# Patient Record
Sex: FEMALE | Race: OTHER | NOT HISPANIC OR LATINO | Employment: OTHER | URBAN - METROPOLITAN AREA
[De-identification: names, ages, dates, MRNs, and addresses within clinical notes are randomized per-mention and may not be internally consistent; named-entity substitution may affect disease eponyms.]

---

## 2017-02-16 ENCOUNTER — TRANSCRIBE ORDERS (OUTPATIENT)
Dept: ADMINISTRATIVE | Facility: HOSPITAL | Age: 72
End: 2017-02-16

## 2017-02-16 ENCOUNTER — ALLSCRIPTS OFFICE VISIT (OUTPATIENT)
Dept: OTHER | Facility: OTHER | Age: 72
End: 2017-02-16

## 2017-02-16 DIAGNOSIS — R07.9 CHEST PAIN, UNSPECIFIED: Primary | ICD-10-CM

## 2017-03-02 ENCOUNTER — HOSPITAL ENCOUNTER (OUTPATIENT)
Dept: NUCLEAR MEDICINE | Facility: HOSPITAL | Age: 72
Discharge: HOME | End: 2017-03-02
Attending: INTERNAL MEDICINE
Payer: COMMERCIAL

## 2017-03-02 ENCOUNTER — HOSPITAL ENCOUNTER (OUTPATIENT)
Dept: NON INVASIVE DIAGNOSTICS | Facility: HOSPITAL | Age: 72
Discharge: HOME | End: 2017-03-02
Attending: INTERNAL MEDICINE
Payer: COMMERCIAL

## 2017-03-02 DIAGNOSIS — I10 ESSENTIAL (PRIMARY) HYPERTENSION: ICD-10-CM

## 2017-03-02 DIAGNOSIS — E78.5 HYPERLIPIDEMIA: ICD-10-CM

## 2017-03-02 DIAGNOSIS — I25.10 ATHEROSCLEROTIC HEART DISEASE OF NATIVE CORONARY ARTERY WITHOUT ANGINA PECTORIS: ICD-10-CM

## 2017-03-02 DIAGNOSIS — R07.9 CHEST PAIN: ICD-10-CM

## 2017-03-02 DIAGNOSIS — R07.9 CHEST PAIN, UNSPECIFIED: ICD-10-CM

## 2017-03-02 PROCEDURE — 93017 CV STRESS TEST TRACING ONLY: CPT

## 2017-03-02 PROCEDURE — A9502 TC99M TETROFOSMIN: HCPCS

## 2017-03-02 PROCEDURE — 78452 HT MUSCLE IMAGE SPECT MULT: CPT

## 2017-03-02 RX ADMIN — REGADENOSON 0.4 MG: 0.08 INJECTION, SOLUTION INTRAVENOUS at 10:08

## 2017-03-03 ENCOUNTER — CONVERSION ENCOUNTER (OUTPATIENT)
Dept: MAMMOGRAPHY | Facility: CLINIC | Age: 72
End: 2017-03-03

## 2018-01-12 VITALS
BODY MASS INDEX: 35.87 KG/M2 | HEART RATE: 74 BPM | SYSTOLIC BLOOD PRESSURE: 116 MMHG | WEIGHT: 190 LBS | RESPIRATION RATE: 18 BRPM | HEIGHT: 61 IN | DIASTOLIC BLOOD PRESSURE: 64 MMHG

## 2018-02-22 RX ORDER — PREGABALIN 75 MG/1
75 CAPSULE ORAL
COMMUNITY
Start: 2013-10-22

## 2018-02-22 RX ORDER — METRONIDAZOLE 500 MG/1
500 TABLET ORAL
COMMUNITY
Start: 2013-10-22 | End: 2019-04-27 | Stop reason: ALTCHOICE

## 2018-02-22 RX ORDER — LEVOFLOXACIN 500 MG/1
500 TABLET, FILM COATED ORAL
COMMUNITY
Start: 2013-10-22 | End: 2019-04-27 | Stop reason: ALTCHOICE

## 2018-02-22 RX ORDER — EZETIMIBE 10 MG/1
10 TABLET ORAL DAILY
Refills: 3 | COMMUNITY
Start: 2018-02-07 | End: 2020-06-09 | Stop reason: SDUPTHER

## 2018-02-22 RX ORDER — ERGOCALCIFEROL 1.25 MG/1
CAPSULE ORAL
Refills: 3 | COMMUNITY
Start: 2018-02-09 | End: 2020-07-20

## 2018-02-22 RX ORDER — DILTIAZEM HYDROCHLORIDE 240 MG/1
240 CAPSULE, COATED, EXTENDED RELEASE ORAL DAILY
Refills: 5 | COMMUNITY
Start: 2018-02-07 | End: 2020-01-10

## 2018-02-22 RX ORDER — ATORVASTATIN CALCIUM 40 MG/1
40 TABLET, FILM COATED ORAL
COMMUNITY
Start: 2013-10-18 | End: 2021-10-26 | Stop reason: ALTCHOICE

## 2018-02-22 RX ORDER — LOSARTAN POTASSIUM 100 MG/1
100 TABLET ORAL DAILY
Refills: 3 | COMMUNITY
Start: 2018-02-09 | End: 2020-02-04

## 2018-02-22 RX ORDER — FOLIC ACID 1 MG/1
1 TABLET ORAL DAILY
COMMUNITY
Start: 2011-10-07 | End: 2020-03-24 | Stop reason: SDUPTHER

## 2018-02-22 RX ORDER — METOPROLOL SUCCINATE 50 MG/1
50 TABLET, EXTENDED RELEASE ORAL DAILY
Refills: 5 | COMMUNITY
Start: 2018-02-07 | End: 2020-10-15 | Stop reason: SDUPTHER

## 2018-02-22 RX ORDER — OLMESARTAN MEDOXOMIL 40 MG/1
1 TABLET ORAL DAILY
COMMUNITY

## 2018-02-22 RX ORDER — LEVOTHYROXINE SODIUM 112 UG/1
112 TABLET ORAL DAILY
Refills: 5 | COMMUNITY
Start: 2018-02-07 | End: 2019-11-11 | Stop reason: SDUPTHER

## 2018-02-22 RX ORDER — NITROGLYCERIN 0.4 MG/1
1 TABLET SUBLINGUAL
COMMUNITY
Start: 2011-06-23

## 2018-02-22 RX ORDER — HYDROCHLOROTHIAZIDE 25 MG/1
25 TABLET ORAL DAILY
Refills: 1 | COMMUNITY
Start: 2018-02-09 | End: 2020-08-03 | Stop reason: SDUPTHER

## 2018-02-27 ENCOUNTER — OFFICE VISIT (OUTPATIENT)
Dept: CARDIOLOGY CLINIC | Facility: CLINIC | Age: 73
End: 2018-02-27
Payer: COMMERCIAL

## 2018-02-27 VITALS
HEART RATE: 59 BPM | SYSTOLIC BLOOD PRESSURE: 122 MMHG | BODY MASS INDEX: 36.7 KG/M2 | HEIGHT: 61 IN | WEIGHT: 194.4 LBS | DIASTOLIC BLOOD PRESSURE: 74 MMHG

## 2018-02-27 DIAGNOSIS — I10 ESSENTIAL HYPERTENSION: ICD-10-CM

## 2018-02-27 DIAGNOSIS — I25.118 CORONARY ARTERY DISEASE OF NATIVE ARTERY OF NATIVE HEART WITH STABLE ANGINA PECTORIS (HCC): Primary | ICD-10-CM

## 2018-02-27 DIAGNOSIS — E78.5 DYSLIPIDEMIA: ICD-10-CM

## 2018-02-27 PROCEDURE — 93000 ELECTROCARDIOGRAM COMPLETE: CPT | Performed by: INTERNAL MEDICINE

## 2018-02-27 PROCEDURE — 99214 OFFICE O/P EST MOD 30 MIN: CPT | Performed by: INTERNAL MEDICINE

## 2018-02-27 NOTE — LETTER
February 27, 2018     Haider Bermudez MD  [address] [address] [address]    Patient: Tatyana Guthrie   YOB: 1945   Date of Visit: 2/27/2018       Dear Dr Janet Luong: Thank you for referring Tatyana Guthrie to me for evaluation  Below are my notes for this consultation  If you have questions, please do not hesitate to call me  I look forward to following your patient along with you  Sincerely,        Bessy Murdock MD        CC: No Recipients  Bessy Murdock MD  2/27/2018  9:02 AM  Sign at close encounter                                             Cardiology Follow Up    Tatyana Guthrie  1945  [id number]  [address]  [address] [address]    1  Coronary artery disease of native artery of native heart with stable angina pectoris ([facility] [address] )     2  Essential hypertension  POCT ECG   3  Dyslipidemia         Discussion/Summary:  81-year-old female with a history of coronary artery disease without prior intervention  This has been stable  1   CAD:  Back pain, but reports this is stable  Been evaluated last year with a pharmacologic nuclear stress test which was unremarkable  She remains on aspirin, Toprol, Zetia with intolerance to statins in the past   Blood work followed by PCP  Goal LDL ideally 70  Not using sublingual nitroglycerin  2   Hypertension:  Controlled on her current regimen  3   Dyslipidemia:  As above  Continue Zetia  PCP through [address] [place] [address] system  Hopefully will be able to see these results in the near future  No testing or changes indicated at this time  Follow up one year unless changes in the interim  History of Present Illness:     81-year-old female  Cardiac catheterization in 2009 showed disease of diagonal   Otherwise no significant CAD  Treated medically  No intervention performed  Seen last 1 year ago  At that time, she was having some discomfort in her back between her shoulder blades  Concerning for angina, so we proceeded with a pharmacologic nuclear stress test   There were no significant abnormalities noted on the study  Since that time, she says back pain has remained constant, today tells me that this has been a much more longstanding issue  She has prior surgeries on her back as well  She complains of neurologic type pain on the left side of her body and also an abnormality when she hears a sudden loud noise that startles her  She has elevated liver chemistries with statins in the past, therefore is maintained on Zetia    Blood work is followed by her PCP, who does this twice a year  Problem List     Essential hypertension    Coronary artery disease of native artery of native heart with stable angina pectoris ([facility] [address] )    Dyslipidemia        No past medical history on file  Social History     Social History    Marital status: /Civil Union     Spouse name: N/A    Number of children: N/A    Years of education: N/A     Occupational History    Not on file  Social History Main Topics    Smoking status: Not on file    Smokeless tobacco: Not on file    Alcohol use Not on file    Drug use: Unknown    Sexual activity: Not on file     Other Topics Concern    Not on file     Social History Narrative    No narrative on file      No family history on file  No past surgical history on file      Current Outpatient Prescriptions:     aspirin 81 MG tablet, Take 1 tablet by mouth daily, Disp: , Rfl:     atorvastatin (LIPITOR) 40 mg tablet, Take 40 mg by mouth, Disp: , Rfl:     Calcium Carb-Cholecalciferol (OYSCO 500 + D) 500-200 MG-UNIT TABS, Reported on 1/12/2017 , Disp: , Rfl:     diltiazem (CARDIZEM CD) 240 mg 24 hr capsule, Take 240 mg by mouth daily, Disp: , Rfl: 5    ergocalciferol (VITAMIN D2) 50,000 units, TAKE ONE CAPSULE WEEKLY, Disp: , Rfl: 3    ezetimibe (ZETIA) 10 mg tablet, Take 10 mg by mouth daily, Disp: , Rfl: 3    folic acid (FOLVITE) 1 mg tablet, Take 1 tablet by mouth daily, Disp: , Rfl:     hydrochlorothiazide (HYDRODIURIL) 25 mg tablet, Take 25 mg by mouth daily, Disp: , Rfl: 1    levofloxacin (LEVAQUIN) 500 mg tablet, Take 500 mg by mouth, Disp: , Rfl:     levothyroxine 112 mcg tablet, Take 112 mcg by mouth daily, Disp: , Rfl: 5    losartan (COZAAR) 100 MG tablet, Take 100 mg by mouth daily, Disp: , Rfl: 3    metoprolol succinate (TOPROL-XL) 50 mg 24 hr tablet, Take 50 mg by mouth daily, Disp: , Rfl: 5    metroNIDAZOLE (FLAGYL) 500 mg tablet, Take 500 mg by mouth, Disp: , Rfl:     nitroglycerin (NITROSTAT) 0 4 mg SL tablet, Place 1 tablet under the tongue every 5 (five) minutes as needed, Disp: , Rfl:     olmesartan (BENICAR) 40 mg tablet, Take 1 tablet by mouth daily, Disp: , Rfl:     pregabalin (LYRICA) 75 mg capsule, Take 75 mg by mouth, Disp: , Rfl:   Allergies   Allergen Reactions    Codeine        Vitals:    02/27/18 0833   BP: 122/74   BP Location: Left arm   Patient Position: Sitting   Cuff Size: Large   Pulse: 59   Weight: 88 2 kg (194 lb 6 4 oz)   Height: 5' 1" (1 549 m)     Vitals:    02/27/18 0833   Weight: 88 2 kg (194 lb 6 4 oz)      Height: 5' 1" (154 9 cm)   Body mass index is 36 73 kg/m²  Physical Exam:  GENERAL: Alert, well appearing, and in no distress  HEENT:  PERRL, EOMI, no scleral icterus, no conjunctival pallor  NECK:  Supple, No elevated JVP, no thyromegaly, no carotid bruits  HEART:  Regular rate and rhythm, normal S1/S2, no S3/S4, no murmur or rub  LUNGS:  Clear to auscultation bilaterally  ABDOMEN:  Soft, non-tender, positive bowel sounds, no rebound or guarding  EXTREMITIES:  No edema  VASCULAR:  Normal pedal pulses   NEURO: No focal deficits,  SKIN: Normal without suspicious lesions on exposed skin    ROS:  Positive for parasthesias, back pain, abnormal hearing on left, headaches, weakness, fatigue, shaking    Except as noted in HPI, is otherwise reviewed in detail and a 12 point review of systems is negative  Imaging:  Pharmacologic nuclear stress test 2/2017:  Normal perfusion  Normal LV function  Normal study  EKG:  Sinus rhythm 60 beats per minute  First-degree AV block  Nonspecific T-wave abnormality

## 2018-02-27 NOTE — PROGRESS NOTES
Cardiology Follow Up    [facility] Delon  1945  [id number]  [address]    1  Coronary artery disease of native artery of native heart with stable angina pectoris ([address] )     2  Essential hypertension  POCT ECG   3  Dyslipidemia         Discussion/Summary:  67-year-old female with a history of coronary artery disease without prior intervention  This has been stable  1   CAD:  Back pain, but reports this is stable  Been evaluated last year with a pharmacologic nuclear stress test which was unremarkable  She remains on aspirin, Toprol, Zetia with intolerance to statins in the past   Blood work followed by PCP  Goal LDL ideally 70  Not using sublingual nitroglycerin  2   Hypertension:  Controlled on her current regimen  3   Dyslipidemia:  As above  Continue Zetia  PCP through [facility] system  Hopefully will be able to see these results in the near future  No testing or changes indicated at this time  Follow up one year unless changes in the interim  History of Present Illness:     67-year-old female  Cardiac catheterization in 2009 showed disease of diagonal   Otherwise no significant CAD  Treated medically  No intervention performed  Seen last 1 year ago  At that time, she was having some discomfort in her back between her shoulder blades  Concerning for angina, so we proceeded with a pharmacologic nuclear stress test   There were no significant abnormalities noted on the study  Since that time, she says back pain has remained constant, today tells me that this has been a much more longstanding issue  She has prior surgeries on her back as well  She complains of neurologic type pain on the left side of her body and also an abnormality when she hears a sudden loud noise that startles her      She has elevated liver chemistries with statins in the past, therefore is maintained on Zetia    Blood work is followed by her PCP, who does this twice a year  Problem List     Essential hypertension    Coronary artery disease of native artery of native heart with stable angina pectoris ([facility] [address] )    Dyslipidemia        No past medical history on file  Social History     Social History    Marital status: /Civil Union     Spouse name: N/A    Number of children: N/A    Years of education: N/A     Occupational History    Not on file  Social History Main Topics    Smoking status: Not on file    Smokeless tobacco: Not on file    Alcohol use Not on file    Drug use: Unknown    Sexual activity: Not on file     Other Topics Concern    Not on file     Social History Narrative    No narrative on file      No family history on file  No past surgical history on file      Current Outpatient Prescriptions:     aspirin 81 MG tablet, Take 1 tablet by mouth daily, Disp: , Rfl:     atorvastatin (LIPITOR) 40 mg tablet, Take 40 mg by mouth, Disp: , Rfl:     Calcium Carb-Cholecalciferol (OYSCO 500 + D) 500-200 MG-UNIT TABS, Reported on 1/12/2017 , Disp: , Rfl:     diltiazem (CARDIZEM CD) 240 mg 24 hr capsule, Take 240 mg by mouth daily, Disp: , Rfl: 5    ergocalciferol (VITAMIN D2) 50,000 units, TAKE ONE CAPSULE WEEKLY, Disp: , Rfl: 3    ezetimibe (ZETIA) 10 mg tablet, Take 10 mg by mouth daily, Disp: , Rfl: 3    folic acid (FOLVITE) 1 mg tablet, Take 1 tablet by mouth daily, Disp: , Rfl:     hydrochlorothiazide (HYDRODIURIL) 25 mg tablet, Take 25 mg by mouth daily, Disp: , Rfl: 1    levofloxacin (LEVAQUIN) 500 mg tablet, Take 500 mg by mouth, Disp: , Rfl:     levothyroxine 112 mcg tablet, Take 112 mcg by mouth daily, Disp: , Rfl: 5    losartan (COZAAR) 100 MG tablet, Take 100 mg by mouth daily, Disp: , Rfl: 3    metoprolol succinate (TOPROL-XL) 50 mg 24 hr tablet, Take 50 mg by mouth daily, Disp: , Rfl: 5    metroNIDAZOLE (FLAGYL) 500 mg tablet, Take 500 mg by mouth, Disp: , Rfl:     nitroglycerin (NITROSTAT) 0 4 mg SL tablet, Place 1 tablet under the tongue every 5 (five) minutes as needed, Disp: , Rfl:     olmesartan (BENICAR) 40 mg tablet, Take 1 tablet by mouth daily, Disp: , Rfl:     pregabalin (LYRICA) 75 mg capsule, Take 75 mg by mouth, Disp: , Rfl:   Allergies   Allergen Reactions    Codeine        Vitals:    02/27/18 0833   BP: 122/74   BP Location: Left arm   Patient Position: Sitting   Cuff Size: Large   Pulse: 59   Weight: 88 2 kg (194 lb 6 4 oz)   Height: 5' 1" (1 549 m)     Vitals:    02/27/18 0833   Weight: 88 2 kg (194 lb 6 4 oz)      Height: 5' 1" (154 9 cm)   Body mass index is 36 73 kg/m²  Physical Exam:  GENERAL: Alert, well appearing, and in no distress  HEENT:  PERRL, EOMI, no scleral icterus, no conjunctival pallor  NECK:  Supple, No elevated JVP, no thyromegaly, no carotid bruits  HEART:  Regular rate and rhythm, normal S1/S2, no S3/S4, no murmur or rub  LUNGS:  Clear to auscultation bilaterally  ABDOMEN:  Soft, non-tender, positive bowel sounds, no rebound or guarding  EXTREMITIES:  No edema  VASCULAR:  Normal pedal pulses   NEURO: No focal deficits,  SKIN: Normal without suspicious lesions on exposed skin    ROS:  Positive for parasthesias, back pain, abnormal hearing on left, headaches, weakness, fatigue, shaking  Except as noted in HPI, is otherwise reviewed in detail and a 12 point review of systems is negative  Imaging:  Pharmacologic nuclear stress test 2/2017:  Normal perfusion  Normal LV function  Normal study  EKG:  Sinus rhythm 60 beats per minute  First-degree AV block  Nonspecific T-wave abnormality

## 2018-04-04 ENCOUNTER — CONVERSION ENCOUNTER (OUTPATIENT)
Dept: MAMMOGRAPHY | Facility: CLINIC | Age: 73
End: 2018-04-04

## 2018-05-03 LAB
ABSOL LYMPHOCYTES (HISTORICAL): 2.9 K/UL (ref 0.5–4)
ALBUMIN SERPL BCP-MCNC: 4.3 G/DL (ref 3–5.2)
ALP SERPL-CCNC: 63 U/L (ref 43–122)
ALT SERPL W P-5'-P-CCNC: 51 U/L (ref 9–52)
ANION GAP SERPL CALCULATED.3IONS-SCNC: 8 MMOL/L (ref 5–14)
AST SERPL W P-5'-P-CCNC: 39 U/L (ref 14–36)
BASOPHILS # BLD AUTO: 0.1 K/UL (ref 0–0.1)
BASOPHILS # BLD AUTO: 1 % (ref 0–1)
BILIRUB SERPL-MCNC: 0.4 MG/DL
BUN SERPL-MCNC: 16 MG/DL (ref 5–25)
CALCIUM SERPL-MCNC: 9.9 MG/DL (ref 8.4–10.2)
CHLORIDE SERPL-SCNC: 100 MEQ/L (ref 97–108)
CHOLEST SERPL-MCNC: 179 MG/DL
CHOLEST/HDLC SERPL: 2.6 {RATIO}
CK SERPL-CCNC: 113 U/L (ref 30–135)
CO2 SERPL-SCNC: 30 MMOL/L (ref 22–30)
CREATINE, SERUM (HISTORICAL): 0.93 MG/DL (ref 0.6–1.2)
DEPRECATED RDW RBC AUTO: 14 %
EGFR (HISTORICAL): 59 ML/MIN/1.73 M2
EOSINOPHIL # BLD AUTO: 0.2 K/UL (ref 0–0.4)
EOSINOPHIL NFR BLD AUTO: 3 % (ref 0–6)
GLUCOSE SERPL-MCNC: 91 MG/DL (ref 70–99)
HCT VFR BLD AUTO: 41 % (ref 36–46)
HDLC SERPL-MCNC: 69 MG/DL
HGB BLD-MCNC: 12.8 G/DL (ref 12–16)
LDL/HDL RATIO (HISTORICAL): 1.3
LDLC SERPL CALC-MCNC: 87 MG/DL
LYMPHOCYTES NFR BLD AUTO: 38 % (ref 25–45)
MCH RBC QN AUTO: 26.8 PG (ref 26–34)
MCHC RBC AUTO-ENTMCNC: 31.3 % (ref 31–36)
MCV RBC AUTO: 86 FL (ref 80–100)
MONOCYTES # BLD AUTO: 0.7 K/UL (ref 0.2–0.9)
MONOCYTES NFR BLD AUTO: 9 % (ref 1–10)
NEUTROPHILS ABS COUNT (HISTORICAL): 3.8 K/UL (ref 1.8–7.8)
NEUTS SEG NFR BLD AUTO: 49 % (ref 45–65)
PLATELET # BLD AUTO: 215 K/MCL (ref 150–450)
POTASSIUM SERPL-SCNC: 4.5 MEQ/L (ref 3.6–5)
RBC # BLD AUTO: 4.78 M/MCL (ref 4–5.2)
SODIUM SERPL-SCNC: 139 MEQ/L (ref 137–147)
TOTAL PROTEIN (HISTORICAL): 7 G/DL (ref 5.9–8.4)
TRIGL SERPL-MCNC: 113 MG/DL
TSH SERPL DL<=0.05 MIU/L-ACNC: 6.65 UIU/ML (ref 0.47–4.68)
URIC ACID (HISTORICAL): 6.9 MG/DL (ref 2.5–7.5)
VLDLC SERPL CALC-MCNC: 23 MG/DL (ref 0–40)
WBC # BLD AUTO: 7.7 K/MCL (ref 4.5–11)

## 2018-06-15 ENCOUNTER — EVALUATION (OUTPATIENT)
Dept: PHYSICAL THERAPY | Facility: CLINIC | Age: 73
End: 2018-06-15
Payer: COMMERCIAL

## 2018-06-15 DIAGNOSIS — M54.16 LUMBAR RADICULOPATHY: ICD-10-CM

## 2018-06-15 DIAGNOSIS — M17.11 OSTEOARTHRITIS OF RIGHT KNEE, UNSPECIFIED OSTEOARTHRITIS TYPE: ICD-10-CM

## 2018-06-15 DIAGNOSIS — M17.12 OSTEOARTHRITIS OF LEFT KNEE, UNSPECIFIED OSTEOARTHRITIS TYPE: ICD-10-CM

## 2018-06-15 DIAGNOSIS — M25.562 LEFT KNEE PAIN, UNSPECIFIED CHRONICITY: Primary | ICD-10-CM

## 2018-06-15 PROCEDURE — 97162 PT EVAL MOD COMPLEX 30 MIN: CPT | Performed by: PHYSICAL MEDICINE & REHABILITATION

## 2018-06-15 PROCEDURE — G8991 OTHER PT/OT GOAL STATUS: HCPCS | Performed by: PHYSICAL MEDICINE & REHABILITATION

## 2018-06-15 PROCEDURE — G8990 OTHER PT/OT CURRENT STATUS: HCPCS | Performed by: PHYSICAL MEDICINE & REHABILITATION

## 2018-06-15 NOTE — LETTER
2018    Wale Greene PA-C  [address] [facility] [address]    Patient: Fallon Mcclain   YOB: 1945   Date of Visit: 6/15/2018     Encounter Diagnosis     ICD-10-CM    1  Left knee pain, unspecified chronicity M25 562 CANCELED: PT plan of care cert/re-cert     CANCELED: PT plan of care cert/re-cert   2  Lumbar radiculopathy M54 16 CANCELED: PT plan of care cert/re-cert     CANCELED: PT plan of care cert/re-cert   3  Osteoarthritis of left knee, unspecified osteoarthritis type M17 12 CANCELED: PT plan of care cert/re-cert     CANCELED: PT plan of care cert/re-cert   4  Osteoarthritis of right knee, unspecified osteoarthritis type M17 11 CANCELED: PT plan of care cert/re-cert     CANCELED: PT plan of care cert/re-cert       Dear Dr Yair Gonzales:    Please review the attached Plan of Care from [facility] recent visit  Please verify that you agree therapy should continue by signing the attached document and sending it back to our office  If you have any questions or concerns, please don't hesitate to call  Sincerely,    Edgar Lopez, PT      Referring Provider:      I certify that I have read the below Plan of Care and certify the need for these services furnished under this plan of treatment while under my care  Wale Greene PA-C  [address] [facility] [address]  VIA Facsimile: [phone]          PT Evaluation     Today's date: 6/15/2018  Patient name: Fallon Mcclain  : 1945  MRN: [id number]  Referring provider: Randal Perez  Dx:   Encounter Diagnosis     ICD-10-CM    1  Left knee pain, unspecified chronicity M25 562    2  Lumbar radiculopathy M54 16    3  Osteoarthritis of left knee, unspecified osteoarthritis type M17 12    4   Osteoarthritis of right knee, unspecified osteoarthritis type M17 11                   Assessment  Impairments: abnormal gait, abnormal or restricted ROM, lacks appropriate home exercise program, pain with function and poor posture     Assessment details: Patient is a 68year old female presenting with ROM and soft tissue restrictions, decreased strength, LBP, and bilateral knee pain  Current deficits limit functional activity tolerance and household mobility  Patient demonstrates good understanding and is motivated to participate in treatment plan  Patient would benefit from structured PT intervention to address current deficits and maximize functional mobility  Thank you for the referral    Understanding of Dx/Px/POC: good   Prognosis: good    Goals  1  Patient will report decreased pain with activity by at least 2 points within 4 weeks  2  Patient will improve ROM 5-10 degrees within 4 weeks  3  Patient will demonstrate improved strength by at least 1/2 grade within 4 weeks  4  Patient will ambulate greater than 2 blocks without requiring seated rest   5  Patient will climb stairs reciprocally with use of handrail by discharge      Plan  Patient would benefit from: skilled physical therapy  Planned modality interventions: TENS, cryotherapy and thermotherapy: hydrocollator packs  Planned therapy interventions: abdominal trunk stabilization, manual therapy, postural training, neuromuscular re-education, strengthening, stretching, therapeutic activities, therapeutic exercise, transfer training, gait training and home exercise program  Frequency: 2x week  Duration in weeks: 4  Treatment plan discussed with: patient and family        Subjective Evaluation    History of Present Illness  Mechanism of injury: Patient presents with complaints of LBP as well as bilateral knee pain, R>L  R knee pain present for 4 weeks, left knee pain 1 year of insidious onset  Long history of back pain secondary to stenosis, laminectomy performed in 2000  Patient notes constant numbness in the lateral LLE, cramping at night  Patient notes difficulty with ambulation greater than 2 blocks, stairclimbing   Increased pain in knees at night when getting up from bed to use the bathroom  Pain  Current pain ratin  At best pain ratin  At worst pain ratin  Aggravating factors: stair climbing, walking and standing          Objective     Active Range of Motion     Lumbar   Flexion: WFL  Extension: Active lumbar extension: Neutral  with pain  Left lateral flexion: 25 degrees WFL  Right lateral flexion: 22 degrees   Left Knee   Flexion: 118 degrees     Right Knee   Flexion: 100 degrees     Additional Active Range of Motion Details  Rotation 75% with pain  Increased lumbar lordosis in neutral stance  Patient rests in 10 deg trunk flexion    Strength/Myotome Testing     Left Knee   Flexion: 4+  Extension: 4+    Right Knee   Normal strength    General Comments     Lumbar Comments  Gait: slight right lateral trunk shift, left hip appears elevated vs  Right  Decreased gait speed, step length, heel strike, hip and knee excursion       Hip Comments   L hip flexion produces back pain in sitting and supine, 4/5  (+) passive SLR, 30 deg L 50 deg R  90/90 -45 bilaterally  Patient unable to complete bridge for leveling of pelvis, plan to perform longsit test per patient tolerance NV  Increased back pain with passive log roll for assessment of hip IR/ER          Precautions: previous back surgery    Daily Treatment Diary     Manual              Lumbar rotation mobs             B knee PROM                                                        Exercise Diary              UBE stand with TA             TA              TA with hip abd             Seated HS stretch             Lumbar roll outs             APT/PPT             LAQ             HR/TR                                                                                                                                                                             Modalities              CP/MHP/TENS prn

## 2018-06-15 NOTE — PROGRESS NOTES
PT Evaluation     Today's date: 6/15/2018  Patient name: Claudeen Norman  : 1945  MRN: [id number]  Referring provider: Lisa Hurley  Dx:   Encounter Diagnosis     ICD-10-CM    1  Left knee pain, unspecified chronicity M25 562    2  Lumbar radiculopathy M54 16    3  Osteoarthritis of left knee, unspecified osteoarthritis type M17 12    4  Osteoarthritis of right knee, unspecified osteoarthritis type M17 11                   Assessment  Impairments: abnormal gait, abnormal or restricted ROM, lacks appropriate home exercise program, pain with function and poor posture     Assessment details: Patient is a 68year old female presenting with ROM and soft tissue restrictions, decreased strength, LBP, and bilateral knee pain  Current deficits limit functional activity tolerance and household mobility  Patient demonstrates good understanding and is motivated to participate in treatment plan  Patient would benefit from structured PT intervention to address current deficits and maximize functional mobility  Thank you for the referral    Understanding of Dx/Px/POC: good   Prognosis: good    Goals  1  Patient will report decreased pain with activity by at least 2 points within 4 weeks  2  Patient will improve ROM 5-10 degrees within 4 weeks  3  Patient will demonstrate improved strength by at least 1/2 grade within 4 weeks  4   Patient will ambulate greater than 2 blocks without requiring seated rest   5  Patient will climb stairs reciprocally with use of handrail by discharge      Plan  Patient would benefit from: skilled physical therapy  Planned modality interventions: TENS, cryotherapy and thermotherapy: hydrocollator packs  Planned therapy interventions: abdominal trunk stabilization, manual therapy, postural training, neuromuscular re-education, strengthening, stretching, therapeutic activities, therapeutic exercise, transfer training, gait training and home exercise program  Frequency: 2x week  Duration in weeks: 4  Treatment plan discussed with: patient and family        Subjective Evaluation    History of Present Illness  Mechanism of injury: Patient presents with complaints of LBP as well as bilateral knee pain, R>L  R knee pain present for 4 weeks, left knee pain 1 year of insidious onset  Long history of back pain secondary to stenosis, laminectomy performed in   Patient notes constant numbness in the lateral LLE, cramping at night  Patient notes difficulty with ambulation greater than 2 blocks, stairclimbing  Increased pain in knees at night when getting up from bed to use the bathroom  Pain  Current pain ratin  At best pain ratin  At worst pain ratin  Aggravating factors: stair climbing, walking and standing          Objective     Active Range of Motion     Lumbar   Flexion: WFL  Extension: Active lumbar extension: Neutral  with pain  Left lateral flexion: 25 degrees WFL  Right lateral flexion: 22 degrees   Left Knee   Flexion: 118 degrees     Right Knee   Flexion: 100 degrees     Additional Active Range of Motion Details  Rotation 75% with pain  Increased lumbar lordosis in neutral stance  Patient rests in 10 deg trunk flexion    Strength/Myotome Testing     Left Knee   Flexion: 4+  Extension: 4+    Right Knee   Normal strength    General Comments     Lumbar Comments  Gait: slight right lateral trunk shift, left hip appears elevated vs  Right  Decreased gait speed, step length, heel strike, hip and knee excursion       Hip Comments   L hip flexion produces back pain in sitting and supine, 4/5  (+) passive SLR, 30 deg L 50 deg R  90/90 -45 bilaterally  Patient unable to complete bridge for leveling of pelvis, plan to perform longsit test per patient tolerance NV  Increased back pain with passive log roll for assessment of hip IR/ER          Precautions: previous back surgery    Daily Treatment Diary     Manual              Lumbar rotation mobs             B knee PROM Exercise Diary              UBE stand with TA             TA              TA with hip abd             Seated HS stretch             Lumbar roll outs             APT/PPT             LAQ             HR/TR                                                                                                                                                                             Modalities              CP/MHP/TENS prn

## 2018-06-18 ENCOUNTER — OFFICE VISIT (OUTPATIENT)
Dept: PHYSICAL THERAPY | Facility: CLINIC | Age: 73
End: 2018-06-18
Payer: COMMERCIAL

## 2018-06-18 DIAGNOSIS — M17.11 OSTEOARTHRITIS OF RIGHT KNEE, UNSPECIFIED OSTEOARTHRITIS TYPE: ICD-10-CM

## 2018-06-18 DIAGNOSIS — M25.562 LEFT KNEE PAIN, UNSPECIFIED CHRONICITY: Primary | ICD-10-CM

## 2018-06-18 DIAGNOSIS — M17.12 OSTEOARTHRITIS OF LEFT KNEE, UNSPECIFIED OSTEOARTHRITIS TYPE: ICD-10-CM

## 2018-06-18 DIAGNOSIS — M54.16 LUMBAR RADICULOPATHY: ICD-10-CM

## 2018-06-18 PROCEDURE — 97140 MANUAL THERAPY 1/> REGIONS: CPT

## 2018-06-18 PROCEDURE — 97110 THERAPEUTIC EXERCISES: CPT

## 2018-06-18 PROCEDURE — 97150 GROUP THERAPEUTIC PROCEDURES: CPT

## 2018-06-18 NOTE — PROGRESS NOTES
Daily Note     Today's date: 2018  Patient name: Hugo Peralta  : 1945  MRN: [id number]  Referring provider: Ranjan Carney  Dx:   Encounter Diagnosis     ICD-10-CM    1  Left knee pain, unspecified chronicity M25 562    2  Lumbar radiculopathy M54 16    3  Osteoarthritis of left knee, unspecified osteoarthritis type M17 12    4  Osteoarthritis of right knee, unspecified osteoarthritis type M17 11                   Subjective: Pt presents to PT reports pain continues in B knees and low back  She denies pain in low back at this time, She grades the pain in R knee a 5-6/10 and L a 4-5/10  She reports "numbness" in B LE's; L>R  Pt reports no change in paresthesia and denies increased pain in low back; she reports "pressure" in B LE's, plantar surface of feet numb  Objective: See treatment diary below    Precautions: previous back surgery    Daily Treatment Diary     Manual  18            Lumbar rotation mobs             B knee PROM                                                        Exercise Diary  18            UBE stand with TA: bkwrds 2 mins            TA  5" x 15            TA with hip abd 5" x 15            Seated HS stretch: w/ step stool 15" x 3 ea            Lumbar roll outs: fwrd 10" x 5            APT/PPT nv            LAQ:B 3" x15 ea            HR/TR 15x ea stand HR, sit TR                                                                                                                                                                            Modalities  18            CP/MHP/TENS prn                                                   Assessment: Tolerated treatment well orffering no complaints  She required occ cuning but otherwise performed TE well  Patient would benefit from continued PT      Plan: Continue per plan of care

## 2018-06-22 ENCOUNTER — OFFICE VISIT (OUTPATIENT)
Dept: PHYSICAL THERAPY | Facility: CLINIC | Age: 73
End: 2018-06-22
Payer: COMMERCIAL

## 2018-06-22 DIAGNOSIS — M54.16 LUMBAR RADICULOPATHY: ICD-10-CM

## 2018-06-22 DIAGNOSIS — M17.12 OSTEOARTHRITIS OF LEFT KNEE, UNSPECIFIED OSTEOARTHRITIS TYPE: ICD-10-CM

## 2018-06-22 DIAGNOSIS — M17.11 OSTEOARTHRITIS OF RIGHT KNEE, UNSPECIFIED OSTEOARTHRITIS TYPE: ICD-10-CM

## 2018-06-22 DIAGNOSIS — M25.562 LEFT KNEE PAIN, UNSPECIFIED CHRONICITY: Primary | ICD-10-CM

## 2018-06-22 PROCEDURE — 97110 THERAPEUTIC EXERCISES: CPT

## 2018-06-22 PROCEDURE — 97150 GROUP THERAPEUTIC PROCEDURES: CPT

## 2018-06-22 NOTE — PROGRESS NOTES
Daily Note     Today's date: 2018  Patient name: Chyna Hager  : 1945  MRN: [id number]  Referring provider: Morgan Munoz  Dx:   Encounter Diagnosis     ICD-10-CM    1  Left knee pain, unspecified chronicity M25 562    2  Lumbar radiculopathy M54 16    3  Osteoarthritis of left knee, unspecified osteoarthritis type M17 12    4  Osteoarthritis of right knee, unspecified osteoarthritis type M17 11                   Subjective:   Pt reports she did not feel well yesterday stating she did not sleep well the night before and had a HA  She reports he BP was high  She states MD appointment stating MD thought it was due to cortisone injection in B knee "about 2 weeks ago"  Pt states she feels better today and has a f/u with PCP 18 with possibility of increasing her BP medication  BP taken as noted below  She was asked to let clinican know if she feels light headed or dizzy, short of breath, sweating, palpitations or chest pain  Pt reports a verbal understanding  Pt presents to PT reports decreased pain in B knees grading the pain a 1-2/10  She reports numbness in  plantar surface of L foot  Pt reports no increased pain post PT session  She states she has had the numbness in L foot since her back surgery        Objective: See treatment diary below    Precautions: previous back surgery    Daily Treatment Diary     Manual  18           Lumbar rotation mobs  np           B knee PROM  np           BP  L UE, seated   170/94                                         Exercise Diary  18           UBE stand with TA: bkwrds 2 mins 3 mins           TA  5" x 15 5" x 15           TA with hip abd 5" x 15 Peach  5" x 15           Seated HS stretch: w/ step stool 15" x 3 ea 15" x 3 ea           Lumbar roll outs: fwrd 10" x 5 10" x 5           APT/PPT nv            LAQ:B 3" x15 ea 3" x20 ea           HR/TR 15x ea stand HR, sit TR 20 x ea  Stand HR, sit TR Modalities  6/18/18 6/22/18           CP/MHP/TENS prn  np                                                 Assessment: Pt demonstrates good tolerance to TE with no complaints  Pt requires some cuing for reps and holds  Pt also required cues for ab bracing and not to hold her breath  Patient would benefit from continued PT      Plan: Continue per plan of care

## 2018-06-25 ENCOUNTER — OFFICE VISIT (OUTPATIENT)
Dept: PHYSICAL THERAPY | Facility: CLINIC | Age: 73
End: 2018-06-25
Payer: COMMERCIAL

## 2018-06-25 DIAGNOSIS — M54.16 LUMBAR RADICULOPATHY: ICD-10-CM

## 2018-06-25 DIAGNOSIS — M17.11 OSTEOARTHRITIS OF RIGHT KNEE, UNSPECIFIED OSTEOARTHRITIS TYPE: ICD-10-CM

## 2018-06-25 DIAGNOSIS — M17.12 OSTEOARTHRITIS OF LEFT KNEE, UNSPECIFIED OSTEOARTHRITIS TYPE: ICD-10-CM

## 2018-06-25 DIAGNOSIS — M25.562 LEFT KNEE PAIN, UNSPECIFIED CHRONICITY: Primary | ICD-10-CM

## 2018-06-25 PROCEDURE — 97110 THERAPEUTIC EXERCISES: CPT

## 2018-06-25 PROCEDURE — 97150 GROUP THERAPEUTIC PROCEDURES: CPT

## 2018-06-25 NOTE — PROGRESS NOTES
Daily Note     Today's date: 2018  Patient name: Ebenezer Ford  : 1945  MRN: [id number]  Referring provider: Riley Boss  Dx:   Encounter Diagnosis     ICD-10-CM    1  Left knee pain, unspecified chronicity M25 562    2  Lumbar radiculopathy M54 16    3  Osteoarthritis of left knee, unspecified osteoarthritis type M17 12    4  Osteoarthritis of right knee, unspecified osteoarthritis type M17 11                   Subjective:   Pt reports increased pain now and over the weekend in R knee secondary to work over the weekend grading the pain a -7/10  She denies pain in L knee  She continues to report numbness in plantar surface of L foot  Pt reports pain increases in L knee with weight bearing activity  Pt reports decreased pain post PT session grading the pain a 2/10        Objective: See treatment diary below    Precautions: previous back surgery    Daily Treatment Diary     Manual  18          Lumbar rotation mobs  np np          B knee PROM  np np          BP  L UE, seated   170/94 L UE, seated   152/88                                        Exercise Diary  18          UBE stand with TA: bkwrds 2 mins 3 mins 3 mins          TA  5" x 15 5" x 15 5" x 15          TA with hip abd 5" x 15 [place]  5" x 15 Org  5" x 15          Seated HS stretch: w/ step stool 15" x 3 ea 15" x 3 ea 15" x 3 ea          Lumbar roll outs: fwrd 10" x 5 10" x 5 10" x 5          APT/PPT nv  10x ea  On pball          LAQ:B 3" x15 ea 3" x20 ea 3" x20 ea          HR/TR 15x ea stand HR, sit TR 20 x ea  Stand HR, sit TR 20 x ea  Stand HR, sit TR                                                                                                                                                                          Modalities  18          CP/MHP/TENS prn  np np                                                Assessment: Pt demonstrates good tolerance to TE with no complaints  Added APT/PPT seated on pball secondary to pt reporting pain in low back with lying supine  She demonstrates limited AROM with tilting  Patient would benefit from continued PT      Plan: Continue per plan of care

## 2018-06-29 ENCOUNTER — OFFICE VISIT (OUTPATIENT)
Dept: PHYSICAL THERAPY | Facility: CLINIC | Age: 73
End: 2018-06-29
Payer: COMMERCIAL

## 2018-06-29 DIAGNOSIS — M17.11 OSTEOARTHRITIS OF RIGHT KNEE, UNSPECIFIED OSTEOARTHRITIS TYPE: ICD-10-CM

## 2018-06-29 DIAGNOSIS — M17.12 OSTEOARTHRITIS OF LEFT KNEE, UNSPECIFIED OSTEOARTHRITIS TYPE: ICD-10-CM

## 2018-06-29 DIAGNOSIS — M25.562 LEFT KNEE PAIN, UNSPECIFIED CHRONICITY: Primary | ICD-10-CM

## 2018-06-29 DIAGNOSIS — M54.16 LUMBAR RADICULOPATHY: ICD-10-CM

## 2018-06-29 PROCEDURE — 97140 MANUAL THERAPY 1/> REGIONS: CPT

## 2018-06-29 NOTE — PROGRESS NOTES
Daily Note     Today's date: 2018  Patient name: Babs Merlos  : 1945  MRN: [id number]  Referring provider: Bisi Thomas  Dx:   Encounter Diagnosis     ICD-10-CM    1  Left knee pain, unspecified chronicity M25 562    2  Lumbar radiculopathy M54 16    3  Osteoarthritis of left knee, unspecified osteoarthritis type M17 12    4  Osteoarthritis of right knee, unspecified osteoarthritis type M17 11                   Subjective:   Pt reports no change in pain since last session  Pt reports she is not "feeling well" today stating she feels like there a "small moments" where she will fall or pass out  Pt states her BP has been higher on and off  She states she went to her PCP Dr Jennifer Del Castillo yesterday and was told if she had any sx or felt worse to go to the ER          Objective: See treatment diary below    Precautions: previous back surgery    Daily Treatment Diary     Manual  18         Lumbar rotation mobs  np np np         B knee PROM  np np np         BP  L UE, seated   170/94 L UE, seated   152/88 L UE, seated   160/92,  HR 51 bpm,  O2 98%             20 min rest   L UE, seated   168/92,  HR 52 bpm,  O2 98%                          Exercise Diary  18         UBE stand with TA: bkwrds 2 mins 3 mins 3 mins          TA  5" x 15 5" x 15 5" x 15          TA with hip abd 5" x 15 [place]  5" x 15 Org  5" x 15          Seated HS stretch: w/ step stool 15" x 3 ea 15" x 3 ea 15" x 3 ea          Lumbar roll outs: fwrd 10" x 5 10" x 5 10" x 5          APT/PPT nv  10x ea  On pball          LAQ:B 3" x15 ea 3" x20 ea 3" x20 ea          HR/TR 15x ea stand HR, sit TR 20 x ea  Stand HR, sit TR 20 x ea  Stand HR, sit TR                                                                                                                                                                          Modalities  18          CP/MHP/TENS prn  np np Assessment: Pt's BP, O2 and HR taken as noted above  While taking vitals noted bradycardia with regular rhythm with HR which pt states PCP is aware of    TE held secondary to pt's BP and her stating she doesn't feel well  Pt was advised to go to ER or contact MD if she begins to feel worse  Patient would benefit from continued PT  Plan: Continue per plan of care

## 2018-08-08 PROCEDURE — 84443 ASSAY THYROID STIM HORMONE: CPT | Performed by: FAMILY MEDICINE

## 2018-08-09 ENCOUNTER — LAB REQUISITION (OUTPATIENT)
Dept: LAB | Facility: HOSPITAL | Age: 73
End: 2018-08-09
Payer: COMMERCIAL

## 2018-08-09 DIAGNOSIS — E03.9 HYPOTHYROIDISM: ICD-10-CM

## 2018-08-09 LAB — TSH SERPL DL<=0.05 MIU/L-ACNC: 0.29 UIU/ML (ref 0.36–3.74)

## 2018-10-17 ENCOUNTER — TRANSCRIBE ORDERS (OUTPATIENT)
Dept: ADMINISTRATIVE | Facility: HOSPITAL | Age: 73
End: 2018-10-17

## 2018-10-17 DIAGNOSIS — Z12.39 SCREENING BREAST EXAMINATION: Primary | ICD-10-CM

## 2018-11-13 ENCOUNTER — LAB REQUISITION (OUTPATIENT)
Dept: LAB | Facility: HOSPITAL | Age: 73
End: 2018-11-13
Payer: COMMERCIAL

## 2018-11-13 DIAGNOSIS — E03.9 HYPOTHYROIDISM: ICD-10-CM

## 2018-11-13 DIAGNOSIS — E78.5 HYPERLIPIDEMIA: ICD-10-CM

## 2018-11-13 DIAGNOSIS — R78.5 FINDING OF OTHER PSYCHOTROPIC DRUG IN BLOOD: ICD-10-CM

## 2018-11-13 DIAGNOSIS — I10 ESSENTIAL (PRIMARY) HYPERTENSION: ICD-10-CM

## 2018-11-13 LAB
ALBUMIN SERPL BCP-MCNC: 3.9 G/DL (ref 3.5–5)
ALP SERPL-CCNC: 68 U/L (ref 46–116)
ALT SERPL W P-5'-P-CCNC: 26 U/L (ref 12–78)
ANION GAP SERPL CALCULATED.3IONS-SCNC: 7 MMOL/L (ref 4–13)
AST SERPL W P-5'-P-CCNC: 26 U/L (ref 5–45)
BASOPHILS # BLD AUTO: 0.1 THOUSANDS/ΜL (ref 0–0.1)
BASOPHILS NFR BLD AUTO: 1 % (ref 0–1)
BILIRUB SERPL-MCNC: 0.28 MG/DL (ref 0.2–1)
BUN SERPL-MCNC: 16 MG/DL (ref 5–25)
CALCIUM SERPL-MCNC: 9.4 MG/DL (ref 8.3–10.1)
CHLORIDE SERPL-SCNC: 104 MMOL/L (ref 100–108)
CHOLEST SERPL-MCNC: 159 MG/DL (ref 50–200)
CK SERPL-CCNC: 83 U/L (ref 26–192)
CO2 SERPL-SCNC: 28 MMOL/L (ref 21–32)
CREAT SERPL-MCNC: 0.98 MG/DL (ref 0.6–1.3)
EOSINOPHIL # BLD AUTO: 0.22 THOUSAND/ΜL (ref 0–0.61)
EOSINOPHIL NFR BLD AUTO: 3 % (ref 0–6)
ERYTHROCYTE [DISTWIDTH] IN BLOOD BY AUTOMATED COUNT: 13.4 % (ref 11.6–15.1)
GFR SERPL CREATININE-BSD FRML MDRD: 58 ML/MIN/1.73SQ M
GLUCOSE P FAST SERPL-MCNC: 89 MG/DL (ref 65–99)
HCT VFR BLD AUTO: 41.3 % (ref 34.8–46.1)
HDLC SERPL-MCNC: 59 MG/DL (ref 40–60)
HGB BLD-MCNC: 12.8 G/DL (ref 11.5–15.4)
IMM GRANULOCYTES # BLD AUTO: 0.03 THOUSAND/UL (ref 0–0.2)
IMM GRANULOCYTES NFR BLD AUTO: 0 % (ref 0–2)
LDLC SERPL CALC-MCNC: 75 MG/DL (ref 0–100)
LYMPHOCYTES # BLD AUTO: 2.46 THOUSANDS/ΜL (ref 0.6–4.47)
LYMPHOCYTES NFR BLD AUTO: 34 % (ref 14–44)
MCH RBC QN AUTO: 27.2 PG (ref 26.8–34.3)
MCHC RBC AUTO-ENTMCNC: 31 G/DL (ref 31.4–37.4)
MCV RBC AUTO: 88 FL (ref 82–98)
MONOCYTES # BLD AUTO: 0.51 THOUSAND/ΜL (ref 0.17–1.22)
MONOCYTES NFR BLD AUTO: 7 % (ref 4–12)
NEUTROPHILS # BLD AUTO: 3.84 THOUSANDS/ΜL (ref 1.85–7.62)
NEUTS SEG NFR BLD AUTO: 55 % (ref 43–75)
NONHDLC SERPL-MCNC: 100 MG/DL
NRBC BLD AUTO-RTO: 0 /100 WBCS
PLATELET # BLD AUTO: 237 THOUSANDS/UL (ref 149–390)
PMV BLD AUTO: 12.7 FL (ref 8.9–12.7)
POTASSIUM SERPL-SCNC: 4.2 MMOL/L (ref 3.5–5.3)
PROT SERPL-MCNC: 6.9 G/DL (ref 6.4–8.2)
RBC # BLD AUTO: 4.7 MILLION/UL (ref 3.81–5.12)
SODIUM SERPL-SCNC: 139 MMOL/L (ref 136–145)
TRIGL SERPL-MCNC: 123 MG/DL
TSH SERPL DL<=0.05 MIU/L-ACNC: 1.84 UIU/ML (ref 0.36–3.74)
WBC # BLD AUTO: 7.16 THOUSAND/UL (ref 4.31–10.16)

## 2018-11-13 PROCEDURE — 84443 ASSAY THYROID STIM HORMONE: CPT | Performed by: FAMILY MEDICINE

## 2018-11-13 PROCEDURE — 80061 LIPID PANEL: CPT | Performed by: FAMILY MEDICINE

## 2018-11-13 PROCEDURE — 80053 COMPREHEN METABOLIC PANEL: CPT | Performed by: FAMILY MEDICINE

## 2018-11-13 PROCEDURE — 85025 COMPLETE CBC W/AUTO DIFF WBC: CPT | Performed by: FAMILY MEDICINE

## 2018-11-13 PROCEDURE — 82550 ASSAY OF CK (CPK): CPT | Performed by: FAMILY MEDICINE

## 2018-11-15 ENCOUNTER — TRANSCRIBE ORDERS (OUTPATIENT)
Dept: ADMINISTRATIVE | Facility: HOSPITAL | Age: 73
End: 2018-11-15

## 2018-11-15 DIAGNOSIS — R92.2 BREAST DENSITY: Primary | ICD-10-CM

## 2018-11-27 ENCOUNTER — HOSPITAL ENCOUNTER (OUTPATIENT)
Dept: MAMMOGRAPHY | Facility: CLINIC | Age: 73
Discharge: HOME | End: 2018-11-27
Payer: COMMERCIAL

## 2018-11-27 ENCOUNTER — HOSPITAL ENCOUNTER (OUTPATIENT)
Dept: ULTRASOUND IMAGING | Facility: HOSPITAL | Age: 73
Discharge: HOME | End: 2018-11-27
Payer: COMMERCIAL

## 2018-11-27 VITALS — WEIGHT: 194 LBS | HEIGHT: 61 IN | BODY MASS INDEX: 36.63 KG/M2

## 2018-11-27 DIAGNOSIS — R92.2 BREAST DENSITY: ICD-10-CM

## 2018-11-27 PROCEDURE — 77065 DX MAMMO INCL CAD UNI: CPT

## 2018-11-27 PROCEDURE — 76642 ULTRASOUND BREAST LIMITED: CPT

## 2019-03-25 ENCOUNTER — OFFICE VISIT (OUTPATIENT)
Dept: CARDIOLOGY CLINIC | Facility: CLINIC | Age: 74
End: 2019-03-25
Payer: COMMERCIAL

## 2019-03-25 VITALS
HEIGHT: 61 IN | WEIGHT: 182 LBS | BODY MASS INDEX: 34.36 KG/M2 | SYSTOLIC BLOOD PRESSURE: 114 MMHG | HEART RATE: 56 BPM | DIASTOLIC BLOOD PRESSURE: 68 MMHG

## 2019-03-25 DIAGNOSIS — I20.8 EFFORT ANGINA (HCC): ICD-10-CM

## 2019-03-25 DIAGNOSIS — I25.118 CORONARY ARTERY DISEASE OF NATIVE ARTERY OF NATIVE HEART WITH STABLE ANGINA PECTORIS (HCC): ICD-10-CM

## 2019-03-25 DIAGNOSIS — E78.5 DYSLIPIDEMIA: ICD-10-CM

## 2019-03-25 DIAGNOSIS — I10 ESSENTIAL HYPERTENSION: Primary | ICD-10-CM

## 2019-03-25 PROBLEM — I65.23 BILATERAL CAROTID ARTERY STENOSIS: Status: ACTIVE | Noted: 2019-03-25

## 2019-03-25 PROCEDURE — 99214 OFFICE O/P EST MOD 30 MIN: CPT | Performed by: INTERNAL MEDICINE

## 2019-03-25 PROCEDURE — 93000 ELECTROCARDIOGRAM COMPLETE: CPT | Performed by: INTERNAL MEDICINE

## 2019-03-25 RX ORDER — ISOSORBIDE MONONITRATE 30 MG/1
30 TABLET, EXTENDED RELEASE ORAL DAILY
Qty: 90 TABLET | Refills: 3 | Status: SHIPPED | OUTPATIENT
Start: 2019-03-25

## 2019-03-25 NOTE — PROGRESS NOTES
Cardiology Follow Up    [facility]  1945  [id number]  [address]    1  Essential hypertension  POCT ECG    Echo complete with contrast if indicated   2  Coronary artery disease of native artery of native heart with stable angina pectoris (HCC)  isosorbide mononitrate (IMDUR) 30 mg 24 hr tablet    NM myocardial perfusion spect (rx stress and/or rest)    Echo complete with contrast if indicated   3  Dyslipidemia     4  Effort angina (HCC)  NM myocardial perfusion spect (rx stress and/or rest)    Echo complete with contrast if indicated       Discussion/Summary:  67-year-old female with a history of coronary artery disease identified on cardiac catheterization in 2009  Treated medically as this was diagonal disease of a small vessel  Otherwise no significant CAD  1   CAD:  Symptoms once again sound concerning for angina  Check stress test   I have started Imdur  She recalls having a medication that caused her headache many years ago  If she has problems with this, we can change her to Ranexa  Use sublingual nitroglycerin p r n     Check echocardiogram as no recent evaluation of LV function is performed  Continue aspirin  Lipid-lowering therapy as currently ordered  Advised that if stress test is abnormal, or she continues to have persistent symptoms despite unremarkable stress testing, may proceed with cardiac catheterization if she does not perceived benefit from anti anginal therapy escalation  2   Hypertension:  Blood pressure currently controlled  3   Dyslipidemia:  On both atorvastatin and Zetia  Monitor lipid panel  4    Carotid disease:  Tells me that recently, she had an ultrasound done with her physician who works to [employer]  Was told that her carotid disease increased from 50-60%  Continue medical management  No symptoms in this regard        Previous History:  Cardiac catheterization in 2009 showed disease of diagonal which was 75%, but since small-medium sized vessel, was treated medically  Interval History:  She returns today for yearly follow-up  She is telling me that she is having more tightness in her chest, discomfort in her neck and shoulder blades which comes both when she walks and at times when she is even just talking  We have previously evaluated symptoms similar to this with a nuclear stress test, last in 2017  She feels tired and fatigued  She recalls taking a medication about 10 years ago which caused her to have headaches, but does not recall which 1 this is  She has used sublingual nitroglycerin only a few times within the last 5 years  No other changes recently in her medications  Problem List     Essential hypertension    Coronary artery disease of native artery of native heart with stable angina pectoris ([facility] [address] )    Dyslipidemia        No past medical history on file    Social History     Socioeconomic History    Marital status: /Civil Union     Spouse name: Not on file    Number of children: Not on file    Years of education: Not on file    Highest education level: Not on file   Occupational History    Not on file   Social Needs    Financial resource strain: Not on file    Food insecurity:     Worry: Not on file     Inability: Not on file    Transportation needs:     Medical: Not on file     Non-medical: Not on file   Tobacco Use    Smoking status: Never Smoker    Smokeless tobacco: Never Used   Substance and Sexual Activity    Alcohol use: Not Currently    Drug use: Never    Sexual activity: Not on file   Lifestyle    Physical activity:     Days per week: Not on file     Minutes per session: Not on file    Stress: Not on file   Relationships    Social connections:     Talks on phone: Not on file     Gets together: Not on file     Attends Sabianism service: Not on file     Active member of club or organization: Not on file     Attends meetings of clubs or organizations: Not on file     Relationship status: Not on file    Intimate partner violence:     Fear of current or ex partner: Not on file     Emotionally abused: Not on file     Physically abused: Not on file     Forced sexual activity: Not on file   Other Topics Concern    Not on file   Social History Narrative    Not on file      No family history on file    Past Surgical History:   Procedure Laterality Date    HYSTERECTOMY      OOPHORECTOMY         Current Outpatient Medications:     aspirin 81 MG tablet, Take 1 tablet by mouth daily, Disp: , Rfl:     atorvastatin (LIPITOR) 40 mg tablet, Take 40 mg by mouth, Disp: , Rfl:     Calcium Carb-Cholecalciferol ([employer]SC[employer] 500 + D) 500-200 MG-UNIT TABS, Reported on 1/12/2017 , Disp: , Rfl:     diltiazem (CARDIZEM CD) 240 mg 24 hr capsule, Take 240 mg by mouth daily, Disp: , Rfl: 5    ergocalciferol (VITAMIN D2) 50,000 units, TAKE ONE CAPSULE WEEKLY, Disp: , Rfl: 3    ezetimibe (ZETIA) 10 mg tablet, Take 10 mg by mouth daily, Disp: , Rfl: 3    folic acid (FOLVITE) 1 mg tablet, Take 1 tablet by mouth daily, Disp: , Rfl:     hydrochlorothiazide (HYDRODIURIL) 25 mg tablet, Take 25 mg by mouth daily, Disp: , Rfl: 1    levothyroxine 112 mcg tablet, Take 112 mcg by mouth daily, Disp: , Rfl: 5    losartan (COZAAR) 100 MG tablet, Take 100 mg by mouth daily, Disp: , Rfl: 3    metoprolol succinate (TOPROL-XL) 50 mg 24 hr tablet, Take 50 mg by mouth daily, Disp: , Rfl: 5    isosorbide mononitrate (IMDUR) 30 mg 24 hr tablet, Take 1 tablet (30 mg total) by mouth daily, Disp: 90 tablet, Rfl: 3    levofloxacin (LEVAQUIN) 500 mg tablet, Take 500 mg by mouth, Disp: , Rfl:     metroNIDAZOLE (FLAGYL) 500 mg tablet, Take 500 mg by mouth, Disp: , Rfl:     nitroglycerin (NITROSTAT) 0 4 mg SL tablet, Place 1 tablet under the tongue every 5 (five) minutes as needed, Disp: , Rfl:     olmesartan (BENICAR) 40 mg tablet, Take 1 tablet by mouth daily, Disp: , Rfl:     pregabalin (LYRICA) 75 mg capsule, Take 75 mg by mouth, Disp: , Rfl:   Allergies   Allergen Reactions    Codeine        Vitals:    03/25/19 1019   BP: 114/68   BP Location: Right arm   Patient Position: Sitting   Cuff Size: Large   Pulse: 56   Weight: 82 6 kg (182 lb)   Height: 5' 1" (1 549 m)     Vitals:    03/25/19 1019   Weight: 82 6 kg (182 lb)      Height: 5' 1" (154 9 cm)   Body mass index is 34 39 kg/m²  Physical Exam:  GEN: Analisa Martin appears well, alert and oriented x 3, pleasant and cooperative   HEENT: pupils equal, round, and reactive to light; extraocular muscles intact  NECK: supple, no carotid bruits   HEART: regular rhythm, normal S1 and S2, no murmurs, clicks, gallops or rubs   LUNGS: clear to auscultation bilaterally; no wheezes, rales, or rhonchi   ABDOMEN: normal bowel sounds, soft, no tenderness, no distention  EXTREMITIES: trace LE edema  NEURO: no focal findings   SKIN: normal without suspicious lesions on exposed skin    ROS:  Positive for parasthesias, back pain, abnormal hearing on left, headaches, weakness, fatigue, shaking  Headaches  Except as noted in HPI, is otherwise reviewed in detail and a 12 point review of systems is negative  ROS reviewed and is unchanged    Imaging:  Pharmacologic nuclear stress test 2/2017:  Normal perfusion  Normal LV function  Normal study  EKG:  Sinus rhythm  57 beats per minute  Left axis deviation  Poor anterior R-wave progression  Nonspecific T-wave abnormality

## 2019-03-28 ENCOUNTER — TELEPHONE (OUTPATIENT)
Dept: CARDIOLOGY CLINIC | Facility: CLINIC | Age: 74
End: 2019-03-28

## 2019-03-28 DIAGNOSIS — I25.118 CORONARY ARTERY DISEASE OF NATIVE ARTERY OF NATIVE HEART WITH STABLE ANGINA PECTORIS (HCC): Primary | ICD-10-CM

## 2019-03-28 RX ORDER — RANOLAZINE 500 MG/1
500 TABLET, EXTENDED RELEASE ORAL 2 TIMES DAILY
Qty: 60 TABLET | Refills: 11 | Status: SHIPPED | OUTPATIENT
Start: 2019-03-28

## 2019-03-28 NOTE — TELEPHONE ENCOUNTER
Pt's  calling, pt was started on Isosorbide mono  30 mg QD  Since starting pt has had severe headaches  Pt would like this medication switched if possible

## 2019-03-29 ENCOUNTER — TELEPHONE (OUTPATIENT)
Dept: CARDIOLOGY CLINIC | Facility: CLINIC | Age: 74
End: 2019-03-29

## 2019-04-01 ENCOUNTER — TELEPHONE (OUTPATIENT)
Dept: CARDIOLOGY CLINIC | Facility: CLINIC | Age: 74
End: 2019-04-01

## 2019-04-12 ENCOUNTER — HOSPITAL ENCOUNTER (OUTPATIENT)
Dept: NUCLEAR MEDICINE | Facility: HOSPITAL | Age: 74
Discharge: HOME | End: 2019-04-12
Attending: INTERNAL MEDICINE
Payer: COMMERCIAL

## 2019-04-12 ENCOUNTER — HOSPITAL ENCOUNTER (OUTPATIENT)
Dept: NON INVASIVE DIAGNOSTICS | Facility: HOSPITAL | Age: 74
Discharge: HOME | End: 2019-04-12
Attending: INTERNAL MEDICINE
Payer: COMMERCIAL

## 2019-04-12 DIAGNOSIS — I20.8 EFFORT ANGINA (HCC): ICD-10-CM

## 2019-04-12 DIAGNOSIS — I25.118 CORONARY ARTERY DISEASE OF NATIVE ARTERY OF NATIVE HEART WITH STABLE ANGINA PECTORIS (HCC): ICD-10-CM

## 2019-04-12 DIAGNOSIS — I10 ESSENTIAL HYPERTENSION: ICD-10-CM

## 2019-04-12 LAB
ARRHY DURING EX: NORMAL
CHEST PAIN STATEMENT: NORMAL
MAX DIASTOLIC BP: 80 MMHG
MAX HEART RATE: 94 BPM
MAX PREDICTED HEART RATE: 148 BPM
MAX. SYSTOLIC BP: 140 MMHG
PROTOCOL NAME: NORMAL
REASON FOR TERMINATION: NORMAL
TARGET HR FORMULA: NORMAL
TEST INDICATION: NORMAL
TIME IN EXERCISE PHASE: NORMAL

## 2019-04-12 PROCEDURE — 93017 CV STRESS TEST TRACING ONLY: CPT

## 2019-04-12 PROCEDURE — A9502 TC99M TETROFOSMIN: HCPCS

## 2019-04-12 PROCEDURE — 93016 CV STRESS TEST SUPVJ ONLY: CPT | Performed by: INTERNAL MEDICINE

## 2019-04-12 PROCEDURE — 78452 HT MUSCLE IMAGE SPECT MULT: CPT | Performed by: INTERNAL MEDICINE

## 2019-04-12 PROCEDURE — 78452 HT MUSCLE IMAGE SPECT MULT: CPT

## 2019-04-12 PROCEDURE — 93306 TTE W/DOPPLER COMPLETE: CPT | Performed by: INTERNAL MEDICINE

## 2019-04-12 PROCEDURE — 93018 CV STRESS TEST I&R ONLY: CPT | Performed by: INTERNAL MEDICINE

## 2019-04-12 PROCEDURE — 93306 TTE W/DOPPLER COMPLETE: CPT

## 2019-04-12 RX ADMIN — REGADENOSON 0.4 MG: 0.08 INJECTION, SOLUTION INTRAVENOUS at 12:51

## 2019-04-27 ENCOUNTER — HOSPITAL ENCOUNTER (EMERGENCY)
Facility: HOSPITAL | Age: 74
Discharge: HOME | End: 2019-04-27
Attending: EMERGENCY MEDICINE | Admitting: EMERGENCY MEDICINE
Payer: COMMERCIAL

## 2019-04-27 ENCOUNTER — APPOINTMENT (EMERGENCY)
Dept: RADIOLOGY | Facility: HOSPITAL | Age: 74
End: 2019-04-27
Payer: COMMERCIAL

## 2019-04-27 VITALS
DIASTOLIC BLOOD PRESSURE: 73 MMHG | TEMPERATURE: 97.5 F | RESPIRATION RATE: 16 BRPM | WEIGHT: 182.76 LBS | SYSTOLIC BLOOD PRESSURE: 155 MMHG | HEART RATE: 69 BPM | OXYGEN SATURATION: 99 % | BODY MASS INDEX: 34.53 KG/M2

## 2019-04-27 DIAGNOSIS — L30.9 DERMATITIS OF BOTH FEET: Primary | ICD-10-CM

## 2019-04-27 LAB
ALBUMIN SERPL BCP-MCNC: 3.5 G/DL (ref 3.5–5)
ALP SERPL-CCNC: 74 U/L (ref 46–116)
ALT SERPL W P-5'-P-CCNC: 21 U/L (ref 12–78)
ANION GAP SERPL CALCULATED.3IONS-SCNC: 7 MMOL/L (ref 4–13)
APTT PPP: 35 SECONDS (ref 26–38)
AST SERPL W P-5'-P-CCNC: 18 U/L (ref 5–45)
BASOPHILS # BLD AUTO: 0.05 THOUSANDS/ΜL (ref 0–0.1)
BASOPHILS NFR BLD AUTO: 1 % (ref 0–1)
BILIRUB SERPL-MCNC: 0.18 MG/DL (ref 0.2–1)
BUN SERPL-MCNC: 19 MG/DL (ref 5–25)
CALCIUM SERPL-MCNC: 9.8 MG/DL (ref 8.3–10.1)
CHLORIDE SERPL-SCNC: 104 MMOL/L (ref 100–108)
CO2 SERPL-SCNC: 30 MMOL/L (ref 21–32)
CREAT SERPL-MCNC: 1.11 MG/DL (ref 0.6–1.3)
EOSINOPHIL # BLD AUTO: 0.18 THOUSAND/ΜL (ref 0–0.61)
EOSINOPHIL NFR BLD AUTO: 2 % (ref 0–6)
ERYTHROCYTE [DISTWIDTH] IN BLOOD BY AUTOMATED COUNT: 13.9 % (ref 11.6–15.1)
GFR SERPL CREATININE-BSD FRML MDRD: 50 ML/MIN/1.73SQ M
GLUCOSE SERPL-MCNC: 115 MG/DL (ref 65–140)
HCT VFR BLD AUTO: 38.8 % (ref 34.8–46.1)
HGB BLD-MCNC: 12.1 G/DL (ref 11.5–15.4)
IMM GRANULOCYTES # BLD AUTO: 0.02 THOUSAND/UL (ref 0–0.2)
IMM GRANULOCYTES NFR BLD AUTO: 0 % (ref 0–2)
INR PPP: 0.99 (ref 0.86–1.17)
LYMPHOCYTES # BLD AUTO: 2.37 THOUSANDS/ΜL (ref 0.6–4.47)
LYMPHOCYTES NFR BLD AUTO: 31 % (ref 14–44)
MCH RBC QN AUTO: 27.2 PG (ref 26.8–34.3)
MCHC RBC AUTO-ENTMCNC: 31.2 G/DL (ref 31.4–37.4)
MCV RBC AUTO: 87 FL (ref 82–98)
MONOCYTES # BLD AUTO: 0.53 THOUSAND/ΜL (ref 0.17–1.22)
MONOCYTES NFR BLD AUTO: 7 % (ref 4–12)
NEUTROPHILS # BLD AUTO: 4.56 THOUSANDS/ΜL (ref 1.85–7.62)
NEUTS SEG NFR BLD AUTO: 59 % (ref 43–75)
NRBC BLD AUTO-RTO: 0 /100 WBCS
PLATELET # BLD AUTO: 214 THOUSANDS/UL (ref 149–390)
PMV BLD AUTO: 11.1 FL (ref 8.9–12.7)
POTASSIUM SERPL-SCNC: 3.3 MMOL/L (ref 3.5–5.3)
PROT SERPL-MCNC: 7.1 G/DL (ref 6.4–8.2)
PROTHROMBIN TIME: 13.2 SECONDS (ref 11.8–14.2)
RBC # BLD AUTO: 4.45 MILLION/UL (ref 3.81–5.12)
SODIUM SERPL-SCNC: 141 MMOL/L (ref 136–145)
TROPONIN I SERPL-MCNC: <0.02 NG/ML
WBC # BLD AUTO: 7.71 THOUSAND/UL (ref 4.31–10.16)

## 2019-04-27 PROCEDURE — 99283 EMERGENCY DEPT VISIT LOW MDM: CPT | Performed by: EMERGENCY MEDICINE

## 2019-04-27 PROCEDURE — 36415 COLL VENOUS BLD VENIPUNCTURE: CPT | Performed by: EMERGENCY MEDICINE

## 2019-04-27 PROCEDURE — 84484 ASSAY OF TROPONIN QUANT: CPT | Performed by: EMERGENCY MEDICINE

## 2019-04-27 PROCEDURE — 85730 THROMBOPLASTIN TIME PARTIAL: CPT | Performed by: EMERGENCY MEDICINE

## 2019-04-27 PROCEDURE — 85025 COMPLETE CBC W/AUTO DIFF WBC: CPT | Performed by: EMERGENCY MEDICINE

## 2019-04-27 PROCEDURE — 99284 EMERGENCY DEPT VISIT MOD MDM: CPT

## 2019-04-27 PROCEDURE — 93005 ELECTROCARDIOGRAM TRACING: CPT

## 2019-04-27 PROCEDURE — 80053 COMPREHEN METABOLIC PANEL: CPT | Performed by: EMERGENCY MEDICINE

## 2019-04-27 PROCEDURE — 85610 PROTHROMBIN TIME: CPT | Performed by: EMERGENCY MEDICINE

## 2019-04-27 PROCEDURE — 71046 X-RAY EXAM CHEST 2 VIEWS: CPT

## 2019-04-27 RX ORDER — DOXYCYCLINE HYCLATE 100 MG/1
100 CAPSULE ORAL 2 TIMES DAILY
Qty: 14 CAPSULE | Refills: 0 | Status: SHIPPED | OUTPATIENT
Start: 2019-04-27 | End: 2019-05-04

## 2019-04-27 RX ORDER — PREDNISONE 20 MG/1
40 TABLET ORAL ONCE
Status: COMPLETED | OUTPATIENT
Start: 2019-04-27 | End: 2019-04-27

## 2019-04-27 RX ORDER — POTASSIUM CHLORIDE 20 MEQ/1
20 TABLET, EXTENDED RELEASE ORAL ONCE
Status: COMPLETED | OUTPATIENT
Start: 2019-04-27 | End: 2019-04-27

## 2019-04-27 RX ORDER — DOXYCYCLINE HYCLATE 100 MG/1
100 CAPSULE ORAL ONCE
Status: COMPLETED | OUTPATIENT
Start: 2019-04-27 | End: 2019-04-27

## 2019-04-27 RX ORDER — PREDNISONE 20 MG/1
40 TABLET ORAL DAILY
Qty: 8 TABLET | Refills: 0 | Status: SHIPPED | OUTPATIENT
Start: 2019-04-27

## 2019-04-27 RX ADMIN — PREDNISONE 40 MG: 20 TABLET ORAL at 16:37

## 2019-04-27 RX ADMIN — POTASSIUM CHLORIDE 20 MEQ: 1500 TABLET, EXTENDED RELEASE ORAL at 16:57

## 2019-04-27 RX ADMIN — DOXYCYCLINE HYCLATE 100 MG: 100 CAPSULE ORAL at 16:40

## 2019-04-29 LAB
ATRIAL RATE: 68 BPM
P AXIS: 63 DEGREES
PR INTERVAL: 186 MS
QRS AXIS: -31 DEGREES
QRSD INTERVAL: 86 MS
QT INTERVAL: 408 MS
QTC INTERVAL: 433 MS
T WAVE AXIS: 78 DEGREES
VENTRICULAR RATE: 68 BPM

## 2019-04-29 PROCEDURE — 93010 ELECTROCARDIOGRAM REPORT: CPT | Performed by: INTERNAL MEDICINE

## 2019-05-22 ENCOUNTER — LAB REQUISITION (OUTPATIENT)
Dept: LAB | Facility: HOSPITAL | Age: 74
End: 2019-05-22
Payer: COMMERCIAL

## 2019-05-22 DIAGNOSIS — I10 ESSENTIAL (PRIMARY) HYPERTENSION: ICD-10-CM

## 2019-05-22 DIAGNOSIS — E03.9 HYPOTHYROIDISM: ICD-10-CM

## 2019-05-22 PROCEDURE — 80061 LIPID PANEL: CPT | Performed by: FAMILY MEDICINE

## 2019-05-22 PROCEDURE — 85025 COMPLETE CBC W/AUTO DIFF WBC: CPT | Performed by: FAMILY MEDICINE

## 2019-05-22 PROCEDURE — 84443 ASSAY THYROID STIM HORMONE: CPT | Performed by: FAMILY MEDICINE

## 2019-05-22 PROCEDURE — 80053 COMPREHEN METABOLIC PANEL: CPT | Performed by: FAMILY MEDICINE

## 2019-05-23 LAB
ALBUMIN SERPL BCP-MCNC: 3.8 G/DL (ref 3.5–5)
ALP SERPL-CCNC: 61 U/L (ref 46–116)
ALT SERPL W P-5'-P-CCNC: 25 U/L (ref 12–78)
ANION GAP SERPL CALCULATED.3IONS-SCNC: 7 MMOL/L (ref 4–13)
AST SERPL W P-5'-P-CCNC: 22 U/L (ref 5–45)
BASOPHILS # BLD AUTO: 0.07 THOUSANDS/ΜL (ref 0–0.1)
BASOPHILS NFR BLD AUTO: 1 % (ref 0–1)
BILIRUB SERPL-MCNC: 0.6 MG/DL (ref 0.2–1)
BUN SERPL-MCNC: 14 MG/DL (ref 5–25)
CALCIUM SERPL-MCNC: 8.9 MG/DL (ref 8.3–10.1)
CHLORIDE SERPL-SCNC: 107 MMOL/L (ref 100–108)
CHOLEST SERPL-MCNC: 167 MG/DL (ref 50–200)
CO2 SERPL-SCNC: 28 MMOL/L (ref 21–32)
CREAT SERPL-MCNC: 0.87 MG/DL (ref 0.6–1.3)
EOSINOPHIL # BLD AUTO: 0.22 THOUSAND/ΜL (ref 0–0.61)
EOSINOPHIL NFR BLD AUTO: 3 % (ref 0–6)
ERYTHROCYTE [DISTWIDTH] IN BLOOD BY AUTOMATED COUNT: 14.4 % (ref 11.6–15.1)
GFR SERPL CREATININE-BSD FRML MDRD: 67 ML/MIN/1.73SQ M
GLUCOSE SERPL-MCNC: 95 MG/DL (ref 65–140)
HCT VFR BLD AUTO: 40.4 % (ref 34.8–46.1)
HDLC SERPL-MCNC: 70 MG/DL (ref 40–60)
HGB BLD-MCNC: 12.2 G/DL (ref 11.5–15.4)
IMM GRANULOCYTES # BLD AUTO: 0.01 THOUSAND/UL (ref 0–0.2)
IMM GRANULOCYTES NFR BLD AUTO: 0 % (ref 0–2)
LDLC SERPL CALC-MCNC: 78 MG/DL (ref 0–100)
LYMPHOCYTES # BLD AUTO: 2.48 THOUSANDS/ΜL (ref 0.6–4.47)
LYMPHOCYTES NFR BLD AUTO: 33 % (ref 14–44)
MCH RBC QN AUTO: 27.2 PG (ref 26.8–34.3)
MCHC RBC AUTO-ENTMCNC: 30.2 G/DL (ref 31.4–37.4)
MCV RBC AUTO: 90 FL (ref 82–98)
MONOCYTES # BLD AUTO: 0.55 THOUSAND/ΜL (ref 0.17–1.22)
MONOCYTES NFR BLD AUTO: 7 % (ref 4–12)
NEUTROPHILS # BLD AUTO: 4.11 THOUSANDS/ΜL (ref 1.85–7.62)
NEUTS SEG NFR BLD AUTO: 56 % (ref 43–75)
NONHDLC SERPL-MCNC: 97 MG/DL
NRBC BLD AUTO-RTO: 0 /100 WBCS
PLATELET # BLD AUTO: 231 THOUSANDS/UL (ref 149–390)
PMV BLD AUTO: 12 FL (ref 8.9–12.7)
POTASSIUM SERPL-SCNC: 3.8 MMOL/L (ref 3.5–5.3)
PROT SERPL-MCNC: 6.7 G/DL (ref 6.4–8.2)
RBC # BLD AUTO: 4.49 MILLION/UL (ref 3.81–5.12)
SODIUM SERPL-SCNC: 142 MMOL/L (ref 136–145)
TRIGL SERPL-MCNC: 94 MG/DL
TSH SERPL DL<=0.05 MIU/L-ACNC: 1.17 UIU/ML (ref 0.36–3.74)
WBC # BLD AUTO: 7.44 THOUSAND/UL (ref 4.31–10.16)

## 2019-06-27 ENCOUNTER — TRANSCRIBE ORDERS (OUTPATIENT)
Dept: ADMINISTRATIVE | Facility: HOSPITAL | Age: 74
End: 2019-06-27

## 2019-06-27 DIAGNOSIS — I87.1 COMPRESSION OF VEIN: ICD-10-CM

## 2019-06-27 DIAGNOSIS — R22.43 LOCALIZED SWELLING, MASS AND LUMP, LOWER LIMB, BILATERAL: Primary | ICD-10-CM

## 2019-06-28 ENCOUNTER — HOSPITAL ENCOUNTER (OUTPATIENT)
Dept: ULTRASOUND IMAGING | Facility: HOSPITAL | Age: 74
Discharge: HOME | End: 2019-06-28
Attending: SURGERY
Payer: COMMERCIAL

## 2019-06-28 DIAGNOSIS — R22.43 LOCALIZED SWELLING, MASS AND LUMP, LOWER LIMB, BILATERAL: ICD-10-CM

## 2019-06-28 DIAGNOSIS — I87.1 COMPRESSION OF VEIN: ICD-10-CM

## 2019-06-28 PROCEDURE — 76856 US EXAM PELVIC COMPLETE: CPT

## 2020-07-21 ENCOUNTER — TRANSCRIBE ORDERS (OUTPATIENT)
Dept: LAB | Facility: HOSPITAL | Age: 75
End: 2020-07-21

## 2020-07-21 ENCOUNTER — LAB (OUTPATIENT)
Dept: LAB | Facility: HOSPITAL | Age: 75
End: 2020-07-21
Payer: COMMERCIAL

## 2020-07-21 DIAGNOSIS — E03.9 ACQUIRED HYPOTHYROIDISM: ICD-10-CM

## 2020-07-21 DIAGNOSIS — E78.2 MIXED HYPERLIPIDEMIA: Primary | ICD-10-CM

## 2020-07-21 DIAGNOSIS — E78.5 HYPERLIPIDEMIA, UNSPECIFIED HYPERLIPIDEMIA TYPE: ICD-10-CM

## 2020-07-21 DIAGNOSIS — I10 ESSENTIAL HYPERTENSION: ICD-10-CM

## 2020-07-21 DIAGNOSIS — D72.829 LEUKOCYTOSIS, UNSPECIFIED TYPE: ICD-10-CM

## 2020-07-21 DIAGNOSIS — R30.0 DYSURIA: ICD-10-CM

## 2020-07-21 DIAGNOSIS — E78.2 MIXED HYPERLIPIDEMIA: ICD-10-CM

## 2020-07-21 DIAGNOSIS — D72.820 LYMPHOCYTOSIS: ICD-10-CM

## 2020-07-21 LAB
ALBUMIN SERPL BCP-MCNC: 3.9 G/DL (ref 3–5.2)
ALP SERPL-CCNC: 63 U/L (ref 43–122)
ALT SERPL W P-5'-P-CCNC: 18 U/L (ref 9–52)
ANION GAP SERPL CALCULATED.3IONS-SCNC: 9 MMOL/L (ref 5–14)
AST SERPL W P-5'-P-CCNC: 22 U/L (ref 14–36)
BASOPHILS # BLD AUTO: 0.1 THOUSANDS/ΜL (ref 0–0.1)
BASOPHILS NFR BLD AUTO: 1 % (ref 0–1)
BILIRUB SERPL-MCNC: 0.3 MG/DL
BUN SERPL-MCNC: 25 MG/DL (ref 5–25)
CALCIUM SERPL-MCNC: 10 MG/DL (ref 8.4–10.2)
CHLORIDE SERPL-SCNC: 102 MMOL/L (ref 97–108)
CHOLEST SERPL-MCNC: 138 MG/DL
CK SERPL-CCNC: 35 U/L (ref 30–135)
CO2 SERPL-SCNC: 29 MMOL/L (ref 22–30)
CREAT SERPL-MCNC: 0.81 MG/DL (ref 0.6–1.2)
EOSINOPHIL # BLD AUTO: 0.3 THOUSAND/ΜL (ref 0–0.4)
EOSINOPHIL NFR BLD AUTO: 5 % (ref 0–6)
ERYTHROCYTE [DISTWIDTH] IN BLOOD BY AUTOMATED COUNT: 13.6 %
GFR SERPL CREATININE-BSD FRML MDRD: 72 ML/MIN/1.73SQ M
GLUCOSE P FAST SERPL-MCNC: 101 MG/DL (ref 70–99)
HCT VFR BLD AUTO: 35 % (ref 36–46)
HDLC SERPL-MCNC: 34 MG/DL
HGB BLD-MCNC: 11.5 G/DL (ref 12–16)
LDLC SERPL CALC-MCNC: 75 MG/DL
LYMPHOCYTES # BLD AUTO: 2.1 THOUSANDS/ΜL (ref 0.5–4)
LYMPHOCYTES NFR BLD AUTO: 35 % (ref 25–45)
MCH RBC QN AUTO: 26.7 PG (ref 26–34)
MCHC RBC AUTO-ENTMCNC: 32.9 G/DL (ref 31–36)
MCV RBC AUTO: 81 FL (ref 80–100)
MONOCYTES # BLD AUTO: 0.6 THOUSAND/ΜL (ref 0.2–0.9)
MONOCYTES NFR BLD AUTO: 10 % (ref 1–10)
NEUTROPHILS # BLD AUTO: 3 THOUSANDS/ΜL (ref 1.8–7.8)
NEUTS SEG NFR BLD AUTO: 49 % (ref 45–65)
NONHDLC SERPL-MCNC: 104 MG/DL
PLATELET # BLD AUTO: 221 THOUSANDS/UL (ref 150–450)
PMV BLD AUTO: 10.1 FL (ref 8.9–12.7)
POTASSIUM SERPL-SCNC: 4 MMOL/L (ref 3.6–5)
PROT SERPL-MCNC: 7 G/DL (ref 5.9–8.4)
RBC # BLD AUTO: 4.31 MILLION/UL (ref 4–5.2)
SODIUM SERPL-SCNC: 140 MMOL/L (ref 137–147)
TRIGL SERPL-MCNC: 143 MG/DL
TSH SERPL DL<=0.05 MIU/L-ACNC: <0.015 UIU/ML (ref 0.47–4.68)
WBC # BLD AUTO: 6.1 THOUSAND/UL (ref 4.5–11)

## 2020-07-21 PROCEDURE — 36415 COLL VENOUS BLD VENIPUNCTURE: CPT

## 2020-07-21 PROCEDURE — 80061 LIPID PANEL: CPT

## 2020-07-21 PROCEDURE — 87186 SC STD MICRODIL/AGAR DIL: CPT

## 2020-07-21 PROCEDURE — 87181 SC STD AGAR DILUTION PER AGT: CPT

## 2020-07-21 PROCEDURE — 82550 ASSAY OF CK (CPK): CPT

## 2020-07-21 PROCEDURE — 84443 ASSAY THYROID STIM HORMONE: CPT

## 2020-07-21 PROCEDURE — 87077 CULTURE AEROBIC IDENTIFY: CPT

## 2020-07-21 PROCEDURE — 80053 COMPREHEN METABOLIC PANEL: CPT

## 2020-07-21 PROCEDURE — 87086 URINE CULTURE/COLONY COUNT: CPT

## 2020-07-21 PROCEDURE — 85025 COMPLETE CBC W/AUTO DIFF WBC: CPT

## 2020-07-23 NOTE — RESULT ENCOUNTER NOTE
Please call the patient regarding her abnormal result  uti macrodantin 100 #14 bid  thyroid level high, decrease synthroid to  125anemic, ferrous sulfate 325 # 90 once a day      #90 and return in 3 months

## 2020-07-24 LAB
BACTERIA UR CULT: ABNORMAL
BACTERIA UR CULT: ABNORMAL

## 2020-07-29 NOTE — RESULT ENCOUNTER NOTE
May continue to use Vistaril 25 mg TID PRN anxiety and insomnia    Please call the patient regarding her abnormal result  uti macrodantin 100 #14 bid

## 2020-08-06 LAB — SCAN RESULT: NORMAL

## 2020-10-09 ENCOUNTER — LAB (OUTPATIENT)
Dept: LAB | Facility: HOSPITAL | Age: 75
End: 2020-10-09
Attending: FAMILY MEDICINE
Payer: COMMERCIAL

## 2020-10-09 DIAGNOSIS — D64.9 ANEMIA, UNSPECIFIED TYPE: ICD-10-CM

## 2020-10-09 DIAGNOSIS — E03.9 ACQUIRED HYPOTHYROIDISM: ICD-10-CM

## 2020-10-09 DIAGNOSIS — M25.50 ARTHRALGIA, UNSPECIFIED JOINT: ICD-10-CM

## 2020-10-09 DIAGNOSIS — I10 ESSENTIAL HYPERTENSION: ICD-10-CM

## 2020-10-09 LAB
BASOPHILS # BLD AUTO: 0.1 THOUSANDS/ΜL (ref 0–0.1)
BASOPHILS NFR BLD AUTO: 1 % (ref 0–1)
CRP SERPL QL: 6.4 MG/L
EOSINOPHIL # BLD AUTO: 0.4 THOUSAND/ΜL (ref 0–0.4)
EOSINOPHIL NFR BLD AUTO: 6 % (ref 0–6)
ERYTHROCYTE [DISTWIDTH] IN BLOOD BY AUTOMATED COUNT: 15 %
ERYTHROCYTE [SEDIMENTATION RATE] IN BLOOD: 11 MM/HOUR (ref 0–29)
FERRITIN SERPL-MCNC: 35 NG/ML (ref 8–388)
FOLATE SERPL-MCNC: >20 NG/ML (ref 3.1–17.5)
HCT VFR BLD AUTO: 39.2 % (ref 36–46)
HGB BLD-MCNC: 12.7 G/DL (ref 12–16)
IRON SATN MFR SERPL: 22 %
IRON SERPL-MCNC: 73 UG/DL (ref 50–170)
LYMPHOCYTES # BLD AUTO: 2.5 THOUSANDS/ΜL (ref 0.5–4)
LYMPHOCYTES NFR BLD AUTO: 34 % (ref 25–45)
MCH RBC QN AUTO: 26.9 PG (ref 26–34)
MCHC RBC AUTO-ENTMCNC: 32.3 G/DL (ref 31–36)
MCV RBC AUTO: 83 FL (ref 80–100)
MONOCYTES # BLD AUTO: 0.5 THOUSAND/ΜL (ref 0.2–0.9)
MONOCYTES NFR BLD AUTO: 7 % (ref 1–10)
NEUTROPHILS # BLD AUTO: 3.9 THOUSANDS/ΜL (ref 1.8–7.8)
NEUTS SEG NFR BLD AUTO: 53 % (ref 45–65)
PLATELET # BLD AUTO: 198 THOUSANDS/UL (ref 150–450)
PMV BLD AUTO: 10.1 FL (ref 8.9–12.7)
RBC # BLD AUTO: 4.71 MILLION/UL (ref 4–5.2)
TIBC SERPL-MCNC: 325 UG/DL (ref 250–450)
TSH SERPL DL<=0.05 MIU/L-ACNC: 0.13 UIU/ML (ref 0.47–4.68)
URATE SERPL-MCNC: 6.1 MG/DL (ref 2.7–7.5)
VIT B12 SERPL-MCNC: 5833 PG/ML (ref 100–900)
WBC # BLD AUTO: 7.5 THOUSAND/UL (ref 4.5–11)

## 2020-10-09 PROCEDURE — 85652 RBC SED RATE AUTOMATED: CPT

## 2020-10-09 PROCEDURE — 36415 COLL VENOUS BLD VENIPUNCTURE: CPT

## 2020-10-09 PROCEDURE — 86140 C-REACTIVE PROTEIN: CPT

## 2020-10-09 PROCEDURE — 83550 IRON BINDING TEST: CPT

## 2020-10-09 PROCEDURE — 83540 ASSAY OF IRON: CPT

## 2020-10-09 PROCEDURE — 82746 ASSAY OF FOLIC ACID SERUM: CPT

## 2020-10-09 PROCEDURE — 85025 COMPLETE CBC W/AUTO DIFF WBC: CPT

## 2020-10-09 PROCEDURE — 84443 ASSAY THYROID STIM HORMONE: CPT

## 2020-10-09 PROCEDURE — 86430 RHEUMATOID FACTOR TEST QUAL: CPT

## 2020-10-09 PROCEDURE — 82728 ASSAY OF FERRITIN: CPT

## 2020-10-09 PROCEDURE — 86038 ANTINUCLEAR ANTIBODIES: CPT

## 2020-10-09 PROCEDURE — 82607 VITAMIN B-12: CPT

## 2020-10-09 PROCEDURE — 84550 ASSAY OF BLOOD/URIC ACID: CPT

## 2020-10-09 PROCEDURE — 86618 LYME DISEASE ANTIBODY: CPT

## 2020-10-10 LAB — B BURGDOR IGG+IGM SER-ACNC: <0.91 ISR (ref 0–0.9)

## 2020-10-12 LAB
RHEUMATOID FACT SER QL LA: NEGATIVE
RYE IGE QN: NEGATIVE

## 2021-01-08 PROBLEM — I73.9 PERIPHERAL VASCULAR DISEASE, UNSPECIFIED (HCC): Status: ACTIVE | Noted: 2021-01-08

## 2021-01-19 ENCOUNTER — LAB (OUTPATIENT)
Dept: LAB | Facility: HOSPITAL | Age: 76
End: 2021-01-19
Payer: COMMERCIAL

## 2021-01-19 DIAGNOSIS — E78.5 HYPERLIPIDEMIA, UNSPECIFIED HYPERLIPIDEMIA TYPE: ICD-10-CM

## 2021-01-19 DIAGNOSIS — E03.9 ACQUIRED HYPOTHYROIDISM: ICD-10-CM

## 2021-01-19 LAB
CHOLEST SERPL-MCNC: 191 MG/DL
HDLC SERPL-MCNC: 61 MG/DL
LDLC SERPL CALC-MCNC: 99 MG/DL
NONHDLC SERPL-MCNC: 130 MG/DL
TRIGL SERPL-MCNC: 156 MG/DL
TSH SERPL DL<=0.05 MIU/L-ACNC: 1.09 UIU/ML (ref 0.47–4.68)

## 2021-01-19 PROCEDURE — 84443 ASSAY THYROID STIM HORMONE: CPT

## 2021-01-19 PROCEDURE — 80061 LIPID PANEL: CPT

## 2021-01-19 PROCEDURE — 36415 COLL VENOUS BLD VENIPUNCTURE: CPT

## 2021-01-28 ENCOUNTER — IMMUNIZATIONS (OUTPATIENT)
Dept: FAMILY MEDICINE CLINIC | Facility: HOSPITAL | Age: 76
End: 2021-01-28

## 2021-01-28 DIAGNOSIS — Z23 ENCOUNTER FOR IMMUNIZATION: Primary | ICD-10-CM

## 2021-01-28 PROCEDURE — 91300 SARS-COV-2 / COVID-19 MRNA VACCINE (PFIZER-BIONTECH) 30 MCG: CPT

## 2021-01-28 PROCEDURE — 0001A SARS-COV-2 / COVID-19 MRNA VACCINE (PFIZER-BIONTECH) 30 MCG: CPT

## 2021-03-25 ENCOUNTER — IMMUNIZATIONS (OUTPATIENT)
Dept: FAMILY MEDICINE CLINIC | Facility: HOSPITAL | Age: 76
End: 2021-03-25

## 2021-03-25 DIAGNOSIS — Z23 ENCOUNTER FOR IMMUNIZATION: Primary | ICD-10-CM

## 2021-03-25 PROCEDURE — 0002A SARS-COV-2 / COVID-19 MRNA VACCINE (PFIZER-BIONTECH) 30 MCG: CPT

## 2021-03-25 PROCEDURE — 91300 SARS-COV-2 / COVID-19 MRNA VACCINE (PFIZER-BIONTECH) 30 MCG: CPT

## 2021-05-03 ENCOUNTER — EVALUATION (OUTPATIENT)
Dept: PHYSICAL THERAPY | Facility: CLINIC | Age: 76
End: 2021-05-03
Payer: COMMERCIAL

## 2021-05-03 DIAGNOSIS — M25.50 ARTHRALGIA, UNSPECIFIED JOINT: ICD-10-CM

## 2021-05-03 DIAGNOSIS — M54.2 NECK PAIN: Primary | ICD-10-CM

## 2021-05-03 DIAGNOSIS — R42 VERTIGO: ICD-10-CM

## 2021-05-03 PROCEDURE — 97163 PT EVAL HIGH COMPLEX 45 MIN: CPT | Performed by: PHYSICAL THERAPIST

## 2021-05-03 NOTE — PROGRESS NOTES
PT Evaluation     Today's date: 5/3/2021  Patient name: Ravi Arvizu  : 1945  MRN: [id number]  Referring provider: Jackson Portillo MD  Dx:   Encounter Diagnosis     ICD-10-CM    1  Neck pain  M54 2 Ambulatory referral to Physical Therapy   2  Vertigo  R42 Ambulatory referral to Physical Therapy   3  Arthralgia, unspecified joint  M25 50 Ambulatory referral to Physical Therapy                  Assessment  Assessment details: Ravi Arvizu is a pleasant 77 y o  female who presents with signs and symptoms correlating with referring diagnosis  No further referral appears necessary at this time based upon examination results  The patient's greatest concerns are decreasing pain in the neck and ensuring safety to decrease dizziness and risk of falls  She presents with a movement impairment diagnosis of hypomobile cervical extension ROM  She is also presenting with signs and symptoms that correlate to central pathology of dizziness, shown through head thrust testing, gaze hold in the top L quadrant, saccadic movements, and no dizziness during positional testing  This also presents with decreased tolerance to ROM of the cervical spine, positive compression and distraction, and increased symptoms into the LUE during cervical stretches  Negative prognostic indicators: wide range of symptoms  Positive prognostic indicators: good motivation and family support  Please contact me if you have any further questions or recommendations  Thank you very much for the kind referral       Impairments: abnormal or restricted ROM, abnormal movement, activity intolerance, impaired physical strength, pain with function and poor posture     Symptom irritability: highUnderstanding of Dx/Px/POC: good   Prognosis: good    Goals  STGs  1  Decrease pain by 20% in 2-4 weeks  2  Improve cervical ROM by 25% in 2-4 weeks  3  Decrease dizziness by 25% in in 2-4 weeks  LTGs  1  Decrease pain by 60% in 6-8 weeks    2  Decrease likelihood of dizziness to 1x/week in 6-8 weeks  3  Perform ADLs without pain in 6-8 weeks  Plan  Patient would benefit from: skilled physical therapy  Referral necessary: No  Planned modality interventions: cryotherapy, TENS and thermotherapy: hydrocollator packs  Planned therapy interventions: manual therapy, therapeutic training, stretching, strengthening, therapeutic activities, therapeutic exercise, patient education, activity modification, neuromuscular re-education and home exercise program  Frequency: 2x week  Duration in weeks: 8  Treatment plan discussed with: patient and family        Subjective Evaluation    History of Present Illness  Mechanism of injury: Pt is a 77 y o  female presenting w/ L sided arm and neck pain that occasionally increases to the point that it causes dizziness  She states this pain and dizziness is associated together  She has not noticed one specific reason for the pain or dizziness to start, and is worried that it's worsening  She is currently stating the neck pain is constant, but the dizziness is not  She has frequent headaches in the suboccipital region  Neurological signs: numbness through L side of face and down into L side of low back   Red Flags: numbness through entire L Side of body  PMH: none           Recurrent probem    Quality of life: good    Pain  Current pain ratin  At best pain ratin  At worst pain ratin  Quality: burning and tight  Relieving factors: rest and medications  Progression: worsening    Social Support  Lives with: adult children    Employment status: not working  Hand dominance: right    Patient Goals  Patient goals for therapy: increased strength, decreased pain, increased motion and independence with ADLs/IADLs          Objective     Concurrent Complaints  Positive for headaches (Cervical related), nausea/motion sickness, tinnitus, memory loss (associated with age) and peripheral neuropathy (numbness through LUE )   Negative for visual change, hearing loss, aural fullness and poor concentration    Active Range of Motion   Cervical/Thoracic Spine       Cervical  Subcranial protraction:  WFL   Subcranial retraction:  with pain   Restriction level: moderate  Flexion:  WFL  Extension:  with pain Restriction level: moderate  Left lateral flexion:  with pain Restriction level: maximal  Right lateral flexion:  with pain Restriction level moderate  Left rotation:  with pain Restriction level: moderate  Right rotation:  Restriction level: minimal    Additional Active Range of Motion Details  Palpation: increased tenderness to UT and suboccipital region L side   Neuro Exam:     Dizziness  Positive for disequilibrium, vertigo and oscillopsia (L side occasionally )  Negative for motion sickness, light-headedness, rocking or swaying, diplopia and floating or swimming  Exacerbating factors  Positive for bending over (if prolonged ), looking up, walking, turning head, supine to/from sitting and walking in busy environment  Negative for rolling in bed and optokinetic movement  Headaches   Patient reports headaches: Yes (Cervical related)       Oculomotor exam   Oculomotor ROM: diminished and WNL  Resting nystagmus: not present   Gaze holding nystagmus: not present left  and not present right  Smooth pursuits: saccadic smooth pursuit and within normal limits  Vertical saccades: hypermetric  Convergence: abnormal (symptomatic )  Head thrust: right normal  Head thrust: left abnormal    Positional testing   [place]   Left posterior canal: WNL  Right posterior canal: WNL  Roll test   Left horizontal canal: WNL  Right horizontal canal: WNL  Positional testing comment: VORX1: increased dizziness vert and horizontal       Flowsheet Rows      Most Recent Value   PT/OT G-Codes   Current Score  64   Projected Score  70             Precautions: high irritability of neck and dizziness    Daily Treatment Diary    Date 5/3       Visit Number 1       FOTO IE       Re-Eval IE          Manuals    EZEQUIEL CHAUDHRY                               Neuro Re-Ed     Cervical retractions         VOR x1 HEP       Finn string paper HEP        VOR x2         EC NBOS                         Ther Ex    UBE if sumaya         UT/LEvator stretch  HEP                                                       Ther Activity                                    Modalities    MHP PRN

## 2021-05-10 ENCOUNTER — OFFICE VISIT (OUTPATIENT)
Dept: PHYSICAL THERAPY | Facility: CLINIC | Age: 76
End: 2021-05-10
Payer: COMMERCIAL

## 2021-05-10 DIAGNOSIS — M54.2 NECK PAIN: Primary | ICD-10-CM

## 2021-05-10 DIAGNOSIS — R42 VERTIGO: ICD-10-CM

## 2021-05-10 DIAGNOSIS — M25.50 ARTHRALGIA, UNSPECIFIED JOINT: ICD-10-CM

## 2021-05-10 PROCEDURE — 97110 THERAPEUTIC EXERCISES: CPT | Performed by: PHYSICAL THERAPIST

## 2021-05-10 PROCEDURE — 97140 MANUAL THERAPY 1/> REGIONS: CPT | Performed by: PHYSICAL THERAPIST

## 2021-05-10 NOTE — PROGRESS NOTES
Daily Note     Today's date: 5/10/2021  Patient name: Alf Kunz  : 1945  MRN: [id number]  Referring provider: Timoteo Eden MD  Dx:   Encounter Diagnosis     ICD-10-CM    1  Neck pain  M54 2    2  Vertigo  R42    3  Arthralgia, unspecified joint  M25 50                   Subjective: Pt reports that she is feeling a little better today since last visit  She reports that she is currently doing well with all of her interventions and is still getting dizziness with them  Objective: See treatment diary below      Assessment: Tolerated treatment well, she is currently having dizziness with increased speed during VOR and wilbert string interventions  She required education on the positioning for the wilbert strings to the nose, and correcting the stretches  She will continue to progress at home and will do well with the correction of the interventions  Plan: Continue per plan of care        Precautions: high irritability of neck and dizziness    Daily Treatment Diary    Date 5/3 5/10      Visit Number 1 2      FOTO IE       Re-Eval IE          Manuals    EZEQUIEL CHAUDHRY                               Neuro Re-Ed     Cervical retractions   10x2      VOR x1 HEP 3x30"      Wilbert string paper HEP  4'      VOR x2   4'      EC NBOS                         Ther Ex    UBE if sumaya         UT/LEvator stretch  HEP 10"x10                                                      Ther Activity                                    Modalities    MHP PRN

## 2021-05-12 ENCOUNTER — OFFICE VISIT (OUTPATIENT)
Dept: PHYSICAL THERAPY | Facility: CLINIC | Age: 76
End: 2021-05-12
Payer: COMMERCIAL

## 2021-05-12 DIAGNOSIS — M54.2 NECK PAIN: Primary | ICD-10-CM

## 2021-05-12 DIAGNOSIS — M25.50 ARTHRALGIA, UNSPECIFIED JOINT: ICD-10-CM

## 2021-05-12 DIAGNOSIS — R42 VERTIGO: ICD-10-CM

## 2021-05-12 PROCEDURE — 97140 MANUAL THERAPY 1/> REGIONS: CPT | Performed by: PHYSICAL THERAPIST

## 2021-05-12 PROCEDURE — 97110 THERAPEUTIC EXERCISES: CPT | Performed by: PHYSICAL THERAPIST

## 2021-05-12 NOTE — PROGRESS NOTES
Daily Note     Today's date: 2021  Patient name: Mohamud Roach  : 1945  MRN: [id number]  Referring provider: Radha Hurst MD  Dx:   Encounter Diagnosis     ICD-10-CM    1  Neck pain  M54 2    2  Vertigo  R42    3  Arthralgia, unspecified joint  M25 50                   Subjective: Pt reports last night she had an increase in her dizziness  She rolled her head to the L side and had a room spinning occurrence  She is scared it is getting worse  She also frequently notices correlation to her neck pain  Objective: See treatment diary below, [employer] to L: increased dizziness after 5 seconds (minimal)       Assessment: during the session she expressed increased dizziness during cristian hallpike compared to first session and was taken through the epley maneuver  She did have minimal dizziness during each position and had most dizziness in the last position  She did not have dizziness with VOR interventions today and was completed the session with stretches for the cervical spine  She is also getting frequent numbness into the LUE following prolonged L lateral cervical flexion  Plan: Continue per plan of care        Precautions: high irritability of neck and dizziness    Daily Treatment Diary    Date 5/3 5/10 5/12     Visit Number 1 2 3     FOTO IE       Re-Eval IE          Manuals    EZEQUIEL CHAUDHRY [address]      Epley L    MKx2                     Neuro Re-Ed     Cervical retractions   10x2 10x2     VOR x1 HEP 3x30" 30"x1     Finn string paper HEP  4'      VOR x2   4'      EC NBOS                         Ther Ex    UBE if sumaya    5'     UT/LEvator stretch  HEP 10"x10 20"x5                                                     Ther Activity                                    Modalities    MHP PRN

## 2021-05-17 ENCOUNTER — OFFICE VISIT (OUTPATIENT)
Dept: PHYSICAL THERAPY | Facility: CLINIC | Age: 76
End: 2021-05-17
Payer: COMMERCIAL

## 2021-05-17 DIAGNOSIS — R42 VERTIGO: ICD-10-CM

## 2021-05-17 DIAGNOSIS — M25.50 ARTHRALGIA, UNSPECIFIED JOINT: ICD-10-CM

## 2021-05-17 DIAGNOSIS — M54.2 NECK PAIN: Primary | ICD-10-CM

## 2021-05-17 PROCEDURE — 97140 MANUAL THERAPY 1/> REGIONS: CPT | Performed by: PHYSICAL THERAPIST

## 2021-05-17 PROCEDURE — 97110 THERAPEUTIC EXERCISES: CPT | Performed by: PHYSICAL THERAPIST

## 2021-05-17 NOTE — PROGRESS NOTES
Daily Note     Today's date: 2021  Patient name: Sri Mccarthy  : 1945  MRN: [id number]  Referring provider: Fabricio Knowles MD  Dx:   Encounter Diagnosis     ICD-10-CM    1  Neck pain  M54 2    2  Vertigo  R42    3  Arthralgia, unspecified joint  M25 50                   Subjective: Pt reports improved dizziness since last visit today  She is however continues to state her L side of the neck does not feel normal  She is interested in continuing to work on this  Objective: See treatment diary below      Assessment: Tolerated treatment well  Patient had improved dizziness with 2 bouts of Epley maneuver  She had no symptoms during Semont's  However, did have increase in symptoms (drowsiness) following VOR x1 with rapid head turns  She was educated that she may feel tired/ run down the rest of the day and should drink plenty of water  She is currently doing well and will continue to progress next visit with her neck ROM  She is presenting with tenderness throughout the paraspinals on the L side and UT  Plan: Continue per plan of care        Precautions: high irritability of neck and dizziness    Daily Treatment Diary    Date 5/3 5/10 5/12 5/17    Visit Number 1 2 3 4    FOTO IE       Re-Eval IE          Manuals    EZEQUIEL  NV [address]      Epley L    MKx2 MKx2 Semont x1                     Neuro Re-Ed     Cervical retractions   10x2 10x2 10x2 w/ op    VOR x1 HEP 3x30" 30"x1 30"x1    Finn string paper HEP  4'      VOR x2   4'      EC NBOS                         Ther Ex    UBE if sumaya    5' NV    UT/LEvator stretch  HEP 10"x10 20"x5 NV                                                    Ther Activity                                    Modalities    MHP PRN

## 2021-05-19 ENCOUNTER — OFFICE VISIT (OUTPATIENT)
Dept: PHYSICAL THERAPY | Facility: CLINIC | Age: 76
End: 2021-05-19
Payer: COMMERCIAL

## 2021-05-19 DIAGNOSIS — R42 VERTIGO: ICD-10-CM

## 2021-05-19 DIAGNOSIS — M25.50 ARTHRALGIA, UNSPECIFIED JOINT: ICD-10-CM

## 2021-05-19 DIAGNOSIS — M54.2 NECK PAIN: Primary | ICD-10-CM

## 2021-05-19 PROCEDURE — 97140 MANUAL THERAPY 1/> REGIONS: CPT | Performed by: PHYSICAL THERAPIST

## 2021-05-19 PROCEDURE — 97110 THERAPEUTIC EXERCISES: CPT | Performed by: PHYSICAL THERAPIST

## 2021-05-19 NOTE — PROGRESS NOTES
Daily Note     Today's date: 2021  Patient name: John Barfield  : 1945  MRN: [id number]  Referring provider: Moe Perez MD  Dx:   Encounter Diagnosis     ICD-10-CM    1  Neck pain  M54 2    2  Vertigo  R42    3  Arthralgia, unspecified joint  M25 50                   Subjective: Pt continues to report improvement since last visit  She states that her neck is feeling better with less pain  However, is still stating that she feels it is not normal  She has not had dizziness since Monday  Objective: See treatment diary below      Assessment: Tolerated treatment well  Patient was taken through Epley maneuver 3x today with decreased dizziness with each bout  She did have increased dizziness in the last position lasting >20 seconds, but was completely resolved following this time  She was educated that she is on the right path with her dizziness, as it is continuing to improve  She is still having occasional dizziness with VOR x1 testing, and was instructed to continue this intervention at home standing to progress  Her neck was taken through [facility] today to improve her pain level, and would also benefit from continued stretching interventions  Plan: Continue per plan of care        Precautions: high irritability of neck and dizziness    Daily Treatment Diary    Date      Visit Number   3 4 5   FOTO     NV   Re-Eval           Manuals    ULTT Epley L    MKx2 MKx2 Semont x1  MK x3    STM UT and L side of neck     [address] MK            Neuro Re-Ed     Cervical retractions    10x2 10x2 w/ op    VOR x1   30"x1 30"x1 30"x1    Finn string paper        VOR x2         EC NBOS                         Ther Ex    UBE if sumaya    5' NV NV   UT/LEvator stretch    20"x5 NV 20"x5   yamileth row/ext     NV                                           Ther Activity                                    Modalities    MHP PRN

## 2021-05-24 ENCOUNTER — APPOINTMENT (OUTPATIENT)
Dept: PHYSICAL THERAPY | Facility: CLINIC | Age: 76
End: 2021-05-24
Payer: COMMERCIAL

## 2021-05-26 ENCOUNTER — OFFICE VISIT (OUTPATIENT)
Dept: PHYSICAL THERAPY | Facility: CLINIC | Age: 76
End: 2021-05-26
Payer: COMMERCIAL

## 2021-05-26 DIAGNOSIS — R42 VERTIGO: ICD-10-CM

## 2021-05-26 DIAGNOSIS — M25.50 ARTHRALGIA, UNSPECIFIED JOINT: ICD-10-CM

## 2021-05-26 DIAGNOSIS — M54.2 NECK PAIN: Primary | ICD-10-CM

## 2021-05-26 PROCEDURE — 97110 THERAPEUTIC EXERCISES: CPT | Performed by: PHYSICAL THERAPIST

## 2021-05-26 PROCEDURE — 97140 MANUAL THERAPY 1/> REGIONS: CPT | Performed by: PHYSICAL THERAPIST

## 2021-05-26 NOTE — PROGRESS NOTES
Daily Note     Today's date: 2021  Patient name: Rohit Warner  : 1945  MRN: [id number]  Referring provider: Adina Beard MD  Dx:   Encounter Diagnosis     ICD-10-CM    1  Neck pain  M54 2    2  Vertigo  R42    3  Arthralgia, unspecified joint  M25 50                   Subjective: Pt is stating that she is still having dizziness when she bends over or turns her head occasionally  She is worried that she still does not feel normal on the L side of her body and feels numbness  Objective: See treatment diary below, Reflexes all hyperreflexive, UQS and LQS WNL, sensation hyposensitivity on the L side throughout       Assessment: Pt is currently is having referred numbness throughout her L side, and has consistently been stating that she feels not normal  She will try one more visit as she seems to have been making progress with her neck and dizziness  However, she may require another referral to neuro based on numbness  Plan: Continue per plan of care        Precautions: high irritability of neck and dizziness    Daily Treatment Diary    Date    Visit Number 6  3 4 5   Re-Eval           Manuals    ULTT Epley L  MKx1   MKx2 MKx2 Semont x1  MK x3    STM UT and L side of neck [address] MK            Neuro Re-Ed     Cervical retractions    10x2 10x2 w/ op    VOR x1   30"x1 30"x1 30"x1    Finn string paper        VOR x2         EC NBOS                         Ther Ex    UBE if sumaya    5' NV NV   UT/LEvator stretch  20"x5  20"x5 NV 20"x5   yamileth row/ext 10R 2x10     NV   No monies GTB 15x5"                                        Ther Activity                                    Modalities    MHP PRN

## 2021-06-02 ENCOUNTER — OFFICE VISIT (OUTPATIENT)
Dept: PHYSICAL THERAPY | Facility: CLINIC | Age: 76
End: 2021-06-02
Payer: COMMERCIAL

## 2021-06-02 DIAGNOSIS — R42 VERTIGO: ICD-10-CM

## 2021-06-02 DIAGNOSIS — M25.50 ARTHRALGIA, UNSPECIFIED JOINT: ICD-10-CM

## 2021-06-02 DIAGNOSIS — M54.2 NECK PAIN: Primary | ICD-10-CM

## 2021-06-02 PROCEDURE — 97530 THERAPEUTIC ACTIVITIES: CPT | Performed by: PHYSICAL THERAPIST

## 2021-06-02 PROCEDURE — 97112 NEUROMUSCULAR REEDUCATION: CPT | Performed by: PHYSICAL THERAPIST

## 2021-06-02 NOTE — PROGRESS NOTES
Daily Note     Today's date: 2021  Patient name: Alf Kunz  : 1945  MRN: [id number]  Referring provider: Timoteo Eden MD  Dx:   Encounter Diagnosis     ICD-10-CM    1  Neck pain  M54 2    2  Vertigo  R42    3  Arthralgia, unspecified joint  M25 50                   Subjective: Pt reports she is currently feeling alright, better than the first day of therapy  Although, is still having issues with feeling off during the day and occasional lightheadedness during quick head turns  She will be calling her PCP to follow up in the next few days to see what the next steps for her are  Objective: See treatment diary below      Assessment: Tolerated treatment fair  Patient is currently still presenting with positive findings for VORx1 testing  She is also demonstrating balance issues with ambulation and head turns  She was educated that today was her last visit scheduled and if she would like to schedule more she could as she would benefit from it  However, would like to wait for PCP to suggest more  She was instructed if she does not return to continue working on her instructed interventions  Plan: Continue per plan of care        Precautions: high irritability of neck and dizziness    Daily Treatment Diary    Date    Visit Number 6 7 3 4 5   Re-Eval           Manuals    ULTT Epley L  MKx1   MKx2 MKx2 Semont x1  MK x3    STM UT and L side of neck [address] MK            Neuro Re-Ed     Cervical retractions    10x2 10x2 w/ op    VOR x1  30"x4 30"x1 30"x1 30"x1    Finn string paper        VOR x2         EC NBOS         Laser spot finding  30"x5              Ther Ex    UBE if sumaya    5' NV NV   UT/LEvator stretch  20"x5  20"x5 NV 20"x5   yamileth row/ext 10R 2x10     NV   No monies GTB 15x5"                                        Ther Activity    Ambulation with head turns  10'                               Modalities    MHP PRN

## 2021-09-21 ENCOUNTER — APPOINTMENT (EMERGENCY)
Dept: RADIOLOGY | Facility: HOSPITAL | Age: 76
End: 2021-09-21
Payer: COMMERCIAL

## 2021-09-21 ENCOUNTER — HOSPITAL ENCOUNTER (EMERGENCY)
Facility: HOSPITAL | Age: 76
Discharge: HOME | End: 2021-09-21
Attending: EMERGENCY MEDICINE | Admitting: EMERGENCY MEDICINE
Payer: COMMERCIAL

## 2021-09-21 VITALS
SYSTOLIC BLOOD PRESSURE: 144 MMHG | DIASTOLIC BLOOD PRESSURE: 67 MMHG | RESPIRATION RATE: 18 BRPM | HEART RATE: 57 BPM | BODY MASS INDEX: 30.91 KG/M2 | OXYGEN SATURATION: 98 % | WEIGHT: 169 LBS | TEMPERATURE: 97.5 F

## 2021-09-21 DIAGNOSIS — M25.519 SHOULDER PAIN: ICD-10-CM

## 2021-09-21 DIAGNOSIS — S80.00XA KNEE CONTUSION: ICD-10-CM
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
## 2021-09-21 PROCEDURE — 73564 X-RAY EXAM KNEE 4 OR MORE: CPT

## 2021-09-21 PROCEDURE — 99282 EMERGENCY DEPT VISIT SF MDM: CPT | Performed by: EMERGENCY MEDICINE

## 2021-09-21 PROCEDURE — 73030 X-RAY EXAM OF SHOULDER: CPT

## 2021-09-21 PROCEDURE — 99283 EMERGENCY DEPT VISIT LOW MDM: CPT

## 2021-09-21 NOTE — ED NOTES
Daughter states pt started on plavix 2 weeks ago     Yessi Arrington, [place]  09/21/21 7778

## 2021-09-21 NOTE — ED PROVIDER NOTES
History  Chief Complaint   Patient presents with    Fall     mechanical trip/fall at [email or web address] this morning, landed on right knee and right shoulder, c/o pain to same  no head injury  no LOC/dizzy before or after fall     Patient is a 21-year-old female complaining of right knee and shoulder pain s/p a fall at the grocery store this morning at 11AM   Mechanical fall  Denies any preceding dizziness  No HT or LOC  Patient is on plavix  Constant pain in shoulder and knee, worse with movement  Better with rest   Did take some tylenol earlier today with little relief  No weakness  States has decreased sensation distal to right knee  Prior to Admission Medications   Prescriptions Last Dose Informant Patient Reported? Taking?    Calcium Carb-Cholecalciferol (OYSCO 500 + D) 500-200 MG-UNIT TABS  Self Yes No   Sig: Reported on 1/12/2017    FeroSul 325 (65 Fe) MG tablet   No No   Sig: TAKE ONE TABLET BY MOUTH ONCE DAILY WITH BREAKFAST   aspirin (ECOTRIN LOW STRENGTH) 81 mg EC tablet   No No   Sig: Take 1 tablet (81 mg total) by mouth daily   atorvastatin (LIPITOR) 40 mg tablet  Self Yes No   Sig: Take 40 mg by mouth   clopidogrel (PLAVIX) 75 mg tablet   No No   Sig: Take 1 tablet (75 mg total) by mouth daily   diltiazem (CARDIZEM CD) 240 mg 24 hr capsule   No No   Sig: Take 1 capsule (240 mg total) by mouth daily   ergocalciferol (VITAMIN D2) 50,000 units   No No   Sig: Take 1 capsule (50,000 Units total) by mouth once a week   ezetimibe (ZETIA) 10 mg tablet   No No   Sig: TAKE ONE TABLET BY MOUTH ONCE DAILY   folic acid (FOLVITE) 1 mg tablet   No No   Sig: Take 1 tablet (1,000 mcg total) by mouth daily   hydrochlorothiazide (HYDRODIURIL) 25 mg tablet   No No   Sig: Take 1 tablet (25 mg total) by mouth daily   isosorbide mononitrate (IMDUR) 30 mg 24 hr tablet   No No   Sig: Take 1 tablet (30 mg total) by mouth daily   Patient not taking: Reported on 4/27/2019   levothyroxine 112 mcg tablet   No No Sig: Take 1 tablet (112 mcg total) by mouth daily in the early morning   losartan (COZAAR) 100 MG tablet   No No   Sig: TAKE ONE TABLET BY MOUTH ONCE DAILY   metoprolol succinate (TOPROL-XL) 50 mg 24 hr tablet   No No   Sig: Take 1 tablet (50 mg total) by mouth daily   nitrofurantoin (MACRODANTIN) 100 mg capsule   No No   Sig: Take 1 capsule (100 mg total) by mouth 2 (two) times a day   nitroglycerin (NITROSTAT) 0 4 mg SL tablet  Self Yes No   Sig: Place 1 tablet under the tongue every 5 (five) minutes as needed   olmesartan (BENICAR) 40 mg tablet  Self Yes No   Sig: Take 1 tablet by mouth daily   predniSONE 20 mg tablet   No No   Sig: Take 2 tablets (40 mg total) by mouth daily   pregabalin (LYRICA) 75 mg capsule  Self Yes No   Sig: Take 75 mg by mouth   ranolazine (RANEXA) 500 mg 12 hr tablet   No No   Sig: Take 1 tablet (500 mg total) by mouth 2 (two) times a day      Facility-Administered Medications: None       Past Medical History:   Diagnosis Date    Disease of thyroid gland     Hyperlipidemia     Hypertension        Past Surgical History:   Procedure Laterality Date    BACK SURGERY      BREAST SURGERY      CARPAL TUNNEL RELEASE      CHOLECYSTECTOMY      HYSTERECTOMY      OOPHORECTOMY         History reviewed  No pertinent family history  I have reviewed and agree with the history as documented  E-Cigarette/Vaping     E-Cigarette/Vaping Substances     Social History     Tobacco Use    Smoking status: Never Smoker    Smokeless tobacco: Never Used   Substance Use Topics    Alcohol use: Not Currently    Drug use: Never       Review of Systems   Constitutional: Negative  HENT: Negative  Eyes: Negative  Respiratory: Negative  Cardiovascular: Negative  Gastrointestinal: Negative  Endocrine: Negative  Genitourinary: Negative  Musculoskeletal: Positive for arthralgias  Skin: Negative  Allergic/Immunologic: Negative  Neurological: Negative  Hematological: Negative  Psychiatric/Behavioral: Negative  All other systems reviewed and are negative  Physical Exam  Physical Exam  Vitals and nursing note reviewed  Constitutional:       Appearance: Normal appearance  She is obese  HENT:      Head: Normocephalic and atraumatic  Right Ear: Tympanic membrane, ear canal and external ear normal       Left Ear: Tympanic membrane, ear canal and external ear normal       Nose: Nose normal    Cardiovascular:      Rate and Rhythm: Normal rate and regular rhythm  Pulses: Normal pulses  Heart sounds: Normal heart sounds  Pulmonary:      Effort: Pulmonary effort is normal       Breath sounds: Normal breath sounds  Abdominal:      General: Bowel sounds are normal       Palpations: Abdomen is soft  Musculoskeletal:         General: Swelling and tenderness present  Cervical back: Normal range of motion and neck supple  Comments: Global ant swelling to right knee with ecchymosis  No skin breakdown  No hip or ankle pain  Lateral ttp to right shoulder  Full rom  No elbow pain  No clavicular ttp  Pain with abduction  Skin:     General: Skin is warm  Capillary Refill: Capillary refill takes less than 2 seconds  Neurological:      General: No focal deficit present  Mental Status: She is alert and oriented to person, place, and time     Psychiatric:         Mood and Affect: Mood normal          Behavior: Behavior normal          Vital Signs  ED Triage Vitals [09/21/21 1829]   Temperature Pulse Respirations Blood Pressure SpO2   97 5 °F (36 4 °C) 57 18 144/67 98 %      Temp Source Heart Rate Source Patient Position - Orthostatic VS BP Location FiO2 (%)   Tympanic Monitor Sitting Left arm --      Pain Score       --           Vitals:    09/21/21 1829   BP: 144/67   Pulse: 57   Patient Position - Orthostatic VS: Sitting         Visual Acuity      ED Medications  Medications - No data to display    Diagnostic Studies  Results Reviewed     None XR knee 4+ vw right injury    (Results Pending)   XR shoulder 2+ views RIGHT    (Results Pending)              Procedures  Procedures         ED Course  ED Course as of Sep 21 1936   Tue Sep 21, 2021   1933 Went over xray results  Will call back if read differently by radiology  States cannot take codeine but has tylenol extra strength at home  Warned of worsening pain tomorrow  Follow up with PCP, return for any concerns  MDM  Number of Diagnoses or Management Options     Amount and/or Complexity of Data Reviewed  Tests in the radiology section of CPT®: reviewed and ordered  Obtain history from someone other than the patient: yes  Independent visualization of images, tracings, or specimens: yes        Disposition  Final diagnoses:   None     ED Disposition     None      Follow-up Information    None         Patient's Medications   Discharge Prescriptions    No medications on file     No discharge procedures on file      PDMP Review     None          ED Provider  Electronically Signed by           Valentin To MD  09/21/21 9962

## 2021-09-28 ENCOUNTER — LAB (OUTPATIENT)
Dept: LAB | Facility: HOSPITAL | Age: 76
End: 2021-09-28
Payer: COMMERCIAL

## 2021-09-28 DIAGNOSIS — R73.9 HYPERGLYCEMIA: ICD-10-CM

## 2021-09-28 DIAGNOSIS — E03.9 ACQUIRED HYPOTHYROIDISM: ICD-10-CM

## 2021-09-28 DIAGNOSIS — I10 ESSENTIAL HYPERTENSION: ICD-10-CM

## 2021-09-28 DIAGNOSIS — E78.5 HYPERLIPIDEMIA, UNSPECIFIED HYPERLIPIDEMIA TYPE: ICD-10-CM

## 2021-09-28 LAB
ALBUMIN SERPL BCP-MCNC: 4.4 G/DL (ref 3–5.2)
ALP SERPL-CCNC: 90 U/L (ref 43–122)
ALT SERPL W P-5'-P-CCNC: 59 U/L
ANION GAP SERPL CALCULATED.3IONS-SCNC: 10 MMOL/L (ref 5–14)
AST SERPL W P-5'-P-CCNC: 60 U/L (ref 14–36)
BASOPHILS # BLD AUTO: 0.1 THOUSANDS/ΜL (ref 0–0.1)
BASOPHILS NFR BLD AUTO: 1 % (ref 0–1)
BILIRUB SERPL-MCNC: 0.59 MG/DL
BUN SERPL-MCNC: 15 MG/DL (ref 5–25)
CALCIUM SERPL-MCNC: 10 MG/DL (ref 8.4–10.2)
CHLORIDE SERPL-SCNC: 100 MMOL/L (ref 97–108)
CHOLEST SERPL-MCNC: 173 MG/DL
CK SERPL-CCNC: 43 U/L (ref 30–135)
CO2 SERPL-SCNC: 30 MMOL/L (ref 22–30)
CREAT SERPL-MCNC: 0.87 MG/DL (ref 0.6–1.2)
EOSINOPHIL # BLD AUTO: 0.2 THOUSAND/ΜL (ref 0–0.4)
EOSINOPHIL NFR BLD AUTO: 3 % (ref 0–6)
ERYTHROCYTE [DISTWIDTH] IN BLOOD BY AUTOMATED COUNT: 13.6 %
EST. AVERAGE GLUCOSE BLD GHB EST-MCNC: 108 MG/DL
GFR SERPL CREATININE-BSD FRML MDRD: 65 ML/MIN/1.73SQ M
GLUCOSE P FAST SERPL-MCNC: 95 MG/DL (ref 70–99)
HBA1C MFR BLD: 5.4 %
HCT VFR BLD AUTO: 39.8 % (ref 36–46)
HDLC SERPL-MCNC: 56 MG/DL
HGB BLD-MCNC: 12.9 G/DL (ref 12–16)
LDLC SERPL CALC-MCNC: 94 MG/DL
LYMPHOCYTES # BLD AUTO: 2.4 THOUSANDS/ΜL (ref 0.5–4)
LYMPHOCYTES NFR BLD AUTO: 31 % (ref 25–45)
MCH RBC QN AUTO: 27.5 PG (ref 26–34)
MCHC RBC AUTO-ENTMCNC: 32.5 G/DL (ref 31–36)
MCV RBC AUTO: 85 FL (ref 80–100)
MONOCYTES # BLD AUTO: 0.5 THOUSAND/ΜL (ref 0.2–0.9)
MONOCYTES NFR BLD AUTO: 6 % (ref 1–10)
NEUTROPHILS # BLD AUTO: 4.7 THOUSANDS/ΜL (ref 1.8–7.8)
NEUTS SEG NFR BLD AUTO: 59 % (ref 45–65)
NONHDLC SERPL-MCNC: 117 MG/DL
PLATELET # BLD AUTO: 245 THOUSANDS/UL (ref 150–450)
PMV BLD AUTO: 9.7 FL (ref 8.9–12.7)
POTASSIUM SERPL-SCNC: 4 MMOL/L (ref 3.6–5)
PROT SERPL-MCNC: 7.2 G/DL (ref 5.9–8.4)
RBC # BLD AUTO: 4.7 MILLION/UL (ref 4–5.2)
SODIUM SERPL-SCNC: 140 MMOL/L (ref 137–147)
TRIGL SERPL-MCNC: 114 MG/DL
TSH SERPL DL<=0.05 MIU/L-ACNC: 0.6 UIU/ML (ref 0.47–4.68)
WBC # BLD AUTO: 7.9 THOUSAND/UL (ref 4.5–11)

## 2021-09-28 PROCEDURE — 80061 LIPID PANEL: CPT

## 2021-09-28 PROCEDURE — 84443 ASSAY THYROID STIM HORMONE: CPT

## 2021-09-28 PROCEDURE — 36415 COLL VENOUS BLD VENIPUNCTURE: CPT

## 2021-09-28 PROCEDURE — 82550 ASSAY OF CK (CPK): CPT

## 2021-09-28 PROCEDURE — 80053 COMPREHEN METABOLIC PANEL: CPT

## 2021-09-28 PROCEDURE — 85025 COMPLETE CBC W/AUTO DIFF WBC: CPT

## 2021-09-28 PROCEDURE — 83036 HEMOGLOBIN GLYCOSYLATED A1C: CPT

## 2021-10-11 ENCOUNTER — HOSPITAL ENCOUNTER (OUTPATIENT)
Dept: MAMMOGRAPHY | Facility: CLINIC | Age: 76
Discharge: HOME | End: 2021-10-11
Payer: COMMERCIAL

## 2021-10-11 DIAGNOSIS — B34.9 VIRAL INFECTION, UNSPECIFIED: ICD-10-CM

## 2021-10-11 DIAGNOSIS — Z12.31 ENCOUNTER FOR SCREENING MAMMOGRAM FOR MALIGNANT NEOPLASM OF BREAST: ICD-10-CM

## 2021-10-11 DIAGNOSIS — Z12.31 OTHER SCREENING MAMMOGRAM: ICD-10-CM

## 2021-10-11 PROCEDURE — U0005 INFEC AGEN DETEC AMPLI PROBE: HCPCS

## 2021-10-11 PROCEDURE — 77067 SCR MAMMO BI INCL CAD: CPT

## 2021-10-11 PROCEDURE — U0003 INFECTIOUS AGENT DETECTION BY NUCLEIC ACID (DNA OR RNA); SEVERE ACUTE RESPIRATORY SYNDROME CORONAVIRUS 2 (SARS-COV-2) (CORONAVIRUS DISEASE [COVID-19]), AMPLIFIED PROBE TECHNIQUE, MAKING USE OF HIGH THROUGHPUT TECHNOLOGIES AS DESCRIBED BY CMS-2020-01-R: HCPCS

## 2021-10-11 PROCEDURE — 77063 BREAST TOMOSYNTHESIS BI: CPT

## 2021-12-02 LAB — SARS-COV-2 RNA RESP QL NAA+PROBE: NEGATIVE

## 2022-01-25 ENCOUNTER — OFFICE VISIT (OUTPATIENT)
Dept: OBGYN CLINIC | Facility: CLINIC | Age: 77
End: 2022-01-25
Payer: MEDICARE

## 2022-01-25 VITALS
WEIGHT: 168 LBS | HEIGHT: 62 IN | SYSTOLIC BLOOD PRESSURE: 130 MMHG | DIASTOLIC BLOOD PRESSURE: 76 MMHG | BODY MASS INDEX: 30.91 KG/M2 | TEMPERATURE: 97.6 F | HEART RATE: 78 BPM

## 2022-01-25 DIAGNOSIS — M54.12 CERVICAL RADICULOPATHY: Primary | ICD-10-CM

## 2022-01-25 DIAGNOSIS — M25.511 CHRONIC PAIN OF BOTH SHOULDERS: ICD-10-CM

## 2022-01-25 DIAGNOSIS — G89.29 CHRONIC PAIN OF BOTH SHOULDERS: ICD-10-CM

## 2022-01-25 DIAGNOSIS — M25.512 CHRONIC PAIN OF BOTH SHOULDERS: ICD-10-CM

## 2022-01-25 DIAGNOSIS — M54.2 NECK PAIN: ICD-10-CM

## 2022-01-25 PROCEDURE — 99204 OFFICE O/P NEW MOD 45 MIN: CPT | Performed by: FAMILY MEDICINE

## 2022-01-25 RX ORDER — CLOPIDOGREL BISULFATE 75 MG/1
TABLET ORAL
COMMUNITY
Start: 2022-01-13 | End: 2022-06-28

## 2022-01-25 RX ORDER — PREDNISONE 10 MG/1
TABLET ORAL
Qty: 28 TABLET | Refills: 0 | Status: SHIPPED | OUTPATIENT
Start: 2022-01-25

## 2022-01-25 NOTE — PATIENT INSTRUCTIONS
F/u 6 wks  Begin steroid taper  Avoid ibupofen, aleve, advil while taking steroids  Tylenol as needed  Begin physical therapy  Icing/heat/tylenol  as needed  Exercises for Internal and External Shoulder Rotation   WHAT YOU NEED TO KNOW:   Exercises for internal shoulder rotation work the muscles in your chest and front of your shoulder  Exercises for external shoulder rotation work the muscles in the back of your shoulder and upper back  DISCHARGE INSTRUCTIONS:   Contact your healthcare provider if:   · You have sharp or worsening pain during exercise or at rest     · You have questions or concerns about your shoulder exercises  Before you exercise:  Warm up and stretch before you exercise  Walk or ride a stationary bike for 5 to 10 minutes to help you warm up  Stretching helps increase range of motion  It may also decrease muscle soreness and help prevent another injury  Your healthcare provider will tell you which of the following stretches to do:  · Crossover arm stretch:  Relax your shoulders  Hold your upper arm with the opposite hand  Pull your arm across your chest until you feel a stretch  Hold the stretch for 30 seconds  Return to the starting position  · Shoulder flexion stretch:  Stand facing a wall  Slowly walk your fingers up the wall until you feel a stretch  Hold the stretch for 30 seconds  Return to the starting position  · Sleeper stretch:  Lie on your injured side on a firm, flat surface  Bend the elbow of your injured arm 90° with your hand facing up  Use your arm that is not injured to slowly push your injured arm down  Stop when you feel a stretch at the back of your injured shoulder  Hold the stretch for 30 seconds  Slowly return to the starting position  How to exercise with a weight:  Your healthcare provider will tell you how much weight to exercise with  · Shoulder internal rotation:  Sit in a chair   Place a rolled up towel between your elbow and your side  Bend your elbow to 90°  Gently squeeze the towel with your elbow to prevent it from falling out  Hold the weight with your thumb pointing up  Slowly move the weight across your chest  Stop when your hand reaches your opposite arm  Hold this position for as many seconds as directed  Slowly return to the starting position  · Shoulder external rotation:  Lie on your side with your injured shoulder facing up  Bend your elbow 90°  Place a rolled up towel between your elbow and your side  Hold a weight in your hand  Gently squeeze the towel with your elbow to prevent it from falling out  Slowly rotate your arm outward, but keep your elbow bent  Stop when you feel a stretch  Hold this position for 30 seconds or as directed  Slowly return to the starting position  How to exercise with an exercise band:   · Shoulder internal rotation:  Tie one end of the exercise band to a heavy, secure object  Sit in a chair  Place a rolled up towel between your elbow and your side  Bend your elbow to 90°  Gently squeeze the towel with your elbow to prevent it from falling out  Slowly pull the band across your chest  Stop when your hand reaches your opposite arm  Hold this position for as many seconds as directed  Slowly return to the starting position  · Shoulder external rotation:  Hold one end of the exercise band on the side that is not injured  Place a rolled up towel between your elbow and your side  Bend your elbow 90°  Squeeze the towel with your elbow  Grab the end of the band and slowly turn your arm outward, but keep your elbow bent  Stop when you feel a stretch  Hold this position for 30 seconds or as directed  Slowly return to the starting position  Follow up with your physical therapist as directed:  Write down your questions so you remember to ask them at your visits     © Copyright [employer] 2021 Information is for End User's use only and may not be sold, redistributed or otherwise used for commercial purposes  All illustrations and images included in CareNotes® are the copyrighted property of A D A M [employer] Inc  or Lynnette Bishop  The above information is an  only  It is not intended as medical advice for individual conditions or treatments  Talk to your doctor, nurse or pharmacist before following any medical regimen to see if it is safe and effective for you  Neck Exercises   AMBULATORY CARE:   Neck exercises  help reduce neck pain, and improve neck movement and strength  Neck exercises also help prevent long-term neck problems  What you need to know about neck exercises:   · Do the exercises every day,  or as often as directed by your healthcare provider  · Move slowly, gently, and smoothly  Avoid fast or jerky motions  · Stand and sit the way your healthcare provider shows you  Good posture may reduce your neck pain  Check your posture often, even when you are not doing your neck exercises  How to perform neck exercises safely:   · Exercise position:  You may sit or stand while you do neck exercises  Face forward  Your shoulders should be straight and relaxed, with a good posture  · Head tilts, forward and back:  Gently bow your head and try to touch your chin to your chest  Your healthcare provider may tell you to push on the back of your neck to help bow your head  Raise your chin back to the starting position  Tilt your head back as far as possible so you are looking up at the ceiling  Your healthcare provider may tell you to lift your chin to help tilt your head back  Return your head to the starting position  · Head tilts, side to side:  Tilt your head, bringing your ear toward your shoulder  Then tilt your head toward the other shoulder  · Head turns:  Turn your head to look over your shoulder  Tilt your chin down and try to touch it to your shoulder  Do not raise your shoulder to your chin  Face forward again   Do the same on the other side          · Head rolls:  Slowly bring your chin toward your chest  Next, roll your head to the right  Your ear should be positioned over your shoulder  Hold this position for 5 seconds  Roll your head back toward your chest and to the left into the same position  Hold for 5 seconds  Gently roll your head back and around in a clockwise [place] 3 times  Next, move your head in the reverse direction (counterclockwise) in a [place] 3 times  Do not shrug your shoulders upwards while you do this exercise  Contact your healthcare provider if:   · Your pain does not get better, or gets worse  · You have questions or concerns about your condition, care, or exercise program     © Copyright [employer] 2021 Information is for End User's use only and may not be sold, redistributed or otherwise used for commercial purposes  All illustrations and images included in CareNotes® are the copyrighted property of A D A M , Inc  or Lynnette Azevedo   The above information is an  only  It is not intended as medical advice for individual conditions or treatments  Talk to your doctor, nurse or pharmacist before following any medical regimen to see if it is safe and effective for you

## 2022-01-25 NOTE — LETTER
January 25, 2022     Mike Portillo MD  [address] [employer]    Patient: Aaron Garnica   YOB: 1945   Date of Visit: 1/25/2022       Dear Dr Barrett Benito: Thank you for referring Aaron Garnica to me for evaluation  Below are my notes for this consultation  If you have questions, please do not hesitate to call me  I look forward to following your patient along with you  Sincerely,        Valentino Altman MD        CC: No Recipients  Valentino Altman MD  1/25/2022 10:47 AM  Incomplete  [facility] [address]  [address]  [address] [address]  [phone]      Chief Complaint:  Chief Complaint   Patient presents with    Right Shoulder - Pain    Left Shoulder - Pain       Vitals:  /76   Pulse 78   Temp 97 6 °F (36 4 °C)   Ht 5' 2" (1 575 m)   Wt 76 2 kg (168 lb)   BMI 30 73 kg/m²     The following portions of the patient's history were reviewed and updated as appropriate: allergies, current medications, past family history, past medical history, past social history, past surgical history, and problem list       Subjective:   Patient ID: Aaron Garnica is a 77 y o  female  Here c/o B shoulder and neck pain  Pain for about 4 wks  No recent injury  1)  Neck pain  Worse with flex/ext/lat flex B  Pain radiates down both arm  N/t  Taking motrin- helps, tylenol- no help  Worse laying down  Burning pain  2)  B shoulders  Hurts to move  Reach up/out/back  Sharp at times  Ibuprofen helps      Review of Systems   Constitutional: Negative for fatigue and fever  Respiratory: Negative for shortness of breath  Cardiovascular: Negative for chest pain  Gastrointestinal: Negative for abdominal pain and nausea  Genitourinary: Negative for dysuria  Musculoskeletal: Positive for arthralgias and neck pain  Skin: Negative for rash and wound  Neurological: Negative for weakness and headaches         Objective:  Back Exam Tenderness   The patient is experiencing no tenderness  Range of Motion   The patient has normal back ROM  Comments:  Pain with flex/ext/lat flex B  Pos spurling      Right Shoulder Exam     Tenderness   The patient is experiencing tenderness in the acromion  Range of Motion   Active abduction: abnormal   Passive abduction: abnormal   Extension: normal   External rotation: normal   Forward flexion: abnormal   Internal rotation 0 degrees: normal   Internal rotation 90 degrees: normal     Muscle Strength   The patient has normal right shoulder strength  Tests   Gay test: positive    Comments:  Pos empty cant/push off      Left Shoulder Exam     Tenderness   The patient is experiencing tenderness in the acromion and biceps tendon  Range of Motion   Active abduction: abnormal   Passive abduction: abnormal   Extension: normal   External rotation: normal   Forward flexion: abnormal   Internal rotation 0 degrees: normal   Internal rotation 90 degrees: normal     Muscle Strength   The patient has normal left shoulder strength  Comments:  Pos empty cant/push off            Physical Exam  Constitutional:       Appearance: Normal appearance  She is normal weight  HENT:      Head: Normocephalic  Eyes:      Extraocular Movements: Extraocular movements intact  Pulmonary:      Effort: Pulmonary effort is normal    Musculoskeletal:      Cervical back: Normal range of motion  Skin:     General: Skin is warm and dry  Neurological:      General: No focal deficit present  Mental Status: She is alert and oriented to person, place, and time  Mental status is at baseline  Psychiatric:         Mood and Affect: Mood normal          Behavior: Behavior normal          Thought Content: Thought content normal          Judgment: Judgment normal          I have personally reviewed pertinent films in PACS and my interpretation is XR- C spine- mod DJD, loss of joint space, osteophytes   no fx       Assessment/Plan:  Assessment/Plan   Diagnoses and all orders for this visit:    Cervical radiculopathy  -     Ambulatory Referral to Physical Therapy; Future  -     predniSONE 10 mg tablet; Take 7T PO x 1 day, then take 6T PO x 1 day, and continue to decrease  by 1T until finished  Quantity-28    Chronic pain of both shoulders  -     Ambulatory Referral to Orthopedic Surgery  -     Ambulatory Referral to Physical Therapy; Future  -     predniSONE 10 mg tablet; Take 7T PO x 1 day, then take 6T PO x 1 day, and continue to decrease  by 1T until finished  Quantity-28    Neck pain  -     Ambulatory Referral to Orthopedic Surgery  -     XR spine cervical 2 or 3 vw injury; Future  -     Ambulatory Referral to Physical Therapy; Future  -     predniSONE 10 mg tablet; Take 7T PO x 1 day, then take 6T PO x 1 day, and continue to decrease  by 1T until finished  Quantity-28    Other orders  -     clopidogrel (PLAVIX) 75 mg tablet        Return in about 6 weeks (around 3/8/2022) for Recheck  MD Ollie Nolasco MD  1/25/2022 10:23 AM  [facility] [address] [place] [address]  [address] [facility]  [address] [place] [address]  [phone]  [phone]      Chief Complaint:  Chief Complaint   Patient presents with    Right Shoulder - Pain    Left Shoulder - Pain       Vitals:  /76   Pulse 78   Temp 97 6 °F (36 4 °C)   Ht 5' 2" (1 575 m)   Wt 76 2 kg (168 lb)   BMI 30 73 kg/m²     The following portions of the patient's history were reviewed and updated as appropriate: allergies, current medications, past family history, past medical history, past social history, past surgical history, and problem list       Subjective:   Patient ID: Priya Portillo is a 77 y o  female  Here c/o B shoulder and neck pain  Pain for about 4 wks  No recent injury    1)  Neck pain  Worse with flex/ext/lat flex B  Pain radiates down both arm  N/t  Taking motrin- helps, tylenol- no help  Worse laying down  Burning pain  2)  B shoulders  Hurts to move  Reach up/out/back  Sharp at times  Ibuprofen helps      Review of Systems   Constitutional: Negative for fatigue and fever  Respiratory: Negative for shortness of breath  Cardiovascular: Negative for chest pain  Gastrointestinal: Negative for abdominal pain and nausea  Genitourinary: Negative for dysuria  Musculoskeletal: Positive for arthralgias and neck pain  Skin: Negative for rash and wound  Neurological: Negative for weakness and headaches  Objective:  Back Exam     Tenderness   The patient is experiencing no tenderness  Range of Motion   The patient has normal back ROM  Comments:  Pain with flex/ext/lat flex B  Pos spurling      Right Shoulder Exam     Tenderness   The patient is experiencing tenderness in the acromion  Range of Motion   Active abduction: abnormal   Passive abduction: abnormal   Extension: normal   External rotation: normal   Forward flexion: abnormal   Internal rotation 0 degrees: normal   Internal rotation 90 degrees: normal     Muscle Strength   The patient has normal right shoulder strength  Tests   Gay test: positive    Comments:  Pos empty cant/push off      Left Shoulder Exam     Tenderness   The patient is experiencing tenderness in the acromion and biceps tendon  Range of Motion   Active abduction: abnormal   Passive abduction: abnormal   Extension: normal   External rotation: normal   Forward flexion: abnormal   Internal rotation 0 degrees: normal   Internal rotation 90 degrees: normal     Muscle Strength   The patient has normal left shoulder strength  Comments:  Pos empty cant/push off            Physical Exam  Constitutional:       Appearance: Normal appearance  She is normal weight  HENT:      Head: Normocephalic  Eyes:      Extraocular Movements: Extraocular movements intact     Pulmonary:      Effort: Pulmonary effort is normal    Musculoskeletal:      Cervical back: Normal range of motion  Skin:     General: Skin is warm and dry  Neurological:      General: No focal deficit present  Mental Status: She is alert and oriented to person, place, and time  Mental status is at baseline  Psychiatric:         Mood and Affect: Mood normal          Behavior: Behavior normal          Thought Content: Thought content normal          Judgment: Judgment normal          {Imaging Review Statement:[id number]}      Assessment/Plan:  Assessment/Plan   Diagnoses and all orders for this visit:    Cervical radiculopathy  -     Ambulatory Referral to Physical Therapy; Future  -     predniSONE 10 mg tablet; Take 7T PO x 1 day, then take 6T PO x 1 day, and continue to decrease  by 1T until finished  Quantity-28    Chronic pain of both shoulders  -     Ambulatory Referral to Orthopedic Surgery  -     Ambulatory Referral to Physical Therapy; Future  -     predniSONE 10 mg tablet; Take 7T PO x 1 day, then take 6T PO x 1 day, and continue to decrease  by 1T until finished  Quantity-28    Neck pain  -     Ambulatory Referral to Orthopedic Surgery  -     XR spine cervical 2 or 3 vw injury; Future  -     Ambulatory Referral to Physical Therapy; Future  -     predniSONE 10 mg tablet; Take 7T PO x 1 day, then take 6T PO x 1 day, and continue to decrease  by 1T until finished  Quantity-28    Other orders  -     clopidogrel (PLAVIX) 75 mg tablet        Return in about 6 weeks (around 3/8/2022) for Recheck       Austin Loera MD

## 2022-01-25 NOTE — LETTER
January 25, 2022     Kellie Calzada MD  [address] [employer]    Patient: Shayan Glass   YOB: 1945   Date of Visit: 1/25/2022       Dear Dr Guevara Avitia: Thank you for referring Shayan Glass to me for evaluation  Below are my notes for this consultation  If you have questions, please do not hesitate to call me  I look forward to following your patient along with you  Sincerely,        Grace Grossman MD        CC: No Recipients  Grace Grossman MD  1/25/2022 10:47 AM  Signed  [facility] [address]  [address] [facility]  [address] [address]  [phone]  [phone]      Chief Complaint:  Chief Complaint   Patient presents with    Right Shoulder - Pain    Left Shoulder - Pain       Vitals:  /76   Pulse 78   Temp 97 6 °F (36 4 °C)   Ht 5' 2" (1 575 m)   Wt 76 2 kg (168 lb)   BMI 30 73 kg/m²     The following portions of the patient's history were reviewed and updated as appropriate: allergies, current medications, past family history, past medical history, past social history, past surgical history, and problem list       Subjective:   Patient ID: Shayan Glass is a 77 y o  female  Here c/o B shoulder and neck pain  Pain for about 4 wks  No recent injury  1)  Neck pain  Worse with flex/ext/lat flex B  Pain radiates down both arm  N/t  Taking motrin- helps, tylenol- no help  Worse laying down  Burning pain  2)  B shoulders  Hurts to move  Reach up/out/back  Sharp at times  Ibuprofen helps      Review of Systems   Constitutional: Negative for fatigue and fever  Respiratory: Negative for shortness of breath  Cardiovascular: Negative for chest pain  Gastrointestinal: Negative for abdominal pain and nausea  Genitourinary: Negative for dysuria  Musculoskeletal: Positive for arthralgias and neck pain  Skin: Negative for rash and wound  Neurological: Negative for weakness and headaches         Objective:  Back Exam Tenderness   The patient is experiencing no tenderness  Range of Motion   The patient has normal back ROM  Comments:  Pain with flex/ext/lat flex B  Pos spurling      Right Shoulder Exam     Tenderness   The patient is experiencing tenderness in the acromion  Range of Motion   Active abduction: abnormal   Passive abduction: abnormal   Extension: normal   External rotation: normal   Forward flexion: abnormal   Internal rotation 0 degrees: normal   Internal rotation 90 degrees: normal     Muscle Strength   The patient has normal right shoulder strength  Tests   Gay test: positive    Comments:  Pos empty cant/push off      Left Shoulder Exam     Tenderness   The patient is experiencing tenderness in the acromion and biceps tendon  Range of Motion   Active abduction: abnormal   Passive abduction: abnormal   Extension: normal   External rotation: normal   Forward flexion: abnormal   Internal rotation 0 degrees: normal   Internal rotation 90 degrees: normal     Muscle Strength   The patient has normal left shoulder strength  Comments:  Pos empty cant/push off            Physical Exam  Constitutional:       Appearance: Normal appearance  She is normal weight  HENT:      Head: Normocephalic  Eyes:      Extraocular Movements: Extraocular movements intact  Pulmonary:      Effort: Pulmonary effort is normal    Musculoskeletal:      Cervical back: Normal range of motion  Skin:     General: Skin is warm and dry  Neurological:      General: No focal deficit present  Mental Status: She is alert and oriented to person, place, and time  Mental status is at baseline  Psychiatric:         Mood and Affect: Mood normal          Behavior: Behavior normal          Thought Content: Thought content normal          Judgment: Judgment normal          I have personally reviewed pertinent films in PACS and my interpretation is XR- C spine- mod DJD, loss of joint space, osteophytes   no fx       Assessment/Plan:  Assessment/Plan   Diagnoses and all orders for this visit:    Cervical radiculopathy  -     Ambulatory Referral to Physical Therapy; Future  -     predniSONE 10 mg tablet; Take 7T PO x 1 day, then take 6T PO x 1 day, and continue to decrease  by 1T until finished  Quantity-28    Chronic pain of both shoulders  -     Ambulatory Referral to Orthopedic Surgery  -     Ambulatory Referral to Physical Therapy; Future  -     predniSONE 10 mg tablet; Take 7T PO x 1 day, then take 6T PO x 1 day, and continue to decrease  by 1T until finished  Quantity-28    Neck pain  -     Ambulatory Referral to Orthopedic Surgery  -     XR spine cervical 2 or 3 vw injury; Future  -     Ambulatory Referral to Physical Therapy; Future  -     predniSONE 10 mg tablet; Take 7T PO x 1 day, then take 6T PO x 1 day, and continue to decrease  by 1T until finished  Quantity-28    Other orders  -     clopidogrel (PLAVIX) 75 mg tablet        Return in about 6 weeks (around 3/8/2022) for Recheck       Sami Alex MD

## 2022-01-25 NOTE — PROGRESS NOTES
[facility]  [address]  [facility] [address] [phone]      Chief Complaint:  Chief Complaint   Patient presents with    Right Shoulder - Pain    Left Shoulder - Pain       Vitals:  /76   Pulse 78   Temp 97 6 °F (36 4 °C)   Ht 5' 2" (1 575 m)   Wt 76 2 kg (168 lb)   BMI 30 73 kg/m²     The following portions of the patient's history were reviewed and updated as appropriate: allergies, current medications, past family history, past medical history, past social history, past surgical history, and problem list       Subjective:   Patient ID: Sri Mccarthy is a 77 y o  female  Here c/o B shoulder and neck pain  Pain for about 4 wks  No recent injury  1)  Neck pain  Worse with flex/ext/lat flex B  Pain radiates down both arm  N/t  Taking motrin- helps, tylenol- no help  Worse laying down  Burning pain  2)  B shoulders  Hurts to move  Reach up/out/back  Sharp at times  Ibuprofen helps      Review of Systems   Constitutional: Negative for fatigue and fever  Respiratory: Negative for shortness of breath  Cardiovascular: Negative for chest pain  Gastrointestinal: Negative for abdominal pain and nausea  Genitourinary: Negative for dysuria  Musculoskeletal: Positive for arthralgias and neck pain  Skin: Negative for rash and wound  Neurological: Negative for weakness and headaches  Objective:  Back Exam     Tenderness   The patient is experiencing no tenderness  Range of Motion   The patient has normal back ROM  Comments:  Pain with flex/ext/lat flex B  Pos spurling      Right Shoulder Exam     Tenderness   The patient is experiencing tenderness in the acromion      Range of Motion   Active abduction: abnormal   Passive abduction: abnormal   Extension: normal   External rotation: normal   Forward flexion: abnormal   Internal rotation 0 degrees: normal   Internal rotation 90 degrees: normal     Muscle Strength   The patient has normal right shoulder strength  Tests   Gay test: positive    Comments:  Pos empty cant/push off      Left Shoulder Exam     Tenderness   The patient is experiencing tenderness in the acromion and biceps tendon  Range of Motion   Active abduction: abnormal   Passive abduction: abnormal   Extension: normal   External rotation: normal   Forward flexion: abnormal   Internal rotation 0 degrees: normal   Internal rotation 90 degrees: normal     Muscle Strength   The patient has normal left shoulder strength  Comments:  Pos empty cant/push off            Physical Exam  Constitutional:       Appearance: Normal appearance  She is normal weight  HENT:      Head: Normocephalic  Eyes:      Extraocular Movements: Extraocular movements intact  Pulmonary:      Effort: Pulmonary effort is normal    Musculoskeletal:      Cervical back: Normal range of motion  Skin:     General: Skin is warm and dry  Neurological:      General: No focal deficit present  Mental Status: She is alert and oriented to person, place, and time  Mental status is at baseline  Psychiatric:         Mood and Affect: Mood normal          Behavior: Behavior normal          Thought Content: Thought content normal          Judgment: Judgment normal          I have personally reviewed pertinent films in PACS and my interpretation is XR- C spine- mod DJD, loss of joint space, osteophytes  no fx  Assessment/Plan:  Assessment/Plan   Diagnoses and all orders for this visit:    Cervical radiculopathy  -     Ambulatory Referral to Physical Therapy; Future  -     predniSONE 10 mg tablet; Take 7T PO x 1 day, then take 6T PO x 1 day, and continue to decrease  by 1T until finished  Quantity-28    Chronic pain of both shoulders  -     Ambulatory Referral to Orthopedic Surgery  -     Ambulatory Referral to Physical Therapy; Future  -     predniSONE 10 mg tablet;  Take 7T PO x 1 day, then take 6T PO x 1 day, and continue to decrease by 1T until finished  Quantity-28    Neck pain  -     Ambulatory Referral to Orthopedic Surgery  -     XR spine cervical 2 or 3 vw injury; Future  -     Ambulatory Referral to Physical Therapy; Future  -     predniSONE 10 mg tablet; Take 7T PO x 1 day, then take 6T PO x 1 day, and continue to decrease  by 1T until finished  Quantity-28    Other orders  -     clopidogrel (PLAVIX) 75 mg tablet        Return in about 6 weeks (around 3/8/2022) for Recheck       Rebecca Catalan MD

## 2022-02-01 ENCOUNTER — TELEPHONE (OUTPATIENT)
Dept: OBGYN CLINIC | Facility: HOSPITAL | Age: 77
End: 2022-02-01

## 2022-02-01 NOTE — TELEPHONE ENCOUNTER
Pt sees Dr Kristine Hopkins from [facility] is calling stating that they needs a script faxed over for the patient physical therapy   Please fax [phone] Americo Garrett

## 2022-02-16 ENCOUNTER — HOSPITAL ENCOUNTER (EMERGENCY)
Facility: HOSPITAL | Age: 77
Discharge: HOME | End: 2022-02-16
Attending: EMERGENCY MEDICINE | Admitting: EMERGENCY MEDICINE
Payer: MEDICARE

## 2022-02-16 VITALS
DIASTOLIC BLOOD PRESSURE: 63 MMHG | BODY MASS INDEX: 31.56 KG/M2 | SYSTOLIC BLOOD PRESSURE: 119 MMHG | TEMPERATURE: 97.6 F | HEART RATE: 60 BPM | WEIGHT: 171.52 LBS | RESPIRATION RATE: 16 BRPM | HEIGHT: 62 IN | OXYGEN SATURATION: 98 %

## 2022-02-16 DIAGNOSIS — M25.511 BILATERAL SHOULDER PAIN: ICD-10-CM

## 2022-02-16 DIAGNOSIS — M25.512 BILATERAL SHOULDER PAIN: ICD-10-CM

## 2022-02-16 DIAGNOSIS — S16.1XXA STRAIN OF NECK MUSCLE, INITIAL ENCOUNTER: Primary | ICD-10-CM

## 2022-02-16 DIAGNOSIS — M54.2 NECK PAIN: ICD-10-CM

## 2022-02-16 PROCEDURE — 96372 THER/PROPH/DIAG INJ SC/IM: CPT

## 2022-02-16 PROCEDURE — 99284 EMERGENCY DEPT VISIT MOD MDM: CPT | Performed by: EMERGENCY MEDICINE

## 2022-02-16 PROCEDURE — 99283 EMERGENCY DEPT VISIT LOW MDM: CPT

## 2022-02-16 RX ORDER — KETOROLAC TROMETHAMINE 30 MG/ML
15 INJECTION, SOLUTION INTRAMUSCULAR; INTRAVENOUS ONCE
Status: COMPLETED | OUTPATIENT
Start: 2022-02-16 | End: 2022-02-16

## 2022-02-16 RX ORDER — METHOCARBAMOL 750 MG/1
750 TABLET, FILM COATED ORAL 3 TIMES DAILY
Qty: 30 TABLET | Refills: 0 | Status: SHIPPED | OUTPATIENT
Start: 2022-02-16

## 2022-02-16 RX ORDER — METHOCARBAMOL 750 MG/1
750 TABLET, FILM COATED ORAL ONCE
Status: COMPLETED | OUTPATIENT
Start: 2022-02-16 | End: 2022-02-16

## 2022-02-16 RX ADMIN — KETOROLAC TROMETHAMINE 15 MG: 30 INJECTION, SOLUTION INTRAMUSCULAR at 14:09

## 2022-02-16 RX ADMIN — METHOCARBAMOL 750 MG: 750 TABLET, FILM COATED ORAL at 14:08

## 2022-02-16 NOTE — ED PROVIDER NOTES
History  Chief Complaint   Patient presents with    Shoulder Pain     b/l chronic shoulder pain that she goes to PT for  had a session yesterday and since both shoulder have had increased pain  Pt reports she is unable to perform ADLs due to pain Referred here by orthopedic doctor  21-year-old female presents for evaluation of worsening bilateral chronic shoulder pain  Patient had physical therapy yesterday and has had her pain go from moderate intensity is severe says gradually since then  She states it is worse with just moving her shoulders which is making it hard for her to change in eat  Dizzy exact same pain that she always has it is just worse  There is no nausea vomiting, fevers, chills, chest pain, shortness of breath  History provided by:  Patient and relative   used: Yes    Shoulder Pain  Associated symptoms: neck pain    Associated symptoms: no fatigue and no fever        Prior to Admission Medications   Prescriptions Last Dose Informant Patient Reported? Taking?    Aspirin Low Dose 81 MG EC tablet   No No   Sig: TAKE ONE TABLET BY MOUTH ONCE DAILY   Calcium Carb-Cholecalciferol (OYSCO 500 + D) 500-200 MG-UNIT TABS  Self Yes No   Sig: Reported on 1/12/2017    FeroSul 325 (65 Fe) MG tablet   No No   Sig: TAKE ONE TABLET BY MOUTH ONCE DAILY WITH BREAKFAST   clopidogrel (PLAVIX) 75 mg tablet   Yes No   diltiazem (CARDIZEM CD) 240 mg 24 hr capsule   No No   Sig: TAKE ONE CAPSULE BY MOUTH ONCE DAILY   ergocalciferol (VITAMIN D2) 50,000 units   No No   Sig: Take 1 capsule (50,000 Units total) by mouth once a week   ezetimibe (ZETIA) 10 mg tablet   No No   Sig: TAKE ONE TABLET BY MOUTH ONCE DAILY   folic acid (FOLVITE) 1 mg tablet   No No   Sig: Take 1 tablet (1,000 mcg total) by mouth daily   hydrochlorothiazide (HYDRODIURIL) 25 mg tablet   No No   Sig: TAKE ONE TABLET BY MOUTH ONCE DAILY   isosorbide mononitrate (IMDUR) 30 mg 24 hr tablet   No No   Sig: Take 1 tablet (30 mg total) by mouth daily   Patient not taking: Reported on 4/27/2019   levothyroxine 112 mcg tablet   No No   Sig: Take 1 tablet (112 mcg total) by mouth daily in the early morning   losartan (COZAAR) 100 MG tablet   No No   Sig: TAKE ONE TABLET BY MOUTH ONCE DAILY   methocarbamol (Robaxin-750) 750 mg tablet   No No   Sig: Take 1 tablet (750 mg total) by mouth 3 (three) times a day   methocarbamol (Robaxin-750) 750 mg tablet   No No   Sig: Take 1 tablet (750 mg total) by mouth 3 (three) times a day   metoprolol succinate (TOPROL-XL) 50 mg 24 hr tablet   No No   Sig: Take 1 tablet (50 mg total) by mouth daily   naproxen (NAPROSYN) 500 mg tablet   No No   Sig: Take 1 tablet (500 mg total) by mouth 2 (two) times a day with meals   naproxen (NAPROSYN) 500 mg tablet   No No   Sig: TAKE ONE TABLET BY MOUTH TWICE A DAY WITH MEALS   nitrofurantoin (MACRODANTIN) 100 mg capsule   No No   Sig: Take 1 capsule (100 mg total) by mouth 2 (two) times a day   nitroglycerin (NITROSTAT) 0 4 mg SL tablet  Self Yes No   Sig: Place 1 tablet under the tongue every 5 (five) minutes as needed   olmesartan (BENICAR) 40 mg tablet  Self Yes No   Sig: Take 1 tablet by mouth daily   predniSONE 10 mg tablet   No No   Sig: Take 7T PO x 1 day, then take 6T PO x 1 day, and continue to decrease  by 1T until finished   Quantity-28   predniSONE 20 mg tablet   No No   Sig: Take 2 tablets (40 mg total) by mouth daily   pregabalin (LYRICA) 75 mg capsule  Self Yes No   Sig: Take 75 mg by mouth   ranolazine (RANEXA) 500 mg 12 hr tablet   No No   Sig: Take 1 tablet (500 mg total) by mouth 2 (two) times a day      Facility-Administered Medications: None       Past Medical History:   Diagnosis Date    Disease of thyroid gland     Hyperlipidemia     Hypertension        Past Surgical History:   Procedure Laterality Date    BACK SURGERY      BREAST SURGERY      CARPAL TUNNEL RELEASE      CHOLECYSTECTOMY      HYSTERECTOMY      OOPHORECTOMY         Family History   Problem Relation Age of Onset    No Known Problems Mother     Lung cancer Father     Ovarian cancer Sister     No Known Problems Maternal Grandmother     No Known Problems Maternal Grandfather     No Known Problems Paternal Grandmother     No Known Problems Paternal Grandfather     No Known Problems Sister     No Known Problems Sister     No Known Problems Maternal Aunt     No Known Problems Paternal Aunt     No Known Problems Paternal Aunt     Breast cancer Cousin 39     I have reviewed and agree with the history as documented  E-Cigarette/Vaping     E-Cigarette/Vaping Substances     Social History     Tobacco Use    Smoking status: Never Smoker    Smokeless tobacco: Never Used   Substance Use Topics    Alcohol use: Not Currently    Drug use: Never       Review of Systems   Constitutional: Negative for activity change, appetite change, fatigue and fever  HENT: Negative for congestion, dental problem, ear pain, rhinorrhea and sore throat  Eyes: Negative for pain and redness  Respiratory: Negative for chest tightness, shortness of breath and wheezing  Cardiovascular: Negative for chest pain and palpitations  Gastrointestinal: Negative for abdominal pain, blood in stool, constipation, diarrhea, nausea and vomiting  Endocrine: Negative for cold intolerance and heat intolerance  Genitourinary: Negative for dysuria, frequency and hematuria  Musculoskeletal: Positive for neck pain  Negative for arthralgias and myalgias  Skin: Negative for color change, pallor and rash  Neurological: Negative for weakness and numbness  Hematological: Does not bruise/bleed easily  Psychiatric/Behavioral: Negative for agitation, hallucinations and suicidal ideas  Physical Exam  Physical Exam  Constitutional:       Appearance: She is well-developed  HENT:      Head: Normocephalic and atraumatic  Eyes:      Pupils: Pupils are equal, round, and reactive to light     Neck: Vascular: No JVD  Trachea: No tracheal deviation  Comments: No tenderness to palpation over the cervical and thoracic spines  Patient is tender to palpation over the bilateral trapezius muscles with mild hypertonicity  Patient has no swelling redness warmth or tenderness palpation noted over the bilateral shoulder joints  She is neurovascularly intact bilateral upper extremities  Cardiovascular:      Rate and Rhythm: Normal rate and regular rhythm  Pulmonary:      Effort: No tachypnea, accessory muscle usage or respiratory distress  Abdominal:      General: There is no distension  Musculoskeletal:      Right lower leg: Normal       Left lower leg: Normal    Skin:     General: Skin is warm  Capillary Refill: Capillary refill takes less than 2 seconds  Neurological:      Mental Status: She is alert and oriented to person, place, and time     Psychiatric:         Behavior: Behavior normal          Vital Signs  ED Triage Vitals   Temperature Pulse Respirations Blood Pressure SpO2   02/16/22 1333 02/16/22 1332 02/16/22 1332 02/16/22 1332 02/16/22 1332   97 6 °F (36 4 °C) 60 16 119/63 98 %      Temp Source Heart Rate Source Patient Position - Orthostatic VS BP Location FiO2 (%)   02/16/22 1332 02/16/22 1332 02/16/22 1332 02/16/22 1332 --   Oral Monitor Sitting Left arm       Pain Score       --                  Vitals:    02/16/22 1332   BP: 119/63   Pulse: 60   Patient Position - Orthostatic VS: Sitting         Visual Acuity      ED Medications  Medications   ketorolac (TORADOL) injection 15 mg (15 mg Intramuscular Given 2/16/22 1409)   methocarbamol (ROBAXIN) tablet 750 mg (750 mg Oral Given 2/16/22 1408)       Diagnostic Studies  Results Reviewed     None                 No orders to display              Procedures  Procedures         ED Course                                             MDM  Number of Diagnoses or Management Options  Bilateral shoulder pain  Strain of neck muscle, initial encounter  Diagnosis management comments: Bilateral shoulder pain consistent with musculoskeletal pain-will reassure, counseled treat symptoms, continue outpatient follow-up      Disposition  Final diagnoses:   Strain of neck muscle, initial encounter   Bilateral shoulder pain     Time reflects when diagnosis was documented in both MDM as applicable and the Disposition within this note     Time User Action Codes Description Comment    2/16/2022  1:59 PM Earline Oden Add Russell Force  1XXA] Strain of neck muscle, initial encounter     2/16/2022  1:59 PM Earline Oden Add [M25 511,  M25 512] Bilateral shoulder pain     2/16/2022  2:01 PM Earline Oden Add [M54 2] Neck pain       ED Disposition     ED Disposition Condition Date/Time Comment    Discharge Stable Wed Feb 16, 2022  1:59 PM Ariadna Jernigan discharge to home/self care              Follow-up Information     Follow up With Specialties Details Why Contact Guille Robbins MD Family Medicine Schedule an appointment as soon as possible for a visit in 2 days  [address]  [phone]            Discharge Medication List as of 2/16/2022  2:02 PM      START taking these medications    Details   Diclofenac Sodium (VOLTAREN) 1 % Apply 2 g topically 4 (four) times a day, Starting Wed 2/16/2022, Normal         CONTINUE these medications which have CHANGED    Details   methocarbamol (Robaxin-750) 750 mg tablet Take 1 tablet (750 mg total) by mouth 3 (three) times a day, Starting Wed 2/16/2022, Normal         CONTINUE these medications which have NOT CHANGED    Details   Aspirin Low Dose 81 MG EC tablet TAKE ONE TABLET BY MOUTH ONCE DAILY, Normal      Calcium Carb-Cholecalciferol (OYSCO 500 + D) 500-200 MG-UNIT TABS Reported on 1/12/2017 , Historical Med      clopidogrel (PLAVIX) 75 mg tablet Starting Thu 1/13/2022, Historical Med      diltiazem (CARDIZEM CD) 240 mg 24 hr capsule TAKE ONE CAPSULE BY MOUTH ONCE DAILY, Normal      ergocalciferol (VITAMIN D2) 50,000 units Take 1 capsule (50,000 Units total) by mouth once a week, Starting Tue 10/26/2021, Normal      ezetimibe (ZETIA) 10 mg tablet TAKE ONE TABLET BY MOUTH ONCE DAILY, Normal      FeroSul 325 (65 Fe) MG tablet TAKE ONE TABLET BY MOUTH ONCE DAILY WITH BREAKFAST, Normal      folic acid (FOLVITE) 1 mg tablet Take 1 tablet (1,000 mcg total) by mouth daily, Starting Tue 3/9/2021, Normal      hydrochlorothiazide (HYDRODIURIL) 25 mg tablet TAKE ONE TABLET BY MOUTH ONCE DAILY, Normal      isosorbide mononitrate (IMDUR) 30 mg 24 hr tablet Take 1 tablet (30 mg total) by mouth daily, Starting Mon 3/25/2019, Normal      levothyroxine 112 mcg tablet Take 1 tablet (112 mcg total) by mouth daily in the early morning, Starting Tue 3/9/2021, Normal      losartan (COZAAR) 100 MG tablet TAKE ONE TABLET BY MOUTH ONCE DAILY, Normal      metoprolol succinate (TOPROL-XL) 50 mg 24 hr tablet Take 1 tablet (50 mg total) by mouth daily, Starting Tue 3/9/2021, Normal      !! naproxen (NAPROSYN) 500 mg tablet Take 1 tablet (500 mg total) by mouth 2 (two) times a day with meals, Starting Wed 12/1/2021, Normal      !! naproxen (NAPROSYN) 500 mg tablet TAKE ONE TABLET BY MOUTH TWICE A DAY WITH MEALS, Normal      nitrofurantoin (MACRODANTIN) 100 mg capsule Take 1 capsule (100 mg total) by mouth 2 (two) times a day, Starting Tue 7/28/2020, Normal      nitroglycerin (NITROSTAT) 0 4 mg SL tablet Place 1 tablet under the tongue every 5 (five) minutes as needed, Starting Thu 6/23/2011, Historical Med      olmesartan (BENICAR) 40 mg tablet Take 1 tablet by mouth daily, Historical Med      !! predniSONE 10 mg tablet Take 7T PO x 1 day, then take 6T PO x 1 day, and continue to decrease  by 1T until finished   Quantity-28, Normal      !! predniSONE 20 mg tablet Take 2 tablets (40 mg total) by mouth daily, Starting Sat 4/27/2019, Print      pregabalin (LYRICA) 75 mg capsule Take 75 mg by mouth, Starting Tue 10/22/2013, Historical Med ranolazine (RANEXA) 500 mg 12 hr tablet Take 1 tablet (500 mg total) by mouth 2 (two) times a day, Starting u 3/28/2019, Normal       !! - Potential duplicate medications found  Please discuss with provider  No discharge procedures on file      PDMP Review     None          ED Provider  Electronically Signed by           Gabriela Griffith MD  02/16/22 9168

## 2022-02-22 ENCOUNTER — OFFICE VISIT (OUTPATIENT)
Dept: OBGYN CLINIC | Facility: CLINIC | Age: 77
End: 2022-02-22
Payer: MEDICARE

## 2022-02-22 VITALS
SYSTOLIC BLOOD PRESSURE: 114 MMHG | HEART RATE: 65 BPM | HEIGHT: 62 IN | DIASTOLIC BLOOD PRESSURE: 73 MMHG | WEIGHT: 171 LBS | TEMPERATURE: 97.6 F | BODY MASS INDEX: 31.47 KG/M2

## 2022-02-22 DIAGNOSIS — M75.51 ACUTE SHOULDER BURSITIS, RIGHT: ICD-10-CM

## 2022-02-22 DIAGNOSIS — G89.29 CHRONIC PAIN OF BOTH SHOULDERS: Primary | ICD-10-CM

## 2022-02-22 DIAGNOSIS — M54.2 NECK PAIN: ICD-10-CM

## 2022-02-22 DIAGNOSIS — M75.52 BURSITIS OF SHOULDER, LEFT: ICD-10-CM

## 2022-02-22 DIAGNOSIS — M25.511 CHRONIC PAIN OF BOTH SHOULDERS: Primary | ICD-10-CM

## 2022-02-22 DIAGNOSIS — M54.12 CERVICAL RADICULOPATHY: ICD-10-CM

## 2022-02-22 DIAGNOSIS — M25.512 CHRONIC PAIN OF BOTH SHOULDERS: Primary | ICD-10-CM

## 2022-02-22 PROCEDURE — 99214 OFFICE O/P EST MOD 30 MIN: CPT | Performed by: FAMILY MEDICINE

## 2022-02-22 PROCEDURE — 20610 DRAIN/INJ JOINT/BURSA W/O US: CPT | Performed by: FAMILY MEDICINE

## 2022-02-22 RX ORDER — TRIAMCINOLONE ACETONIDE 40 MG/ML
40 INJECTION, SUSPENSION INTRA-ARTICULAR; INTRAMUSCULAR
Status: COMPLETED | OUTPATIENT
Start: 2022-02-22 | End: 2022-02-22

## 2022-02-22 RX ORDER — LIDOCAINE HYDROCHLORIDE 10 MG/ML
4 INJECTION, SOLUTION INFILTRATION; PERINEURAL
Status: COMPLETED | OUTPATIENT
Start: 2022-02-22 | End: 2022-02-22

## 2022-02-22 RX ADMIN — TRIAMCINOLONE ACETONIDE 40 MG: 40 INJECTION, SUSPENSION INTRA-ARTICULAR; INTRAMUSCULAR at 15:04

## 2022-02-22 RX ADMIN — LIDOCAINE HYDROCHLORIDE 4 ML: 10 INJECTION, SOLUTION INFILTRATION; PERINEURAL at 15:04

## 2022-02-22 NOTE — PATIENT INSTRUCTIONS
F/u 4 wks  Continue therapy  Icing/heat/OTC pain meds as needed  Home exercises  Consider MRI if no improvement  L shoulder injection given today

## 2022-02-22 NOTE — PROGRESS NOTES
[facility] [address]  [address]  [address] [address] [phone]      Chief Complaint:  Chief Complaint   Patient presents with    Left Shoulder - Follow-up    Right Shoulder - Follow-up       Vitals:  /73   Pulse 65   Temp 97 6 °F (36 4 °C)   Ht 5' 2" (1 575 m)   Wt 77 6 kg (171 lb)   BMI 31 28 kg/m²     The following portions of the patient's history were reviewed and updated as appropriate: allergies, current medications, past family history, past medical history, past social history, past surgical history, and problem list       Subjective:   Patient ID: Chidi Pollack is a 77 y o  female  Here for f/u  B shoulder and neck pain  She is still having pain  No recent injury  1)  Neck pain  Worse with ext  Pain radiates down both arm  N/t   tylenol  Worse laying down  Burning pain  Finished prednisone  Going to [facility] for PT for 2 wks    2)  B shoulders  Hurts to move arms  Reach up/out/back  Sharp at times  Taking tylenol PRN      Review of Systems   Constitutional: Negative for fatigue and fever  Respiratory: Negative for shortness of breath  Cardiovascular: Negative for chest pain  Gastrointestinal: Negative for abdominal pain and nausea  Genitourinary: Negative for dysuria  Musculoskeletal: Positive for arthralgias and neck pain  Skin: Negative for rash and wound  Neurological: Negative for weakness and headaches  Objective:  Back Exam     Tenderness   The patient is experiencing tenderness in the cervical     Range of Motion   The patient has normal back ROM  Comments:  Pain with ext  Pos spurling      Right Shoulder Exam     Tenderness   The patient is experiencing tenderness in the acromion      Range of Motion   Active abduction: abnormal   Passive abduction: abnormal   Extension: normal   External rotation: normal   Forward flexion: abnormal     Muscle Strength   The patient has normal right shoulder strength  Tests   Gay test: positive    Comments:  Pos empty can/push off      Left Shoulder Exam     Tenderness   The patient is experiencing tenderness in the acromion  Range of Motion   Active abduction: abnormal   Passive abduction: abnormal   Extension: normal   External rotation: normal   Forward flexion: abnormal     Muscle Strength   The patient has normal left shoulder strength  Tests   Gay test: positive    Comments:  Pos empty can/push off              Physical Exam  Constitutional:       Appearance: Normal appearance  She is normal weight  Eyes:      Extraocular Movements: Extraocular movements intact  Pulmonary:      Effort: Pulmonary effort is normal    Musculoskeletal:      Cervical back: Normal range of motion  Skin:     General: Skin is warm and dry  Neurological:      General: No focal deficit present  Mental Status: She is alert and oriented to person, place, and time  Mental status is at baseline  Psychiatric:         Mood and Affect: Mood normal          Behavior: Behavior normal          Thought Content: Thought content normal          Judgment: Judgment normal          Large joint arthrocentesis: L subacromial bursa  Universal Protocol:  Consent: Verbal consent obtained  Risks and benefits: risks, benefits and alternatives were discussed  Consent given by: patient  Time out: Immediately prior to procedure a "time out" was called to verify the correct patient, procedure, equipment, support staff and site/side marked as required    Timeout called at: 2/22/2022 3:02 PM   Site marked: the operative site was marked  Supporting Documentation  Indications: pain   Procedure Details  Location: shoulder - L subacromial bursa  Preparation: Patient was prepped and draped in the usual sterile fashion  Needle size: 25 G  Ultrasound guidance: no  Approach: posterolateral  Medications administered: 4 mL lidocaine 1 %; 40 mg triamcinolone acetonide 40 mg/mL    Patient tolerance: patient tolerated the procedure well with no immediate complications  Dressing:  Sterile dressing applied            Assessment/Plan:  Assessment/Plan   Diagnoses and all orders for this visit:    Chronic pain of both shoulders    Neck pain    Cervical radiculopathy    Bursitis of shoulder, left    Acute shoulder bursitis, right    Other orders  -     Large joint arthrocentesis        Return in about 4 weeks (around 3/22/2022) for Recheck       Dru Yao MD

## 2022-03-22 ENCOUNTER — OFFICE VISIT (OUTPATIENT)
Dept: OBGYN CLINIC | Facility: CLINIC | Age: 77
End: 2022-03-22
Payer: MEDICARE

## 2022-03-22 VITALS
BODY MASS INDEX: 31.47 KG/M2 | WEIGHT: 171 LBS | HEIGHT: 62 IN | SYSTOLIC BLOOD PRESSURE: 134 MMHG | OXYGEN SATURATION: 97 % | DIASTOLIC BLOOD PRESSURE: 86 MMHG | HEART RATE: 74 BPM

## 2022-03-22 DIAGNOSIS — M75.52 BURSITIS OF SHOULDER, LEFT: ICD-10-CM

## 2022-03-22 DIAGNOSIS — M25.512 CHRONIC PAIN OF BOTH SHOULDERS: Primary | ICD-10-CM

## 2022-03-22 DIAGNOSIS — M25.511 CHRONIC PAIN OF BOTH SHOULDERS: Primary | ICD-10-CM

## 2022-03-22 DIAGNOSIS — M54.2 NECK PAIN: ICD-10-CM

## 2022-03-22 DIAGNOSIS — M54.12 CERVICAL RADICULOPATHY: ICD-10-CM

## 2022-03-22 DIAGNOSIS — G89.29 CHRONIC PAIN OF BOTH SHOULDERS: Primary | ICD-10-CM

## 2022-03-22 DIAGNOSIS — M75.51 ACUTE SHOULDER BURSITIS, RIGHT: ICD-10-CM

## 2022-03-22 PROCEDURE — 20610 DRAIN/INJ JOINT/BURSA W/O US: CPT | Performed by: FAMILY MEDICINE

## 2022-03-22 PROCEDURE — 99214 OFFICE O/P EST MOD 30 MIN: CPT | Performed by: FAMILY MEDICINE

## 2022-03-22 RX ORDER — LIDOCAINE HYDROCHLORIDE 10 MG/ML
4 INJECTION, SOLUTION INFILTRATION; PERINEURAL
Status: COMPLETED | OUTPATIENT
Start: 2022-03-22 | End: 2022-03-22

## 2022-03-22 RX ORDER — TRIAMCINOLONE ACETONIDE 40 MG/ML
40 INJECTION, SUSPENSION INTRA-ARTICULAR; INTRAMUSCULAR
Status: COMPLETED | OUTPATIENT
Start: 2022-03-22 | End: 2022-03-22

## 2022-03-22 RX ADMIN — LIDOCAINE HYDROCHLORIDE 4 ML: 10 INJECTION, SOLUTION INFILTRATION; PERINEURAL at 13:53

## 2022-03-22 RX ADMIN — TRIAMCINOLONE ACETONIDE 40 MG: 40 INJECTION, SUSPENSION INTRA-ARTICULAR; INTRAMUSCULAR at 13:53

## 2022-03-22 NOTE — PATIENT INSTRUCTIONS
F/u after MRI  MRI C spine-   neck pain/radiculopathy  XR neg  6 wks of PT  Continue therapy  Home exercises  Icing/heat/OTC pain meds as needed  R shoulder steroid injection given today

## 2022-03-22 NOTE — PROGRESS NOTES
[facility] [address]  [address] [address]  [address] [address] [phone]      Chief Complaint:  Chief Complaint   Patient presents with    Left Shoulder - Follow-up    Right Shoulder - Pain, Follow-up       Vitals:  /86   Pulse 74   Ht 5' 2" (1 575 m)   Wt 77 6 kg (171 lb)   SpO2 97%   BMI 31 28 kg/m²     The following portions of the patient's history were reviewed and updated as appropriate: allergies, current medications, past family history, past medical history, past social history, past surgical history, and problem list       Subjective:   Patient ID: Anup Duran is a 77 y o  female  Here for f/u  B shoulder and neck pain  She is still having pain    1)  Neck pain  Worse with ext  Pain radiates down both arm  N/t   tylenol  Worse laying down  Burning pain  Going [facility] for 6 wks     2)  B shoulders  R is worse than left arm  She cant raise R arm above the shoulder  Hurts to move arms  L arm is getting better  Less pain  Reach up/out/back  Sharp at times  Taking tylenol PRN      Review of Systems   Constitutional: Negative for fatigue and fever  Respiratory: Negative for shortness of breath  Cardiovascular: Negative for chest pain  Gastrointestinal: Negative for abdominal pain and nausea  Genitourinary: Negative for dysuria  Musculoskeletal: Positive for arthralgias and neck pain  Skin: Negative for rash and wound  Neurological: Negative for weakness and headaches  Objective:  Back Exam     Tenderness   The patient is experiencing no tenderness  Range of Motion   Extension: abnormal   Flexion: normal   Lateral bend right: normal   Lateral bend left: normal   Rotation right: normal   Rotation left: normal     Comments:  Pain with ext  Pos spurling test      Right Shoulder Exam     Tenderness   The patient is experiencing no tenderness      Range of Motion   Active abduction: abnormal   Passive abduction: abnormal   Extension: normal   External rotation: normal   Forward flexion: abnormal   Internal rotation 0 degrees: normal   Internal rotation 90 degrees: normal     Muscle Strength   The patient has normal right shoulder strength  Tests   Gay test: positive    Comments:  Pos empty can      Left Shoulder Exam     Tenderness   The patient is experiencing no tenderness  Range of Motion   Active abduction: abnormal   Passive abduction: abnormal   Extension: normal   External rotation: normal   Forward flexion: abnormal   Internal rotation 0 degrees: normal   Internal rotation 90 degrees: normal     Muscle Strength   The patient has normal left shoulder strength  Tests   Gay test: negative    Comments:  Pos empty can              Physical Exam  Constitutional:       Appearance: Normal appearance  She is normal weight  HENT:      Head: Normocephalic  Eyes:      Extraocular Movements: Extraocular movements intact  Pulmonary:      Effort: Pulmonary effort is normal    Musculoskeletal:      Cervical back: Normal range of motion  Skin:     General: Skin is warm and dry  Neurological:      General: No focal deficit present  Mental Status: She is alert and oriented to person, place, and time  Mental status is at baseline  Psychiatric:         Mood and Affect: Mood normal          Behavior: Behavior normal          Thought Content: Thought content normal          Judgment: Judgment normal          Large joint arthrocentesis: R subacromial bursa  Universal Protocol:  Consent: Verbal consent obtained  Risks and benefits: risks, benefits and alternatives were discussed  Consent given by: patient  Time out: Immediately prior to procedure a "time out" was called to verify the correct patient, procedure, equipment, support staff and site/side marked as required    Timeout called at: 3/22/2022 1:53 PM   Site marked: the operative site was marked  Supporting Documentation  Indications: pain Procedure Details  Location: shoulder - R subacromial bursa  Preparation: Patient was prepped and draped in the usual sterile fashion  Needle size: 25 G  Ultrasound guidance: no  Approach: posterolateral  Medications administered: 4 mL lidocaine 1 %; 40 mg triamcinolone acetonide 40 mg/mL    Patient tolerance: patient tolerated the procedure well with no immediate complications  Dressing:  Sterile dressing applied            Assessment/Plan:  Assessment/Plan   Diagnoses and all orders for this visit:    Chronic pain of both shoulders    Cervical radiculopathy  -     MRI cervical spine wo contrast; Future    Neck pain  -     MRI cervical spine wo contrast; Future    Bursitis of shoulder, left    Acute shoulder bursitis, right    Other orders  -     Large joint arthrocentesis        Return for f/u after MRI  c spine, Recheck       Miles Monterroso MD

## 2022-03-25 ENCOUNTER — LAB (OUTPATIENT)
Dept: LAB | Facility: HOSPITAL | Age: 77
End: 2022-03-25
Payer: MEDICARE

## 2022-03-25 DIAGNOSIS — R73.9 HYPERGLYCEMIA: ICD-10-CM

## 2022-03-25 DIAGNOSIS — E78.5 HYPERLIPIDEMIA, UNSPECIFIED HYPERLIPIDEMIA TYPE: ICD-10-CM

## 2022-03-25 DIAGNOSIS — I10 ESSENTIAL HYPERTENSION: ICD-10-CM

## 2022-03-25 DIAGNOSIS — E03.9 ACQUIRED HYPOTHYROIDISM: ICD-10-CM

## 2022-03-25 DIAGNOSIS — R74.8 ELEVATED LIVER ENZYMES: ICD-10-CM

## 2022-03-25 LAB
ALBUMIN SERPL BCP-MCNC: 4 G/DL (ref 3–5.2)
ALP SERPL-CCNC: 55 U/L (ref 43–122)
ALT SERPL W P-5'-P-CCNC: 17 U/L
ANION GAP SERPL CALCULATED.3IONS-SCNC: 3 MMOL/L (ref 5–14)
AST SERPL W P-5'-P-CCNC: 23 U/L (ref 14–36)
BASOPHILS # BLD AUTO: 0.1 THOUSANDS/ΜL (ref 0–0.1)
BASOPHILS NFR BLD AUTO: 1 % (ref 0–1)
BILIRUB DIRECT SERPL-MCNC: 0.36 MG/DL
BILIRUB SERPL-MCNC: 0.5 MG/DL
BUN SERPL-MCNC: 24 MG/DL (ref 5–25)
CALCIUM SERPL-MCNC: 9.8 MG/DL (ref 8.4–10.2)
CHLORIDE SERPL-SCNC: 104 MMOL/L (ref 97–108)
CHOLEST SERPL-MCNC: 167 MG/DL
CK SERPL-CCNC: 28 U/L (ref 30–135)
CO2 SERPL-SCNC: 31 MMOL/L (ref 22–30)
CREAT SERPL-MCNC: 0.81 MG/DL (ref 0.6–1.2)
EOSINOPHIL # BLD AUTO: 0.1 THOUSAND/ΜL (ref 0–0.4)
EOSINOPHIL NFR BLD AUTO: 1 % (ref 0–6)
ERYTHROCYTE [DISTWIDTH] IN BLOOD BY AUTOMATED COUNT: 14.5 %
EST. AVERAGE GLUCOSE BLD GHB EST-MCNC: 114 MG/DL
GFR SERPL CREATININE-BSD FRML MDRD: 71 ML/MIN/1.73SQ M
GLUCOSE P FAST SERPL-MCNC: 88 MG/DL (ref 70–99)
HBA1C MFR BLD: 5.6 %
HCT VFR BLD AUTO: 37.1 % (ref 36–46)
HDLC SERPL-MCNC: 75 MG/DL
HGB BLD-MCNC: 12.2 G/DL (ref 12–16)
LDLC SERPL CALC-MCNC: 73 MG/DL
LYMPHOCYTES # BLD AUTO: 2.2 THOUSANDS/ΜL (ref 0.5–4)
LYMPHOCYTES NFR BLD AUTO: 25 % (ref 25–45)
MCH RBC QN AUTO: 27.6 PG (ref 26–34)
MCHC RBC AUTO-ENTMCNC: 33 G/DL (ref 31–36)
MCV RBC AUTO: 84 FL (ref 80–100)
MONOCYTES # BLD AUTO: 0.9 THOUSAND/ΜL (ref 0.2–0.9)
MONOCYTES NFR BLD AUTO: 10 % (ref 1–10)
NEUTROPHILS # BLD AUTO: 5.5 THOUSANDS/ΜL (ref 1.8–7.8)
NEUTS SEG NFR BLD AUTO: 63 % (ref 45–65)
NONHDLC SERPL-MCNC: 92 MG/DL
PLATELET # BLD AUTO: 297 THOUSANDS/UL (ref 150–450)
PMV BLD AUTO: 8.6 FL (ref 8.9–12.7)
POTASSIUM SERPL-SCNC: 4 MMOL/L (ref 3.6–5)
PROT SERPL-MCNC: 7 G/DL (ref 5.9–8.4)
RBC # BLD AUTO: 4.43 MILLION/UL (ref 4–5.2)
SODIUM SERPL-SCNC: 138 MMOL/L (ref 137–147)
TRIGL SERPL-MCNC: 96 MG/DL
TSH SERPL DL<=0.05 MIU/L-ACNC: 0.35 UIU/ML (ref 0.47–4.68)
WBC # BLD AUTO: 8.7 THOUSAND/UL (ref 4.5–11)

## 2022-03-25 PROCEDURE — 80061 LIPID PANEL: CPT

## 2022-03-25 PROCEDURE — 82248 BILIRUBIN DIRECT: CPT

## 2022-03-25 PROCEDURE — 83036 HEMOGLOBIN GLYCOSYLATED A1C: CPT

## 2022-03-25 PROCEDURE — 84443 ASSAY THYROID STIM HORMONE: CPT

## 2022-03-25 PROCEDURE — 85025 COMPLETE CBC W/AUTO DIFF WBC: CPT

## 2022-03-25 PROCEDURE — 82550 ASSAY OF CK (CPK): CPT

## 2022-03-25 PROCEDURE — 80053 COMPREHEN METABOLIC PANEL: CPT

## 2022-03-25 PROCEDURE — 36415 COLL VENOUS BLD VENIPUNCTURE: CPT

## 2022-03-28 NOTE — RESULT ENCOUNTER NOTE
Please call the patient regarding her abnormal result  thyroid level borderline, repeat in 3  months

## 2022-04-07 ENCOUNTER — HOSPITAL ENCOUNTER (OUTPATIENT)
Dept: MRI IMAGING | Facility: HOSPITAL | Age: 77
Discharge: HOME | End: 2022-04-07
Attending: FAMILY MEDICINE
Payer: MEDICARE

## 2022-04-07 DIAGNOSIS — M54.12 CERVICAL RADICULOPATHY: ICD-10-CM

## 2022-04-07 DIAGNOSIS — M54.2 NECK PAIN: ICD-10-CM

## 2022-04-07 PROCEDURE — G1004 CDSM NDSC: HCPCS

## 2022-04-07 PROCEDURE — 72141 MRI NECK SPINE W/O DYE: CPT

## 2022-04-08 ENCOUNTER — TELEPHONE (OUTPATIENT)
Dept: OBGYN CLINIC | Facility: HOSPITAL | Age: 77
End: 2022-04-08

## 2022-05-04 ENCOUNTER — CONSULT (OUTPATIENT)
Dept: CARDIOLOGY CLINIC | Facility: CLINIC | Age: 77
End: 2022-05-04
Payer: MEDICARE

## 2022-05-04 VITALS
DIASTOLIC BLOOD PRESSURE: 62 MMHG | WEIGHT: 170 LBS | SYSTOLIC BLOOD PRESSURE: 118 MMHG | BODY MASS INDEX: 31.09 KG/M2 | HEART RATE: 74 BPM

## 2022-05-04 DIAGNOSIS — I25.119 ATHEROSCLEROSIS OF NATIVE CORONARY ARTERY OF NATIVE HEART WITH ANGINA PECTORIS (HCC): Primary | ICD-10-CM

## 2022-05-04 DIAGNOSIS — I10 ESSENTIAL HYPERTENSION: ICD-10-CM

## 2022-05-04 DIAGNOSIS — I25.118 CORONARY ARTERY DISEASE OF NATIVE ARTERY OF NATIVE HEART WITH STABLE ANGINA PECTORIS (HCC): ICD-10-CM

## 2022-05-04 DIAGNOSIS — R07.9 CHEST PAIN, UNSPECIFIED TYPE: ICD-10-CM

## 2022-05-04 DIAGNOSIS — I73.9 PERIPHERAL VASCULAR DISEASE, UNSPECIFIED (HCC): ICD-10-CM

## 2022-05-04 DIAGNOSIS — I51.9 HEART DISEASE: ICD-10-CM

## 2022-05-04 DIAGNOSIS — I65.23 BILATERAL CAROTID ARTERY STENOSIS: ICD-10-CM

## 2022-05-04 PROCEDURE — 99204 OFFICE O/P NEW MOD 45 MIN: CPT | Performed by: INTERNAL MEDICINE

## 2022-05-04 PROCEDURE — 93000 ELECTROCARDIOGRAM COMPLETE: CPT | Performed by: INTERNAL MEDICINE

## 2022-05-04 NOTE — PROGRESS NOTES
[facility]  [id number] [facility], [place]  Phone#  [phone]  Fax#  [phone]  *-*-*-*-*-*-*-*-*-*-*-*-*-*-*-*-*-*-*-*-*-*-*-*-*-*-*-*-*-*-*-*-*-*-*-*-*-*-*-*-*-*-*-*-*-*-*-*-*-*-*-*-*-*  ENCOUNTER DATE: 05/04/22 8:02 PM  PATIENT NAME: Seb Sandoval   1945    [id number]  Age: 77 y o  Sex: female  AUTHOR: Johnny Hinojosa MD  [facility] PHYSICIAN: Bobo Galloway MD  REFERRING PHYSICIAN: Bobo Galloway MD  [address] [place] [address] Dr Fawad Matos MD   *-*-*-*-*-*-*-*-*-*-*-*-*-*-*-*-*-*-*-*-*-*-*-*-*-*-*-*-*-*-*-*-*-*-*-*-*-*-*-*-*-*-*-*-*-*-*-*-*-*-*-*-*-*-  REASON FOR REFERRAL:  Evaluation of chest pain    *-*-*-*-*-*-*-*-*-*-*-*-*-*-*-*-*-*-*-*-*-*-*-*-*-*-*-*-*-*-*-*-*-*-*-*-*-*-*-*-*-*-*-*-*-*-*-*-*-*-*-*-*-*-  CARDIOLOGY ASSESSMENT & PLAN:   Diagnosis ICD-10-CM Associated Orders   1  Atherosclerosis of native coronary artery of native heart with angina pectoris ([facility] [address] )  I25 119 Echo complete w/ contrast if indicated     NM myocardial perfusion spect (rx stress and/or rest)   2  Chest pain, unspecified type  R07 9 Ambulatory Referral to Cardiology   3  Heart disease  I51 9 Ambulatory Referral to Cardiology     POCT ECG   4  Coronary artery disease of native artery of native heart with stable angina pectoris ([facility] )  I25 118    5  Bilateral carotid artery stenosis  I65 23    6  Essential hypertension  I10    7  Peripheral vascular disease, unspecified ([facility] )  I73 9      Coronary artery disease of native artery of native heart with stable angina pectoris [facility])  Ms Seb Sandoval is a 43-year-old female with known history of small-vessel coronary artery disease with stable angina over the years  She today she is describing intermittent chest pain which is not very typical of angina  She does report that she was having significant symptoms when she was in [place] and she was advised cardiac catheterization    I do not have information about her evaluation in [place] as she states that she did not bring any records to us  Her current blood pressure is well controlled  On physical examination there is no evidence of pulmonary vascular congestion  Her renal function and lipids are well controlled  Do her symptoms are not very frequent she is quite concerned about being told that she needs a cardiac catheterization  She is also poor historian as I had great difficulty in determining her symptoms through the use of   -- given known previous history and current symptoms it would be reasonable to proceed with the regadenoson nuclear stress test for evaluation for ischemia  -- I am advising her to continue her current medications and to bring her medication bottles for me to review at next visit so that I can remove duplicate and medications that she is not currently taking   -- and echocardiogram is being requested to reassess cardiac structure and function  -- regarding her left upper extremity edema this may be related to her osteoarthritis or secondary to recent steroid injection and continued oral steroid use  I am advising her to follow-up with her orthopedic physician  -- regarding her known history of carotid artery disease we will request carotid duplex ultrasound at next visit  -- Dietary and medical compliance are reinforced  -- She is advised  to report any concerning symptoms such as chest pain, shortness of breath, decline in exercise tolerance or presyncope/syncope  *-*-*-*-*-*-*-*-*-*-*-*-*-*-*-*-*-*-*-*-*-*-*-*-*-*-*-*-*-*-*-*-*-*-*-*-*-*-*-*-*-*-*-*-*-*-*-*-*-*-*-*-*-*-  CURRENT ECG:  Results for orders placed or performed in visit on 05/04/22   POCT ECG    Narrative    A was sinus rhythm, HR 74 beats per minute, left axis deviation, moderate voltage criteria for LVH, nonspecific ST T-wave abnormalities  No significant other chamber hypertrophy enlargement  Slightly delayed R wave transition  *-*-*-*-*-*-*-*-*-*-*-*-*-*-*-*-*-*-*-*-*-*-*-*-*-*-*-*-*-*-*-*-*-*-*-*-*-*-*-*-*-*-*-*-*-*-*-*-*-*-*-*-*-*-  HISTORY OF PRESENT ILLNESS:  Patient is a pleasant 43-year-old female of Citizen of the [place] origin who is here for visit accompanied with her   As patient primarily speaks Kyrgyz,  encounter is performed with use of [employer]  telephone translation service (  [id number] )  Patient is reestablishing follow-up with Cardiology for her comorbidities  She was last seen by Dr Silvana Ruth  in March 2019  Her medical history is significant for:  1  Unspecified coronary artery disease by cardiac catheterization in 2009  Medically managed for small-vessel disease without significant occlusive disease  2  Primary hypertension  3  Dyslipidemia  4  Carotid artery disease  5  Degenerative joint disease with cervical radiculopathy, chronic pain in both shoulders with bilateral bursitis  6  Hypothyroidism    Patient today she states that she was visiting [place] and that is why I was not able to follow-up with cardiologist here  In 2019 she was experiencing chest pain and was evaluated by cardiologist in [place] and was recommended cardiac catheterization hour she decided to pursue that when she comes back to [place]  Reports that since she came here the pandemic started so she did not pursue it further  She reports of occasional jaw pain radiating to her throat  The last episode was approximately 12 months back  Reports her main symptom currently is left upper and lower back pain  Denies shortness of breath palpitations or dizziness  Also reports that she has been experiencing some swelling in left hand and pain for last 2 weeks  He is scheduled to follow-up with orthopedics in the next few days  Reports that she is not very active due to her joint pains and myalgias  Denies any presyncope/syncope  Denies orthopnea, PND or worsening pedal edema    Reports being compliant with medications  Patient's family history is significant for premature CAD including 3 of her brothers having CAD with 1 of them undergoing CABG    Reports that she used to smoke a pack of cigarettes a day but quit smoking about a year back  Denies alcohol use or illicit drug use  She is taking currently Cardizem CD 2 40 mg once daily, HCTZ 25 mg once daily, metoprolol succinate 50 mg daily, losartan 100 mg daily  She was previously also on Imdur and Ranexa but she is not taking this medication anymore  Last blood work is from March 25, 2022 and shows normal renal function and electrolytes  Total cholesterol was 167, triglyceride was 96, HDL was 75, calculated LDL was 73  Last cardiac workup was regadenoson nuclear stress test in April 2019 the showed normal myocardial perfusion, hyperdynamic LV function with EF of 76%  Echocardiogram in April 2019 showed normal left ventricular systolic function, EF 03%, grade 1 diastolic dysfunction, normal right ventricular size and function, normal left and right atrial size, mild mitral annular calcification without stenosis or regurgitation, mildly thickened aortic valve with no stenosis or regurgitation, no tricuspid or pulmonic valve regurgitation and no pulmonary hypertension and pericardial effusion      *-*-*-*-*-*-*-*-*-*-*-*-*-*-*-*-*-*-*-*-*-*-*-*-*-*-*-*-*-*-*-*-*-*-*-*-*-*-*-*-*-*-*-*-*-*-*-*-*-*-*-*-*-*  PAST MEDICAL HISTORY:  Past Medical History:   Diagnosis Date    Disease of thyroid gland     Hyperlipidemia     Hypertension     PAST SURGICAL HISTORY:   Past Surgical History:   Procedure Laterality Date    BACK SURGERY      BREAST SURGERY      CARPAL TUNNEL RELEASE      CHOLECYSTECTOMY      HYSTERECTOMY      OOPHORECTOMY           FAMILY HISTORY:  Family History   Problem Relation Age of Onset    No Known Problems Mother     Lung cancer Father     Ovarian cancer Sister     No Known Problems Maternal Grandmother     No Known Problems Maternal Grandfather     No Known Problems Paternal Grandmother     No Known Problems Paternal Grandfather     No Known Problems Sister     No Known Problems Sister     No Known Problems Maternal Aunt     No Known Problems Paternal Aunt     No Known Problems Paternal Aunt     Breast cancer Cousin 39    SOCIAL HISTORY:  Social History     Tobacco Use   Smoking Status Never Smoker   Smokeless Tobacco Never Used      Social History     Substance and Sexual Activity   Alcohol Use Not Currently     Social History     Substance and Sexual Activity   Drug Use Never         *-*-*-*-*-*-*-*-*-*-*-*-*-*-*-*-*-*-*-*-*-*-*-*-*-*-*-*-*-*-*-*-*-*-*-*-*-*-*-*-*-*-*-*-*-*-*-*-*-*-*-*-*-*  ALLERGIES:  Allergies   Allergen Reactions    Codeine     CURRENT SCHEDULED MEDICATIONS:    Current Outpatient Medications:     clopidogrel (PLAVIX) 75 mg tablet, , Disp: , Rfl:     diltiazem (CARDIZEM CD) 240 mg 24 hr capsule, TAKE ONE CAPSULE BY MOUTH ONCE DAILY, Disp: 90 capsule, Rfl: 2    ergocalciferol (VITAMIN D2) 50,000 units, Take 1 capsule (50,000 Units total) by mouth once a week, Disp: 12 capsule, Rfl: 3    ezetimibe (ZETIA) 10 mg tablet, TAKE ONE TABLET BY MOUTH ONCE DAILY, Disp: 90 tablet, Rfl: 2    folic acid (FOLVITE) 1 mg tablet, TAKE ONE TABLET BY MOUTH ONCE DAILY, Disp: 90 tablet, Rfl: 10    hydrochlorothiazide (HYDRODIURIL) 25 mg tablet, TAKE ONE TABLET BY MOUTH ONCE DAILY, Disp: 90 tablet, Rfl: 2    levothyroxine 112 mcg tablet, Take 1 tablet (112 mcg total) by mouth daily in the early morning, Disp: 90 tablet, Rfl: 3    losartan (COZAAR) 100 MG tablet, TAKE ONE TABLET BY MOUTH ONCE DAILY, Disp: 90 tablet, Rfl: 2    metoprolol succinate (TOPROL-XL) 50 mg 24 hr tablet, Take 1 tablet (50 mg total) by mouth daily, Disp: 90 tablet, Rfl: 3    Aspirin Low Dose 81 MG EC tablet, TAKE ONE TABLET BY MOUTH ONCE DAILY (Patient not taking: Reported on 5/4/2022), Disp: 90 tablet, Rfl: 4    benzonatate (TESSALON PERLES) 100 mg capsule, Take 1 capsule (100 mg total) by mouth 3 (three) times a day as needed for cough, Disp: 20 capsule, Rfl: 0    Calcium Carb-Cholecalciferol (OYSCO 500 + D) 500-200 MG-UNIT TABS, Reported on 1/12/2017 , Disp: , Rfl:     Diclofenac Sodium (VOLTAREN) 1 %, Apply 2 g topically 4 (four) times a day, Disp: 150 g, Rfl: 0    FeroSul 325 (65 Fe) MG tablet, TAKE ONE TABLET BY MOUTH ONCE DAILY WITH BREAKFAST (Patient not taking: Reported on 5/4/2022), Disp: 90 tablet, Rfl: 2    isosorbide mononitrate (IMDUR) 30 mg 24 hr tablet, Take 1 tablet (30 mg total) by mouth daily (Patient not taking: Reported on 4/27/2019), Disp: 90 tablet, Rfl: 3    methocarbamol (Robaxin-750) 750 mg tablet, Take 1 tablet (750 mg total) by mouth 3 (three) times a day (Patient not taking: Reported on 5/4/2022 ), Disp: 30 tablet, Rfl: 0    naproxen (NAPROSYN) 500 mg tablet, Take 1 tablet (500 mg total) by mouth 2 (two) times a day with meals (Patient not taking: Reported on 5/4/2022 ), Disp: 30 tablet, Rfl: 0    naproxen (NAPROSYN) 500 mg tablet, TAKE ONE TABLET BY MOUTH TWICE A DAY WITH MEALS (Patient not taking: Reported on 5/4/2022), Disp: 30 tablet, Rfl: 0    nitrofurantoin (MACRODANTIN) 100 mg capsule, Take 1 capsule (100 mg total) by mouth 2 (two) times a day (Patient not taking: Reported on 5/4/2022 ), Disp: 14 capsule, Rfl: 0    nitroglycerin (NITROSTAT) 0 4 mg SL tablet, Place 1 tablet under the tongue every 5 (five) minutes as needed, Disp: , Rfl:     olmesartan (BENICAR) 40 mg tablet, Take 1 tablet by mouth daily (Patient not taking: Reported on 5/4/2022 ), Disp: , Rfl:     predniSONE 10 mg tablet, Take 7T PO x 1 day, then take 6T PO x 1 day, and continue to decrease  by 1T until finished   Quantity-28 (Patient not taking: Reported on 5/4/2022 ), Disp: 28 tablet, Rfl: 0    predniSONE 20 mg tablet, Take 2 tablets (40 mg total) by mouth daily (Patient not taking: Reported on 3/22/2022 ), Disp: 8 tablet, Rfl: 0   pregabalin (LYRICA) 75 mg capsule, Take 75 mg by mouth (Patient not taking: Reported on 5/4/2022 ), Disp: , Rfl:     ranolazine (RANEXA) 500 mg 12 hr tablet, Take 1 tablet (500 mg total) by mouth 2 (two) times a day (Patient not taking: Reported on 5/4/2022 ), Disp: 60 tablet, Rfl: 11     *-*-*-*-*-*-*-*-*-*-*-*-*-*-*-*-*-*-*-*-*-*-*-*-*-*-*-*-*-*-*-*-*-*-*-*-*-*-*-*-*-*-*-*-*-*-*-*-*-*-*-*-*-*  REVIEW OF SYMPTOMS:    Positive for:  As noted above in HPI  Negative for: All remaining as reviewed below and in HPI   SYSTEM SYMPTOMS REVIEWED:  General--weight change, fever, night sweats  Respiratoryl-- Wheezing, shortness of breath, cough, URI symptoms, sputum, blood  Cardiovascular--chest pain, syncope, dyspnea on exertion, edema, decline in exercise tolerance, claudication   Gastrointestinal--persistent vomiting, diarrhea, abdominal distention, blood in stool   Muscular or skeletal--joint pain or swelling   Neurologic--headaches, syncope, abnormal movement  Hematologic--history of easy bruising and bleeding   Endocrine--thyroid enlargement, heat or cold intolerance, polyuria   Psychiatric--anxiety, depression      *-*-*-*-*-*-*-*-*-*-*-*-*-*-*-*-*-*-*-*-*-*-*-*-*-*-*-*-*-*-*-*-*-*-*-*-*-*-*-*-*-*-*-*-*-*-*-*-*-*-*-*-*-*  CURRENT OUTPATIENT MEDICATIONS:     Current Outpatient Medications:     clopidogrel (PLAVIX) 75 mg tablet, , Disp: , Rfl:     diltiazem (CARDIZEM CD) 240 mg 24 hr capsule, TAKE ONE CAPSULE BY MOUTH ONCE DAILY, Disp: 90 capsule, Rfl: 2    ergocalciferol (VITAMIN D2) 50,000 units, Take 1 capsule (50,000 Units total) by mouth once a week, Disp: 12 capsule, Rfl: 3    ezetimibe (ZETIA) 10 mg tablet, TAKE ONE TABLET BY MOUTH ONCE DAILY, Disp: 90 tablet, Rfl: 2    folic acid (FOLVITE) 1 mg tablet, TAKE ONE TABLET BY MOUTH ONCE DAILY, Disp: 90 tablet, Rfl: 10    hydrochlorothiazide (HYDRODIURIL) 25 mg tablet, TAKE ONE TABLET BY MOUTH ONCE DAILY, Disp: 90 tablet, Rfl: 2    levothyroxine 112 mcg tablet, Take 1 tablet (112 mcg total) by mouth daily in the early morning, Disp: 90 tablet, Rfl: 3    losartan (COZAAR) 100 MG tablet, TAKE ONE TABLET BY MOUTH ONCE DAILY, Disp: 90 tablet, Rfl: 2    metoprolol succinate (TOPROL-XL) 50 mg 24 hr tablet, Take 1 tablet (50 mg total) by mouth daily, Disp: 90 tablet, Rfl: 3    Aspirin Low Dose 81 MG EC tablet, TAKE ONE TABLET BY MOUTH ONCE DAILY (Patient not taking: Reported on 5/4/2022), Disp: 90 tablet, Rfl: 4    benzonatate (TESSALON PERLES) 100 mg capsule, Take 1 capsule (100 mg total) by mouth 3 (three) times a day as needed for cough, Disp: 20 capsule, Rfl: 0    Calcium Carb-Cholecalciferol (OYSCO 500 + D) 500-200 MG-UNIT TABS, Reported on 1/12/2017 , Disp: , Rfl:     Diclofenac Sodium (VOLTAREN) 1 %, Apply 2 g topically 4 (four) times a day, Disp: 150 g, Rfl: 0    FeroSul 325 (65 Fe) MG tablet, TAKE ONE TABLET BY MOUTH ONCE DAILY WITH BREAKFAST (Patient not taking: Reported on 5/4/2022), Disp: 90 tablet, Rfl: 2    isosorbide mononitrate (IMDUR) 30 mg 24 hr tablet, Take 1 tablet (30 mg total) by mouth daily (Patient not taking: Reported on 4/27/2019), Disp: 90 tablet, Rfl: 3    methocarbamol (Robaxin-750) 750 mg tablet, Take 1 tablet (750 mg total) by mouth 3 (three) times a day (Patient not taking: Reported on 5/4/2022 ), Disp: 30 tablet, Rfl: 0    naproxen (NAPROSYN) 500 mg tablet, Take 1 tablet (500 mg total) by mouth 2 (two) times a day with meals (Patient not taking: Reported on 5/4/2022 ), Disp: 30 tablet, Rfl: 0    naproxen (NAPROSYN) 500 mg tablet, TAKE ONE TABLET BY MOUTH TWICE A DAY WITH MEALS (Patient not taking: Reported on 5/4/2022), Disp: 30 tablet, Rfl: 0    nitrofurantoin (MACRODANTIN) 100 mg capsule, Take 1 capsule (100 mg total) by mouth 2 (two) times a day (Patient not taking: Reported on 5/4/2022 ), Disp: 14 capsule, Rfl: 0    nitroglycerin (NITROSTAT) 0 4 mg SL tablet, Place 1 tablet under the tongue every 5 (five) minutes as needed, Disp: , Rfl:     olmesartan (BENICAR) 40 mg tablet, Take 1 tablet by mouth daily (Patient not taking: Reported on 5/4/2022 ), Disp: , Rfl:     predniSONE 10 mg tablet, Take 7T PO x 1 day, then take 6T PO x 1 day, and continue to decrease  by 1T until finished  Quantity-28 (Patient not taking: Reported on 5/4/2022 ), Disp: 28 tablet, Rfl: 0    predniSONE 20 mg tablet, Take 2 tablets (40 mg total) by mouth daily (Patient not taking: Reported on 3/22/2022 ), Disp: 8 tablet, Rfl: 0    pregabalin (LYRICA) 75 mg capsule, Take 75 mg by mouth (Patient not taking: Reported on 5/4/2022 ), Disp: , Rfl:     ranolazine (RANEXA) 500 mg 12 hr tablet, Take 1 tablet (500 mg total) by mouth 2 (two) times a day (Patient not taking: Reported on 5/4/2022 ), Disp: 60 tablet, Rfl: 11    *-*-*-*-*-*-*-*-*-*-*-*-*-*-*-*-*-*-*-*-*-*-*-*-*-*-*-*-*-*-*-*-*-*-*-*-*-*-*-*-*-*-*-*-*-*-*-*-*-*-*-*-*-*  VITAL SIGNS:  Vitals:    05/04/22 1053   BP: 118/62   BP Location: Left arm   Patient Position: Sitting   Cuff Size: Large   Pulse: 74   Weight: 77 1 kg (170 lb)       BMI: Body mass index is 31 09 kg/m²      WEIGHTS:   Wt Readings from Last 25 Encounters:   05/04/22 77 1 kg (170 lb)   04/11/22 76 7 kg (169 lb)   03/22/22 77 6 kg (171 lb)   03/21/22 77 1 kg (170 lb)   02/22/22 77 6 kg (171 lb)   02/16/22 77 8 kg (171 lb 8 3 oz)   01/25/22 76 2 kg (168 lb)   01/14/22 76 2 kg (168 lb)   12/01/21 74 4 kg (164 lb)   10/26/21 76 2 kg (168 lb)   09/21/21 76 7 kg (169 lb)   07/16/21 77 6 kg (171 lb)   04/19/21 77 6 kg (171 lb)   01/08/21 77 1 kg (170 lb)   10/05/20 73 5 kg (162 lb)   08/05/20 72 1 kg (159 lb)   07/20/20 71 2 kg (157 lb)   04/27/19 82 9 kg (182 lb 12 2 oz)   03/25/19 82 6 kg (182 lb)   11/27/18 88 kg (194 lb)   02/27/18 88 2 kg (194 lb 6 4 oz)   02/16/17 86 2 kg (190 lb)   02/09/16 86 6 kg (191 lb)   02/24/15 89 9 kg (198 lb 2 1 oz)   02/12/14 90 7 kg (200 lb) *-*-*-*-*-*-*-*-*-*-*-*-*-*-*-*-*-*-*-*-*-*-*-*-*-*-*-*-*-*-*-*-*-*-*-*-*-*-*-*-*-*-*-*-*-*-*-*-*-*-*-*-*-*-  PHYSICAL EXAM:  General Appearance:    Alert, cooperative, no distress, appears stated age, slightly obese   Head, Eyes, ENT:    No obvious abnormality, moist mucous mebranes  Neck:   Supple, no carotid bruit or JVD   Back:     Symmetric, no curvature  Lungs:     Respirations unlabored  Clear to auscultation bilaterally,    Chest wall:    No tenderness or deformity   Heart:    Regular rate and rhythm, S1 and S2 normal, no murmur, rub  or gallop  Abdomen:     Soft, non-tender,   Extremities:   Extremities warm, there is mild left upper extremity edema mostly involving the hand region  Skin:   no significant venostatic changes in lower extremities  Normal skin color, texture, and turgor  No rashes or lesions     *-*-*-*-*-*-*-*-*-*-*-*-*-*-*-*-*-*-*-*-*-*-*-*-*-*-*-*-*-*-*-*-*-*-*-*-*-*-*-*-*-*-*-*-*-*-*-*-*-*-*-*-*-*-  LABORATORY DATA: I have personally reviewed the available laboratory data          Potassium   Date Value Ref Range Status   03/25/2022 4 0 3 6 - 5 0 mmol/L Final   09/28/2021 4 0 3 6 - 5 0 mmol/L Final   07/21/2020 4 0 3 6 - 5 0 mmol/L Final   05/03/2018 4 5 3 6 - 5 0 MEQ/L Final     Chloride   Date Value Ref Range Status   03/25/2022 104 97 - 108 mmol/L Final   09/28/2021 100 97 - 108 mmol/L Final   07/21/2020 102 97 - 108 mmol/L Final   05/03/2018 100 97 - 108 MEQ/L Final     CO2   Date Value Ref Range Status   03/25/2022 31 (H) 22 - 30 mmol/L Final   09/28/2021 30 22 - 30 mmol/L Final   07/21/2020 29 22 - 30 mmol/L Final   05/03/2018 30 22 - 30 MMOL/L Final     Anion Gap   Date Value Ref Range Status   05/03/2018 8 5 - 14 MMOL/L Final     BUN   Date Value Ref Range Status   03/25/2022 24 5 - 25 mg/dL Final   09/28/2021 15 5 - 25 mg/dL Final   07/21/2020 25 5 - 25 mg/dL Final   05/03/2018 16 5 - 25 MG/DL Final     Creatinine   Date Value Ref Range Status   03/25/2022 0 81 0 60 - 1 20 mg/dL Final     Comment:     Standardized to IDMS reference method   09/28/2021 0 87 0 60 - 1 20 mg/dL Final     Comment:     Standardized to IDMS reference method   07/21/2020 0 81 0 60 - 1 20 mg/dL Final     Comment:     Standardized to IDMS reference method     eGFR   Date Value Ref Range Status   03/25/2022 71 ml/min/1 73sq m Final   09/28/2021 65 >60 ml/min/1 73sq m Final   07/21/2020 72 >60 ml/min/1 73sq m Final     Glucose   Date Value Ref Range Status   05/03/2018 91 70 - 99 MG/DL Final     Calcium   Date Value Ref Range Status   03/25/2022 9 8 8 4 - 10 2 mg/dL Final   09/28/2021 10 0 8 4 - 10 2 mg/dL Final   07/21/2020 10 0 8 4 - 10 2 mg/dL Final   05/03/2018 9 9 8 4 - 10 2 MG/DL Final     AST   Date Value Ref Range Status   03/25/2022 23 14 - 36 U/L Final     Comment:     Specimen collection should occur prior to Sulfasalazine administration due to the potential for falsely depressed results  09/28/2021 60 (H) 14 - 36 U/L Final     Comment:     Specimen collection should occur prior to Sulfasalazine administration due to the potential for falsely depressed results  07/21/2020 22 14 - 36 U/L Final     Comment:       Specimen collection should occur prior to Sulfasalazine administration due to the potential for falsely depressed results  05/03/2018 39 (H) 14 - 36 U/L Final     ALT   Date Value Ref Range Status   03/25/2022 17 <35 U/L Final     Comment:     Specimen collection should occur prior to Sulfasalazine administration due to the potential for falsely depressed results  09/28/2021 59 (H) <35 U/L Final     Comment:     Specimen collection should occur prior to Sulfasalazine administration due to the potential for falsely depressed results  07/21/2020 18 9 - 52 U/L Final     Comment:       Specimen collection should occur prior to Sulfasalazine administration due to the potential for falsely depressed results      05/03/2018 51 9 - 52 U/L Final     Alkaline Phosphatase   Date Value Ref Range Status   03/25/2022 55 43 - 122 U/L Final   09/28/2021 90 43 - 122 U/L Final   07/21/2020 63 43 - 122 U/L Final   05/03/2018 63 43 - 122 U/L Final     Total Protein   Date Value Ref Range Status   05/03/2018 7 0 5 9 - 8 4 G/DL Final     Total Bilirubin   Date Value Ref Range Status   05/03/2018 0 4 <1 3 mg/dL Final     WBC   Date Value Ref Range Status   03/25/2022 8 70 4 50 - 11 00 Thousand/uL Final   09/28/2021 7 90 4 50 - 11 00 Thousand/uL Final   10/09/2020 7 50 4 50 - 11 00 Thousand/uL Final   05/03/2018 7 7 4 5 - 11 0 K/MCL Final     Hemoglobin   Date Value Ref Range Status   03/25/2022 12 2 12 0 - 16 0 g/dL Final   09/28/2021 12 9 12 0 - 16 0 g/dL Final   10/09/2020 12 7 12 0 - 16 0 g/dL Final   05/03/2018 12 8 12 0 - 16 0 G/DL Final     Platelets   Date Value Ref Range Status   03/25/2022 297 150 - 450 Thousands/uL Final   09/28/2021 245 150 - 450 Thousands/uL Final   10/09/2020 198 150 - 450 Thousands/uL Final   05/03/2018 215 150 - 450 K/MCL Final     PTT   Date Value Ref Range Status   04/27/2019 35 26 - 38 seconds Final     Comment:     Therapeutic Heparin Range =  60-90 seconds     INR   Date Value Ref Range Status   04/27/2019 0 99 0 86 - 1 17 Final     No results found for: CKMB, DIGOXIN  No results found for: TSH  Cholesterol   Date Value Ref Range Status   05/03/2018 179 <200 mg/dL Final     Comment:            NCEP ATP III      <200           DESIRABLE   200-239     BORDERLINE HIGH   > = 240                HIGH       HDL   Date Value Ref Range Status   05/03/2018 69 >40 mg/dL Final     Comment:            NCEP ATP III       <40                 LOW    > = 60                HIGH       HDL, Direct   Date Value Ref Range Status   03/25/2022 75 >=50 mg/dL Final     Comment:     Specimen collection should occur prior to Metamizole administration due to the potential for falsley depressed results       Triglycerides   Date Value Ref Range Status   03/25/2022 96 See Comment mg/dL Final Comment:     Triglyceride:     0-9Y            <75mg/dL     10Y-17Y         <90 mg/dL       >=18Y     Normal          <150 mg/dL     Borderline High 150-199 mg/dL     High            200-499 mg/dL        Very High       >499 mg/dL    Specimen collection should occur prior to N-Acetylcysteine or Metamizole administration due to the potential for falsely depressed results  05/03/2018 113 <150 mg/dL Final     Comment:            NCEP ATP III      <150              NORMAL  150-199     BORDERLINE HIGH  200-499                HIGH  = > 500           VERY HIGH        Hemoglobin A1C   Date Value Ref Range Status   03/25/2022 5 6 Normal 3 8-5 6%; PreDiabetic 5 7-6 4%; Diabetic >=6 5%; Glycemic control for adults with diabetes <7 0% % Final     Urine Culture   Date Value Ref Range Status   07/21/2020 80,000-89,000 cfu/ml Escherichia coli ESBL (A)  Final     Comment:     An Extended-Spectrum Beta-Lactamase is being produced by this organism (requires contact precautions)  Cephalosporins are NOT effective for treating these organisms  For SEVERE infections (i e  bacteremia, septic shock, etc ), Carbapenems are the drug of choice  For MILD infections (i e  isolated urinary or biliary infection), high-dose Zosyn may be used  07/21/2020 <10,000 cfu/ml   Final     Comment:     Mixed Contaminants X2       *-*-*-*-*-*-*-*-*-*-*-*-*-*-*-*-*-*-*-*-*-*-*-*-*-*-*-*-*-*-*-*-*-*-*-*-*-*-*-*-*-*-*-*-*-*-*-*-*-*-*-*-*-*-  RADIOLOGY RESULTS:  MRI cervical spine wo contrast    Result Date: 4/8/2022  Impression: Mild surrounding inflammatory changes adjacent to C5, C6, and superior aspect of C7 spinous process tips adjacent to the nuchal and supraspinous cervical ligaments, suggestive of inflammatory enthesitis  Worsened multilevel degenerative changes of cervical spine with varying degrees of canal stenosis (moderate C5-C6) and foraminal narrowing (moderate left C5-C6), as detailed above   The study was marked in EPIC for significant notification  Workstation performed: [id number]       *-*-*-*-*-*-*-*-*-*-*-*-*-*-*-*-*-*-*-*-*-*-*-*-*-*-*-*-*-*-*-*-*-*-*-*-*-*-*-*-*-*-*-*-*-*-*-*-*-*-*-*-*-*-  LAST ECHOCARDIOGRAM AND OTHER CARDIOLOGY RESULTS:  Results for orders placed during the hospital encounter of 19    Echo complete with contrast if indicated    Narrative  [facility]  [place], [address]  [phone]    Transthoracic Echocardiogram  2D, M-mode, Doppler, and Color Doppler    Study date:  2019    Patient: Aurelia Lennox  MR number: [id number]  Account number: [de-identified]  : 10-Stephen-1945  Age: 68 years  Gender: Female  Status: Outpatient  Location: Echo lab  Height: 61 in  Weight: 182 lb  BP: 114/ 68 mmHg    Indications: Coronary artery disease  Diagnoses: I25 83 - Coronary atherosclerosis due to lipid rich plaque    Sonographer:  Long Jordan RDCS  Primary Physician:  Richie Elam MD  Referring Physician:  Cole Mcmahan MD  Group:  [facility]  Interpreting Physician:  Adali Panda DO    SUMMARY    LEFT VENTRICLE:  Systolic function was normal  Ejection fraction was estimated to be 65 %  There were no regional wall motion abnormalities  Wall thickness was mildly increased  Doppler parameters were consistent with abnormal left ventricular relaxation (grade 1 diastolic dysfunction)  MITRAL VALVE:  There was mild annular calcification  HISTORY: PRIOR HISTORY: Hypertension, Hyperlipidemia, Coronary artery disease  PROCEDURE: The procedure was performed in the echo lab  This was a routine study  The transthoracic approach was used  The study included complete 2D imaging, M-mode, complete spectral Doppler, and color Doppler  The heart rate was 55 bpm,  at the start of the study  Images were obtained from the parasternal, apical, subcostal, and suprasternal notch acoustic windows   Echocardiographic views were limited due to lung interference  This was a technically difficult study  LEFT VENTRICLE: Size was normal  Systolic function was normal  Ejection fraction was estimated to be 65 %  There were no regional wall motion abnormalities  Wall thickness was mildly increased  DOPPLER: Doppler parameters were consistent  with abnormal left ventricular relaxation (grade 1 diastolic dysfunction)  RIGHT VENTRICLE: The size was normal  Systolic function was normal  Wall thickness was normal     LEFT ATRIUM: Size was normal     RIGHT ATRIUM: Size was normal     MITRAL VALVE: There was mild annular calcification  DOPPLER: There was no evidence for stenosis  There was no regurgitation  AORTIC VALVE: The valve was trileaflet  Leaflets exhibited mildly increased thickness, normal cuspal separation, and sclerosis  DOPPLER: Transaortic velocity was within the normal range  There was no evidence for stenosis  There was no  regurgitation  TRICUSPID VALVE: The valve structure was normal  There was normal leaflet separation  DOPPLER: The transtricuspid velocity was within the normal range  There was no evidence for stenosis  There was no regurgitation  PULMONIC VALVE: Leaflets exhibited normal thickness, no calcification, and normal cuspal separation  DOPPLER: The transpulmonic velocity was within the normal range  There was no regurgitation  PERICARDIUM: There was no pericardial effusion  The pericardium was normal in appearance  AORTA: The root exhibited normal size  SYSTEMIC VEINS: IVC: The inferior vena cava was normal in size and course  Respirophasic changes were normal     PULMONARY ARTERY: DOPPLER: The tricuspid jet envelope definition was inadequate for estimation of RV systolic pressure  There are no indirect findings (abnormal RV volume or geometry, altered pulmonary flow velocity profile, or leftward  septal displacement) which would suggest moderate or severe pulmonary hypertension      SYSTEM MEASUREMENT TABLES    2D  %FS: 41 3 %  Ao Diam: 3 cm  EDV(Teich): 81 4 ml  EF(Teich): 72 5 %  ESV(Teich): 22 4 ml  IVSd: 1 1 cm  LA Area: 10 6 cm2  LA Diam: 3 2 cm  LVEDV MOD A4C: 66 1 ml  LVEF MOD A4C: 70 8 %  LVESV MOD A4C: 19 3 ml  LVIDd: 4 3 cm  LVIDs: 2 5 cm  LVLd A4C: 7 3 cm  LVLs A4C: 5 9 cm  LVPWd: 1 cm  RA Area: 12 3 cm2  RVIDd: 2 5 cm  SV MOD A4C: 46 8 ml  SV(Teich): 59 ml    PW  E': 0 1 m/s  E/E': 8 8  MV A Alexander: 0 9 m/s  MV Dec Cayey: 1 9 m/s2  MV DecT: 269 1 ms  MV E Alexander: 0 5 m/s  MV E/A Ratio: 0 6  MV PHT: 78 1 ms  MVA By PHT: 2 8 cm2    [facility] Accredited Echocardiography Laboratory    Prepared and electronically signed by    Myles Murdock DO  Signed 12-Apr-2019 15:57:18    No results found for this or any previous visit  No results found for this or any previous visit  No results found for this or any previous visit  *-*-*-*-*-*-*-*-*-*-*-*-*-*-*-*-*-*-*-*-*-*-*-*-*-*-*-*-*-*-*-*-*-*-*-*-*-*-*-*-*-*-*-*-*-*-*-*-*-*-*-*-*-*-  RADIOLOGY RESULTS:  MRI cervical spine wo contrast    Result Date: 4/8/2022  Impression: Mild surrounding inflammatory changes adjacent to C5, C6, and superior aspect of C7 spinous process tips adjacent to the nuchal and supraspinous cervical ligaments, suggestive of inflammatory enthesitis  Worsened multilevel degenerative changes of cervical spine with varying degrees of canal stenosis (moderate C5-C6) and foraminal narrowing (moderate left C5-C6), as detailed above  The study was marked in EPIC for significant notification  Workstation performed: [id number]       *-*-*-*-*-*-*-*-*-*-*-*-*-*-*-*-*-*-*-*-*-*-*-*-*-*-*-*-*-*-*-*-*-*-*-*-*-*-*-*-*-*-*-*-*-*-*-*-*-*-*-*-*-*-  ECHOCARDIOGRAM AND OTHER CARDIOLOGY RESULTS:  Results for orders placed during the hospital encounter of 04/12/19    Echo complete with contrast if indicated    Narrative  [facility]    [place], [address] [facility]  [phone]    Transthoracic Echocardiogram  2D, M-mode, Doppler, and Color Doppler    Study date:  2019    Patient: Luis Concepcion  MR number: [id number]  Account number: [de-identified]  : 10-Stephen-1945  Age: 68 years  Gender: Female  Status: Outpatient  Location: Echo lab  Height: 61 in  Weight: 182 lb  BP: 114/ 68 mmHg    Indications: Coronary artery disease  Diagnoses: I25 83 - Coronary atherosclerosis due to lipid rich plaque    Sonographer:  Omar Nolasco RDCS  Primary Physician:  Daniel Owens MD  Referring Physician:  Zachery Fontanez MD  Group:  [facility]  Interpreting Physician:  Kiran Castanon DO    SUMMARY    LEFT VENTRICLE:  Systolic function was normal  Ejection fraction was estimated to be 65 %  There were no regional wall motion abnormalities  Wall thickness was mildly increased  Doppler parameters were consistent with abnormal left ventricular relaxation (grade 1 diastolic dysfunction)  MITRAL VALVE:  There was mild annular calcification  HISTORY: PRIOR HISTORY: Hypertension, Hyperlipidemia, Coronary artery disease  PROCEDURE: The procedure was performed in the echo lab  This was a routine study  The transthoracic approach was used  The study included complete 2D imaging, M-mode, complete spectral Doppler, and color Doppler  The heart rate was 55 bpm,  at the start of the study  Images were obtained from the parasternal, apical, subcostal, and suprasternal notch acoustic windows  Echocardiographic views were limited due to lung interference  This was a technically difficult study  LEFT VENTRICLE: Size was normal  Systolic function was normal  Ejection fraction was estimated to be 65 %  There were no regional wall motion abnormalities  Wall thickness was mildly increased  DOPPLER: Doppler parameters were consistent  with abnormal left ventricular relaxation (grade 1 diastolic dysfunction)      RIGHT VENTRICLE: The size was normal  Systolic function was normal  Wall thickness was normal     LEFT ATRIUM: Size was normal     RIGHT ATRIUM: Size was normal     MITRAL VALVE: There was mild annular calcification  DOPPLER: There was no evidence for stenosis  There was no regurgitation  AORTIC VALVE: The valve was trileaflet  Leaflets exhibited mildly increased thickness, normal cuspal separation, and sclerosis  DOPPLER: Transaortic velocity was within the normal range  There was no evidence for stenosis  There was no  regurgitation  TRICUSPID VALVE: The valve structure was normal  There was normal leaflet separation  DOPPLER: The transtricuspid velocity was within the normal range  There was no evidence for stenosis  There was no regurgitation  PULMONIC VALVE: Leaflets exhibited normal thickness, no calcification, and normal cuspal separation  DOPPLER: The transpulmonic velocity was within the normal range  There was no regurgitation  PERICARDIUM: There was no pericardial effusion  The pericardium was normal in appearance  AORTA: The root exhibited normal size  SYSTEMIC VEINS: IVC: The inferior vena cava was normal in size and course  Respirophasic changes were normal     PULMONARY ARTERY: DOPPLER: The tricuspid jet envelope definition was inadequate for estimation of RV systolic pressure  There are no indirect findings (abnormal RV volume or geometry, altered pulmonary flow velocity profile, or leftward  septal displacement) which would suggest moderate or severe pulmonary hypertension      SYSTEM MEASUREMENT TABLES    2D  %FS: 41 3 %  Ao Diam: 3 cm  EDV(Teich): 81 4 ml  EF(Teich): 72 5 %  ESV(Teich): 22 4 ml  IVSd: 1 1 cm  LA Area: 10 6 cm2  LA Diam: 3 2 cm  LVEDV MOD A4C: 66 1 ml  LVEF MOD A4C: 70 8 %  LVESV MOD A4C: 19 3 ml  LVIDd: 4 3 cm  LVIDs: 2 5 cm  LVLd A4C: 7 3 cm  LVLs A4C: 5 9 cm  LVPWd: 1 cm  RA Area: 12 3 cm2  RVIDd: 2 5 cm  SV MOD A4C: 46 8 ml  SV(Teich): 59 ml    PW  E': 0 1 m/s  E/E': 8 8  MV A Alexander: 0 9 m/s  MV Dec Zap[place]: 1 9 m/s2  MV DecT: 269 1 ms  MV E Alexander: 0 5 m/s  MV E/A Ratio: 0 6  MV PHT: 78 1 ms  MVA By PHT: 2 8 cm2    [employer] Accredited Echocardiography Laboratory    Prepared and electronically signed by    Myles Marengo, DO  Signed 12-Apr-2019 15:57:18    No results found for this or any previous visit  No results found for this or any previous visit  No results found for this or any previous visit         *-*-*-*-*-*-*-*-*-*-*-*-*-*-*-*-*-*-*-*-*-*-*-*-*-*-*-*-*-*-*-*-*-*-*-*-*-*-*-*-*-*-*-*-*-*-*-*-*-*-*-*-*-*-  SIGNATURES:   @KVM@   Laney Bal MD     CC:   MD Greg [facility], [address]

## 2022-05-05 ENCOUNTER — APPOINTMENT (OUTPATIENT)
Dept: RADIOLOGY | Facility: MEDICAL CENTER | Age: 77
End: 2022-05-05
Payer: MEDICARE

## 2022-05-05 ENCOUNTER — OFFICE VISIT (OUTPATIENT)
Dept: OBGYN CLINIC | Facility: MEDICAL CENTER | Age: 77
End: 2022-05-05
Payer: MEDICARE

## 2022-05-05 VITALS
SYSTOLIC BLOOD PRESSURE: 101 MMHG | HEART RATE: 71 BPM | BODY MASS INDEX: 32.57 KG/M2 | DIASTOLIC BLOOD PRESSURE: 68 MMHG | WEIGHT: 177 LBS | HEIGHT: 62 IN

## 2022-05-05 DIAGNOSIS — M25.532 PAIN IN LEFT WRIST: ICD-10-CM

## 2022-05-05 DIAGNOSIS — M65.832 EXTENSOR TENOSYNOVITIS OF LEFT WRIST: Primary | ICD-10-CM

## 2022-05-05 PROCEDURE — 99214 OFFICE O/P EST MOD 30 MIN: CPT | Performed by: PHYSICIAN ASSISTANT

## 2022-05-05 PROCEDURE — 73110 X-RAY EXAM OF WRIST: CPT

## 2022-05-05 RX ORDER — PREDNISONE 10 MG/1
TABLET ORAL
Qty: 42 TABLET | Refills: 0 | Status: SHIPPED | OUTPATIENT
Start: 2022-05-05

## 2022-05-05 NOTE — ASSESSMENT & PLAN NOTE
Ms Aftab Flower is a 78-year-old female with known history of small-vessel coronary artery disease with stable angina over the years  She today she is describing intermittent chest pain which is not very typical of angina  She does report that she was having significant symptoms when she was in [place] and she was advised cardiac catheterization  I do not have information about her evaluation in [place] as she states that she did not bring any records to us  Her current blood pressure is well controlled  On physical examination there is no evidence of pulmonary vascular congestion  Her renal function and lipids are well controlled  Do her symptoms are not very frequent she is quite concerned about being told that she needs a cardiac catheterization  She is also poor historian as I had great difficulty in determining her symptoms through the use of   -- given known previous history and current symptoms it would be reasonable to proceed with the regadenoson nuclear stress test for evaluation for ischemia  -- I am advising her to continue her current medications and to bring her medication bottles for me to review at next visit so that I can remove duplicate and medications that she is not currently taking   -- and echocardiogram is being requested to reassess cardiac structure and function  -- regarding her left upper extremity edema this may be related to her osteoarthritis or secondary to recent steroid injection and continued oral steroid use  I am advising her to follow-up with her orthopedic physician  -- regarding her known history of carotid artery disease we will request carotid duplex ultrasound at next visit  -- Dietary and medical compliance are reinforced  -- She is advised  to report any concerning symptoms such as chest pain, shortness of breath, decline in exercise tolerance or presyncope/syncope

## 2022-05-05 NOTE — PATIENT INSTRUCTIONS
CARDIOLOGY ASSESSMENT & PLAN:   Diagnosis ICD-10-CM Associated Orders   1  Atherosclerosis of native coronary artery of native heart with angina pectoris ([facility] )  I25 119 Echo complete w/ contrast if indicated     NM myocardial perfusion spect (rx stress and/or rest)   2  Chest pain, unspecified type  R07 9 Ambulatory Referral to Cardiology   3  Heart disease  I51 9 Ambulatory Referral to Cardiology     POCT ECG   4  Coronary artery disease of native artery of native heart with stable angina pectoris ([facility] )  I25 118    5  Bilateral carotid artery stenosis  I65 23    6  Essential hypertension  I10    7  Peripheral vascular disease, unspecified ([facility] )  I73 9      Coronary artery disease of native artery of native heart with stable angina pectoris [facility])  Ms Jayro Fregoso is a 64-year-old female with known history of small-vessel coronary artery disease with stable angina over the years  She today she is describing intermittent chest pain which is not very typical of angina  She does report that she was having significant symptoms when she was in [place] and she was advised cardiac catheterization  I do not have information about her evaluation in [place] as she states that she did not bring any records to us  Her current blood pressure is well controlled  On physical examination there is no evidence of pulmonary vascular congestion  Her renal function and lipids are well controlled  Do her symptoms are not very frequent she is quite concerned about being told that she needs a cardiac catheterization  She is also poor historian as I had great difficulty in determining her symptoms through the use of   -- given known previous history and current symptoms it would be reasonable to proceed with the regadenoson nuclear stress test for evaluation for ischemia    -- I am advising her to continue her current medications and to bring her medication bottles for me to review at next visit so that I can remove duplicate and medications that she is not currently taking   -- and echocardiogram is being requested to reassess cardiac structure and function  -- regarding her left upper extremity edema this may be related to her osteoarthritis or secondary to recent steroid injection and continued oral steroid use  I am advising her to follow-up with her orthopedic physician  -- regarding her known history of carotid artery disease we will request carotid duplex ultrasound at next visit  -- Dietary and medical compliance are reinforced  -- She is advised  to report any concerning symptoms such as chest pain, shortness of breath, decline in exercise tolerance or presyncope/syncope

## 2022-05-05 NOTE — PROGRESS NOTES
Patient Name:  Judith Woods  MRN:  [id number]    Assessment & Plan     Left wrist extensor tenosynovitis  1  Prescription for prednisone taper  2  Advised she discontinue to proximal taking prednisone  She may reinitiate naproxen once prednisone tapers complete  3  Universal wrist brace provided  4  Referral to physical therapy  5  Follow-up in six weeks with primary care sports medicine  Chief Complaint     Left hand/wrist pain and swelling    History of the Present Illness     Judith Woods is a 77 y o  female who reports to the office today for evaluation of her left hand and wrist   She notes an onset of pain and swelling approximately two weeks ago  She denies any injury or trauma or change in her routine  She notes pain primarily along the dorsum of the hand and wrist with associated swelling  Pain is worse with increased use of the wrist and repetitive motion as well as lifting  She notes weakness secondary to pain  She notes stiffness in the wrist and digits due to the swelling  No numbness or tingling  No fevers or chills  She has been taking Tylenol and naproxen with minimal improvement  Physical Exam     Ht 5' 2" (1 575 m)   Wt 80 3 kg (177 lb)   BMI 32 37 kg/m²     Left hand/wrist:  Skin intact  No gross deformity  Soft tissue swelling and edema noted over the dorsum of the hand and wrist   No erythema  No palpable fluctuance or collection evident  No ecchymosis  There is tenderness to palpation along the common extensor tendons of the wrist and hand  Wrist range of motion is intact and limited due to pain  Full composite fist formation with discomfort noted as well  Sensation intact median ulnar and radial nerves  Eyes: Anicteric sclerae  ENT: Trachea midline  Lungs: Normal respiratory effort  CV: Capillary refill is less than 2 seconds  Skin: Intact without erythema  Lymph: No palpable lymphadenopathy  Neuro: Sensation is grossly intact to light touch    Psych: Mood and affect are appropriate  Data Review     I have personally reviewed pertinent films in PACS, and my interpretation follows:    X-rays left wrist 5/5/22:  No acute osseous abnormality  No significant degenerative changes  No fracture or dislocation noted      Past Medical History:   Diagnosis Date    Disease of thyroid gland     Hyperlipidemia     Hypertension        Past Surgical History:   Procedure Laterality Date    BACK SURGERY      BREAST SURGERY      CARPAL TUNNEL RELEASE      CHOLECYSTECTOMY      HYSTERECTOMY      OOPHORECTOMY         Allergies   Allergen Reactions    Codeine        Current Outpatient Medications on File Prior to Visit   Medication Sig Dispense Refill    benzonatate (TESSALON PERLES) 100 mg capsule Take 1 capsule (100 mg total) by mouth 3 (three) times a day as needed for cough 20 capsule 0    Calcium Carb-Cholecalciferol (OYSCO 500 + D) 500-200 MG-UNIT TABS Reported on 1/12/2017       clopidogrel (PLAVIX) 75 mg tablet       Diclofenac Sodium (VOLTAREN) 1 % Apply 2 g topically 4 (four) times a day 150 g 0    diltiazem (CARDIZEM CD) 240 mg 24 hr capsule TAKE ONE CAPSULE BY MOUTH ONCE DAILY 90 capsule 2    ergocalciferol (VITAMIN D2) 50,000 units Take 1 capsule (50,000 Units total) by mouth once a week 12 capsule 3    ezetimibe (ZETIA) 10 mg tablet TAKE ONE TABLET BY MOUTH ONCE DAILY 90 tablet 2    folic acid (FOLVITE) 1 mg tablet TAKE ONE TABLET BY MOUTH ONCE DAILY 90 tablet 10    hydrochlorothiazide (HYDRODIURIL) 25 mg tablet TAKE ONE TABLET BY MOUTH ONCE DAILY 90 tablet 2    levothyroxine 112 mcg tablet Take 1 tablet (112 mcg total) by mouth daily in the early morning 90 tablet 3    losartan (COZAAR) 100 MG tablet TAKE ONE TABLET BY MOUTH ONCE DAILY 90 tablet 2    methocarbamol (Robaxin-750) 750 mg tablet Take 1 tablet (750 mg total) by mouth 3 (three) times a day 30 tablet 0    metoprolol succinate (TOPROL-XL) 50 mg 24 hr tablet Take 1 tablet (50 mg total) by mouth daily 90 tablet 3    nitroglycerin (NITROSTAT) 0 4 mg SL tablet Place 1 tablet under the tongue every 5 (five) minutes as needed      predniSONE 20 mg tablet Take 2 tablets (40 mg total) by mouth daily 8 tablet 0    Aspirin Low Dose 81 MG EC tablet TAKE ONE TABLET BY MOUTH ONCE DAILY (Patient not taking: Reported on 5/4/2022) 90 tablet 4    FeroSul 325 (65 Fe) MG tablet TAKE ONE TABLET BY MOUTH ONCE DAILY WITH BREAKFAST (Patient not taking: Reported on 5/4/2022) 90 tablet 2    isosorbide mononitrate (IMDUR) 30 mg 24 hr tablet Take 1 tablet (30 mg total) by mouth daily (Patient not taking: Reported on 4/27/2019) 90 tablet 3    naproxen (NAPROSYN) 500 mg tablet Take 1 tablet (500 mg total) by mouth 2 (two) times a day with meals (Patient not taking: Reported on 5/4/2022 ) 30 tablet 0    naproxen (NAPROSYN) 500 mg tablet TAKE ONE TABLET BY MOUTH TWICE A DAY WITH MEALS (Patient not taking: Reported on 5/4/2022) 30 tablet 0    nitrofurantoin (MACRODANTIN) 100 mg capsule Take 1 capsule (100 mg total) by mouth 2 (two) times a day (Patient not taking: Reported on 5/4/2022 ) 14 capsule 0    olmesartan (BENICAR) 40 mg tablet Take 1 tablet by mouth daily (Patient not taking: Reported on 5/4/2022 )      predniSONE 10 mg tablet Take 7T PO x 1 day, then take 6T PO x 1 day, and continue to decrease  by 1T until finished  Quantity-28 (Patient not taking: Reported on 5/4/2022 ) 28 tablet 0    pregabalin (LYRICA) 75 mg capsule Take 75 mg by mouth (Patient not taking: Reported on 5/4/2022 )      ranolazine (RANEXA) 500 mg 12 hr tablet Take 1 tablet (500 mg total) by mouth 2 (two) times a day (Patient not taking: Reported on 5/4/2022 ) 60 tablet 11     No current facility-administered medications on file prior to visit         Social History     Tobacco Use    Smoking status: Never Smoker    Smokeless tobacco: Never Used   Substance Use Topics    Alcohol use: Not Currently    Drug use: Never       Family History Problem Relation Age of Onset    No Known Problems Mother     Lung cancer Father     Ovarian cancer Sister     No Known Problems Maternal Grandmother     No Known Problems Maternal Grandfather     No Known Problems Paternal Grandmother     No Known Problems Paternal Grandfather     No Known Problems Sister     No Known Problems Sister     No Known Problems Maternal Aunt     No Known Problems Paternal Aunt     No Known Problems Paternal Aunt     Breast cancer Cousin 39       Review of Systems     As stated in the HPI  All other systems reviewed and are negative

## 2022-05-10 ENCOUNTER — OFFICE VISIT (OUTPATIENT)
Dept: OBGYN CLINIC | Facility: MEDICAL CENTER | Age: 77
End: 2022-05-10
Payer: MEDICARE

## 2022-05-10 VITALS
SYSTOLIC BLOOD PRESSURE: 150 MMHG | DIASTOLIC BLOOD PRESSURE: 83 MMHG | HEART RATE: 59 BPM | BODY MASS INDEX: 32.57 KG/M2 | WEIGHT: 177 LBS | HEIGHT: 62 IN

## 2022-05-10 DIAGNOSIS — M54.12 CERVICAL RADICULOPATHY: Primary | ICD-10-CM

## 2022-05-10 DIAGNOSIS — M50.20 BULGING OF CERVICAL INTERVERTEBRAL DISC: ICD-10-CM

## 2022-05-10 DIAGNOSIS — M54.2 CERVICALGIA: ICD-10-CM

## 2022-05-10 PROCEDURE — 99214 OFFICE O/P EST MOD 30 MIN: CPT | Performed by: FAMILY MEDICINE

## 2022-05-10 NOTE — PROGRESS NOTES
[facility] [address]  [facility] [address] [phone]      Chief Complaint:  Chief Complaint   Patient presents with    Neck - Follow-up       Vitals:  /83   Pulse 59   Ht 5' 2" (1 575 m)   Wt 80 3 kg (177 lb)   BMI 32 37 kg/m²     The following portions of the patient's history were reviewed and updated as appropriate: allergies, current medications, past family history, past medical history, past social history, past surgical history, and problem list       Subjective:   Patient ID: Tobi Hayward is a 77 y o  female  Here for f/u  B shoulder and neck pain/MRI  She is still having pain   [id number]     1)  Neck pain  She is having less pain today  Pain is intermittent  Worse with ext  Pain radiates down both arm  N/t   tylenol  Worse laying down  Burning pain  Taking prednisone for wrist which is helping neck      MRI CERVICAL SPINE WITHOUT CONTRAST     INDICATION: M54 12: Radiculopathy, cervical region  M54 2: Cervicalgia      COMPARISON: Cervical spine radiograph January 25, 2022  MRI cervical spine with and without contrast 11/27/2015      TECHNIQUE:  Sagittal T1, sagittal T2, sagittal inversion recovery, axial T2, axial  gradient     IMAGE QUALITY:  Diagnostic     FINDINGS:     ALIGNMENT:  Straightened cervical spine  No scoliosis  No subluxation    No compression deformity        MARROW SIGNAL:  Type II Modic endplate changes I9-H6, new from prior exam   Otherwise, normal bone marrow signal      CERVICAL AND VISUALIZED THORACIC CORD:  Normal signal within the visualized cord      PREVERTEBRAL AND PARASPINAL SOFT TISSUES:  Mild surrounding inflammatory changes adjacent to C5, C6, and superior aspect of C7 spinous process tips adjacent to the nuchal and supraspinous cervical ligaments (501:10-11; 701:15-18), suggestive of   inflammatory enthesitis which is new since 11/27/2015      VISUALIZED POSTERIOR FOSSA:  The visualized posterior fossa demonstrates no abnormal signal      CERVICAL DISC SPACES:       C1-C2: Normal      C2-C3: Normal      C3-C4: Normal      C4-C5: Small left central disc extrusion with mild flattening of left ventral cord, slightly worsened from prior exam   Mild canal stenosis, slightly worsened from prior exam   No significant foraminal narrowing, unchanged      C5-C6: Diffuse disc bulge with left central/subarticular disc protrusion with mild flattening of ventral cord, worse from prior exam   Uncovertebral hypertrophy and flavum thickening  Moderate canal stenosis, worse from prior exam   Mild right and   moderate left foraminal narrowing, worse from prior exam      C6-C7: Diffuse disc bulge  No significant canal stenosis or foraminal narrowing, unchanged      C7-T1: Normal      Multilevel degenerative changes have worsened since MRI cervical spine with and without contrast November 27, 2015      UPPER THORACIC DISC SPACES:  Normal      IMPRESSION:     Mild surrounding inflammatory changes adjacent to C5, C6, and superior aspect of C7 spinous process tips adjacent to the nuchal and supraspinous cervical ligaments, suggestive of inflammatory enthesitis      Worsened multilevel degenerative changes of cervical spine with varying degrees of canal stenosis (moderate C5-C6) and foraminal narrowing (moderate left C5-C6), as detailed above      The study was marked in EPIC for significant notification               2)  B shoulders  Injection helped  No pain in the shoulders today  No pain with movement       Review of Systems   Constitutional: Negative for fatigue and fever  Respiratory: Negative for shortness of breath  Cardiovascular: Negative for chest pain  Gastrointestinal: Negative for abdominal pain and nausea  Genitourinary: Negative for dysuria  Musculoskeletal: Positive for arthralgias and neck pain  Skin: Negative for rash and wound     Neurological: Negative for weakness and headaches  Objective:  Back Exam     Tenderness   The patient is experiencing tenderness in the cervical     Range of Motion   The patient has normal back ROM  Comments:  Pain with flex/ext/rot B/lat Flex L  Pos spurling L            Physical Exam  Constitutional:       Appearance: Normal appearance  She is normal weight  HENT:      Head: Normocephalic  Eyes:      Extraocular Movements: Extraocular movements intact  Pulmonary:      Effort: Pulmonary effort is normal    Musculoskeletal:      Cervical back: Normal range of motion  Skin:     General: Skin is warm and dry  Neurological:      General: No focal deficit present  Mental Status: She is alert and oriented to person, place, and time  Mental status is at baseline  Motor: No weakness  Psychiatric:         Mood and Affect: Mood normal          Behavior: Behavior normal          Thought Content: Thought content normal          Judgment: Judgment normal          I have personally reviewed pertinent films in PACS and my interpretation is MRI C spine- C4/5 5/6, 6/7 disc bulge with significant DJD  Assessment/Plan:  Assessment/Plan   There are no diagnoses linked to this encounter  No follow-ups on file       Montana Sweeney MD

## 2022-05-10 NOTE — PATIENT INSTRUCTIONS
F/u here as needed  Referral to pain management  Continue home exercises  Icing/heat/OTC pain meds as needed  Finish prednisone

## 2022-06-01 ENCOUNTER — LAB (OUTPATIENT)
Dept: LAB | Facility: HOSPITAL | Age: 77
End: 2022-06-01
Payer: MEDICARE

## 2022-06-01 DIAGNOSIS — M25.50 ARTHRALGIA, UNSPECIFIED JOINT: ICD-10-CM

## 2022-06-01 DIAGNOSIS — E03.9 ACQUIRED HYPOTHYROIDISM: ICD-10-CM

## 2022-06-01 LAB
CRP SERPL QL: 50.8 MG/L
ERYTHROCYTE [SEDIMENTATION RATE] IN BLOOD: 58 MM/HOUR (ref 0–29)
TSH SERPL DL<=0.05 MIU/L-ACNC: 0.33 UIU/ML (ref 0.45–4.5)
URATE SERPL-MCNC: 4.4 MG/DL (ref 2.7–7.5)

## 2022-06-01 PROCEDURE — 84443 ASSAY THYROID STIM HORMONE: CPT

## 2022-06-01 PROCEDURE — 36415 COLL VENOUS BLD VENIPUNCTURE: CPT

## 2022-06-01 PROCEDURE — 85652 RBC SED RATE AUTOMATED: CPT

## 2022-06-01 PROCEDURE — 86430 RHEUMATOID FACTOR TEST QUAL: CPT

## 2022-06-01 PROCEDURE — 86038 ANTINUCLEAR ANTIBODIES: CPT

## 2022-06-01 PROCEDURE — 84550 ASSAY OF BLOOD/URIC ACID: CPT

## 2022-06-01 PROCEDURE — 86618 LYME DISEASE ANTIBODY: CPT

## 2022-06-01 PROCEDURE — 86140 C-REACTIVE PROTEIN: CPT

## 2022-06-02 LAB
B BURGDOR IGG+IGM SER-ACNC: 26
RHEUMATOID FACT SER QL LA: NEGATIVE

## 2022-06-03 ENCOUNTER — HOSPITAL ENCOUNTER (OUTPATIENT)
Dept: NON INVASIVE DIAGNOSTICS | Facility: HOSPITAL | Age: 77
Discharge: HOME | End: 2022-06-03
Attending: INTERNAL MEDICINE
Payer: MEDICARE

## 2022-06-03 ENCOUNTER — HOSPITAL ENCOUNTER (OUTPATIENT)
Dept: RADIOLOGY | Facility: HOSPITAL | Age: 77
Discharge: HOME | End: 2022-06-03
Attending: INTERNAL MEDICINE
Payer: MEDICARE

## 2022-06-03 VITALS
OXYGEN SATURATION: 97 % | HEIGHT: 62 IN | DIASTOLIC BLOOD PRESSURE: 58 MMHG | HEART RATE: 62 BPM | SYSTOLIC BLOOD PRESSURE: 115 MMHG | BODY MASS INDEX: 32.57 KG/M2 | WEIGHT: 177 LBS

## 2022-06-03 VITALS
HEART RATE: 77 BPM | WEIGHT: 177 LBS | BODY MASS INDEX: 32.57 KG/M2 | HEIGHT: 62 IN | SYSTOLIC BLOOD PRESSURE: 150 MMHG | DIASTOLIC BLOOD PRESSURE: 83 MMHG

## 2022-06-03 DIAGNOSIS — I25.119 ATHEROSCLEROSIS OF NATIVE CORONARY ARTERY OF NATIVE HEART WITH ANGINA PECTORIS (HCC): ICD-10-CM

## 2022-06-03 LAB
AORTIC ROOT: 3.3 CM
AORTIC VALVE MEAN VELOCITY: 9.3 M/S
APICAL FOUR CHAMBER EJECTION FRACTION: 61 %
ASCENDING AORTA: 3.4 CM
AV AREA BY CONTINUOUS VTI: 2.1 CM2
AV AREA PEAK VELOCITY: 1.9 CM2
AV LVOT MEAN GRADIENT: 2 MMHG
AV LVOT PEAK GRADIENT: 3 MMHG
AV MEAN GRADIENT: 4 MMHG
AV PEAK GRADIENT: 8 MMHG
AV VALVE AREA: 2.1 CM2
AV VELOCITY RATIO: 0.62
CHEST PAIN STATEMENT: NORMAL
DOP CALC AO PEAK VEL: 1.38 M/S
DOP CALC AO VTI: 34.71 CM
DOP CALC LVOT AREA: 3.14 CM2
DOP CALC LVOT DIAMETER: 2 CM
DOP CALC LVOT PEAK VEL VTI: 23.22 CM
DOP CALC LVOT PEAK VEL: 0.85 M/S
DOP CALC LVOT STROKE INDEX: 42.1 ML/M2
DOP CALC LVOT STROKE VOLUME: 72.91 CM3
DOP CALC MV VTI: 26.32 CM
E WAVE DECELERATION TIME: 201 MS
FRACTIONAL SHORTENING: 34 % (ref 28–44)
INTERVENTRICULAR SEPTUM IN DIASTOLE (PARASTERNAL SHORT AXIS VIEW): 1.1 CM
INTERVENTRICULAR SEPTUM: 1.1 CM (ref 0.6–1.1)
LAAS-AP2: 14 CM2
LAAS-AP4: 12.8 CM2
LEFT ATRIUM SIZE: 2.5 CM
LEFT INTERNAL DIMENSION IN SYSTOLE: 2.5 CM (ref 2.1–4)
LEFT VENTRICULAR INTERNAL DIMENSION IN DIASTOLE: 3.8 CM (ref 3.5–6)
LEFT VENTRICULAR POSTERIOR WALL IN END DIASTOLE: 1.1 CM
LEFT VENTRICULAR STROKE VOLUME: 40 ML
LVSV (TEICH): 40 ML
MAX DIASTOLIC BP: 57 MMHG
MAX HEART RATE: 90 BPM
MAX HR PERCENT: 62 %
MAX HR: 90 BPM
MAX PREDICTED HEART RATE: 145 BPM
MAX. SYSTOLIC BP: 120 MMHG
MV E'TISSUE VEL-LAT: 9 CM/S
MV E'TISSUE VEL-SEP: 7 CM/S
MV MEAN GRADIENT: 2 MMHG
MV PEAK A VEL: 1.06 M/S
MV PEAK E VEL: 73 CM/S
MV PEAK GRADIENT: 5 MMHG
MV STENOSIS PRESSURE HALF TIME: 58 MS
MV VALVE AREA BY CONTINUITY EQUATION: 2.77 CM2
MV VALVE AREA P 1/2 METHOD: 3.79 CM2
NUC STRESS EJECTION FRACTION: 67 %
PROTOCOL NAME: NORMAL
RATE PRESSURE PRODUCT: NORMAL
REASON FOR TERMINATION: NORMAL
RIGHT ATRIAL 2D VOLUME: 25 ML
RIGHT ATRIUM AREA SYSTOLE A4C: 12.9 CM2
RIGHT VENTRICLE ID DIMENSION: 3.5 CM
RYE IGE QN: NEGATIVE
SL CV LEFT ATRIUM LENGTH A2C: 5.6 CM
SL CV LV EF: 65
SL CV PED ECHO LEFT VENTRICLE DIASTOLIC VOLUME (MOD BIPLANE) 2D: 62 ML
SL CV PED ECHO LEFT VENTRICLE SYSTOLIC VOLUME (MOD BIPLANE) 2D: 21 ML
SL CV REST NUCLEAR ISOTOPE DOSE: 10.6 MCI
SL CV STRESS NUCLEAR ISOTOPE DOSE: 32.3 MCI
SL CV STRESS RECOVERY BP: NORMAL MMHG
SL CV STRESS RECOVERY HR: 87 BPM
SL CV STRESS RECOVERY O2 SAT: 98 %
STRESS ANGINA INDEX: 0
STRESS BASELINE BP: NORMAL MMHG
STRESS BASELINE HR: 62 BPM
STRESS O2 SAT REST: 97 %
STRESS PEAK HR: 90 BPM
STRESS POST ESTIMATED WORKLOAD: 1 METS
STRESS POST O2 SAT PEAK: 99 %
STRESS POST PEAK BP: 115 MMHG
STRESS/REST PERFUSION RATIO: 1.04
TARGET HR FORMULA: NORMAL
TEST INDICATION: NORMAL
TIME IN EXERCISE PHASE: NORMAL
TR MAX PG: 21 MMHG
TR PEAK VELOCITY: 2.3 M/S
TRICUSPID VALVE PEAK REGURGITATION VELOCITY: 2.3 M/S

## 2022-06-03 PROCEDURE — 93306 TTE W/DOPPLER COMPLETE: CPT | Performed by: INTERNAL MEDICINE

## 2022-06-03 PROCEDURE — 93018 CV STRESS TEST I&R ONLY: CPT | Performed by: INTERNAL MEDICINE

## 2022-06-03 PROCEDURE — 78452 HT MUSCLE IMAGE SPECT MULT: CPT

## 2022-06-03 PROCEDURE — 78452 HT MUSCLE IMAGE SPECT MULT: CPT | Performed by: INTERNAL MEDICINE

## 2022-06-03 PROCEDURE — 93017 CV STRESS TEST TRACING ONLY: CPT

## 2022-06-03 PROCEDURE — G1004 CDSM NDSC: HCPCS

## 2022-06-03 PROCEDURE — A9502 TC99M TETROFOSMIN: HCPCS

## 2022-06-03 PROCEDURE — 93016 CV STRESS TEST SUPVJ ONLY: CPT | Performed by: INTERNAL MEDICINE

## 2022-06-03 PROCEDURE — 93306 TTE W/DOPPLER COMPLETE: CPT

## 2022-06-03 RX ADMIN — REGADENOSON 0.4 MG: 0.08 INJECTION, SOLUTION INTRAVENOUS at 10:16

## 2022-06-03 NOTE — RESULT ENCOUNTER NOTE
Please call the patient regarding her abnormal result  thyroid level high, decrease synthroid to   100     #90 and return in 3 months

## 2022-06-06 ENCOUNTER — TELEPHONE (OUTPATIENT)
Dept: CARDIOLOGY CLINIC | Facility: CLINIC | Age: 77
End: 2022-06-06

## 2022-06-06 NOTE — TELEPHONE ENCOUNTER
----- Message from Karina Dean MD sent at 6/5/2022  9:53 PM EDT -----  please inform patient that her nuclear stress test was normal with no evidence that suggest blockage in the artery of the heart   function is normal

## 2022-06-06 NOTE — RESULT ENCOUNTER NOTE
please inform patient that besides her stress test,  her echocardiogram was also overall normal  her heart function is good, heart muscle is slightly thickened  at this time she's advised to continue current Medical therapy  routine follow up in 3 months has had been planned at the office with it is adviced

## 2022-06-06 NOTE — RESULT ENCOUNTER NOTE
please inform patient that her nuclear stress test was normal with no evidence that suggest blockage in the artery of the heart   function is normal

## 2022-06-06 NOTE — TELEPHONE ENCOUNTER
----- Message from Dora Fowler MD sent at 6/5/2022  9:56 PM EDT -----  please inform patient that besides her stress test,  her echocardiogram was also overall normal  her heart function is good, heart muscle is slightly thickened  at this time she's advised to continue current Medical therapy  routine follow up in 3 months has had been planned at the office with it is adviced

## 2022-06-23 ENCOUNTER — OFFICE VISIT (OUTPATIENT)
Dept: CARDIOLOGY CLINIC | Facility: CLINIC | Age: 77
End: 2022-06-23
Payer: MEDICARE

## 2022-06-23 VITALS
DIASTOLIC BLOOD PRESSURE: 58 MMHG | HEART RATE: 67 BPM | SYSTOLIC BLOOD PRESSURE: 114 MMHG | BODY MASS INDEX: 31.31 KG/M2 | WEIGHT: 171.2 LBS

## 2022-06-23 DIAGNOSIS — I10 ESSENTIAL HYPERTENSION: ICD-10-CM

## 2022-06-23 DIAGNOSIS — I25.118 CORONARY ARTERY DISEASE OF NATIVE ARTERY OF NATIVE HEART WITH STABLE ANGINA PECTORIS (HCC): Primary | ICD-10-CM

## 2022-06-23 DIAGNOSIS — I65.23 BILATERAL CAROTID ARTERY STENOSIS: ICD-10-CM

## 2022-06-23 DIAGNOSIS — E78.5 DYSLIPIDEMIA: ICD-10-CM

## 2022-06-23 DIAGNOSIS — I73.9 PERIPHERAL VASCULAR DISEASE, UNSPECIFIED (HCC): ICD-10-CM

## 2022-06-23 PROCEDURE — 99214 OFFICE O/P EST MOD 30 MIN: CPT | Performed by: INTERNAL MEDICINE

## 2022-06-23 NOTE — PROGRESS NOTES
[facility]  [id number] [facility], [place] [address] [address]  Phone#  [phone]  Fax#  [phone]  *-*-*-*-*-*-*-*-*-*-*-*-*-*-*-*-*-*-*-*-*-*-*-*-*-*-*-*-*-*-*-*-*-*-*-*-*-*-*-*-*-*-*-*-*-*-*-*-*-*-*-*-*-*  ENCOUNTER DATE: 06/23/22 11:11 AM  PATIENT NAME: Julissa Rodney   1945    [id number]  AGE:77 y o  SEX: female  Seth Avendaño MD     PRIMARY CARE PHYSICIAN: Александр Mahmood MD    DIAGNOSES:  1  Unspecified coronary artery disease by cardiac catheterization in 2009  Medically managed for small-vessel disease without significant occlusive disease  2  Primary hypertension  3  Dyslipidemia  4  Carotid artery disease  5  Degenerative joint disease with cervical radiculopathy, chronic pain in both shoulders with bilateral bursitis  6  Hypothyroidism    REGADENOSON NUCLEAR STRESS TEST Chiqui 3, 2022:  1  No ECG changes and no angina noted during stress test   2  Normal myocardial perfusion scan with normal left ventricular function and normal regional motion  EF of 67%  3  Normal resting ECG with no significant changes and no significant arrhythmia noted during stress test   4  Normal resting blood pressure with appropriate blood pressure response noted during stress test       ECHOCARDIOGRAM June 2022: Normal left ventricular cavity size, mild concentric LVH, normal left ventricular systolic function, EF 68%, grade 1 diastolic dysfunction, normal right ventricular size and systolic function, normal left and right atrial size, intact interatrial septum, mild mitral annular calcification, trace mitral valve regurgitation, trace tricuspid valve regurgitation, no obvious pulmonary hypertension, no pulmonary hypertension and no pericardial effusion  REGADENOSON NUCLEAR STRESS TEST in April 2019 the showed normal myocardial perfusion, hyperdynamic LV function with EF of 76%      ECHOCARDIOGRAM in April 2019 showed normal left ventricular systolic function, EF 12%, grade 1 diastolic dysfunction, normal right ventricular size and function, normal left and right atrial size, mild mitral annular calcification without stenosis or regurgitation, mildly thickened aortic valve with no stenosis or regurgitation, no tricuspid or pulmonic valve regurgitation and no pulmonary hypertension and pericardial effusion  CURRENT ECG   No results found for this visit on 06/23/22  CARDIOLOGY ASSESSMENT & PLAN     1  Coronary artery disease of native artery of native heart with stable angina pectoris ([facility] [address] )     2  Essential hypertension     3  Peripheral vascular disease, unspecified ([facility] [address] )     4  Bilateral carotid artery stenosis     5  Dyslipidemia       Coronary artery disease of native artery of native heart with stable angina pectoris [facility]  Ms Seb Sandoval stable from cardiac perspective however she is dealing with severe joint pains and swellings  She is noted to have elevated inflammatory markers  Her [facility] screen was negative  She is scheduled to establish follow-up with rheumatology for further evaluation  She is on good cardiac medical regimen  Blood pressure is well controlled  On examination there is no evidence of pulmonary or significant peripheral vascular congestion  -- at this time I am advising her to continue current cardiac medicines  -- she she will reach out to rheumatology office and schedule an appointment as soon as possible  -- Dietary and medical compliance are reinforced  -- She is advised  to report any concerning symptoms such as chest pain, shortness of breath, decline in exercise tolerance or presyncope/syncope  INTERVAL HISTORY & HISTORY OF PRESENT ILLNESS     Seb Sandoval is here for follow-up regarding her cardiac comorbidities which include:  Coronary artery disease, carotid artery disease, primary hypertension, dyslipidemia and other comorbidities    She was last seen by me on May 4, 2022 and was experiencing intermittent chest pains and shortness of breath  She was referred to undergo echocardiogram and nuclear stress test which have been completed  Today she reports that she has been overall feeling better with respect to her heart however she has been dealing with severe pain in bilateral wrist and arm regions and her shoulders  She has been seen by her primary physician has ordered some blood work which was significant for elevated CRP level and ESR level  He has referred her to a rheumatologist but she has yet to get an appointment  She denies previously reported chest pain and shortness of breath  Continues to deal with pain relating to her cervical spine disease and left shoulder arthritis  Reports that her primary physician has put her on steroids and whenever she takes the steroid she feels good but when she stops the course she again develops pain and swelling in her extremities  Functional capacity status: Moderate, limited due to pain   (Excellent- >10 METs; Good: (7-10 METs); Moderate (4-7 METs); Poor (<= 4 METs)    Any chronic stressors:  None   (feeling of poor health, financial problems, and social isolation etc)  Tobacco or alcohol dependence:  She does not smoke and she does not drink  She quit smoking last year    Current cardiac medications:   She is taking currently Cardizem CD 2 40 mg once daily, HCTZ 25 mg once daily, metoprolol succinate 50 mg daily, losartan 100 mg daily, clopidogrel 75 mg daily, ezetimibe 10 mg daily     Last blood work is from March 25, 2022 and shows normal renal function and electrolytes  Total cholesterol was 167, triglyceride was 96, HDL was 75, calculated LDL was 73  REVIEW OF SYSTEMS   Positive for:  As noted above in HPI  Negative for: All remaining as reviewed below and in HPI      SYSTEM SYMPTOMS REVIEWED:  General--weight change, fever, night sweats  Respiratory--cough, wheezing, shortness of breath, sputum production  Cardiovascular--chest pain, syncope, dyspnea on exertion, edema, decline in exercise tolerance, claudication   Gastrointestinal--persistent vomiting, diarrhea, abdominal distention, blood in stool   Muscular or skeletal--joint pain or swelling   Neurologic--headaches, syncope, abnormal movement  Hematologic--history of easy bruising and bleeding   Endocrine--thyroid enlargement, heat or cold intolerance, polyuria   Psychiatric--anxiety, depression     *-*-*-*-*-*-*-*-*-*-*-*-*-*-*-*-*-*-*-*-*-*-*-*-*-*-*-*-*-*-*-*-*-*-*-*-*-*-*-*-*-*-*-*-*-*-*-*-*-*-*-*-*-*-  VITAL SIGNS     CURRENT VITAL SIGNS:   Vitals:    06/23/22 1022   BP: 114/58   BP Location: Left arm   Patient Position: Sitting   Cuff Size: Large   Pulse: 67   Weight: 77 7 kg (171 lb 3 2 oz)       BMI: Body mass index is 31 31 kg/m²  WEIGHTS:   Wt Readings from Last 25 Encounters:   06/23/22 77 7 kg (171 lb 3 2 oz)   06/21/22 78 kg (172 lb)   06/03/22 80 3 kg (177 lb)   06/03/22 80 3 kg (177 lb)   05/31/22 77 1 kg (170 lb)   05/10/22 80 3 kg (177 lb)   05/05/22 80 3 kg (177 lb)   05/04/22 77 1 kg (170 lb)   04/11/22 76 7 kg (169 lb)   03/22/22 77 6 kg (171 lb)   03/21/22 77 1 kg (170 lb)   02/22/22 77 6 kg (171 lb)   02/16/22 77 8 kg (171 lb 8 3 oz)   01/25/22 76 2 kg (168 lb)   01/14/22 76 2 kg (168 lb)   12/01/21 74 4 kg (164 lb)   10/26/21 76 2 kg (168 lb)   09/21/21 76 7 kg (169 lb)   07/16/21 77 6 kg (171 lb)   04/19/21 77 6 kg (171 lb)   01/08/21 77 1 kg (170 lb)   10/05/20 73 5 kg (162 lb)   08/05/20 72 1 kg (159 lb)   07/20/20 71 2 kg (157 lb)   04/27/19 82 9 kg (182 lb 12 2 oz)        *-*-*-*-*-*-*-*-*-*-*-*-*-*-*-*-*-*-*-*-*-*-*-*-*-*-*-*-*-*-*-*-*-*-*-*-*-*-*-*-*-*-*-*-*-*-*-*-*-*-*-*-*-*-  PHYSICAL EXAM     General Appearance:    Alert, cooperative, no distress, appears stated age, slightly obese   Head, Eyes, ENT:    No obvious abnormality, moist mucous mebranes  Neck:   Supple, no carotid bruit or JVD   Back:     Symmetric, no curvature  Lungs:     Respirations unlabored   Clear to auscultation bilaterally,    Chest wall:    No tenderness or deformity   Heart:    Regular rate and rhythm, S1 and S2 normal, no murmur, rub  or gallop  Abdomen:     Soft, non-tender,    Extremities:   Extremities warm, , there is edema and tenderness of the wrist and fingers  There is trace lower extremity edema  Skin:   very minimal venostatic changes in lower extremities  Normal skin color, texture, and turgor   No rashes or lesions     *-*-*-*-*-*-*-*-*-*-*-*-*-*-*-*-*-*-*-*-*-*-*-*-*-*-*-*-*-*-*-*-*-*-*-*-*-*-*-*-*-*-*-*-*-*-*-*-*-*-*-*-*-*-  CURRENT MEDICATIONS LIST     Current Outpatient Medications:     clopidogrel (PLAVIX) 75 mg tablet, , Disp: , Rfl:     diltiazem (CARDIZEM CD) 240 mg 24 hr capsule, TAKE ONE CAPSULE BY MOUTH ONCE DAILY, Disp: 90 capsule, Rfl: 2    ergocalciferol (VITAMIN D2) 50,000 units, Take 1 capsule (50,000 Units total) by mouth once a week, Disp: 12 capsule, Rfl: 3    ezetimibe (ZETIA) 10 mg tablet, TAKE ONE TABLET BY MOUTH ONCE DAILY, Disp: 90 tablet, Rfl: 2    folic acid (FOLVITE) 1 mg tablet, TAKE ONE TABLET BY MOUTH ONCE DAILY, Disp: 90 tablet, Rfl: 10    hydrochlorothiazide (HYDRODIURIL) 25 mg tablet, TAKE ONE TABLET BY MOUTH ONCE DAILY, Disp: 90 tablet, Rfl: 2    levothyroxine (Synthroid) 100 mcg tablet, Take 1 tablet (100 mcg total) by mouth daily in the early morning, Disp: 90 tablet, Rfl: 3    losartan (COZAAR) 100 MG tablet, TAKE ONE TABLET BY MOUTH ONCE DAILY, Disp: 90 tablet, Rfl: 2    metoprolol succinate (TOPROL-XL) 50 mg 24 hr tablet, Take 1 tablet (50 mg total) by mouth daily, Disp: 90 tablet, Rfl: 3    Aspirin Low Dose 81 MG EC tablet, TAKE ONE TABLET BY MOUTH ONCE DAILY (Patient not taking: No sig reported), Disp: 90 tablet, Rfl: 4    benzonatate (TESSALON PERLES) 100 mg capsule, Take 1 capsule (100 mg total) by mouth 3 (three) times a day as needed for cough (Patient not taking: Reported on 6/23/2022), Disp: 20 capsule, Rfl: 0    Calcium Carb-Cholecalciferol (OSCAL-D) 500 mg-200 units per tablet, Reported on 1/12/2017  (Patient not taking: Reported on 6/23/2022), Disp: , Rfl:     celecoxib (CeleBREX) 200 mg capsule, Take 1 capsule (200 mg total) by mouth 2 (two) times a day (Patient not taking: Reported on 6/23/2022), Disp: 180 capsule, Rfl: 1    diclofenac (VOLTAREN) 75 mg EC tablet, Take 1 tablet (75 mg total) by mouth 2 (two) times a day (Patient not taking: Reported on 6/23/2022), Disp: 20 tablet, Rfl: 0    Diclofenac Sodium (VOLTAREN) 1 %, Apply 2 g topically 4 (four) times a day (Patient not taking: Reported on 6/23/2022), Disp: 150 g, Rfl: 0    FeroSul 325 (65 Fe) MG tablet, TAKE ONE TABLET BY MOUTH ONCE DAILY WITH BREAKFAST (Patient not taking: No sig reported), Disp: 90 tablet, Rfl: 2    isosorbide mononitrate (IMDUR) 30 mg 24 hr tablet, Take 1 tablet (30 mg total) by mouth daily (Patient not taking: No sig reported), Disp: 90 tablet, Rfl: 3    methocarbamol (Robaxin-750) 750 mg tablet, Take 1 tablet (750 mg total) by mouth 3 (three) times a day (Patient not taking: Reported on 6/23/2022), Disp: 30 tablet, Rfl: 0    naproxen (NAPROSYN) 500 mg tablet, Take 1 tablet (500 mg total) by mouth 2 (two) times a day with meals (Patient not taking: No sig reported), Disp: 30 tablet, Rfl: 0    naproxen (NAPROSYN) 500 mg tablet, TAKE ONE TABLET BY MOUTH TWICE A DAY WITH MEALS (Patient not taking: No sig reported), Disp: 30 tablet, Rfl: 0    nitrofurantoin (MACRODANTIN) 100 mg capsule, Take 1 capsule (100 mg total) by mouth 2 (two) times a day (Patient not taking: No sig reported), Disp: 14 capsule, Rfl: 0    nitroglycerin (NITROSTAT) 0 4 mg SL tablet, Place 1 tablet under the tongue every 5 (five) minutes as needed (Patient not taking: Reported on 6/23/2022), Disp: , Rfl:     olmesartan (BENICAR) 40 mg tablet, Take 1 tablet by mouth daily (Patient not taking: No sig reported), Disp: , Rfl:     predniSONE 10 mg tablet, Take 7T PO x 1 day, then take 6T PO x 1 day, and continue to decrease  by 1T until finished   Quantity-28 (Patient not taking: No sig reported), Disp: 28 tablet, Rfl: 0    predniSONE 10 mg tablet, Take 6 tabs days 1&2, 5 tabs days 3&4, 4 tabs days 5&6, 3 tabs days 7&8, 2 tabs days 9&10, 1 tab days 11&12 (Patient not taking: Reported on 6/23/2022), Disp: 42 tablet, Rfl: 0    predniSONE 20 mg tablet, Take 2 tablets (40 mg total) by mouth daily (Patient not taking: Reported on 6/23/2022), Disp: 8 tablet, Rfl: 0    predniSONE 5 mg tablet, Take 1 tablet (5 mg total) by mouth daily (Patient not taking: Reported on 6/23/2022), Disp: 90 tablet, Rfl: 0    pregabalin (LYRICA) 75 mg capsule, Take 75 mg by mouth (Patient not taking: No sig reported), Disp: , Rfl:     ranolazine (RANEXA) 500 mg 12 hr tablet, Take 1 tablet (500 mg total) by mouth 2 (two) times a day (Patient not taking: No sig reported), Disp: 60 tablet, Rfl: 11       ALLERGIES     Allergies   Allergen Reactions    Codeine        *-*-*-*-*-*-*-*-*-*-*-*-*-*-*-*-*-*-*-*-*-*-*-*-*-*-*-*-*-*-*-*-*-*-*-*-*-*-*-*-*-*-*-*-*-*-*-*-*-*-*-*-*-*-  LABORATORY DATA     Sodium   Date Value Ref Range Status   05/03/2018 139 137 - 147 MEQ/L Final     Sodium   Date Value Ref Range Status   05/03/2018 139 137 - 147 MEQ/L Final     Potassium   Date Value Ref Range Status   03/25/2022 4 0 3 6 - 5 0 mmol/L Final   09/28/2021 4 0 3 6 - 5 0 mmol/L Final   07/21/2020 4 0 3 6 - 5 0 mmol/L Final   05/03/2018 4 5 3 6 - 5 0 MEQ/L Final     Chloride   Date Value Ref Range Status   03/25/2022 104 97 - 108 mmol/L Final   09/28/2021 100 97 - 108 mmol/L Final   07/21/2020 102 97 - 108 mmol/L Final   05/03/2018 100 97 - 108 MEQ/L Final     CO2   Date Value Ref Range Status   03/25/2022 31 (H) 22 - 30 mmol/L Final   09/28/2021 30 22 - 30 mmol/L Final   07/21/2020 29 22 - 30 mmol/L Final   05/03/2018 30 22 - 30 MMOL/L Final     Anion Gap   Date Value Ref Range Status   05/03/2018 8 5 - 14 MMOL/L Final     BUN   Date Value Ref Range Status   03/25/2022 24 5 - 25 mg/dL Final   09/28/2021 15 5 - 25 mg/dL Final   07/21/2020 25 5 - 25 mg/dL Final   05/03/2018 16 5 - 25 MG/DL Final     Creatinine   Date Value Ref Range Status   03/25/2022 0 81 0 60 - 1 20 mg/dL Final     Comment:     Standardized to IDMS reference method   09/28/2021 0 87 0 60 - 1 20 mg/dL Final     Comment:     Standardized to IDMS reference method   07/21/2020 0 81 0 60 - 1 20 mg/dL Final     Comment:     Standardized to IDMS reference method     eGFR   Date Value Ref Range Status   03/25/2022 71 ml/min/1 73sq m Final   09/28/2021 65 >60 ml/min/1 73sq m Final   07/21/2020 72 >60 ml/min/1 73sq m Final     Glucose   Date Value Ref Range Status   05/03/2018 91 70 - 99 MG/DL Final     Calcium   Date Value Ref Range Status   03/25/2022 9 8 8 4 - 10 2 mg/dL Final   09/28/2021 10 0 8 4 - 10 2 mg/dL Final   07/21/2020 10 0 8 4 - 10 2 mg/dL Final   05/03/2018 9 9 8 4 - 10 2 MG/DL Final     AST   Date Value Ref Range Status   03/25/2022 23 14 - 36 U/L Final     Comment:     Specimen collection should occur prior to Sulfasalazine administration due to the potential for falsely depressed results  09/28/2021 60 (H) 14 - 36 U/L Final     Comment:     Specimen collection should occur prior to Sulfasalazine administration due to the potential for falsely depressed results  07/21/2020 22 14 - 36 U/L Final     Comment:       Specimen collection should occur prior to Sulfasalazine administration due to the potential for falsely depressed results  05/03/2018 39 (H) 14 - 36 U/L Final     ALT   Date Value Ref Range Status   03/25/2022 17 <35 U/L Final     Comment:     Specimen collection should occur prior to Sulfasalazine administration due to the potential for falsely depressed results  09/28/2021 59 (H) <35 U/L Final     Comment:     Specimen collection should occur prior to Sulfasalazine administration due to the potential for falsely depressed results      07/21/2020 18 9 - 52 U/L Final Comment:       Specimen collection should occur prior to Sulfasalazine administration due to the potential for falsely depressed results  05/03/2018 51 9 - 52 U/L Final     Alkaline Phosphatase   Date Value Ref Range Status   03/25/2022 55 43 - 122 U/L Final   09/28/2021 90 43 - 122 U/L Final   07/21/2020 63 43 - 122 U/L Final   05/03/2018 63 43 - 122 U/L Final     Total Protein   Date Value Ref Range Status   05/03/2018 7 0 5 9 - 8 4 G/DL Final     Total Bilirubin   Date Value Ref Range Status   05/03/2018 0 4 <1 3 mg/dL Final     WBC   Date Value Ref Range Status   03/25/2022 8 70 4 50 - 11 00 Thousand/uL Final   09/28/2021 7 90 4 50 - 11 00 Thousand/uL Final   10/09/2020 7 50 4 50 - 11 00 Thousand/uL Final   05/03/2018 7 7 4 5 - 11 0 K/MCL Final     Hemoglobin   Date Value Ref Range Status   03/25/2022 12 2 12 0 - 16 0 g/dL Final   09/28/2021 12 9 12 0 - 16 0 g/dL Final   10/09/2020 12 7 12 0 - 16 0 g/dL Final   05/03/2018 12 8 12 0 - 16 0 G/DL Final     Platelets   Date Value Ref Range Status   03/25/2022 297 150 - 450 Thousands/uL Final   09/28/2021 245 150 - 450 Thousands/uL Final   10/09/2020 198 150 - 450 Thousands/uL Final   05/03/2018 215 150 - 450 K/MCL Final     PTT   Date Value Ref Range Status   04/27/2019 35 26 - 38 seconds Final     Comment:     Therapeutic Heparin Range =  60-90 seconds     INR   Date Value Ref Range Status   04/27/2019 0 99 0 86 - 1 17 Final     No results found for: CKMB, DIGOXIN  No results found for: TSH  Cholesterol   Date Value Ref Range Status   05/03/2018 179 <200 mg/dL Final     Comment:            NCEP ATP III      <200           DESIRABLE   200-239     BORDERLINE HIGH   > = 240                HIGH        Hemoglobin A1C   Date Value Ref Range Status   03/25/2022 5 6 Normal 3 8-5 6%; PreDiabetic 5 7-6 4%;  Diabetic >=6 5%; Glycemic control for adults with diabetes <7 0% % Final     Urine Culture   Date Value Ref Range Status   07/21/2020 80,000-89,000 cfu/ml Escherichia coli ESBL (A)  Final     Comment:     An Extended-Spectrum Beta-Lactamase is being produced by this organism (requires contact precautions)  Cephalosporins are NOT effective for treating these organisms  For SEVERE infections (i e  bacteremia, septic shock, etc ), Carbapenems are the drug of choice  For MILD infections (i e  isolated urinary or biliary infection), high-dose Zosyn may be used  2020 <10,000 cfu/ml   Final     Comment:     Mixed Contaminants X2       *-*-*-*-*-*-*-*-*-*-*-*-*-*-*-*-*-*-*-*-*-*-*-*-*-*-*-*-*-*-*-*-*-*-*-*-*-*-*-*-*-*-*-*-*-*-*-*-*-*-*-*-*-*-  PREVIOUS CARDIOLOGY & RADIOLOGY TEST RESULTS   I have personally reviewed pertinent results of cardiovascular tests noted below  Results for orders placed during the hospital encounter of 19    Echo complete with contrast if indicated    Narrative  [address] [place] [address], [address] [facility]  [phone]    Transthoracic Echocardiogram  2D, M-mode, Doppler, and Color Doppler    Study date:  2019    Patient: Margarita Rojas  MR number: [id number]  Account number: [de-identified]  : 10-Stephen-1945  Age: 68 years  Gender: Female  Status: Outpatient  Location: Echo lab  Height: 61 in  Weight: 182 lb  BP: 114/ 68 mmHg    Indications: Coronary artery disease  Diagnoses: I25 83 - Coronary atherosclerosis due to lipid rich plaque    Sonographer:  Tigist Valladares RDCS  Primary Physician:  Humberto Lao MD  Referring Physician:  Gi Benitez MD  Group:  [facility]  Interpreting Physician:  DO SANTIAGO Neves    LEFT VENTRICLE:  Systolic function was normal  Ejection fraction was estimated to be 65 %  There were no regional wall motion abnormalities  Wall thickness was mildly increased  Doppler parameters were consistent with abnormal left ventricular relaxation (grade 1 diastolic dysfunction)      MITRAL VALVE:  There was mild annular calcification  HISTORY: PRIOR HISTORY: Hypertension, Hyperlipidemia, Coronary artery disease  PROCEDURE: The procedure was performed in the echo lab  This was a routine study  The transthoracic approach was used  The study included complete 2D imaging, M-mode, complete spectral Doppler, and color Doppler  The heart rate was 55 bpm,  at the start of the study  Images were obtained from the parasternal, apical, subcostal, and suprasternal notch acoustic windows  Echocardiographic views were limited due to lung interference  This was a technically difficult study  LEFT VENTRICLE: Size was normal  Systolic function was normal  Ejection fraction was estimated to be 65 %  There were no regional wall motion abnormalities  Wall thickness was mildly increased  DOPPLER: Doppler parameters were consistent  with abnormal left ventricular relaxation (grade 1 diastolic dysfunction)  RIGHT VENTRICLE: The size was normal  Systolic function was normal  Wall thickness was normal     LEFT ATRIUM: Size was normal     RIGHT ATRIUM: Size was normal     MITRAL VALVE: There was mild annular calcification  DOPPLER: There was no evidence for stenosis  There was no regurgitation  AORTIC VALVE: The valve was trileaflet  Leaflets exhibited mildly increased thickness, normal cuspal separation, and sclerosis  DOPPLER: Transaortic velocity was within the normal range  There was no evidence for stenosis  There was no  regurgitation  TRICUSPID VALVE: The valve structure was normal  There was normal leaflet separation  DOPPLER: The transtricuspid velocity was within the normal range  There was no evidence for stenosis  There was no regurgitation  PULMONIC VALVE: Leaflets exhibited normal thickness, no calcification, and normal cuspal separation  DOPPLER: The transpulmonic velocity was within the normal range  There was no regurgitation  PERICARDIUM: There was no pericardial effusion   The pericardium was normal in appearance  AORTA: The root exhibited normal size  SYSTEMIC VEINS: IVC: The inferior vena cava was normal in size and course  Respirophasic changes were normal     PULMONARY ARTERY: DOPPLER: The tricuspid jet envelope definition was inadequate for estimation of RV systolic pressure  There are no indirect findings (abnormal RV volume or geometry, altered pulmonary flow velocity profile, or leftward  septal displacement) which would suggest moderate or severe pulmonary hypertension  SYSTEM MEASUREMENT TABLES    2D  %FS: 41 3 %  Ao Diam: 3 cm  EDV(Teich): 81 4 ml  EF(Teich): 72 5 %  ESV(Teich): 22 4 ml  IVSd: 1 1 cm  LA Area: 10 6 cm2  LA Diam: 3 2 cm  LVEDV MOD A4C: 66 1 ml  LVEF MOD A4C: 70 8 %  LVESV MOD A4C: 19 3 ml  LVIDd: 4 3 cm  LVIDs: 2 5 cm  LVLd A4C: 7 3 cm  LVLs A4C: 5 9 cm  LVPWd: 1 cm  RA Area: 12 3 cm2  RVIDd: 2 5 cm  SV MOD A4C: 46 8 ml  SV(Teich): 59 ml    PW  E': 0 1 m/s  E/E': 8 8  MV A [place]: 0 9 m/s  MV Dec [place]: 1 9 m/s2  MV DecT: 269 1 ms  MV E Alexander: 0 5 m/s  MV E/A Ratio: 0 6  MV PHT: 78 1 ms  MVA By PHT: 2 8 cm2    [employer] Accredited Echocardiography Laboratory    Prepared and electronically signed by    Randy Emanuel DO  Signed 12-Apr-2019 15:57:18    No results found for this or any previous visit  No results found for this or any previous visit  No results found for this or any previous visit  NM myocardial perfusion spect (rx stress and/or rest)    Pharmacologic nuclear stress test appears negative for myocardial   ischemia    Stress ECG: The stress ECG is negative for ischemia after pharmacologic   vasodilation, without reproduction of symptoms    Stress ECG: The patient and had a maximal HR of 90 bpm (62 % of MPHR)   and 1 0 METS  The patient experienced no angina during the test  Blood   pressure demonstrated a normal response and heart rate demonstrated a   normal response to stress      Stress Function: Left ventricular function post-stress is normal    Post-stress ejection fraction is 67 %    No significant arrhythmias noted  Stress strip  Confirmed by [address] ([id number]),  Cinthya Lamb ([id number]) on 6/3/2022 11:11:26 AM  Echo complete w/ contrast if indicated    Left Ventricle: Left ventricular cavity size is normal  There is mild   concentric hypertrophy  The left ventricular ejection fraction is 65% by   visual estimation  Systolic function is normal  Wall motion is normal    Diastolic function is mildly abnormal, consistent with grade I (abnormal)   relaxation    Right Ventricle: Right ventricular cavity size is normal  Systolic   function is normal     Aortic Valve: There is aortic sclerosis    Mitral Valve: There is mild calcification  The leaflets exhibit normal   mobility  There is mild annular calcification  There is trace   regurgitation    Tricuspid Valve: There is trace regurgitation  Pulmonary artery   systolic pressures are estimated at 24 mm Hg    Compared to report from April 12, 2019, there are no significant   changes          *-*-*-*-*-*-*-*-*-*-*-*-*-*-*-*-*-*-*-*-*-*-*-*-*-*-*-*-*-*-*-*-*-*-*-*-*-*-*-*-*-*-*-*-*-*-*-*-*-*-*-*-*-*-  SIGNATURES:   [unfilled]   Geoffrey De Los Santos MD; MHA    *-*-*-*-*-*-*-*-*-*-*-*-*-*-*-*-*-*-*-*-*-*-*-*-*-*-*-*-*-*-*-*-*-*-*-*-*-*-*-*-*-*-*-*-*-*-*-*-*-*-*-*-*-*-  PAST MEDICAL HISTORY:  Past Medical History:   Diagnosis Date    Coronary artery disease     Disease of thyroid gland     Hyperlipidemia     Hypertension     PAST SURGICAL HISTORY:  Past Surgical History:   Procedure Laterality Date    BACK SURGERY      BREAST SURGERY      CARPAL TUNNEL RELEASE      CHOLECYSTECTOMY      HYSTERECTOMY      OOPHORECTOMY           FAMILY HISTORY:  Family History   Problem Relation Age of Onset    No Known Problems Mother     Lung cancer Father     Ovarian cancer Sister     No Known Problems Maternal Grandmother     No Known Problems Maternal Grandfather     No Known Problems Paternal Grandmother     No Known Problems Paternal Grandfather     No Known Problems Sister     No Known Problems Sister     No Known Problems Maternal Aunt     No Known Problems Paternal Aunt     No Known Problems Paternal Aunt     Breast cancer Cousin 39    SOCIAL HISTORY:  Social History     Tobacco Use   Smoking Status Never Smoker   Smokeless Tobacco Never Used      Social History     Substance and Sexual Activity   Alcohol Use Not Currently     Social History     Substance and Sexual Activity   Drug Use Never    [unfilled]     *-*-*-*-*-*-*-*-*-*-*-*-*-*-*-*-*-*-*-*-*-*-*-*-*-*-*-*-*-*-*-*-*-*-*-*-*-*-*-*-*-*-*-*-*-*-*-*-*-*-*-*-*-*  ALLERGIES:  Allergies   Allergen Reactions    Codeine     CURRENT SCHEDULED MEDICATIONS:    Current Outpatient Medications:     clopidogrel (PLAVIX) 75 mg tablet, , Disp: , Rfl:     diltiazem (CARDIZEM CD) 240 mg 24 hr capsule, TAKE ONE CAPSULE BY MOUTH ONCE DAILY, Disp: 90 capsule, Rfl: 2    ergocalciferol (VITAMIN D2) 50,000 units, Take 1 capsule (50,000 Units total) by mouth once a week, Disp: 12 capsule, Rfl: 3    ezetimibe (ZETIA) 10 mg tablet, TAKE ONE TABLET BY MOUTH ONCE DAILY, Disp: 90 tablet, Rfl: 2    folic acid (FOLVITE) 1 mg tablet, TAKE ONE TABLET BY MOUTH ONCE DAILY, Disp: 90 tablet, Rfl: 10    hydrochlorothiazide (HYDRODIURIL) 25 mg tablet, TAKE ONE TABLET BY MOUTH ONCE DAILY, Disp: 90 tablet, Rfl: 2    levothyroxine (Synthroid) 100 mcg tablet, Take 1 tablet (100 mcg total) by mouth daily in the early morning, Disp: 90 tablet, Rfl: 3    losartan (COZAAR) 100 MG tablet, TAKE ONE TABLET BY MOUTH ONCE DAILY, Disp: 90 tablet, Rfl: 2    metoprolol succinate (TOPROL-XL) 50 mg 24 hr tablet, Take 1 tablet (50 mg total) by mouth daily, Disp: 90 tablet, Rfl: 3    Aspirin Low Dose 81 MG EC tablet, TAKE ONE TABLET BY MOUTH ONCE DAILY (Patient not taking: No sig reported), Disp: 90 tablet, Rfl: 4    benzonatate (TESSALON PERLES) 100 mg capsule, Take 1 capsule (100 mg total) by mouth 3 (three) times a day as needed for cough (Patient not taking: Reported on 6/23/2022), Disp: 20 capsule, Rfl: 0    Calcium Carb-Cholecalciferol (OSCAL-D) 500 mg-200 units per tablet, Reported on 1/12/2017  (Patient not taking: Reported on 6/23/2022), Disp: , Rfl:     celecoxib (CeleBREX) 200 mg capsule, Take 1 capsule (200 mg total) by mouth 2 (two) times a day (Patient not taking: Reported on 6/23/2022), Disp: 180 capsule, Rfl: 1    diclofenac (VOLTAREN) 75 mg EC tablet, Take 1 tablet (75 mg total) by mouth 2 (two) times a day (Patient not taking: Reported on 6/23/2022), Disp: 20 tablet, Rfl: 0    Diclofenac Sodium (VOLTAREN) 1 %, Apply 2 g topically 4 (four) times a day (Patient not taking: Reported on 6/23/2022), Disp: 150 g, Rfl: 0    FeroSul 325 (65 Fe) MG tablet, TAKE ONE TABLET BY MOUTH ONCE DAILY WITH BREAKFAST (Patient not taking: No sig reported), Disp: 90 tablet, Rfl: 2    isosorbide mononitrate (IMDUR) 30 mg 24 hr tablet, Take 1 tablet (30 mg total) by mouth daily (Patient not taking: No sig reported), Disp: 90 tablet, Rfl: 3    methocarbamol (Robaxin-750) 750 mg tablet, Take 1 tablet (750 mg total) by mouth 3 (three) times a day (Patient not taking: Reported on 6/23/2022), Disp: 30 tablet, Rfl: 0    naproxen (NAPROSYN) 500 mg tablet, Take 1 tablet (500 mg total) by mouth 2 (two) times a day with meals (Patient not taking: No sig reported), Disp: 30 tablet, Rfl: 0    naproxen (NAPROSYN) 500 mg tablet, TAKE ONE TABLET BY MOUTH TWICE A DAY WITH MEALS (Patient not taking: No sig reported), Disp: 30 tablet, Rfl: 0    nitrofurantoin (MACRODANTIN) 100 mg capsule, Take 1 capsule (100 mg total) by mouth 2 (two) times a day (Patient not taking: No sig reported), Disp: 14 capsule, Rfl: 0    nitroglycerin (NITROSTAT) 0 4 mg SL tablet, Place 1 tablet under the tongue every 5 (five) minutes as needed (Patient not taking: Reported on 6/23/2022), Disp: , Rfl:     olmesartan (BENICAR) 40 mg tablet, Take 1 tablet by mouth daily (Patient not taking: No sig reported), Disp: , Rfl:     predniSONE 10 mg tablet, Take 7T PO x 1 day, then take 6T PO x 1 day, and continue to decrease  by 1T until finished   Quantity-28 (Patient not taking: No sig reported), Disp: 28 tablet, Rfl: 0    predniSONE 10 mg tablet, Take 6 tabs days 1&2, 5 tabs days 3&4, 4 tabs days 5&6, 3 tabs days 7&8, 2 tabs days 9&10, 1 tab days 11&12 (Patient not taking: Reported on 6/23/2022), Disp: 42 tablet, Rfl: 0    predniSONE 20 mg tablet, Take 2 tablets (40 mg total) by mouth daily (Patient not taking: Reported on 6/23/2022), Disp: 8 tablet, Rfl: 0    predniSONE 5 mg tablet, Take 1 tablet (5 mg total) by mouth daily (Patient not taking: Reported on 6/23/2022), Disp: 90 tablet, Rfl: 0    pregabalin (LYRICA) 75 mg capsule, Take 75 mg by mouth (Patient not taking: No sig reported), Disp: , Rfl:     ranolazine (RANEXA) 500 mg 12 hr tablet, Take 1 tablet (500 mg total) by mouth 2 (two) times a day (Patient not taking: No sig reported), Disp: 60 tablet, Rfl: 11     *-*-*-*-*-*-*-*-*-*-*-*-*-*-*-*-*-*-*-*-*-*-*-*-*-*-*-*-*-*-*-*-*-*-*-*-*-*-*-*-*-*-*-*-*-*-*-*-*-*-*-*-*-*

## 2022-06-23 NOTE — ASSESSMENT & PLAN NOTE
Ms Adams Dill stable from cardiac perspective however she is dealing with severe joint pains and swellings  She is noted to have elevated inflammatory markers  Her [facility] screen was negative  She is scheduled to establish follow-up with rheumatology for further evaluation  She is on good cardiac medical regimen  Blood pressure is well controlled  On examination there is no evidence of pulmonary or significant peripheral vascular congestion  -- at this time I am advising her to continue current cardiac medicines  -- she she will reach out to rheumatology office and schedule an appointment as soon as possible  -- Dietary and medical compliance are reinforced  -- She is advised  to report any concerning symptoms such as chest pain, shortness of breath, decline in exercise tolerance or presyncope/syncope

## 2022-06-23 NOTE — PATIENT INSTRUCTIONS
CARDIOLOGY ASSESSMENT & PLAN     1  Coronary artery disease of native artery of native heart with stable angina pectoris ([facility] [address] )     2  Essential hypertension     3  Peripheral vascular disease, unspecified ([facility] [address] )     4  Bilateral carotid artery stenosis     5  Dyslipidemia       Coronary artery disease of native artery of native heart with stable angina pectoris [facility])  Ms Britton Slider stable from cardiac perspective however she is dealing with severe joint pains and swellings  She is noted to have elevated inflammatory markers  Her [facility] screen was negative  She is scheduled to establish follow-up with rheumatology for further evaluation  She is on good cardiac medical regimen  Blood pressure is well controlled  On examination there is no evidence of pulmonary or significant peripheral vascular congestion  -- at this time I am advising her to continue current cardiac medicines  -- she she will reach out to rheumatology office and schedule an appointment as soon as possible  -- Dietary and medical compliance are reinforced  -- She is advised  to report any concerning symptoms such as chest pain, shortness of breath, decline in exercise tolerance or presyncope/syncope

## 2022-07-06 ENCOUNTER — OFFICE VISIT (OUTPATIENT)
Dept: OBGYN CLINIC | Facility: CLINIC | Age: 77
End: 2022-07-06
Payer: MEDICARE

## 2022-07-06 VITALS
HEIGHT: 62 IN | HEART RATE: 56 BPM | DIASTOLIC BLOOD PRESSURE: 59 MMHG | BODY MASS INDEX: 31.47 KG/M2 | WEIGHT: 171 LBS | SYSTOLIC BLOOD PRESSURE: 140 MMHG

## 2022-07-06 DIAGNOSIS — M25.531 CHRONIC WRIST PAIN, RIGHT: Primary | ICD-10-CM

## 2022-07-06 DIAGNOSIS — M25.532 WRIST PAIN, CHRONIC, LEFT: ICD-10-CM

## 2022-07-06 DIAGNOSIS — G89.29 WRIST PAIN, CHRONIC, LEFT: ICD-10-CM

## 2022-07-06 DIAGNOSIS — M18.11 ARTHRITIS OF CARPOMETACARPAL (CMC) JOINT OF RIGHT THUMB: ICD-10-CM

## 2022-07-06 DIAGNOSIS — G89.29 CHRONIC WRIST PAIN, RIGHT: Primary | ICD-10-CM

## 2022-07-06 PROCEDURE — 99214 OFFICE O/P EST MOD 30 MIN: CPT | Performed by: FAMILY MEDICINE

## 2022-07-06 PROCEDURE — 20600 DRAIN/INJ JOINT/BURSA W/O US: CPT | Performed by: FAMILY MEDICINE

## 2022-07-06 RX ORDER — TRIAMCINOLONE ACETONIDE 40 MG/ML
20 INJECTION, SUSPENSION INTRA-ARTICULAR; INTRAMUSCULAR
Status: COMPLETED | OUTPATIENT
Start: 2022-07-06 | End: 2022-07-06

## 2022-07-06 RX ORDER — LIDOCAINE HYDROCHLORIDE 10 MG/ML
0.5 INJECTION, SOLUTION INFILTRATION; PERINEURAL
Status: COMPLETED | OUTPATIENT
Start: 2022-07-06 | End: 2022-07-06

## 2022-07-06 RX ADMIN — LIDOCAINE HYDROCHLORIDE 0.5 ML: 10 INJECTION, SOLUTION INFILTRATION; PERINEURAL at 10:35

## 2022-07-06 RX ADMIN — TRIAMCINOLONE ACETONIDE 20 MG: 40 INJECTION, SUSPENSION INTRA-ARTICULAR; INTRAMUSCULAR at 10:35

## 2022-07-06 NOTE — PATIENT INSTRUCTIONS
F/u here as needed  F/u with Rheumatology  R CMC joint steroid injection given today  Icing/heat/OTC pain meds as needed  Continue current meds

## 2022-07-06 NOTE — PROGRESS NOTES
[facility] [place]  [id number]  [facility] [address] [phone]      Chief Complaint:  Chief Complaint   Patient presents with    Left Hand - Pain    Right Hand - Pain       Vitals:  /59   Pulse 56   Ht 5' 2" (1 575 m)   Wt 77 6 kg (171 lb)   BMI 31 28 kg/m²     The following portions of the patient's history were reviewed and updated as appropriate: allergies, current medications, past family history, past medical history, past social history, past surgical history, and problem list       Subjective:   Patient ID: Sarah Arnold is a 69 y o  female  Here c/o B hand pain  Denies injury  Pain for about 2 months  Taking prednisone- helps  Given by PCP  Referred to Rheumatologist   Pain with flexion of wrist  Pain all over hands  Burning/numbness- entire hand  Taking celebrex which is helping  Labs- elevated ESR/CRP, dec TSH      Review of Systems   Constitutional: Negative for fatigue and fever  Respiratory: Negative for shortness of breath  Cardiovascular: Negative for chest pain  Gastrointestinal: Negative for abdominal pain and nausea  Genitourinary: Negative for dysuria  Musculoskeletal: Positive for arthralgias  Skin: Negative for rash and wound  Neurological: Negative for weakness and headaches  Objective:  Ortho Exam    Physical Exam  Constitutional:       Appearance: Normal appearance  She is normal weight  HENT:      Head: Normocephalic  Eyes:      Extraocular Movements: Extraocular movements intact  Pulmonary:      Effort: Pulmonary effort is normal    Musculoskeletal:         General: Tenderness present  Cervical back: Normal range of motion  Comments: B wrist- neg tinel/phalen  R wrist- 1st CMC joint TTP with ROM  Pain with B wrist- ext/flex/sup/pron   Skin:     General: Skin is warm and dry  Neurological:      General: No focal deficit present        Mental Status: She is alert and oriented to person, place, and time  Mental status is at baseline  Psychiatric:         Mood and Affect: Mood normal          Behavior: Behavior normal          Thought Content: Thought content normal          Judgment: Judgment normal        Small joint arthrocentesis: R thumb CMC  [place] Protocol:  Consent: Verbal consent obtained  Risks and benefits: risks, benefits and alternatives were discussed  Consent given by: patient  Timeout called at: 7/6/2022 10:34 AM   Supporting Documentation  Indications: pain   Procedure Details  Location: thumb - R thumb CMC  Needle size: 25 G  Ultrasound guidance: no  Approach: lateral  Medications administered: 0 5 mL lidocaine 1 %; 20 mg triamcinolone acetonide 40 mg/mL    Patient tolerance: patient tolerated the procedure well with no immediate complications  Dressing:  Sterile dressing applied          I have personally reviewed pertinent films in PACS and my interpretation is XR-  R wrist- mild CMC joint OA  Assessment/Plan:  Assessment/Plan   Diagnoses and all orders for this visit:    Chronic wrist pain, right  -     XR wrist 3+ vw right; Future        No follow-ups on file       Kera Holder MD

## 2022-07-14 ENCOUNTER — CONSULT (OUTPATIENT)
Dept: PAIN MEDICINE | Facility: MEDICAL CENTER | Age: 77
End: 2022-07-14
Payer: MEDICARE

## 2022-07-14 ENCOUNTER — TELEPHONE (OUTPATIENT)
Dept: RADIOLOGY | Facility: MEDICAL CENTER | Age: 77
End: 2022-07-14

## 2022-07-14 VITALS
HEIGHT: 62 IN | BODY MASS INDEX: 31.28 KG/M2 | TEMPERATURE: 97.8 F | DIASTOLIC BLOOD PRESSURE: 58 MMHG | HEART RATE: 61 BPM | WEIGHT: 170 LBS | OXYGEN SATURATION: 95 % | SYSTOLIC BLOOD PRESSURE: 122 MMHG

## 2022-07-14 DIAGNOSIS — M96.1 POSTLAMINECTOMY SYNDROME OF LUMBAR REGION: ICD-10-CM

## 2022-07-14 DIAGNOSIS — M54.12 CERVICAL RADICULOPATHY: Primary | ICD-10-CM

## 2022-07-14 DIAGNOSIS — M54.16 LUMBAR RADICULITIS: ICD-10-CM

## 2022-07-14 DIAGNOSIS — M54.2 CERVICALGIA: ICD-10-CM

## 2022-07-14 DIAGNOSIS — M50.30 BULGING OF CERVICAL INTERVERTEBRAL DISC: ICD-10-CM

## 2022-07-14 PROCEDURE — 99204 OFFICE O/P NEW MOD 45 MIN: CPT | Performed by: PHYSICAL MEDICINE & REHABILITATION

## 2022-07-14 NOTE — TELEPHONE ENCOUNTER
Plavix hold faxed to Dr Shamir Roche MD at [phone]       Left GRISEL  (8/24 is day that we will have contrast available for GRISEL)    4w f/u with NP

## 2022-07-14 NOTE — PROGRESS NOTES
Assessment  1  Cervical radiculopathy    2  Cervicalgia    3  Bulging of cervical intervertebral disc    4  Postlaminectomy syndrome of lumbar region    5  Lumbar radiculitis        Plan  1  At this time we will schedule patient for a left cervical epidural steroid injection  She does take Plavix and we will need clarification for holding this  2  Obtain MRI without and with contrast of the lumbar spine  Patient does have prior history of lumbar spine surgery and is experiencing significant radiating right leg pain complaints  This is mainly in an L5 and S1 distribution on the right  BUN and Cr prior to study  3  If she fails to respond to cervical epidural steroid injection will consider cervical medial branch nerve block C4-6 and proceeding on to radiofrequency ablation if she has an appropriate diagnostic response  My impressions and treatment recommendations were discussed in detail with the patient who verbalized understanding and had no further questions  Discharge instructions were provided  I personally saw and examined the patient and I agree with the above discussed plan of care  Orders Placed This Encounter   Procedures    FL spine and pain procedure     Standing Status:   Future     Standing Expiration Date:   7/14/2023     Order Specific Question:   Reason for Exam:     Answer:   (L) GRISEL     Order Specific Question:   Anticoagulant hold needed? Answer:   yes, plavix - Dr Hawa Angulo MRI lumbar spine with and without contrast     Standing Status:   Future     Standing Expiration Date:   7/14/2026     Scheduling Instructions: There is no preparation for this test  Please leave your jewelry and valuables at home, wedding rings are the exception  Magnetic nail polish must be removed prior to arrival for your test  Please bring your insurance cards, a form of photo ID and a list of your medications with you  Arrive 15 minutes prior to your appointment time in order to register  Please bring any prior CT or MRI studies of this area that were not performed at a [facility] facility  To schedule this appointment, please contact Central Scheduling at [phone]  Prior to your appointment, please make sure you complete the MRI Screening Form when you e-Check in for your appointment  This will be available starting 7 days before your appointment in [address]  You may receive an e-mail with an activation code if you do not have a [employer] account  If you do not have access to a device, we will complete your screening at your appointment  Order Specific Question:   What is the patient's sedation requirement? Answer:   No Sedation     Order Specific Question:   Does the patient have metallic implants? Answer:   No     Order Specific Question:   Does this procedure require the 3T MRI at [facility]? Answer:   No     Order Specific Question:   Release to patient through [employer]     Answer:   Immediate     Order Specific Question:   Is order priority selected as STAT? Answer:   No     Order Specific Question:   Reason for Exam (FREE TEXT)     Answer:   back and radiating right leg pain, prior lumbar surgery    BUN     Standing Status:   Future     Standing Expiration Date:   7/14/2023    Creatinine, serum     Standing Status:   Future     Standing Expiration Date:   7/14/2023     No orders of the defined types were placed in this encounter  History of Present Illness    Shayan Glass is a 69 y o  female seen in consultation at the request of Dr Teresa Eli regarding neck and radiating arm pain  Patient is describing pain radiating down the left arm greater than the right  She also has complaints of back and radiating right leg pain with a history of prior lumbar spine surgery      She is experiencing at least moderate to severe intensity pain rated as a 7/10 which is constant and worse in the morning characterized as burning cramping numbness pins and needles  Aggravating factors include standing sitting  Alleviating factors are unknown  Diagnostic studies include cervical spine MRI  This does reveal some stenosis and disc herniation  Please see report for full details  She has tried physical therapy in the past as well as exercise without relief  She does get some moderate relief of local heat or ice application  Social history negative for tobacco marijuana and alcohol use  Currently not taking any prescription pain medications  I have personally reviewed and/or updated the patient's past medical history, past surgical history, family history, social history, current medications, allergies, and vital signs today  Review of Systems   Constitutional: Negative for fever and unexpected weight change  HENT: Negative for trouble swallowing  Eyes: Negative for visual disturbance  Respiratory: Negative for shortness of breath and wheezing  Cardiovascular: Negative for chest pain and palpitations  Gastrointestinal: Negative for constipation, diarrhea, nausea and vomiting  Endocrine: Negative for cold intolerance, heat intolerance and polydipsia  Genitourinary: Negative for difficulty urinating and frequency  Musculoskeletal: Positive for myalgias, neck pain and neck stiffness  Negative for arthralgias, gait problem and joint swelling  Skin: Negative for rash  Neurological: Positive for numbness and headaches  Negative for dizziness, seizures, syncope and weakness  Hematological: Does not bruise/bleed easily  Psychiatric/Behavioral: Negative for dysphoric mood  All other systems reviewed and are negative        Patient Active Problem List   Diagnosis    Essential hypertension    Coronary artery disease of native artery of native heart with stable [facility]ina [facility]    Dyslipidemia    Bilateral carotid artery stenosis    Peripheral vascular disease, unspecified [facility] )       Past Medical History:   Diagnosis Date  Arthritis     Coronary artery disease     Disease of thyroid gland     Hyperlipidemia     Hypertension        Past Surgical History:   Procedure Laterality Date    BACK SURGERY      BREAST SURGERY      CARPAL TUNNEL RELEASE      CHOLECYSTECTOMY      HYSTERECTOMY      OOPHORECTOMY      ORTHOPEDIC SURGERY         Family History   Problem Relation Age of Onset    No Known Problems Mother     Lung cancer Father     Ovarian cancer Sister     No Known Problems Maternal Grandmother     No Known Problems Maternal Grandfather     No Known Problems Paternal Grandmother     No Known Problems Paternal Grandfather     No Known Problems Sister     No Known Problems Sister     No Known Problems Maternal Aunt     No Known Problems Paternal Aunt     No Known Problems Paternal Aunt     Breast cancer Cousin 39       Social History     Occupational History    Not on file   Tobacco Use    Smoking status: Never Smoker    Smokeless tobacco: Never Used   Vaping Use    Vaping Use: Never used   Substance and Sexual Activity    Alcohol use: Not Currently    Drug use: Never    Sexual activity: Not on file       Current Outpatient Medications on File Prior to Visit   Medication Sig    celecoxib (CeleBREX) 200 mg capsule Take 1 capsule (200 mg total) by mouth 2 (two) times a day    clopidogrel (PLAVIX) 75 mg tablet TAKE ONE TABLET BY MOUTH ONCE DAILY    diltiazem (CARDIZEM CD) 240 mg 24 hr capsule TAKE ONE CAPSULE BY MOUTH ONCE DAILY    ergocalciferol (VITAMIN D2) 50,000 units Take 1 capsule (50,000 Units total) by mouth once a week    ezetimibe (ZETIA) 10 mg tablet TAKE ONE TABLET BY MOUTH ONCE DAILY    folic acid (FOLVITE) 1 mg tablet TAKE ONE TABLET BY MOUTH ONCE DAILY    hydrochlorothiazide (HYDRODIURIL) 25 mg tablet TAKE ONE TABLET BY MOUTH ONCE DAILY    levothyroxine (Synthroid) 100 mcg tablet Take 1 tablet (100 mcg total) by mouth daily in the early morning    losartan (COZAAR) 100 MG tablet TAKE ONE TABLET BY MOUTH ONCE DAILY    metoprolol succinate (TOPROL-XL) 50 mg 24 hr tablet Take 1 tablet (50 mg total) by mouth daily    predniSONE 5 mg tablet Take 1 tablet (5 mg total) by mouth daily    Aspirin Low Dose 81 MG EC tablet TAKE ONE TABLET BY MOUTH ONCE DAILY (Patient not taking: No sig reported)    benzonatate (TESSALON PERLES) 100 mg capsule Take 1 capsule (100 mg total) by mouth 3 (three) times a day as needed for cough (Patient not taking: Reported on 6/23/2022)    Calcium Carb-Cholecalciferol (OSCAL-D) 500 mg-200 units per tablet Reported on 1/12/2017  (Patient not taking: Reported on 6/23/2022)    diclofenac (VOLTAREN) 75 mg EC tablet Take 1 tablet (75 mg total) by mouth 2 (two) times a day (Patient not taking: Reported on 6/23/2022)    Diclofenac Sodium (VOLTAREN) 1 % Apply 2 g topically 4 (four) times a day (Patient not taking: Reported on 6/23/2022)    FeroSul 325 (65 Fe) MG tablet TAKE ONE TABLET BY MOUTH ONCE DAILY WITH BREAKFAST (Patient not taking: No sig reported)    isosorbide mononitrate (IMDUR) 30 mg 24 hr tablet Take 1 tablet (30 mg total) by mouth daily (Patient not taking: No sig reported)    methocarbamol (Robaxin-750) 750 mg tablet Take 1 tablet (750 mg total) by mouth 3 (three) times a day (Patient not taking: Reported on 6/23/2022)    naproxen (NAPROSYN) 500 mg tablet Take 1 tablet (500 mg total) by mouth 2 (two) times a day with meals (Patient not taking: No sig reported)    naproxen (NAPROSYN) 500 mg tablet TAKE ONE TABLET BY MOUTH TWICE A DAY WITH MEALS (Patient not taking: No sig reported)    nitrofurantoin (MACRODANTIN) 100 mg capsule Take 1 capsule (100 mg total) by mouth 2 (two) times a day (Patient not taking: No sig reported)    nitroglycerin (NITROSTAT) 0 4 mg SL tablet Place 1 tablet under the tongue every 5 (five) minutes as needed (Patient not taking: Reported on 6/23/2022)    olmesartan (BENICAR) 40 mg tablet Take 1 tablet by mouth daily (Patient not taking: No sig reported)    predniSONE 10 mg tablet Take 7T PO x 1 day, then take 6T PO x 1 day, and continue to decrease  by 1T until finished  Quantity-28 (Patient not taking: No sig reported)    predniSONE 10 mg tablet Take 6 tabs days 1&2, 5 tabs days 3&4, 4 tabs days 5&6, 3 tabs days 7&8, 2 tabs days 9&10, 1 tab days 11&12 (Patient not taking: Reported on 6/23/2022)    predniSONE 20 mg tablet Take 2 tablets (40 mg total) by mouth daily (Patient not taking: Reported on 6/23/2022)    pregabalin (LYRICA) 75 mg capsule Take 75 mg by mouth (Patient not taking: No sig reported)    ranolazine (RANEXA) 500 mg 12 hr tablet Take 1 tablet (500 mg total) by mouth 2 (two) times a day (Patient not taking: No sig reported)     No current facility-administered medications on file prior to visit  Allergies   Allergen Reactions    Codeine        Physical Exam    /58   Pulse 61   Temp 97 8 °F (36 6 °C)   Ht 5' 2" (1 575 m)   Wt 77 1 kg (170 lb)   SpO2 95%   BMI 31 09 kg/m²     CERVICAL  General: Well-developed, well-nourished individual in no acute distress  Mental: Appropriate mood and affect  Grossly oriented with coherent speech and thought processing   Neuro:  Cranial nerves: Cranial nerve function is grossly intact bilaterally   Strength: Bilateral upper extremity strength is normal and symmetric   No atrophy or tone abnormalities noted   Reflexes: Bilateral upper extremity muscle stretch reflexes are diminished bilaterally    Bilateral lower extremity muscle stretch reflexes are absent at the knees and ankles  No Cruz sign   Negative straight leg raise test on the right in a seated position  Sensation: No loss of sensation is noted   Foraminal Compression Maneuvers:  Spurling sign is absent   Gait:  Gait/gross motor: Gait is normal  Station is normal      Musculoskeletal:  Palpation: Inspection and palpation of the spine and extremities are unremarkable     Spine: Normal pain-free range of motion except the patient does notice tension in the left side of her neck when side-bending towards the right  No gross axial skeletal deformities   Skin: Skin inspection grossly negative for erythema, breakdown, or concerning lesions in affected area   Lymph: No lymphadenopathy is appreciated in the involved extremity   Vessels: No lower extremity edema   Lungs: Breathing is comfortable and regular  No dyspnea noted during examination   Eyes: Visual field grossly intact to confrontation  No redness appreciated  ENT: No craniofacial deformities or asymmetry  No neck masses appreciated  Imaging    MRI cervical spine wo contrast    Status: Final result               PACS Images     Show images for MRI cervical spine wo contrast      Study Result    Narrative & Impression   MRI CERVICAL SPINE WITHOUT CONTRAST     INDICATION: M54 12: Radiculopathy, cervical region  M54 2: Cervicalgia      COMPARISON: Cervical spine radiograph January 25, 2022  MRI cervical spine with and without contrast 11/27/2015      TECHNIQUE:  Sagittal T1, sagittal T2, sagittal inversion recovery, axial T2, axial  gradient     IMAGE QUALITY:  Diagnostic     FINDINGS:     ALIGNMENT:  Straightened cervical spine  No scoliosis  No subluxation    No compression deformity        MARROW SIGNAL:  Type II Modic endplate changes J0-A0, new from prior exam   Otherwise, normal bone marrow signal      CERVICAL AND VISUALIZED THORACIC CORD:  Normal signal within the visualized cord      PREVERTEBRAL AND PARASPINAL SOFT TISSUES:  Mild surrounding inflammatory changes adjacent to C5, C6, and superior aspect of C7 spinous process tips adjacent to the nuchal and supraspinous cervical ligaments (501:10-11; 701:15-18), suggestive of   inflammatory enthesitis which is new since 11/27/2015      VISUALIZED POSTERIOR FOSSA:  The visualized posterior fossa demonstrates no abnormal signal      CERVICAL DISC SPACES:       C1-C2: Normal      C2-C3: Normal      C3-C4: Normal      C4-C5: Small left central disc extrusion with mild flattening of left ventral cord, slightly worsened from prior exam   Mild canal stenosis, slightly worsened from prior exam   No significant foraminal narrowing, unchanged      C5-C6: Diffuse disc bulge with left central/subarticular disc protrusion with mild flattening of ventral cord, worse from prior exam   Uncovertebral hypertrophy and flavum thickening  Moderate canal stenosis, worse from prior exam   Mild right and   moderate left foraminal narrowing, worse from prior exam      C6-C7: Diffuse disc bulge   No significant canal stenosis or foraminal narrowing, unchanged      C7-T1: Normal      Multilevel degenerative changes have worsened since MRI cervical spine with and without contrast November 27, 2015      UPPER THORACIC DISC SPACES:  Normal      IMPRESSION:     Mild surrounding inflammatory changes adjacent to C5, C6, and superior aspect of C7 spinous process tips adjacent to the nuchal and supraspinous cervical ligaments, suggestive of inflammatory enthesitis      Worsened multilevel degenerative changes of cervical spine with varying degrees of canal stenosis (moderate C5-C6) and foraminal narrowing (moderate left C5-C6), as detailed above      The study was marked in EPIC for significant notification               Workstation performed: [id number]

## 2022-07-14 NOTE — PATIENT INSTRUCTIONS
Cervical Radiculopathy   WHAT YOU NEED TO KNOW:   Cervical radiculopathy is a painful condition that happens when a spinal nerve in your neck is pinched or irritated  DISCHARGE INSTRUCTIONS:   Medicines: You may need any of the following:  NSAIDs  help decrease swelling and pain  This medicine can be bought without a doctor's order  This medicine can cause stomach bleeding or kidney problems in certain people  If you take blood thinner medicine, always ask your healthcare provider if NSAIDs are safe for you  Always read the medicine label and follow the directions on it before using this medicine  Prescription pain medicine  helps decrease pain  Do not wait until the pain is severe before you take this medicine  Steroids  help decrease pain and swelling  These may be given as a pill or as an injection in your neck  You may need more than 1 injection if your symptoms do not improve after the first treatment  Take your medicine as directed  Contact your healthcare provider if you think your medicine is not helping or if you have side effects  Tell him of her if you are allergic to any medicine  Keep a list of the medicines, vitamins, and herbs you take  Include the amounts, and when and why you take them  Bring the list or the pill bottles to follow-up visits  Carry your medicine list with you in case of an emergency  Follow up with your healthcare provider or spine specialist as directed:  Write down your questions so you remember to ask them during your visits  Physical therapy:  Your healthcare provider may suggest physical therapy to stretch and strengthen your muscles  Your physical therapist can teach you how to improve your posture and the way you hold your neck  He may also teach you how to be safely active and avoid further injury  He can also help you develop an exercise program that is safe for your back and neck  Self-care:   Ice  helps decrease swelling and pain   Ice may also help prevent tissue damage  Use an ice pack, or put crushed ice in a plastic bag  Cover it with a towel and place it on your neck for 15 to 20 minutes every hour or as directed  Rest  when you feel it is needed  Slowly start to do more each day  Return to your daily activities as directed  Wear a soft collar  You may be given a soft collar to support your neck while you sleep  Wear the soft collar only as directed  Do light stretches and regular exercise  Your healthcare provider may suggest light stretches to help decrease stiffness in your neck and arm as you recover  After your pain is controlled, you may benefit from regular exercise  Ask what type of exercise is safe for your back and neck  Review your work area  A comfortable work area can help prevent neck strain  Ask your employer for an ergonomic review to check the position of your desk, chair, phone, and computer  Make any necessary adjustments for your comfort  Contact your healthcare provider or spine specialist if:   You have a fever  You are losing weight without trying  Your pain is worse, even with medicine  One or both hands feel more numb than before, or you cannot move your fingers well  You have questions or concerns about your condition or care  © Copyright [employer] 2022 Information is for End User's use only and may not be sold, redistributed or otherwise used for commercial purposes  All illustrations and images included in CareNotes® are the copyrighted property of [employer]  or Lynnette Bishop  The above information is an  only  It is not intended as medical advice for individual conditions or treatments  Talk to your doctor, nurse or pharmacist before following any medical regimen to see if it is safe and effective for you

## 2022-07-18 ENCOUNTER — APPOINTMENT (OUTPATIENT)
Dept: LAB | Facility: HOSPITAL | Age: 77
End: 2022-07-18
Payer: MEDICARE

## 2022-07-18 DIAGNOSIS — M96.1 POSTLAMINECTOMY SYNDROME OF LUMBAR REGION: ICD-10-CM

## 2022-07-18 DIAGNOSIS — M54.16 LUMBAR RADICULITIS: ICD-10-CM

## 2022-07-18 LAB
BUN SERPL-MCNC: 27 MG/DL (ref 5–25)
CREAT SERPL-MCNC: 0.84 MG/DL (ref 0.6–1.2)
GFR SERPL CREATININE-BSD FRML MDRD: 67 ML/MIN/1.73SQ M

## 2022-07-18 PROCEDURE — 36415 COLL VENOUS BLD VENIPUNCTURE: CPT

## 2022-07-18 PROCEDURE — 84520 ASSAY OF UREA NITROGEN: CPT

## 2022-07-18 PROCEDURE — 82565 ASSAY OF CREATININE: CPT

## 2022-07-25 ENCOUNTER — HOSPITAL ENCOUNTER (OUTPATIENT)
Dept: MRI IMAGING | Facility: HOSPITAL | Age: 77
Discharge: HOME | End: 2022-07-25
Payer: MEDICARE

## 2022-07-25 DIAGNOSIS — M96.1 POSTLAMINECTOMY SYNDROME OF LUMBAR REGION: ICD-10-CM

## 2022-07-25 DIAGNOSIS — M54.16 LUMBAR RADICULITIS: ICD-10-CM

## 2022-07-25 PROCEDURE — G1004 CDSM NDSC: HCPCS

## 2022-07-25 PROCEDURE — A9585 GADOBUTROL INJECTION: HCPCS | Performed by: PHYSICAL MEDICINE & REHABILITATION

## 2022-07-25 PROCEDURE — 72158 MRI LUMBAR SPINE W/O & W/DYE: CPT

## 2022-07-25 RX ADMIN — GADOBUTROL 8 ML: 604.72 INJECTION INTRAVENOUS at 15:04

## 2022-08-02 ENCOUNTER — TELEPHONE (OUTPATIENT)
Dept: OBGYN CLINIC | Facility: CLINIC | Age: 77
End: 2022-08-02

## 2022-08-02 NOTE — TELEPHONE ENCOUNTER
Please see hold task below  Do you give permission to hold Plavix 7 days prior to pt inj?     Thank you

## 2022-08-02 NOTE — TELEPHONE ENCOUNTER
S/w pt daughter Flo Hidalgo) to [employer] for 8/24 @ 2pm   Laquita Conde) to call w/ pt instructions for procedure  Plavix hold reviewed w/ pt's daughter told to hold starting 8/17

## 2022-08-03 NOTE — TELEPHONE ENCOUNTER
COVID disclaimer/ screening completed  Because of possible immunosuppression due to steroid, pt is aware that he should practice more strict social distancing, masking, handwashing for 2-3 weeks following procedure  Pt aware will need a , wear loose comfortable clothes,call to reschedule if become ill, have a fever or start on antibiotics, and may eat a light breakfast one hour before  RN s/w pt's daughter Kalyan Palacios,  as per ALEXI on chart  Kalyan Philip is aware that the pt should not take any plavix from the 17th up to and including the 24th to restart 24 hours after the procedure  Celebrex should be stopped on 8/20 up to and including 8/24 to be restarted 24 hours after the procedure  Yessica appreciative of call and will call back if any questions or concerns

## 2022-08-05 ENCOUNTER — TELEPHONE (OUTPATIENT)
Dept: PAIN MEDICINE | Facility: MEDICAL CENTER | Age: 77
End: 2022-08-05

## 2022-08-05 NOTE — TELEPHONE ENCOUNTER
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

## 2022-08-08 NOTE — TELEPHONE ENCOUNTER
S/W Yessica  Advised her of the same  She stated she is at work now  She will review with her mom and verify if she is still having right leg pain  Advised would have to send another hold form for Plavix and make sure the prescriber is aware that she is holding the Plavix for 8/24 GRISEL procedure  Answered her questions regarding the pre procedure instructions for 8/24  RN attempted to explain results and Saranya Mancuso does not understand  Please explain results  Please advise

## 2022-08-08 NOTE — TELEPHONE ENCOUNTER
S/W Yessica  Advised her of the same  Scheduled pt for [facility] on 8/9 at 8:30 w/ [address]  She will call back with any issues of arranging a car for her mom to come  She verbalized understanding

## 2022-08-09 ENCOUNTER — OFFICE VISIT (OUTPATIENT)
Dept: PAIN MEDICINE | Facility: MEDICAL CENTER | Age: 77
End: 2022-08-09
Payer: MEDICARE

## 2022-08-09 VITALS
TEMPERATURE: 97.6 F | HEART RATE: 67 BPM | DIASTOLIC BLOOD PRESSURE: 70 MMHG | BODY MASS INDEX: 31.83 KG/M2 | OXYGEN SATURATION: 97 % | HEIGHT: 62 IN | WEIGHT: 173 LBS | SYSTOLIC BLOOD PRESSURE: 134 MMHG

## 2022-08-09 DIAGNOSIS — M96.1 LUMBAR POST-LAMINECTOMY SYNDROME: ICD-10-CM

## 2022-08-09 DIAGNOSIS — M54.16 LUMBAR RADICULOPATHY: ICD-10-CM

## 2022-08-09 DIAGNOSIS — M54.16 LUMBAR RADICULITIS: ICD-10-CM

## 2022-08-09 DIAGNOSIS — M54.12 CERVICAL RADICULOPATHY: Primary | ICD-10-CM

## 2022-08-09 PROCEDURE — 99214 OFFICE O/P EST MOD 30 MIN: CPT | Performed by: NURSE PRACTITIONER

## 2022-08-09 NOTE — PROGRESS NOTES
Assessment:  1  Cervical radiculopathy    2  Lumbar post-laminectomy syndrome    3  Lumbar radiculitis    4  Lumbar radiculopathy        Plan:  1  Will schedule the patient for the patient for a right S1 TFESI address inflammatory component of the patient's pain  Patient will need to hold Plavix and we will request permission to hold this prior to scheduling her procedure  Complete risks and benefits including bleeding, infection, tissue reaction, nerve injury and allergic reaction were discussed  The patient was agreeable and verbalized an understanding  2  Patient will move forward with cervical epidural steroid injection which is already scheduled  3  May consider trial gabapentin future  4  Will avoid NSAIDs secondary to anticoagulation  5  Patient will follow up after her procedure sooner if needed    My impressions and treatment recommendations were discussed in detail with the patient who verbalized understanding and had no further questions  Discharge instructions were provided  I personally saw and examined the patient and I agree with the above discussed plan of care  Orders Placed This Encounter   Procedures    FL spine and pain procedure     Patient wishes to schedule at least a month after GRISEL     Standing Status:   Future     Standing Expiration Date:   8/9/2026     Order Specific Question:   Reason for Exam:     Answer:   Right S1 TFESI     Order Specific Question:   Anticoagulant hold needed? Answer:   Plavix     No orders of the defined types were placed in this encounter  History of Present Illness:  Osmar Moffett is a 69 y o  female who presents for a follow up office visit in regards to neck pain that radiates into the left shoulder, and low back pain that radiates into the posterior aspect of the right lower extremity to the dorsum of the foot with associated numbness of the right heel  She is already scheduled for a cervical epidural steroid injection August 24, 2022    She was offered a right S1 TFESI after MRI results were reviewed and the patient would like to schedule this at this time  She is currently on prednisone for right hand swelling as prescribed by her PCP  Patient rates her pain an 8/10 on the numeric pain rating scale  Constant pain throughout the day which is described as sharp, numbness, and pins and needles    I have personally reviewed and/or updated the patient's past medical history, past surgical history, family history, social history, current medications, allergies, and vital signs today  Review of Systems   Respiratory: Negative for shortness of breath  Cardiovascular: Negative for chest pain  Gastrointestinal: Negative for constipation, diarrhea, nausea and vomiting  Musculoskeletal: Positive for back pain, joint swelling and myalgias  Negative for arthralgias and gait problem  Skin: Negative for rash  Neurological: Positive for dizziness and weakness  Negative for seizures  All other systems reviewed and are negative        Patient Active Problem List   Diagnosis    Essential hypertension    Coronary artery disease of native artery of native heart with stable angina pectoris ([facility] [address]    Dyslipidemia    Bilateral carotid artery stenosis    Peripheral vascular disease, unspecified ([facility] [address] [facility]    Cervical radiculopathy    Lumbar post-laminectomy syndrome       Past Medical History:   Diagnosis Date    Arthritis     Coronary artery disease     Disease of thyroid gland     Hyperlipidemia     Hypertension        Past Surgical History:   Procedure Laterality Date    BACK SURGERY      BREAST SURGERY      CARPAL TUNNEL RELEASE      CHOLECYSTECTOMY      HYSTERECTOMY      OOPHORECTOMY      ORTHOPEDIC SURGERY         Family History   Problem Relation Age of Onset    No Known Problems Mother     Lung cancer Father     Ovarian cancer Sister     No Known Problems Maternal Grandmother     No Known Problems Maternal Grandfather     No Known Problems Paternal Grandmother     No Known Problems Paternal Grandfather     No Known Problems Sister     No Known Problems Sister     No Known Problems Maternal Aunt     No Known Problems Paternal Aunt     No Known Problems Paternal [de-identified]     Breast cancer Cousin 39       Social History     Occupational History    Not on file   Tobacco Use    Smoking status: Never Smoker    Smokeless tobacco: Never Used   Vaping Use    Vaping Use: Never used   Substance and Sexual Activity    Alcohol use: Not Currently    Drug use: Never    Sexual activity: Not on file       Current Outpatient Medications on File Prior to Visit   Medication Sig    celecoxib (CeleBREX) 200 mg capsule Take 1 capsule (200 mg total) by mouth 2 (two) times a day    clopidogrel (PLAVIX) 75 mg tablet TAKE ONE TABLET BY MOUTH ONCE DAILY    diltiazem (CARDIZEM CD) 240 mg 24 hr capsule TAKE ONE CAPSULE BY MOUTH ONCE DAILY    ergocalciferol (VITAMIN D2) 50,000 units Take 1 capsule (50,000 Units total) by mouth once a week    ezetimibe (ZETIA) 10 mg tablet TAKE ONE TABLET BY MOUTH ONCE DAILY    folic acid (FOLVITE) 1 mg tablet TAKE ONE TABLET BY MOUTH ONCE DAILY    hydrochlorothiazide (HYDRODIURIL) 25 mg tablet TAKE ONE TABLET BY MOUTH ONCE DAILY    levothyroxine (Synthroid) 100 mcg tablet Take 1 tablet (100 mcg total) by mouth daily in the early morning    losartan (COZAAR) 100 MG tablet TAKE ONE TABLET BY MOUTH ONCE DAILY    metoprolol succinate (TOPROL-XL) 50 mg 24 hr tablet TAKE ONE TABLET BY MOUTH ONCE DAILY    predniSONE 5 mg tablet Take 1 tablet (5 mg total) by mouth daily    Aspirin Low Dose 81 MG EC tablet TAKE ONE TABLET BY MOUTH ONCE DAILY (Patient not taking: No sig reported)    benzonatate (TESSALON PERLES) 100 mg capsule Take 1 capsule (100 mg total) by mouth 3 (three) times a day as needed for cough (Patient not taking: Reported on 6/23/2022)    Calcium Carb-Cholecalciferol (OSCAL-D) 500 mg-200 units per tablet Reported on 1/12/2017  (Patient not taking: Reported on 6/23/2022)    diclofenac (VOLTAREN) 75 mg EC tablet Take 1 tablet (75 mg total) by mouth 2 (two) times a day (Patient not taking: Reported on 6/23/2022)    Diclofenac Sodium (VOLTAREN) 1 % Apply 2 g topically 4 (four) times a day (Patient not taking: Reported on 6/23/2022)    FeroSul 325 (65 Fe) MG tablet TAKE ONE TABLET BY MOUTH ONCE DAILY WITH BREAKFAST (Patient not taking: No sig reported)    isosorbide mononitrate (IMDUR) 30 mg 24 hr tablet Take 1 tablet (30 mg total) by mouth daily (Patient not taking: No sig reported)    methocarbamol (Robaxin-750) 750 mg tablet Take 1 tablet (750 mg total) by mouth 3 (three) times a day (Patient not taking: Reported on 6/23/2022)    metoprolol succinate (TOPROL-XL) 50 mg 24 hr tablet Take 1 tablet (50 mg total) by mouth daily (Patient not taking: Reported on 8/9/2022)    naproxen (NAPROSYN) 500 mg tablet Take 1 tablet (500 mg total) by mouth 2 (two) times a day with meals (Patient not taking: No sig reported)    naproxen (NAPROSYN) 500 mg tablet TAKE ONE TABLET BY MOUTH TWICE A DAY WITH MEALS (Patient not taking: No sig reported)    nitrofurantoin (MACRODANTIN) 100 mg capsule Take 1 capsule (100 mg total) by mouth 2 (two) times a day (Patient not taking: No sig reported)    nitroglycerin (NITROSTAT) 0 4 mg SL tablet Place 1 tablet under the tongue every 5 (five) minutes as needed (Patient not taking: Reported on 6/23/2022)    olmesartan (BENICAR) 40 mg tablet Take 1 tablet by mouth daily (Patient not taking: No sig reported)    omeprazole (PriLOSEC) 20 mg delayed release capsule Take 1 capsule (20 mg total) by mouth daily before breakfast    predniSONE 10 mg tablet Take 7T PO x 1 day, then take 6T PO x 1 day, and continue to decrease  by 1T until finished   Quantity-28 (Patient not taking: No sig reported)    predniSONE 10 mg tablet Take 6 tabs days 1&2, 5 tabs days 3&4, 4 tabs days 5&6, 3 tabs days 7&8, 2 tabs days 9&10, 1 tab days 11&12 (Patient not taking: Reported on 6/23/2022)    predniSONE 20 mg tablet Take 2 tablets (40 mg total) by mouth daily (Patient not taking: Reported on 6/23/2022)    pregabalin (LYRICA) 75 mg capsule Take 75 mg by mouth (Patient not taking: No sig reported)    ranolazine (RANEXA) 500 mg 12 hr tablet Take 1 tablet (500 mg total) by mouth 2 (two) times a day (Patient not taking: No sig reported)     No current facility-administered medications on file prior to visit  Allergies   Allergen Reactions    Codeine        Physical Exam:    /70   Pulse 67   Temp 97 6 °F (36 4 °C)   Ht 5' 2" (1 575 m)   Wt 78 5 kg (173 lb)   SpO2 97%   BMI 31 64 kg/m²     Constitutional:normal, well developed, well nourished, alert, in no distress and non-toxic and no overt pain behavior  Eyes:anicteric  HEENT:grossly intact  Neck:supple, symmetric, trachea midline and no masses   Pulmonary:even and unlabored  Cardiovascular:No edema or pitting edema present  Skin:Normal without rashes or lesions and well hydrated  Psychiatric:Mood and affect appropriate  Neurologic:Cranial Nerves II-XII grossly intact  Musculoskeletal:Slightly antalgic gait but steady without assistive devices  Imaging    MRI lumbar spine with and without contrast: Result Notes     Conrad Dhaliwal DO   8/5/2022 12:32 PM EDT         Transitional lumbosacral junction   L5 is a sacralized level   Complete disc space noted at L5-S1      Patient status post decompressive laminectomy at L3-4 and L4-5   There is mild central stenosis persistent at the L4-5 level primarily related to bulky facet hypertrophy      WOULD RECOMMEND RIGHT S1 TFESI IF STILL HAVING RIGHT LEG PAIN              Study Result    Narrative & Impression   MRI LUMBAR SPINE WITH AND WITHOUT CONTRAST     INDICATION: M96 1: Postlaminectomy syndrome, not elsewhere classified  M54 16: Radiculopathy, lumbar region     Back pain radiating into the right leg      COMPARISON:  None      TECHNIQUE:  Sagittal T1, sagittal T2, sagittal inversion recovery, axial T1 and axial T2, coronal T2  Sagittal and axial T1 postcontrast      IV Contrast:  8 mL of Gadobutrol injection (SINGLE-DOSE)      IMAGE QUALITY:  Diagnostic     FINDINGS:     VERTEBRAL BODIES:  There is a transitional lumbosacral junction  L5 is a sacralized level  There is a complete disc space at L5-S1  Slight anterolisthesis L4-5  Normal marrow signal is identified within the visualized bony structures  No discrete   marrow lesion      SACRUM:  Normal signal within the sacrum  No evidence of insufficiency or stress fracture      DISTAL CORD AND CONUS:  Normal size and signal within the distal cord and conus      PARASPINAL SOFT TISSUES:  Large T2 hyperintensity within the left renal pelvis measures approximately 5 4 cm  This was described on a prior outside CT is potentially representing parapelvic renal cyst   The possibility of congenital UPJ obstruction is   not excluded      LOWER THORACIC DISC SPACES:  Normal disc height and signal   No disc herniation, canal stenosis or foraminal narrowing      LUMBAR DISC SPACES:     L1-L2:  Moderate facet hypertrophy  Minimal central stenosis without foraminal narrowing      L2-L3:  Moderate facet hypertrophy  Diffuse annular bulge identified with moderate central stenosis, moderate left and severe right lateral recess stenosis  Moderate to severe right and moderate left foraminal narrowing noted      L3-L4:  Decompressive laminectomy defect noted  Moderate to severe bulky facet hypertrophy with mild annular bulge results in mild central stenosis  There is moderate bilateral foraminal narrowing      L4-L5:  Decompressive laminectomy defect noted  There is bulky facet hypertrophy  Slight anterolisthesis noted  Moderate right greater than left bilateral foraminal narrowing is seen    Mild narrowing of the transverse dimension of the spinal canal      L5-S1:  This is a transitional level  No central or foraminal narrowing      POSTCONTRAST IMAGING:  No abnormal enhancement      IMPRESSION:     Transitional lumbosacral junction  L5 is a sacralized level  Complete disc space noted at L5-S1      Patient status post decompressive laminectomy at L3-4 and L4-5    There is mild central stenosis persistent at the L4-5 level primarily related to bulky facet hypertrophy      Spondylotic degenerative changes at L2-3 result in moderate central stenosis and moderate to severe lateral recess and foraminal narrowing      No evidence of recurrent or residual disc herniation            Workstation performed: [id number]

## 2022-08-10 ENCOUNTER — TELEPHONE (OUTPATIENT)
Dept: RADIOLOGY | Facility: MEDICAL CENTER | Age: 77
End: 2022-08-10

## 2022-08-10 NOTE — TELEPHONE ENCOUNTER
Patient scheduled for GRISEL on 8/24  To have TFESI done about a month after the GRISEL; there is already a hold approval in place for GRISEL  Possible to contact Dr Constantin Dave office and explain need for two different hold approvals as Plavix would need to be held for 7 separate consecutive days prior to each injection to be certain that both holds are ok from his standpoint for patient?

## 2022-08-15 NOTE — TELEPHONE ENCOUNTER
Patient daughter called stating she needs to reschedule procedure for mother  Patient daughter can be reached at [phone]   TY

## 2022-08-15 NOTE — TELEPHONE ENCOUNTER
S/w pt daughter Tatiana Paez) as per ALEXI and w/ schd'r Leslie  Pt schd for 9/27/22 @ 1540 w/ JE for S1 TFESI  Advised Pt daughter Tatiana Paez) to start Plavix hold 9/20/22 w/ LD 9/19/22  RN advised that pt will be instructed when to restart Plavix on day of inj      RN advised:  Pt must have  day of inj  Pt wear loose/comfortable clothing  Pt NPO 1hr prior to procd  No vaccines 2wks before or after procd  If prescr   Abx/active infect to contact office to reschd

## 2022-08-24 ENCOUNTER — HOSPITAL ENCOUNTER (OUTPATIENT)
Dept: RADIOLOGY | Facility: MEDICAL CENTER | Age: 77
Discharge: HOME | End: 2022-08-24
Payer: MEDICARE

## 2022-08-24 VITALS
SYSTOLIC BLOOD PRESSURE: 123 MMHG | RESPIRATION RATE: 18 BRPM | DIASTOLIC BLOOD PRESSURE: 70 MMHG | TEMPERATURE: 97.3 F | OXYGEN SATURATION: 96 % | HEART RATE: 56 BPM

## 2022-08-24 DIAGNOSIS — M54.12 CERVICAL RADICULOPATHY: ICD-10-CM

## 2022-08-24 PROCEDURE — 62321 NJX INTERLAMINAR CRV/THRC: CPT | Performed by: PHYSICAL MEDICINE & REHABILITATION

## 2022-08-24 RX ORDER — METHYLPREDNISOLONE ACETATE 80 MG/ML
80 INJECTION, SUSPENSION INTRA-ARTICULAR; INTRALESIONAL; INTRAMUSCULAR; PARENTERAL; SOFT TISSUE ONCE
Status: COMPLETED | OUTPATIENT
Start: 2022-08-24 | End: 2022-08-24

## 2022-08-24 RX ADMIN — IOHEXOL 1 ML: 300 INJECTION, SOLUTION INTRAVENOUS at 14:13

## 2022-08-24 RX ADMIN — METHYLPREDNISOLONE ACETATE 80 MG: 80 INJECTION, SUSPENSION INTRA-ARTICULAR; INTRALESIONAL; INTRAMUSCULAR; PARENTERAL; SOFT TISSUE at 14:14

## 2022-08-24 NOTE — H&P
History of Present Illness:  The patient is a 69 y o  female who presents with complaints of neck and left arm pain    Patient Active Problem List   Diagnosis    Essential hypertension    Coronary artery disease of native artery of native heart with stable angina pectoris ([address]    Dyslipidemia    Bilateral carotid artery stenosis    Peripheral vascular disease, unspecified ([address]    Cervical radiculopathy    Lumbar post-laminectomy syndrome       Past Medical History:   Diagnosis Date    Arthritis     Coronary artery disease     Disease of thyroid gland     Hyperlipidemia     Hypertension        Past Surgical History:   Procedure Laterality Date    BACK SURGERY      BREAST SURGERY      CARPAL TUNNEL RELEASE      CHOLECYSTECTOMY      HYSTERECTOMY      OOPHORECTOMY      ORTHOPEDIC SURGERY           Current Outpatient Medications:     benzonatate (TESSALON PERLES) 100 mg capsule, Take 1 capsule (100 mg total) by mouth 3 (three) times a day as needed for cough (Patient not taking: Reported on 6/23/2022), Disp: 20 capsule, Rfl: 0    Calcium Carb-Cholecalciferol (OSCAL-D) 500 mg-200 units per tablet, Reported on 1/12/2017  (Patient not taking: Reported on 6/23/2022), Disp: , Rfl:     celecoxib (CeleBREX) 200 mg capsule, Take 1 capsule (200 mg total) by mouth 2 (two) times a day, Disp: 180 capsule, Rfl: 1    clopidogrel (PLAVIX) 75 mg tablet, TAKE ONE TABLET BY MOUTH ONCE DAILY, Disp: 90 tablet, Rfl: 2    diclofenac (VOLTAREN) 75 mg EC tablet, Take 1 tablet (75 mg total) by mouth 2 (two) times a day (Patient not taking: No sig reported), Disp: 20 tablet, Rfl: 0    Diclofenac Sodium (VOLTAREN) 1 %, Apply 2 g topically 4 (four) times a day (Patient not taking: Reported on 6/23/2022), Disp: 150 g, Rfl: 0    diltiazem (CARDIZEM CD) 240 mg 24 hr capsule, TAKE ONE CAPSULE BY MOUTH ONCE DAILY, Disp: 90 capsule, Rfl: 2    ergocalciferol (VITAMIN D2) 50,000 units, Take 1 capsule (50,000 Units total) by mouth once a week, Disp: 12 capsule, Rfl: 3    ezetimibe (ZETIA) 10 mg tablet, TAKE ONE TABLET BY MOUTH ONCE DAILY, Disp: 90 tablet, Rfl: 2    FeroSul 325 (65 Fe) MG tablet, TAKE ONE TABLET BY MOUTH ONCE DAILY WITH BREAKFAST (Patient not taking: No sig reported), Disp: 90 tablet, Rfl: 2    folic acid (FOLVITE) 1 mg tablet, TAKE ONE TABLET BY MOUTH ONCE DAILY, Disp: 90 tablet, Rfl: 10    hydrochlorothiazide (HYDRODIURIL) 25 mg tablet, TAKE ONE TABLET BY MOUTH ONCE DAILY, Disp: 90 tablet, Rfl: 2    isosorbide mononitrate (IMDUR) 30 mg 24 hr tablet, Take 1 tablet (30 mg total) by mouth daily (Patient not taking: No sig reported), Disp: 90 tablet, Rfl: 3    levothyroxine (Synthroid) 100 mcg tablet, Take 1 tablet (100 mcg total) by mouth daily in the early morning, Disp: 90 tablet, Rfl: 3    losartan (COZAAR) 100 MG tablet, TAKE ONE TABLET BY MOUTH ONCE DAILY, Disp: 90 tablet, Rfl: 2    methocarbamol (Robaxin-750) 750 mg tablet, Take 1 tablet (750 mg total) by mouth 3 (three) times a day (Patient not taking: Reported on 6/23/2022), Disp: 30 tablet, Rfl: 0    metoprolol succinate (TOPROL-XL) 50 mg 24 hr tablet, TAKE ONE TABLET BY MOUTH ONCE DAILY, Disp: 90 tablet, Rfl: 2    metoprolol succinate (TOPROL-XL) 50 mg 24 hr tablet, Take 1 tablet (50 mg total) by mouth daily (Patient not taking: Reported on 8/9/2022), Disp: 90 tablet, Rfl: 3    naproxen (NAPROSYN) 500 mg tablet, Take 1 tablet (500 mg total) by mouth 2 (two) times a day with meals (Patient not taking: No sig reported), Disp: 30 tablet, Rfl: 0    naproxen (NAPROSYN) 500 mg tablet, TAKE ONE TABLET BY MOUTH TWICE A DAY WITH MEALS (Patient not taking: No sig reported), Disp: 30 tablet, Rfl: 0    nitroglycerin (NITROSTAT) 0 4 mg SL tablet, Place 1 tablet under the tongue every 5 (five) minutes as needed (Patient not taking: Reported on 6/23/2022), Disp: , Rfl:     olmesartan (BENICAR) 40 mg tablet, Take 1 tablet by mouth daily (Patient not taking: No sig reported), Disp: , Rfl:     omeprazole (PriLOSEC) 20 mg delayed release capsule, Take 1 capsule (20 mg total) by mouth daily before breakfast, Disp: 90 capsule, Rfl: 3    predniSONE 10 mg tablet, Take 7T PO x 1 day, then take 6T PO x 1 day, and continue to decrease  by 1T until finished  Quantity-28 (Patient not taking: No sig reported), Disp: 28 tablet, Rfl: 0    predniSONE 10 mg tablet, Take 6 tabs days 1&2, 5 tabs days 3&4, 4 tabs days 5&6, 3 tabs days 7&8, 2 tabs days 9&10, 1 tab days 11&12 (Patient not taking: Reported on 6/23/2022), Disp: 42 tablet, Rfl: 0    predniSONE 20 mg tablet, Take 2 tablets (40 mg total) by mouth daily (Patient not taking: Reported on 6/23/2022), Disp: 8 tablet, Rfl: 0    predniSONE 5 mg tablet, Take 1 tablet (5 mg total) by mouth daily, Disp: 90 tablet, Rfl: 0    pregabalin (LYRICA) 75 mg capsule, Take 75 mg by mouth (Patient not taking: No sig reported), Disp: , Rfl:     ranolazine (RANEXA) 500 mg 12 hr tablet, Take 1 tablet (500 mg total) by mouth 2 (two) times a day (Patient not taking: No sig reported), Disp: 60 tablet, Rfl: 11    Current Facility-Administered Medications:     iohexol (OMNIPAQUE) 300 mg/mL injection 2 mL, 2 mL, Epidural, Once, Andrey Acevedo, DO    methylPREDNISolone acetate (DEPO-MEDROL) injection 80 mg, 80 mg, Epidural, Once, Andrey Acevedo, DO    Allergies   Allergen Reactions    Codeine        Physical Exam:   Vitals:    08/24/22 1402   BP: 107/66   Pulse: (!) 53   Resp: 18   Temp: (!) 97 3 °F (36 3 °C)   SpO2: 95%     General: Awake, Alert, Oriented x 3, Mood and affect appropriate  Respiratory: Respirations even and unlabored  Cardiovascular: Peripheral pulses intact; no edema  Musculoskeletal Exam: neck and left arm pain    ASA Score: 2    Patient/Chart Verification  Patient ID Verified: Verbal  Consents Confirmed: Procedural, To be obtained in the Pre-Procedure area  H&P( within 30 days) Verified:  To be obtained in the Pre-Procedure area  Allergies Reviewed: Yes  Anticoag/NSAID held?: Yes (Plavix last dose 8/16, celebrex last dose today-takes it 2 x/day, JE aware)  Currently on antibiotics?: No  Pregnancy denied?: NA    Assessment:   1   Cervical radiculopathy        Plan: (L) GRISEL

## 2022-08-24 NOTE — TELEPHONE ENCOUNTER
Pt here for procedure and daughter sated the pt needs to reschedule procedure for 9/27  Please call to reschedule-call daughter Ewelina Richardson [phone]

## 2022-08-24 NOTE — DISCHARGE INSTRUCTIONS
Epidural Steroid Injection   WHAT YOU NEED TO KNOW:   An epidural steroid injection (SUMMER) is a procedure to inject steroid medicine into the epidural space  The epidural space is between your spinal cord and vertebrae  Steroids reduce inflammation and fluid buildup in your spine that may be causing pain  You may be given pain medicine along with the steroids  ACTIVITY  Do not drive or operate machinery today  No strenuous activity today - bending, lifting, etc   You may resume normal activites starting tomorrow - start slowly and as tolerated  You may shower today, but no tub baths or hot tubs  You may have numbness for several hours from the local anesthetic  Please use caution and common sense, especially with weight-bearing activities  CARE OF THE INJECTION SITE  If you have soreness or pain, apply ice to the area today (20 minutes on/20 minutes off)  Starting tomorrow, you may use warm, moist heat or ice if needed  You may have an increase or change in your discomfort for 36-48 hours after your treatment  Apply ice and continue with any pain medication you have been prescribed  Notify the [facility] if you have any of the following: redness, drainage, swelling, headache, stiff neck or fever above 100°F     SPECIAL INSTRUCTIONS  Our office will contact you in approximately 7 days for a progress report  MEDICATIONS  Continue to take all routine medications  Our office may have instructed you to hold some medications  Resume Plavix and Celebrex tomorrow 8/25 after 2:30 pm     As no general anesthesia was used in today's procedure, you should not experience any side effects related to anesthesia  If you are diabetic, the steroids used in today's injection may temporarily increase your blood sugar levels after the first few days after your injection   Please keep a close eye on your sugars and alert the doctor who manages your diabetes if your sugars are significantly high from your baseline or you are symptomatic  If you have a problem specifically related to your procedure, please call our office at [phone]  Problems not related to your procedure should be directed to your primary care physician

## 2022-08-29 NOTE — TELEPHONE ENCOUNTER
VMMLOM for Livan Cardenas not mentioned on ALEXI , but Lizz Mathis is and is at the same phone number listed

## 2022-08-31 ENCOUNTER — TELEPHONE (OUTPATIENT)
Dept: PAIN MEDICINE | Facility: CLINIC | Age: 77
End: 2022-08-31

## 2022-09-01 NOTE — TELEPHONE ENCOUNTER
pts daughter called to cancel procedure on 9/27-     pls be advised that the patient would like to put this off until for another 3 mos

## 2022-09-01 NOTE — TELEPHONE ENCOUNTER
S/W pt  Pt stated the insurance is not going to cover her procedure on 9/27  Pt states okay to talk with her daughter [place]  She will have Valeria call [facility]  Please advise

## 2022-09-29 ENCOUNTER — APPOINTMENT (EMERGENCY)
Dept: CT IMAGING | Facility: HOSPITAL | Age: 77
End: 2022-09-29
Payer: MEDICARE

## 2022-09-29 ENCOUNTER — HOSPITAL ENCOUNTER (EMERGENCY)
Facility: HOSPITAL | Age: 77
Discharge: HOME | End: 2022-09-29
Attending: EMERGENCY MEDICINE
Payer: MEDICARE

## 2022-09-29 VITALS
SYSTOLIC BLOOD PRESSURE: 142 MMHG | OXYGEN SATURATION: 97 % | RESPIRATION RATE: 14 BRPM | HEIGHT: 62 IN | BODY MASS INDEX: 32.98 KG/M2 | TEMPERATURE: 97.1 F | DIASTOLIC BLOOD PRESSURE: 74 MMHG | WEIGHT: 179.23 LBS | HEART RATE: 51 BPM

## 2022-09-29 DIAGNOSIS — I65.22 LEFT CAROTID STENOSIS: Primary | ICD-10-CM

## 2022-09-29 DIAGNOSIS — R07.9 CHEST PAIN, UNSPECIFIED: ICD-10-CM

## 2022-09-29 DIAGNOSIS — M54.12 CERVICAL RADICULOPATHY: ICD-10-CM

## 2022-09-29 LAB
2HR DELTA HS TROPONIN: -1 NG/L
ANION GAP SERPL CALCULATED.3IONS-SCNC: 4 MMOL/L (ref 5–14)
ATRIAL RATE: 51 BPM
BASOPHILS # BLD AUTO: 0.08 THOUSANDS/ΜL (ref 0–0.1)
BASOPHILS NFR BLD AUTO: 1 % (ref 0–1)
BUN SERPL-MCNC: 23 MG/DL (ref 5–25)
CALCIUM SERPL-MCNC: 9.4 MG/DL (ref 8.4–10.2)
CARDIAC TROPONIN I PNL SERPL HS: 2 NG/L
CARDIAC TROPONIN I PNL SERPL HS: 3 NG/L
CHLORIDE SERPL-SCNC: 100 MMOL/L (ref 96–108)
CO2 SERPL-SCNC: 32 MMOL/L (ref 21–32)
CREAT SERPL-MCNC: 0.74 MG/DL (ref 0.6–1.2)
EOSINOPHIL # BLD AUTO: 0.1 THOUSAND/ΜL (ref 0–0.61)
EOSINOPHIL NFR BLD AUTO: 1 % (ref 0–6)
ERYTHROCYTE [DISTWIDTH] IN BLOOD BY AUTOMATED COUNT: 14.6 % (ref 11.6–15.1)
GFR SERPL CREATININE-BSD FRML MDRD: 78 ML/MIN/1.73SQ M
GLUCOSE SERPL-MCNC: 87 MG/DL (ref 70–99)
HCT VFR BLD AUTO: 42 % (ref 34.8–46.1)
HGB BLD-MCNC: 12.9 G/DL (ref 11.5–15.4)
IMM GRANULOCYTES # BLD AUTO: 0.08 THOUSAND/UL (ref 0–0.2)
IMM GRANULOCYTES NFR BLD AUTO: 1 % (ref 0–2)
LYMPHOCYTES # BLD AUTO: 2.99 THOUSANDS/ΜL (ref 0.6–4.47)
LYMPHOCYTES NFR BLD AUTO: 29 % (ref 14–44)
MCH RBC QN AUTO: 26.5 PG (ref 26.8–34.3)
MCHC RBC AUTO-ENTMCNC: 30.7 G/DL (ref 31.4–37.4)
MCV RBC AUTO: 86 FL (ref 82–98)
MONOCYTES # BLD AUTO: 0.88 THOUSAND/ΜL (ref 0.17–1.22)
MONOCYTES NFR BLD AUTO: 9 % (ref 4–12)
NEUTROPHILS # BLD AUTO: 6.28 THOUSANDS/ΜL (ref 1.85–7.62)
NEUTS SEG NFR BLD AUTO: 59 % (ref 43–75)
NRBC BLD AUTO-RTO: 0 /100 WBCS
P AXIS: 33 DEGREES
PLATELET # BLD AUTO: 234 THOUSANDS/UL (ref 149–390)
PMV BLD AUTO: 10.5 FL (ref 8.9–12.7)
POTASSIUM SERPL-SCNC: 3.4 MMOL/L (ref 3.5–5.3)
PR INTERVAL: 212 MS
QRS AXIS: -34 DEGREES
QRSD INTERVAL: 84 MS
QT INTERVAL: 444 MS
QTC INTERVAL: 409 MS
RBC # BLD AUTO: 4.87 MILLION/UL (ref 3.81–5.12)
SODIUM SERPL-SCNC: 136 MMOL/L (ref 135–147)
T WAVE AXIS: 19 DEGREES
TSH SERPL DL<=0.05 MIU/L-ACNC: 1.4 UIU/ML (ref 0.45–4.5)
VENTRICULAR RATE: 51 BPM
WBC # BLD AUTO: 10.41 THOUSAND/UL (ref 4.31–10.16)

## 2022-09-29 PROCEDURE — 96374 THER/PROPH/DIAG INJ IV PUSH: CPT

## 2022-09-29 PROCEDURE — 85025 COMPLETE CBC W/AUTO DIFF WBC: CPT

## 2022-09-29 PROCEDURE — 36415 COLL VENOUS BLD VENIPUNCTURE: CPT

## 2022-09-29 PROCEDURE — 93010 ELECTROCARDIOGRAM REPORT: CPT | Performed by: INTERNAL MEDICINE

## 2022-09-29 PROCEDURE — 70496 CT ANGIOGRAPHY HEAD: CPT

## 2022-09-29 PROCEDURE — 80048 BASIC METABOLIC PNL TOTAL CA: CPT

## 2022-09-29 PROCEDURE — G1004 CDSM NDSC: HCPCS

## 2022-09-29 PROCEDURE — 99285 EMERGENCY DEPT VISIT HI MDM: CPT

## 2022-09-29 PROCEDURE — 70498 CT ANGIOGRAPHY NECK: CPT

## 2022-09-29 PROCEDURE — 93005 ELECTROCARDIOGRAM TRACING: CPT

## 2022-09-29 PROCEDURE — 84484 ASSAY OF TROPONIN QUANT: CPT

## 2022-09-29 PROCEDURE — 84443 ASSAY THYROID STIM HORMONE: CPT

## 2022-09-29 RX ORDER — SODIUM CHLORIDE 9 MG/ML
3 INJECTION INTRAVENOUS
Status: DISCONTINUED | OUTPATIENT
Start: 2022-09-29 | End: 2022-09-29 | Stop reason: HOSPADM

## 2022-09-29 RX ORDER — KETOROLAC TROMETHAMINE 30 MG/ML
15 INJECTION, SOLUTION INTRAMUSCULAR; INTRAVENOUS ONCE
Status: COMPLETED | OUTPATIENT
Start: 2022-09-29 | End: 2022-09-29

## 2022-09-29 RX ORDER — LIDOCAINE 50 MG/G
1 PATCH TOPICAL ONCE
Status: DISCONTINUED | OUTPATIENT
Start: 2022-09-29 | End: 2022-09-29 | Stop reason: HOSPADM

## 2022-09-29 RX ADMIN — KETOROLAC TROMETHAMINE 15 MG: 30 INJECTION, SOLUTION INTRAMUSCULAR; INTRAVENOUS at 11:33

## 2022-09-29 RX ADMIN — IOHEXOL 85 ML: 350 INJECTION, SOLUTION INTRAVENOUS at 11:54

## 2022-09-29 RX ADMIN — LIDOCAINE 1 PATCH: 50 PATCH TOPICAL at 11:34

## 2022-09-29 NOTE — ED PROVIDER NOTES
History  Chief Complaint   Patient presents with    Chest Pain     States was at an eye exam and noted pain I throat that went down to left chest and shoulder and into back; states it lasted longer than the usual pain that come in left arm and shoulder  No pain at this time  This is a 60-year-old female with past medical history hypothyroidism, CAD, HTN, dyslipidemia, cervical radiculopathy presenting today via EMS due to chest pain  Patient was at the eye doctor and when they started the eye exam patient began to have neck pain that radiated to midsternal chest, back and left arm  Patient states the episode lasted about 5 minutes and did resolve before EMS arrived  She had associated fatigue and shortness of breath with it  States that prior to arriving at the eye doctor she did feel slightly off balance however it resolved  She denies any diaphoresis, lightheadedness, dizziness, near syncope or syncope with the event  Patient also reports that she has been experiencing numbness in the left arm and leg for the past 3 months  Also states that she has chronic neck pain and does follow with a specialist for this  Patient offers no complaints this time  States the chest pain has completely resolved  Denies any fever, chills, abdominal pain, nausea, vomiting, diarrhea, urinary symptoms  No leg swelling or pain  Prior to Admission Medications   Prescriptions Last Dose Informant Patient Reported? Taking?    Calcium Carb-Cholecalciferol (OSCAL-D) 500 mg-200 units per tablet  Self Yes No   Sig: Reported on 1/12/2017    Patient not taking: Reported on 6/23/2022   Diclofenac Sodium (VOLTAREN) 1 %   No No   Sig: Apply 2 g topically 4 (four) times a day   Patient not taking: Reported on 6/23/2022   FeroSul 325 (65 Fe) MG tablet   No No   Sig: TAKE ONE TABLET BY MOUTH ONCE DAILY WITH BREAKFAST   Patient not taking: No sig reported   benzonatate (TESSALON PERLES) 100 mg capsule   No No   Sig: Take 1 capsule (100 mg total) by mouth 3 (three) times a day as needed for cough   Patient not taking: Reported on 6/23/2022   celecoxib (CeleBREX) 200 mg capsule   No No   Sig: Take 1 capsule (200 mg total) by mouth 2 (two) times a day   clopidogrel (PLAVIX) 75 mg tablet   No No   Sig: TAKE ONE TABLET BY MOUTH ONCE DAILY   diclofenac (VOLTAREN) 75 mg EC tablet   No No   Sig: Take 1 tablet (75 mg total) by mouth 2 (two) times a day   Patient not taking: No sig reported   diltiazem (CARDIZEM CD) 240 mg 24 hr capsule   No No   Sig: TAKE ONE CAPSULE BY MOUTH ONCE DAILY   ergocalciferol (VITAMIN D2) 50,000 units   No No   Sig: Take 1 capsule (50,000 Units total) by mouth once a week   ezetimibe (ZETIA) 10 mg tablet   No No   Sig: TAKE ONE TABLET BY MOUTH ONCE DAILY   folic acid (FOLVITE) 1 mg tablet   No No   Sig: TAKE ONE TABLET BY MOUTH ONCE DAILY   hydrochlorothiazide (HYDRODIURIL) 25 mg tablet   No No   Sig: TAKE ONE TABLET BY MOUTH ONCE DAILY   isosorbide mononitrate (IMDUR) 30 mg 24 hr tablet   No No   Sig: Take 1 tablet (30 mg total) by mouth daily   Patient not taking: No sig reported   levothyroxine (Synthroid) 100 mcg tablet   No No   Sig: Take 1 tablet (100 mcg total) by mouth daily in the early morning   losartan (COZAAR) 100 MG tablet   No No   Sig: TAKE ONE TABLET BY MOUTH ONCE DAILY   methocarbamol (Robaxin-750) 750 mg tablet   No No   Sig: Take 1 tablet (750 mg total) by mouth 3 (three) times a day   Patient not taking: Reported on 6/23/2022   metoprolol succinate (TOPROL-XL) 50 mg 24 hr tablet   No No   Sig: TAKE ONE TABLET BY MOUTH ONCE DAILY   metoprolol succinate (TOPROL-XL) 50 mg 24 hr tablet   No No   Sig: Take 1 tablet (50 mg total) by mouth daily   Patient not taking: Reported on 8/9/2022   naproxen (NAPROSYN) 500 mg tablet   No No   Sig: Take 1 tablet (500 mg total) by mouth 2 (two) times a day with meals   Patient not taking: No sig reported   naproxen (NAPROSYN) 500 mg tablet   No No   Sig: TAKE ONE TABLET BY MOUTH TWICE A DAY WITH MEALS   Patient not taking: No sig reported   nitroglycerin (NITROSTAT) 0 4 mg SL tablet  Self Yes No   Sig: Place 1 tablet under the tongue every 5 (five) minutes as needed   Patient not taking: Reported on 6/23/2022   olmesartan (BENICAR) 40 mg tablet  Self Yes No   Sig: Take 1 tablet by mouth daily   Patient not taking: No sig reported   omeprazole (PriLOSEC) 20 mg delayed release capsule   No No   Sig: Take 1 capsule (20 mg total) by mouth daily before breakfast   predniSONE 10 mg tablet   No No   Sig: Take 7T PO x 1 day, then take 6T PO x 1 day, and continue to decrease  by 1T until finished   Quantity-28   Patient not taking: No sig reported   predniSONE 10 mg tablet   No No   Sig: Take 6 tabs days 1&2, 5 tabs days 3&4, 4 tabs days 5&6, 3 tabs days 7&8, 2 tabs days 9&10, 1 tab days 11&12   Patient not taking: Reported on 6/23/2022   predniSONE 20 mg tablet   No No   Sig: Take 2 tablets (40 mg total) by mouth daily   Patient not taking: Reported on 6/23/2022   predniSONE 5 mg tablet   No No   Sig: Take 1 tablet (5 mg total) by mouth daily   pregabalin (LYRICA) 75 mg capsule  Self Yes No   Sig: Take 75 mg by mouth   Patient not taking: No sig reported   ranolazine (RANEXA) 500 mg 12 hr tablet   No No   Sig: Take 1 tablet (500 mg total) by mouth 2 (two) times a day   Patient not taking: No sig reported      Facility-Administered Medications: None       Past Medical History:   Diagnosis Date    Arthritis     Coronary artery disease     Disease of thyroid gland     Hyperlipidemia     Hypertension        Past Surgical History:   Procedure Laterality Date    BACK SURGERY      BREAST SURGERY      CARPAL TUNNEL RELEASE      CHOLECYSTECTOMY      HYSTERECTOMY      OOPHORECTOMY      ORTHOPEDIC SURGERY         Family History   Problem Relation Age of Onset    No Known Problems Mother     Lung cancer Father     Ovarian cancer Sister     No Known Problems Maternal Grandmother  No Known Problems Maternal Grandfather     No Known Problems Paternal Grandmother     No Known Problems Paternal Grandfather     No Known Problems Sister     No Known Problems Sister     No Known Problems Maternal Aunt     No Known Problems Paternal Aunt     No Known Problems Paternal Aunt     Breast cancer Cousin 39     I have reviewed and agree with the history as documented  E-Cigarette/Vaping    E-Cigarette Use Never User      E-Cigarette/Vaping Substances    Nicotine No     THC No     CBD No     Flavoring No     Other No     Unknown No      Social History     Tobacco Use    Smoking status: Never Smoker    Smokeless tobacco: Never Used   Vaping Use    Vaping Use: Never used   Substance Use Topics    Alcohol use: Not Currently    Drug use: Never       Review of Systems   Constitutional: Negative for chills and fever  HENT: Negative for ear pain and sore throat  Eyes: Negative for pain and visual disturbance  Respiratory: Positive for shortness of breath  Negative for cough  Cardiovascular: Positive for chest pain  Negative for palpitations  Gastrointestinal: Negative for abdominal pain, diarrhea, nausea and vomiting  Genitourinary: Negative for dysuria and hematuria  Musculoskeletal: Positive for back pain and neck pain  Negative for arthralgias  Skin: Negative for color change and rash  Neurological: Positive for dizziness  Negative for seizures, syncope and light-headedness  All other systems reviewed and are negative  Physical Exam  Physical Exam  Vitals and nursing note reviewed  Constitutional:       General: She is not in acute distress  Appearance: She is well-developed  She is obese  She is not ill-appearing  HENT:      Head: Normocephalic and atraumatic  Eyes:      Conjunctiva/sclera: Conjunctivae normal    Cardiovascular:      Rate and Rhythm: Normal rate and regular rhythm        Pulses:           Carotid pulses are 2+ on the right side and 2+ on the left side  Heart sounds: Normal heart sounds  No murmur heard  Pulmonary:      Effort: Pulmonary effort is normal  No respiratory distress  Breath sounds: Normal breath sounds  No decreased breath sounds, wheezing, rhonchi or rales  Abdominal:      Palpations: Abdomen is soft  Tenderness: There is no abdominal tenderness  Musculoskeletal:      Cervical back: Normal range of motion and neck supple  Right lower leg: No tenderness  No edema  Left lower leg: No tenderness  No edema  Skin:     General: Skin is warm and dry  Capillary Refill: Capillary refill takes less than 2 seconds  Neurological:      General: No focal deficit present  Mental Status: She is alert and oriented to person, place, and time  GCS: GCS eye subscore is 4  GCS verbal subscore is 5  GCS motor subscore is 6  Cranial Nerves: Cranial nerves are intact  Motor: No weakness or pronator drift  Coordination: Finger-Nose-Finger Test normal       Comments: Pt notes decreased sensation on the entire left arm and leg compared to the right  Also notes decreased sensation on the left side of her face     Psychiatric:         Mood and Affect: Mood normal          Behavior: Behavior normal          Vital Signs  ED Triage Vitals [09/29/22 1003]   Temperature Pulse Respirations Blood Pressure SpO2   (!) 97 1 °F (36 2 °C) 59 16 (!) 181/95 96 %      Temp Source Heart Rate Source Patient Position - Orthostatic VS BP Location FiO2 (%)   Tympanic Monitor Lying Left arm --      Pain Score       No Pain           Vitals:    09/29/22 1003 09/29/22 1030 09/29/22 1100   BP: (!) 181/95 135/80 142/74   Pulse: 59 (!) 51 (!) 51   Patient Position - Orthostatic VS: Lying Lying Lying         Visual Acuity      ED Medications  Medications   sodium chloride (PF) 0 9 % injection 3 mL (has no administration in time range)   lidocaine (LIDODERM) 5 % patch 1 patch (1 patch Topical Medication Applied 9/29/22 1134) ketorolac (TORADOL) injection 15 mg (15 mg Intravenous Given 9/29/22 1133)   iohexol (OMNIPAQUE) 350 MG/ML injection (SINGLE-DOSE) 85 mL (85 mL Intravenous Given 9/29/22 1154)       Diagnostic Studies  Results Reviewed     Procedure Component Value Units Date/Time    HS Troponin I 2hr [[id number]]  (Normal) Collected: 09/29/22 1213    Lab Status: Final result Specimen: Blood from Arm, Left Updated: 09/29/22 1245     hs TnI 2hr 2 ng/L      Delta 2hr hsTnI -1 ng/L     HS Troponin I 4hr [[id number]]     Lab Status: No result Specimen: Blood     TSH, 3rd generation [[id number]]  (Normal) Collected: 09/29/22 1024    Lab Status: Final result Specimen: Blood from Arm, Left Updated: 09/29/22 1118     TSH 3RD GENERATON 1 400 uIU/mL     Narrative:      Patients undergoing fluorescein dye angiography may retain small amounts of fluorescein in the body for 48-72 hours post procedure  Samples containing fluorescein can produce falsely depressed TSH values  If the patient had this procedure,a specimen should be resubmitted post fluorescein clearance        HS Troponin 0hr (reflex protocol) [[id number]]  (Normal) Collected: 09/29/22 1024    Lab Status: Final result Specimen: Blood from Arm, Left Updated: 09/29/22 1057     hs TnI 0hr 3 ng/L     Basic metabolic panel [[id number]]  (Abnormal) Collected: 09/29/22 1024    Lab Status: Final result Specimen: Blood from Arm, Left Updated: 09/29/22 1049     Sodium 136 mmol/L      Potassium 3 4 mmol/L      Chloride 100 mmol/L      CO2 32 mmol/L      ANION GAP 4 mmol/L      BUN 23 mg/dL      Creatinine 0 74 mg/dL      Glucose 87 mg/dL      Calcium 9 4 mg/dL      eGFR 78 ml/min/1 73sq m     Narrative:      [place] guidelines for Chronic Kidney Disease (CKD):     Stage 1 with normal or high GFR (GFR > 90 mL/min/1 73 square meters)    Stage 2 Mild CKD (GFR = 60-89 mL/min/1 73 square meters)    Stage 3A Moderate CKD (GFR = 45-59 mL/min/1 73 square meters)    Stage 3B Moderate CKD (GFR = 30-44 mL/min/1 73 square meters)    Stage 4 Severe CKD (GFR = 15-29 mL/min/1 73 square meters)    Stage 5 End Stage CKD (GFR <15 mL/min/1 73 square meters)  Note: GFR calculation is accurate only with a steady state creatinine    CBC and differential [[id number]]  (Abnormal) Collected: 09/29/22 1024    Lab Status: Final result Specimen: Blood from Arm, Left Updated: 09/29/22 1035     WBC 10 41 Thousand/uL      RBC 4 87 Million/uL      Hemoglobin 12 9 g/dL      Hematocrit 42 0 %      MCV 86 fL      MCH 26 5 pg      MCHC 30 7 g/dL      RDW 14 6 %      MPV 10 5 fL      Platelets 751 Thousands/uL      nRBC 0 /100 WBCs      Neutrophils Relative 59 %      Immat GRANS % 1 %      Lymphocytes Relative 29 %      Monocytes Relative 9 %      Eosinophils Relative 1 %      Basophils Relative 1 %      Neutrophils Absolute 6 28 Thousands/µL      Immature Grans Absolute 0 08 Thousand/uL      Lymphocytes Absolute 2 99 Thousands/µL      Monocytes Absolute 0 88 Thousand/µL      Eosinophils Absolute 0 10 Thousand/µL      Basophils Absolute 0 08 Thousands/µL                  CTA head and neck with and without contrast   Final Result by Wilda Vargas MD (09/29 1231)      No acute intracranial pathology  Severe stenosis at the left carotid bifurcation  No significant stenosis of the right cervical carotid or vertebral arteries  No significant intracranial stenosis, large vessel occlusion or aneurysm  The study was marked in [facility] for notification        Workstation performed: [id number]                    Procedures  ECG 12 Lead Documentation Only    Date/Time: 9/29/2022 12:19 PM  Performed by: Franky Paulino PA-C  Authorized by: Franky Paulino PA-C     Indications / Diagnosis:  2 hr ECG  ECG reviewed by me, the ED Provider: yes    Patient location:  ED  Previous ECG:     Previous ECG:  Compared to current  Interpretation:     Interpretation: normal    Rate:     ECG rate:  51    ECG rate assessment: bradycardic    Rhythm:     Rhythm: sinus rhythm and A-V block    Ectopy:     Ectopy: none    QRS:     QRS axis:  Normal  Conduction:     Conduction: normal    ST segments:     ST segments:  Normal  T waves:     T waves: normal      ECG 12 Lead Documentation Only    Date/Time: 9/29/2022 10:50 AM  Performed by: Vasyl Allen PA-C  Authorized by: Vasyl Allen PA-C     ECG reviewed by me, the ED Provider: yes    Patient location:  ED  Previous ECG:     Previous ECG:  Unavailable  Interpretation:     Interpretation: normal    Rate:     ECG rate:  57    ECG rate assessment: bradycardic    Rhythm:     Rhythm: sinus rhythm    Ectopy:     Ectopy: none    QRS:     QRS axis:  Normal  Conduction:     Conduction: normal    ST segments:     ST segments:  Normal  T waves:     T waves: normal               ED Course  ED Course as of 09/29/22 1310   Thu Sep 29, 2022   1055 Potassium(!): 3 4   1108 hs TnI 0hr: 3  Chest pain occurred <3 hrs ago, will need 2 hr trop   1112 Informed by RN that pt having left arm pain  Starts in her shoulder and radiates down to her finger tips  States it is 4/10 and it is like her typical arm pain that comes with her cervical radiculopathy    1238 CTA head and neck with and without contrast  No acute intracranial pathology  Severe stenosis at the left carotid bifurcation  No significant stenosis of the right cervical carotid or vertebral arteries  No significant intracranial stenosis, large vessel occlusion or aneurysm     [place] 1964 hsTnI: -1   1247 hs TnI 2hr: 2             HEART Risk Score    Flowsheet Row Most Recent Value   Heart Score Risk Calculator    History 1 Filed at: 09/29/2022 1307   ECG 0 Filed at: 09/29/2022 1307   Age 2 Filed at: 09/29/2022 1307   Risk Factors 2 Filed at: 09/29/2022 1307   Troponin 0 Filed at: 09/29/2022 1307   HEART Score 5 Filed at: 09/29/2022 1307                        SBIRT 22yo+    Flowsheet Row Most Recent Value   SBIRT (25 yo +)    In order to provide better care to our patients, we are screening all of our patients for alcohol and drug use  Would it be okay to ask you these screening questions? Yes Filed at: 09/29/2022 1006   Initial Alcohol Screen: US AUDIT-C     1  How often do you have a drink containing alcohol? 0 Filed at: 09/29/2022 1006   2  How many drinks containing alcohol do you have on a typical day you are drinking? 0 Filed at: 09/29/2022 1006   3a  Male UNDER 65: How often do you have five or more drinks on one occasion? 0 Filed at: 09/29/2022 1006   3b  FEMALE Any Age, or MALE 65+: How often do you have 4 or more drinks on one occassion? 0 Filed at: 09/29/2022 1006   Audit-C Score 0 Filed at: 09/29/2022 1006   CORINE: How many times in the past year have you    Used an illegal drug or used a prescription medication for non-medical reasons? Never Filed at: 09/29/2022 1006                    MDM  Number of Diagnoses or Management Options  Cervical radiculopathy: new and requires workup  Chest pain, unspecified: new and requires workup  Left carotid stenosis: new and requires workup  Diagnosis management comments: This is a 71-year-old female with past medical history hypothyroidism, CAD, HTN, dyslipidemia, cervical radiculopathy presenting today via EMS due to chest pain  Physical exam as noted above  CBC, BMP normal  TSH normal  Initial trop was 3 so 2 hr was needed which had delta -1  ECGs during her time here showed no signs of acute ischemia  Likely due to anxiety  CTA neck did show severe left carotid stenosis  She was not having any acute stroke-like symptoms, therefore I placed referral to vascular surgery  Heart score 5- pt already follows with cardiologist- instructed her to follow up  I have discussed the plan to discharge pt from ED   The patient was discharged in stable condition   Patient ambulated off the department   Extensive return to emergency department precautions were discussed   Follow up with appropriate providers including primary care physician was discussed   Patient and/or their  primary decision maker expressed understanding  Pascual Montalvo remained stable during entire emergency department stay  Portions of the record may have been created with voice recognition software  Occasional wrong word or "sound a like" substitutions may have occurred due to the inherent limitations of voice recognition software  Read the chart carefully and recognize, using context, where substitutions have occurred  Amount and/or Complexity of Data Reviewed  Clinical lab tests: ordered and reviewed  Tests in the radiology section of CPT®: ordered and reviewed  Decide to obtain previous medical records or to obtain history from someone other than the patient: yes  Review and summarize past medical records: yes    Patient Progress  Patient progress: stable      Disposition  Final diagnoses:   Left carotid stenosis   Chest pain, unspecified   Cervical radiculopathy     Time reflects when diagnosis was documented in both MDM as applicable and the Disposition within this note     Time User Action Codes Description Comment    9/29/2022 12:50 PM Lala Shankar Add [I65 22] Left carotid stenosis     9/29/2022 12:51 PM Lala Shankar Add [R07 9] Chest pain, unspecified     9/29/2022 12:51 PM Lala Shankar Add [M54 12] Cervical radiculitis     9/29/2022 12:51 PM Lala Shankar Remove [M54 12] Cervical radiculitis     9/29/2022 12:51 PM Lala Shankar Add [R59 56] Cervical radiculopathy       ED Disposition     ED Disposition   Discharge    Condition   Stable    Date/Time   Thu Sep 29, 2022 12:51 PM    Comment   [address] discharge to home/self care                 Follow-up Information     Follow up With Specialties Details Why Contact Info Additional [address] [facility] Emergency Department Emergency Medicine  If symptoms worsen [address] [facility] [address]  [facility] Heart Emergency Department    The [facility] Vascular Surgery Schedule an appointment as soon as possible for a visit   [place] [id number] [phone] The [address], [address] [place] [address], [place], [place], [address] [phone]          Patient's Medications   Discharge Prescriptions    No medications on file           PDMP Review     None          ED Provider  Electronically Signed by           Romulo Arreguin PA-C  09/29/22 5401

## 2022-09-29 NOTE — ED NOTES
Pt returned from CT on stretcher  Pt placed on cardiac, SPO2, and BP monitoring with alarms set and audible        Chadd Woodson RN  09/29/22 0498

## 2022-09-29 NOTE — ED NOTES
Pt taken to CT suite via stretcher by [facility] ER transport  Pt denies CP at this time        Paradise Yao, RN  09/29/22 1144

## 2022-10-11 ENCOUNTER — LAB (OUTPATIENT)
Dept: LAB | Facility: HOSPITAL | Age: 77
End: 2022-10-11
Payer: MEDICARE

## 2022-10-11 DIAGNOSIS — Z12.11 SPECIAL SCREENING FOR MALIGNANT NEOPLASMS, COLON: ICD-10-CM

## 2022-10-11 DIAGNOSIS — E03.9 ACQUIRED HYPOTHYROIDISM: ICD-10-CM

## 2022-10-11 DIAGNOSIS — R73.9 HYPERGLYCEMIA: ICD-10-CM

## 2022-10-11 DIAGNOSIS — M25.50 ARTHRALGIA, UNSPECIFIED JOINT: ICD-10-CM

## 2022-10-11 LAB
CRP SERPL QL: 16 MG/L
ERYTHROCYTE [SEDIMENTATION RATE] IN BLOOD: 18 MM/HOUR (ref 0–29)
EST. AVERAGE GLUCOSE BLD GHB EST-MCNC: 126 MG/DL
HBA1C MFR BLD: 6 %
TSH SERPL DL<=0.05 MIU/L-ACNC: 2.42 UIU/ML (ref 0.45–4.5)

## 2022-10-11 PROCEDURE — 86140 C-REACTIVE PROTEIN: CPT

## 2022-10-11 PROCEDURE — 85652 RBC SED RATE AUTOMATED: CPT

## 2022-10-11 PROCEDURE — 81327 SEPT9 GEN PRMTR MTHYLTN ALYS: CPT

## 2022-10-11 PROCEDURE — 83036 HEMOGLOBIN GLYCOSYLATED A1C: CPT

## 2022-10-11 PROCEDURE — 36415 COLL VENOUS BLD VENIPUNCTURE: CPT

## 2022-10-11 PROCEDURE — 84443 ASSAY THYROID STIM HORMONE: CPT

## 2022-10-20 ENCOUNTER — APPOINTMENT (OUTPATIENT)
Dept: LAB | Facility: HOSPITAL | Age: 77
End: 2022-10-20
Payer: MEDICARE

## 2022-10-20 ENCOUNTER — CONSULT (OUTPATIENT)
Dept: VASCULAR SURGERY | Facility: CLINIC | Age: 77
End: 2022-10-20
Payer: MEDICARE

## 2022-10-20 VITALS
DIASTOLIC BLOOD PRESSURE: 58 MMHG | BODY MASS INDEX: 32.46 KG/M2 | OXYGEN SATURATION: 97 % | SYSTOLIC BLOOD PRESSURE: 114 MMHG | WEIGHT: 176.4 LBS | HEIGHT: 62 IN | HEART RATE: 65 BPM

## 2022-10-20 DIAGNOSIS — Z11.9 SCREENING EXAMINATION FOR UNSPECIFIED INFECTIOUS DISEASE: ICD-10-CM

## 2022-10-20 DIAGNOSIS — I65.22 LEFT CAROTID STENOSIS: ICD-10-CM

## 2022-10-20 DIAGNOSIS — R79.82 ELEVATED C-REACTIVE PROTEIN (CRP): ICD-10-CM

## 2022-10-20 DIAGNOSIS — Z13.89 SCREENING FOR RHEUMATIC DISORDER: ICD-10-CM

## 2022-10-20 DIAGNOSIS — R70.0 ELEVATED SEDIMENTATION RATE: ICD-10-CM

## 2022-10-20 DIAGNOSIS — M13.0 POLYARTHROPATHY: ICD-10-CM

## 2022-10-20 DIAGNOSIS — M79.89 SWELLING OF LIMB: ICD-10-CM

## 2022-10-20 DIAGNOSIS — I65.22 STENOSIS OF LEFT CAROTID ARTERY: Primary | ICD-10-CM

## 2022-10-20 LAB
25(OH)D3 SERPL-MCNC: 37.6 NG/ML (ref 30–100)
ALBUMIN SERPL BCP-MCNC: 3.5 G/DL (ref 3.5–5)
ALP SERPL-CCNC: 49 U/L (ref 46–116)
ALT SERPL W P-5'-P-CCNC: 19 U/L (ref 12–78)
ANION GAP SERPL CALCULATED.3IONS-SCNC: 8 MMOL/L (ref 4–13)
AST SERPL W P-5'-P-CCNC: 15 U/L (ref 5–45)
BASOPHILS # BLD AUTO: 0.07 THOUSANDS/ÂΜL (ref 0–0.1)
BASOPHILS NFR BLD AUTO: 1 % (ref 0–1)
BILIRUB SERPL-MCNC: 0.36 MG/DL (ref 0.2–1)
BUN SERPL-MCNC: 16 MG/DL (ref 5–25)
CALCIUM SERPL-MCNC: 9.8 MG/DL (ref 8.3–10.1)
CHLORIDE SERPL-SCNC: 102 MMOL/L (ref 96–108)
CO2 SERPL-SCNC: 30 MMOL/L (ref 21–32)
CREAT SERPL-MCNC: 0.97 MG/DL (ref 0.6–1.3)
CRP SERPL QL: 14.9 MG/L
EOSINOPHIL # BLD AUTO: 0.14 THOUSAND/ÂΜL (ref 0–0.61)
EOSINOPHIL NFR BLD AUTO: 1 % (ref 0–6)
ERYTHROCYTE [DISTWIDTH] IN BLOOD BY AUTOMATED COUNT: 14.5 % (ref 11.6–15.1)
ERYTHROCYTE [SEDIMENTATION RATE] IN BLOOD: 22 MM/HOUR (ref 0–29)
GFR SERPL CREATININE-BSD FRML MDRD: 56 ML/MIN/1.73SQ M
GLUCOSE SERPL-MCNC: 82 MG/DL (ref 65–140)
HBV CORE AB SER QL: NORMAL
HBV SURFACE AB SER-ACNC: <3.1 MIU/ML
HBV SURFACE AG SER QL: NORMAL
HCT VFR BLD AUTO: 41.9 % (ref 34.8–46.1)
HCV AB SER QL: NORMAL
HGB BLD-MCNC: 13.1 G/DL (ref 11.5–15.4)
IMM GRANULOCYTES # BLD AUTO: 0.07 THOUSAND/UL (ref 0–0.2)
IMM GRANULOCYTES NFR BLD AUTO: 1 % (ref 0–2)
LYMPHOCYTES # BLD AUTO: 2.01 THOUSANDS/ÂΜL (ref 0.6–4.47)
LYMPHOCYTES NFR BLD AUTO: 16 % (ref 14–44)
MCH RBC QN AUTO: 26.8 PG (ref 26.8–34.3)
MCHC RBC AUTO-ENTMCNC: 31.3 G/DL (ref 31.4–37.4)
MCV RBC AUTO: 86 FL (ref 82–98)
MONOCYTES # BLD AUTO: 0.98 THOUSAND/ÂΜL (ref 0.17–1.22)
MONOCYTES NFR BLD AUTO: 8 % (ref 4–12)
NEUTROPHILS # BLD AUTO: 9.73 THOUSANDS/ÂΜL (ref 1.85–7.62)
NEUTS SEG NFR BLD AUTO: 73 % (ref 43–75)
NRBC BLD AUTO-RTO: 0 /100 WBCS
PLATELET # BLD AUTO: 232 THOUSANDS/UL (ref 149–390)
PMV BLD AUTO: 11 FL (ref 8.9–12.7)
POTASSIUM SERPL-SCNC: 3.7 MMOL/L (ref 3.5–5.3)
PROT SERPL-MCNC: 7.3 G/DL (ref 6.4–8.4)
PTH-INTACT SERPL-MCNC: 38.4 PG/ML (ref 18.4–80.1)
RBC # BLD AUTO: 4.89 MILLION/UL (ref 3.81–5.12)
SODIUM SERPL-SCNC: 140 MMOL/L (ref 135–147)
WBC # BLD AUTO: 13 THOUSAND/UL (ref 4.31–10.16)

## 2022-10-20 PROCEDURE — 85025 COMPLETE CBC W/AUTO DIFF WBC: CPT

## 2022-10-20 PROCEDURE — 84165 PROTEIN E-PHORESIS SERUM: CPT

## 2022-10-20 PROCEDURE — 99204 OFFICE O/P NEW MOD 45 MIN: CPT | Performed by: SURGERY

## 2022-10-20 PROCEDURE — 86200 CCP ANTIBODY: CPT

## 2022-10-20 PROCEDURE — 87340 HEPATITIS B SURFACE AG IA: CPT

## 2022-10-20 PROCEDURE — 86038 ANTINUCLEAR ANTIBODIES: CPT

## 2022-10-20 PROCEDURE — 83970 ASSAY OF PARATHORMONE: CPT

## 2022-10-20 PROCEDURE — 86480 TB TEST CELL IMMUN MEASURE: CPT

## 2022-10-20 PROCEDURE — 82955 ASSAY OF G6PD ENZYME: CPT

## 2022-10-20 PROCEDURE — 86140 C-REACTIVE PROTEIN: CPT

## 2022-10-20 PROCEDURE — 82306 VITAMIN D 25 HYDROXY: CPT

## 2022-10-20 PROCEDURE — 85652 RBC SED RATE AUTOMATED: CPT

## 2022-10-20 PROCEDURE — 86430 RHEUMATOID FACTOR TEST QUAL: CPT

## 2022-10-20 PROCEDURE — 36415 COLL VENOUS BLD VENIPUNCTURE: CPT

## 2022-10-20 PROCEDURE — 80053 COMPREHEN METABOLIC PANEL: CPT

## 2022-10-20 PROCEDURE — 86704 HEP B CORE ANTIBODY TOTAL: CPT

## 2022-10-20 PROCEDURE — 86803 HEPATITIS C AB TEST: CPT

## 2022-10-20 PROCEDURE — 86706 HEP B SURFACE ANTIBODY: CPT

## 2022-10-20 NOTE — PATIENT INSTRUCTIONS
Stenosis of left carotid artery  Asymptomatic approximately 70% left internal carotid artery stenosis incidentally identified during recent evaluation for neck pain  The exact degree of stenosis is difficult to ascertain secondary to the highly calcified nature of the plaque  Since she remains asymptomatic we will evaluate further with a noninvasive/carotid duplex study to better determine degree of stenosis  We did discuss at length the pathophysiology of carotid occlusive disease and the indications for further evaluation treatment  In the interval she will continue her current medical management for which he is already on antiplatelets/Plavix therapy and treatment for her hypercholesterolemia with Zetia

## 2022-10-20 NOTE — PROGRESS NOTES
Assessment/Plan:    Stenosis of left carotid artery  Asymptomatic approximately 70% left internal carotid artery stenosis incidentally identified during recent evaluation for neck pain  The exact degree of stenosis is difficult to ascertain secondary to the highly calcified nature of the plaque  Since she remains asymptomatic we will evaluate further with a noninvasive/carotid duplex study to better determine degree of stenosis  We did discuss at length the pathophysiology of carotid occlusive disease and the indications for further evaluation treatment  In the interval she will continue her current medical management for which he is already on antiplatelets/Plavix therapy and treatment for her hypercholesterolemia with Zetia  Diagnoses and all orders for this visit:    Stenosis of left carotid artery    Left carotid stenosis  -     Ambulatory Referral to Vascular Surgery          Subjective:      Patient ID: Hailey Mcbride is a 69 y o  female  Patient is new to our office  She was referred here by Niko Salcido PA-C for carotid stenosis  Patient had a CTA head and neck w/wo contrast done 9/29/2022  Patient denies any headaches, dizziness, sudden loss of vision, trouble swallowing, slurred speech, or any new TIA or Stroke symptoms  She c/o feeling like she is off balance and  continued left sided weakness  She is taking Plavix and Zetia  Patient is a non-smoker  69year-old evaluated in the emergency room 09/29/2022 with chest pain, shortness of breath and neck pain  This prompted a CT angiogram of the neck which identified a highly calcified plaque in the distal common carotid artery with a reported greater than 70% stenosis  On presentation today she is with her   Evaluations performed through an   She denies any current focal neurologic symptoms which would be consistent with TIA, CVA or amaurosis fugax    Her only complaint is of some periodic ipsilateral left arm tingling which is unrelated to activity  CT angiogram 09/29/2022 with findings as noted above  There is a densely calcified plaque in the distal common carotid artery which by my estimate creates a 65-70% stenosis  The report indicates a greater than 70% stenosis  Of note the internal carotid artery is relatively devoid of any significant atherosclerotic disease  The contralateral carotid bifurcation has no evidence of significant stenosis  The following portions of the patient's history were reviewed and updated as appropriate: allergies, current medications, past family history, past medical history, past social history, past surgical history and problem list     Past Medical History:  Past Medical History:   Diagnosis Date   • Arthritis    • Coronary artery disease    • Disease of thyroid gland    • Hyperlipidemia    • Hypertension        Past Surgical History:  Past Surgical History:   Procedure Laterality Date   • BACK SURGERY     • BREAST SURGERY     • CARPAL TUNNEL RELEASE     • CHOLECYSTECTOMY     • HYSTERECTOMY     • OOPHORECTOMY     • ORTHOPEDIC SURGERY         Social History:  Social History     Substance and Sexual Activity   Alcohol Use Not Currently     Social History     Substance and Sexual Activity   Drug Use Never     Social History     Tobacco Use   Smoking Status Never Smoker   Smokeless Tobacco Never Used       Family History:  Family History   Problem Relation Age of Onset   • No Known Problems Mother    • Lung cancer Father    • Ovarian cancer Sister    • No Known Problems Maternal Grandmother    • No Known Problems Maternal Grandfather    • No Known Problems Paternal Grandmother    • No Known Problems Paternal Grandfather    • No Known Problems Sister    • No Known Problems Sister    • No Known Problems Maternal Aunt    • No Known Problems Paternal Aunt    • No Known Problems Paternal Aunt    • Breast cancer Cousin 39       Allergies:   Allergies   Allergen Reactions   • Codeine Medications:    Current Outpatient Medications:   •  celecoxib (CeleBREX) 200 mg capsule, Take 1 capsule (200 mg total) by mouth 2 (two) times a day, Disp: 180 capsule, Rfl: 1  •  clopidogrel (PLAVIX) 75 mg tablet, TAKE ONE TABLET BY MOUTH ONCE DAILY, Disp: 90 tablet, Rfl: 2  •  diltiazem (CARDIZEM CD) 240 mg 24 hr capsule, TAKE ONE CAPSULE BY MOUTH ONCE DAILY, Disp: 90 capsule, Rfl: 2  •  ergocalciferol (VITAMIN D2) 50,000 units, Take 1 capsule (50,000 Units total) by mouth once a week, Disp: 12 capsule, Rfl: 3  •  ezetimibe (ZETIA) 10 mg tablet, TAKE ONE TABLET BY MOUTH ONCE DAILY, Disp: 90 tablet, Rfl: 2  •  folic acid (FOLVITE) 1 mg tablet, TAKE ONE TABLET BY MOUTH ONCE DAILY, Disp: 90 tablet, Rfl: 10  •  hydrochlorothiazide (HYDRODIURIL) 25 mg tablet, TAKE ONE TABLET BY MOUTH ONCE DAILY, Disp: 90 tablet, Rfl: 2  •  levothyroxine (Synthroid) 100 mcg tablet, Take 1 tablet (100 mcg total) by mouth daily in the early morning, Disp: 90 tablet, Rfl: 3  •  losartan (COZAAR) 100 MG tablet, TAKE ONE TABLET BY MOUTH ONCE DAILY, Disp: 90 tablet, Rfl: 2  •  metoprolol succinate (TOPROL-XL) 50 mg 24 hr tablet, TAKE ONE TABLET BY MOUTH ONCE DAILY, Disp: 90 tablet, Rfl: 2  •  omeprazole (PriLOSEC) 20 mg delayed release capsule, Take 1 capsule (20 mg total) by mouth daily before breakfast, Disp: 90 capsule, Rfl: 3  •  predniSONE 5 mg tablet, Take 1 tablet (5 mg total) by mouth daily, Disp: 90 tablet, Rfl: 1  •  benzonatate (TESSALON PERLES) 100 mg capsule, Take 1 capsule (100 mg total) by mouth 3 (three) times a day as needed for cough (Patient not taking: No sig reported), Disp: 20 capsule, Rfl: 0  •  Calcium Carb-Cholecalciferol (OSCAL-D) 500 mg-200 units per tablet, Reported on 1/12/2017  (Patient not taking: No sig reported), Disp: , Rfl:   •  diclofenac (VOLTAREN) 75 mg EC tablet, Take 1 tablet (75 mg total) by mouth 2 (two) times a day (Patient not taking: No sig reported), Disp: 20 tablet, Rfl: 0  •  Diclofenac Sodium (VOLTAREN) 1 %, Apply 2 g topically 4 (four) times a day (Patient not taking: No sig reported), Disp: 150 g, Rfl: 0  •  FeroSul 325 (65 Fe) MG tablet, TAKE ONE TABLET BY MOUTH ONCE DAILY WITH BREAKFAST (Patient not taking: No sig reported), Disp: 90 tablet, Rfl: 2  •  isosorbide mononitrate (IMDUR) 30 mg 24 hr tablet, Take 1 tablet (30 mg total) by mouth daily (Patient not taking: No sig reported), Disp: 90 tablet, Rfl: 3  •  methocarbamol (Robaxin-750) 750 mg tablet, Take 1 tablet (750 mg total) by mouth 3 (three) times a day (Patient not taking: No sig reported), Disp: 30 tablet, Rfl: 0  •  metoprolol succinate (TOPROL-XL) 50 mg 24 hr tablet, Take 1 tablet (50 mg total) by mouth daily (Patient not taking: No sig reported), Disp: 90 tablet, Rfl: 3  •  naproxen (NAPROSYN) 500 mg tablet, Take 1 tablet (500 mg total) by mouth 2 (two) times a day with meals (Patient not taking: No sig reported), Disp: 30 tablet, Rfl: 0  •  naproxen (NAPROSYN) 500 mg tablet, TAKE ONE TABLET BY MOUTH TWICE A DAY WITH MEALS (Patient not taking: No sig reported), Disp: 30 tablet, Rfl: 0  •  nitroglycerin (NITROSTAT) 0 4 mg SL tablet, Place 1 tablet under the tongue every 5 (five) minutes as needed (Patient not taking: No sig reported), Disp: , Rfl:   •  olmesartan (BENICAR) 40 mg tablet, Take 1 tablet by mouth daily (Patient not taking: No sig reported), Disp: , Rfl:   •  predniSONE 10 mg tablet, Take 7T PO x 1 day, then take 6T PO x 1 day, and continue to decrease  by 1T until finished   Quantity-28 (Patient not taking: No sig reported), Disp: 28 tablet, Rfl: 0  •  predniSONE 10 mg tablet, Take 6 tabs days 1&2, 5 tabs days 3&4, 4 tabs days 5&6, 3 tabs days 7&8, 2 tabs days 9&10, 1 tab days 11&12 (Patient not taking: No sig reported), Disp: 42 tablet, Rfl: 0  •  predniSONE 20 mg tablet, Take 2 tablets (40 mg total) by mouth daily (Patient not taking: No sig reported), Disp: 8 tablet, Rfl: 0  •  pregabalin (LYRICA) 75 mg capsule, Take 75 mg by mouth (Patient not taking: No sig reported), Disp: , Rfl:   •  ranolazine (RANEXA) 500 mg 12 hr tablet, Take 1 tablet (500 mg total) by mouth 2 (two) times a day (Patient not taking: No sig reported), Disp: 60 tablet, Rfl: 11    Vitals:  /58 (10/20/22 0911)    Temp      Pulse 65 (10/20/22 0911)   Resp      SpO2 97 % (10/20/22 0911)      Lab Results and Cultures:   CBC with diff:   Lab Results   Component Value Date    WBC 10 41 (H) 09/29/2022    HGB 12 9 09/29/2022    HCT 42 0 09/29/2022    MCV 86 09/29/2022     09/29/2022    MCH 26 5 (L) 09/29/2022    MCHC 30 7 (L) 09/29/2022    RDW 14 6 09/29/2022    MPV 10 5 09/29/2022    NRBC 0 09/29/2022   ,   BMP/CMP:  Lab Results   Component Value Date     05/03/2018    K 3 4 (L) 09/29/2022    K 4 5 05/03/2018     09/29/2022     05/03/2018    CO2 32 09/29/2022    CO2 30 05/03/2018    ANIONGAP 8 05/03/2018    BUN 23 09/29/2022    BUN 16 05/03/2018    CREATININE 0 74 09/29/2022    GLUCOSE 91 05/03/2018    CALCIUM 9 4 09/29/2022    CALCIUM 9 9 05/03/2018    AST 23 03/25/2022    AST 39 (H) 05/03/2018    ALT 17 03/25/2022    ALT 51 05/03/2018    ALKPHOS 55 03/25/2022    ALKPHOS 63 05/03/2018    PROT 7 0 05/03/2018    BILITOT 0 4 05/03/2018    EGFR 78 09/29/2022   ,   Lipid Panel:   Lab Results   Component Value Date    CHOL 179 05/03/2018   ,   Coags:   Lab Results   Component Value Date    PTT 35 04/27/2019    INR 0 99 04/27/2019   ,       Review of Systems   Constitutional: Negative  HENT: Negative  Eyes: Negative  Respiratory: Negative  Cardiovascular: Positive for chest pain  Gastrointestinal: Negative  Endocrine: Negative  Genitourinary: Negative  Musculoskeletal: Negative  Skin: Negative  Allergic/Immunologic: Negative  Neurological: Positive for weakness  Hematological: Negative  Psychiatric/Behavioral: Negative            Objective:      /58 (BP Location: Left arm, Patient Position: Sitting, Cuff Size: Standard)   Pulse 65   Ht 5' 2" (1 575 m)   Wt 80 kg (176 lb 6 4 oz)   SpO2 97%   BMI 32 26 kg/m²          Physical Exam  Constitutional:       Appearance: Normal appearance  She is well-developed  HENT:      Head: Normocephalic and atraumatic  Eyes:      Pupils: Pupils are equal, round, and reactive to light  Neck:      Vascular: No carotid bruit or JVD  Cardiovascular:      Rate and Rhythm: Normal rate and regular rhythm  Pulses:           Carotid pulses are 2+ on the right side and 2+ on the left side  Radial pulses are 2+ on the right side and 2+ on the left side  Heart sounds: No murmur heard  Pulmonary:      Effort: Pulmonary effort is normal  No respiratory distress  Breath sounds: Normal breath sounds  Musculoskeletal:         General: No tenderness  Normal range of motion  Cervical back: Normal range of motion and neck supple  Skin:     General: Skin is warm and dry  Neurological:      Mental Status: She is alert and oriented to person, place, and time  Sensory: No sensory deficit  Motor: No weakness        Gait: Gait normal    Psychiatric:         Mood and Affect: Mood normal

## 2022-10-20 NOTE — ASSESSMENT & PLAN NOTE
Asymptomatic approximately 70% left internal carotid artery stenosis incidentally identified during recent evaluation for neck pain  The exact degree of stenosis is difficult to ascertain secondary to the highly calcified nature of the plaque  Since she remains asymptomatic we will evaluate further with a noninvasive/carotid duplex study to better determine degree of stenosis  We did discuss at length the pathophysiology of carotid occlusive disease and the indications for further evaluation treatment  In the interval she will continue her current medical management for which he is already on antiplatelets/Plavix therapy and treatment for her hypercholesterolemia with Zetia

## 2022-10-20 NOTE — LETTER
October 20, 2022     [address], [place] [address]    Patient: Nav Allison   YOB: 1945   Date of Visit: 10/20/2022       Dear Dr Nidia Vincent: Thank you for referring Nav Allison to me for evaluation  Below are the relevant portions of my assessment and plan of care  Diagnoses and all orders for this visit:    Stenosis of left carotid artery  Asymptomatic approximately 70% left internal carotid artery stenosis incidentally identified during recent evaluation for neck pain  The exact degree of stenosis is difficult to ascertain secondary to the highly calcified nature of the plaque  Since she remains asymptomatic we will evaluate further with a noninvasive/carotid duplex study to better determine degree of stenosis  We did discuss at length the pathophysiology of carotid occlusive disease and the indications for further evaluation treatment  In the interval she will continue her current medical management for which he is already on antiplatelets/Plavix therapy and treatment for her hypercholesterolemia with Zetia  If you have questions, please do not hesitate to call me  I look forward to following [facility] along with you           Sincerely,        Lisa Ospina MD        CC: Maricruz Goyal MD

## 2022-10-21 LAB
ALBUMIN SERPL ELPH-MCNC: 4.14 G/DL (ref 3.5–5)
ALBUMIN SERPL ELPH-MCNC: 60.9 % (ref 52–65)
ALPHA1 GLOB SERPL ELPH-MCNC: 0.42 G/DL (ref 0.1–0.4)
ALPHA1 GLOB SERPL ELPH-MCNC: 6.2 % (ref 2.5–5)
ALPHA2 GLOB SERPL ELPH-MCNC: 0.93 G/DL (ref 0.4–1.2)
ALPHA2 GLOB SERPL ELPH-MCNC: 13.7 % (ref 7–13)
BETA GLOB ABNORMAL SERPL ELPH-MCNC: 0.39 G/DL (ref 0.4–0.8)
BETA1 GLOB SERPL ELPH-MCNC: 5.8 % (ref 5–13)
BETA2 GLOB SERPL ELPH-MCNC: 4.3 % (ref 2–8)
BETA2+GAMMA GLOB SERPL ELPH-MCNC: 0.29 G/DL (ref 0.2–0.5)
CCP AB SER IA-ACNC: <0.4
G6PD BLD QN: 330 U/10E12 RBC (ref 127–427)
GAMMA GLOB ABNORMAL SERPL ELPH-MCNC: 0.62 G/DL (ref 0.5–1.6)
GAMMA GLOB SERPL ELPH-MCNC: 9.1 % (ref 12–22)
IGG/ALB SER: 1.56 {RATIO} (ref 1.1–1.8)
PROT PATTERN SERPL ELPH-IMP: ABNORMAL
PROT SERPL-MCNC: 6.8 G/DL (ref 6.4–8.2)
RBC # BLD AUTO: 4.95 X10E6/UL (ref 3.77–5.28)
RHEUMATOID FACT SER QL LA: NEGATIVE
RYE IGE QN: NEGATIVE

## 2022-10-21 PROCEDURE — 84165 PROTEIN E-PHORESIS SERUM: CPT | Performed by: PATHOLOGY

## 2022-10-23 LAB
GAMMA INTERFERON BACKGROUND BLD IA-ACNC: 0.05 IU/ML
M TB IFN-G BLD-IMP: NEGATIVE
M TB IFN-G CD4+ BCKGRND COR BLD-ACNC: 0 IU/ML
M TB IFN-G CD4+ BCKGRND COR BLD-ACNC: 0 IU/ML
MITOGEN IGNF BCKGRD COR BLD-ACNC: 9.57 IU/ML

## 2022-10-31 LAB — HLA-B27 QL NAA+PROBE: NORMAL

## 2022-11-08 ENCOUNTER — HOSPITAL ENCOUNTER (OUTPATIENT)
Dept: NON INVASIVE DIAGNOSTICS | Facility: CLINIC | Age: 77
Discharge: HOME | End: 2022-11-08

## 2022-11-08 DIAGNOSIS — I65.22 STENOSIS OF LEFT CAROTID ARTERY: ICD-10-CM

## 2022-11-14 ENCOUNTER — HOSPITAL ENCOUNTER (OUTPATIENT)
Dept: MAMMOGRAPHY | Facility: CLINIC | Age: 77
Discharge: HOME | End: 2022-11-14

## 2022-11-14 VITALS — BODY MASS INDEX: 32.39 KG/M2 | WEIGHT: 176 LBS | HEIGHT: 62 IN

## 2022-11-14 DIAGNOSIS — Z12.31 OTHER SCREENING MAMMOGRAM: ICD-10-CM

## 2022-11-22 ENCOUNTER — OFFICE VISIT (OUTPATIENT)
Dept: VASCULAR SURGERY | Facility: CLINIC | Age: 77
End: 2022-11-22

## 2022-11-22 VITALS
HEIGHT: 62 IN | BODY MASS INDEX: 33.16 KG/M2 | DIASTOLIC BLOOD PRESSURE: 70 MMHG | HEART RATE: 67 BPM | WEIGHT: 180.2 LBS | OXYGEN SATURATION: 98 % | SYSTOLIC BLOOD PRESSURE: 144 MMHG

## 2022-11-22 DIAGNOSIS — I65.22 STENOSIS OF LEFT CAROTID ARTERY: Primary | ICD-10-CM

## 2022-11-22 RX ORDER — ATORVASTATIN CALCIUM 40 MG/1
40 TABLET, FILM COATED ORAL DAILY
COMMUNITY

## 2022-11-22 NOTE — PATIENT INSTRUCTIONS
Stenosis of left carotid artery  Asymptomatic left carotid artery stenosis, 60-70%  We discussed the findings on recent duplex and CT angiogram which concur with this degree of stenosis  We also discussed the indications for further evaluation and treatment  At this point no further intervention is necessary other than continued medical management with antiplatelet therapy and cholesterol management  She is currently maintained on Plavix and Zetia  We will plan standard follow-up with duplex imaging in 6 months

## 2022-11-22 NOTE — LETTER
November 22, 2022     Elberta Sicard, MD  [address] [address] [place]     Patient: Jose Drake   YOB: 1945   Date of Visit: 11/22/2022       Dear Dr Trenton Leonard: Thank you for referring Jose Drake to me for evaluation  Below are the relevant portions of my assessment and plan of care  Stenosis of left carotid artery  Asymptomatic left carotid artery stenosis, 60-70%  We discussed the findings on recent duplex and CT angiogram which concur with this degree of stenosis  We also discussed the indications for further evaluation and treatment  At this point no further intervention is necessary other than continued medical management with antiplatelet therapy and cholesterol management  She is currently maintained on Plavix and Zetia  We will plan standard follow-up with duplex imaging in 6 months  If you have questions, please do not hesitate to call me  I look forward to following [facility] along with you           Sincerely,        Jasper Melendez MD        CC: No Recipients

## 2022-11-22 NOTE — PROGRESS NOTES
Assessment/Plan:    Stenosis of left carotid artery  Asymptomatic left carotid artery stenosis, 60-70%  We discussed the findings on recent duplex and CT angiogram which concur with this degree of stenosis  We also discussed the indications for further evaluation and treatment  At this point no further intervention is necessary other than continued medical management with antiplatelet therapy and cholesterol management  She is currently maintained on Plavix and Zetia  We will plan standard follow-up with duplex imaging in 6 months  Diagnoses and all orders for this visit:    Stenosis of left carotid artery  -     VAS carotid complete study; Future    Other orders  -     atorvastatin (LIPITOR) 40 mg tablet; Take 40 mg by mouth daily          Subjective:      Patient ID: Jayro Fregoso is a 69 y o  female  Patient is here today to review results of CV done on 11/08/2022  She also had a CTA of the head and neck wwo contrast done on 9/29/22  Patient c/o occasional HA and numbness from her head to her feet on her left side  She denies any dizziness, trouble swallowing, sudden loss of vision, slurred speech, TIA or stroke symptoms  She takes Plavix, Atorvastatin and Zetia  She is a non-smoker  60-year-old incidentally found to have a left carotid artery stenosis during workup for neck pain  She now presents following carotid duplex  On evaluation today she denies any new focal neurologic symptoms which would be consistent with TIA, CVA or amaurosis fugax  She remains active with no major limitations  Carotid duplex 11/08/2022 with less than 50% right carotid artery stenosis  On the left there is a 50-69% stenosis with flow velocity 211/33 centimeters/second  This concurs with the CT angiogram from 09/29/2022 which does show a calcified plaque at the origin of the internal carotid artery which by my measurements are consistent with a 60-70% stenosis      I have spent 25 minutes with the patient and her  today in which greater than 50% of this time was spent in counseling/coordination of care regarding Diagnostic results, Prognosis, Risks and benefits of tx options, Patient and family education, Risk factor reductions and Impressions  The following portions of the patient's history were reviewed and updated as appropriate: allergies, current medications, past family history, past medical history, past social history, past surgical history and problem list     Past Medical History:  Past Medical History:   Diagnosis Date   • Arthritis    • Coronary artery disease    • Disease of thyroid gland    • Hyperlipidemia    • Hypertension        Past Surgical History:  Past Surgical History:   Procedure Laterality Date   • BACK SURGERY     • BREAST SURGERY     • CARPAL TUNNEL RELEASE     • CHOLECYSTECTOMY     • HYSTERECTOMY     • OOPHORECTOMY     • ORTHOPEDIC SURGERY         Social History:  Social History     Substance and Sexual Activity   Alcohol Use Not Currently     Social History     Substance and Sexual Activity   Drug Use Never     Social History     Tobacco Use   Smoking Status Never   Smokeless Tobacco Never       Family History:  Family History   Problem Relation Age of Onset   • No Known Problems Mother    • Lung cancer Father    • Ovarian cancer Sister    • No Known Problems Sister    • No Known Problems Sister    • No Known Problems Maternal Grandmother    • No Known Problems Maternal Grandfather    • No Known Problems Paternal Grandmother    • No Known Problems Paternal Grandfather    • No Known Problems Maternal Aunt    • No Known Problems Paternal Aunt    • No Known Problems Paternal Aunt    • Breast cancer Cousin 39       Allergies:   Allergies   Allergen Reactions   • Codeine        Medications:    Current Outpatient Medications:   •  atorvastatin (LIPITOR) 40 mg tablet, Take 40 mg by mouth daily, Disp: , Rfl:   •  celecoxib (CeleBREX) 200 mg capsule, Take 1 capsule (200 mg total) by mouth 2 (two) times a day, Disp: 180 capsule, Rfl: 1  •  clopidogrel (PLAVIX) 75 mg tablet, TAKE ONE TABLET BY MOUTH ONCE DAILY, Disp: 90 tablet, Rfl: 2  •  diltiazem (CARDIZEM CD) 240 mg 24 hr capsule, TAKE ONE CAPSULE BY MOUTH ONCE DAILY, Disp: 90 capsule, Rfl: 2  •  ergocalciferol (VITAMIN D2) 50,000 units, Take 1 capsule (50,000 Units total) by mouth once a week, Disp: 12 capsule, Rfl: 3  •  ezetimibe (ZETIA) 10 mg tablet, TAKE ONE TABLET BY MOUTH ONCE DAILY, Disp: 90 tablet, Rfl: 2  •  folic acid (FOLVITE) 1 mg tablet, TAKE ONE TABLET BY MOUTH ONCE DAILY, Disp: 90 tablet, Rfl: 10  •  hydrochlorothiazide (HYDRODIURIL) 25 mg tablet, TAKE ONE TABLET BY MOUTH ONCE DAILY, Disp: 90 tablet, Rfl: 2  •  levothyroxine (Synthroid) 100 mcg tablet, Take 1 tablet (100 mcg total) by mouth daily in the early morning, Disp: 90 tablet, Rfl: 3  •  losartan (COZAAR) 100 MG tablet, TAKE ONE TABLET BY MOUTH ONCE DAILY, Disp: 90 tablet, Rfl: 2  •  metoprolol succinate (TOPROL-XL) 50 mg 24 hr tablet, TAKE ONE TABLET BY MOUTH ONCE DAILY, Disp: 90 tablet, Rfl: 2  •  omeprazole (PriLOSEC) 20 mg delayed release capsule, Take 1 capsule (20 mg total) by mouth daily before breakfast, Disp: 90 capsule, Rfl: 3  •  predniSONE 5 mg tablet, Take 1 tablet (5 mg total) by mouth daily, Disp: 90 tablet, Rfl: 1  •  benzonatate (TESSALON PERLES) 100 mg capsule, Take 1 capsule (100 mg total) by mouth 3 (three) times a day as needed for cough (Patient not taking: Reported on 6/23/2022), Disp: 20 capsule, Rfl: 0  •  Calcium Carb-Cholecalciferol (OSCAL-D) 500 mg-200 units per tablet, Reported on 1/12/2017  (Patient not taking: Reported on 6/23/2022), Disp: , Rfl:   •  diclofenac (VOLTAREN) 75 mg EC tablet, Take 1 tablet (75 mg total) by mouth 2 (two) times a day (Patient not taking: Reported on 6/23/2022), Disp: 20 tablet, Rfl: 0  •  Diclofenac Sodium (VOLTAREN) 1 %, Apply 2 g topically 4 (four) times a day (Patient not taking: Reported on 6/23/2022), Disp: 150 g, Rfl: 0  •  FeroSul 325 (65 Fe) MG tablet, TAKE ONE TABLET BY MOUTH ONCE DAILY WITH BREAKFAST (Patient not taking: No sig reported), Disp: 90 tablet, Rfl: 2  •  isosorbide mononitrate (IMDUR) 30 mg 24 hr tablet, Take 1 tablet (30 mg total) by mouth daily (Patient not taking: Reported on 4/27/2019), Disp: 90 tablet, Rfl: 3  •  methocarbamol (Robaxin-750) 750 mg tablet, Take 1 tablet (750 mg total) by mouth 3 (three) times a day (Patient not taking: Reported on 6/23/2022), Disp: 30 tablet, Rfl: 0  •  metoprolol succinate (TOPROL-XL) 50 mg 24 hr tablet, Take 1 tablet (50 mg total) by mouth daily (Patient not taking: Reported on 8/9/2022), Disp: 90 tablet, Rfl: 3  •  naproxen (NAPROSYN) 500 mg tablet, Take 1 tablet (500 mg total) by mouth 2 (two) times a day with meals (Patient not taking: Reported on 5/4/2022), Disp: 30 tablet, Rfl: 0  •  naproxen (NAPROSYN) 500 mg tablet, TAKE ONE TABLET BY MOUTH TWICE A DAY WITH MEALS (Patient not taking: No sig reported), Disp: 30 tablet, Rfl: 0  •  nitroglycerin (NITROSTAT) 0 4 mg SL tablet, Place 1 tablet under the tongue every 5 (five) minutes as needed (Patient not taking: Reported on 6/23/2022), Disp: , Rfl:   •  olmesartan (BENICAR) 40 mg tablet, Take 1 tablet by mouth daily (Patient not taking: Reported on 5/4/2022), Disp: , Rfl:   •  predniSONE 10 mg tablet, Take 7T PO x 1 day, then take 6T PO x 1 day, and continue to decrease  by 1T until finished   Quantity-28 (Patient not taking: Reported on 5/4/2022), Disp: 28 tablet, Rfl: 0  •  predniSONE 10 mg tablet, Take 6 tabs days 1&2, 5 tabs days 3&4, 4 tabs days 5&6, 3 tabs days 7&8, 2 tabs days 9&10, 1 tab days 11&12 (Patient not taking: Reported on 6/23/2022), Disp: 42 tablet, Rfl: 0  •  predniSONE 20 mg tablet, Take 2 tablets (40 mg total) by mouth daily (Patient not taking: Reported on 6/23/2022), Disp: 8 tablet, Rfl: 0  •  pregabalin (LYRICA) 75 mg capsule, Take 75 mg by mouth (Patient not taking: Reported on 5/4/2022), Disp: , Rfl:   •  ranolazine (RANEXA) 500 mg 12 hr tablet, Take 1 tablet (500 mg total) by mouth 2 (two) times a day (Patient not taking: Reported on 5/4/2022), Disp: 60 tablet, Rfl: 11    Vitals:  /70 (11/22/22 0859)    Temp      Pulse 67 (11/22/22 0859)   Resp      SpO2 98 % (11/22/22 0859)      Lab Results and Cultures:   CBC with diff:   Lab Results   Component Value Date    WBC 13 00 (H) 10/20/2022    HGB 13 1 10/20/2022    HCT 41 9 10/20/2022    MCV 86 10/20/2022     10/20/2022    MCH 26 8 10/20/2022    MCHC 31 3 (L) 10/20/2022    RDW 14 5 10/20/2022    MPV 11 0 10/20/2022    NRBC 0 10/20/2022   ,   BMP/CMP:  Lab Results   Component Value Date     05/03/2018    K 3 7 10/20/2022    K 4 5 05/03/2018     10/20/2022     05/03/2018    CO2 30 10/20/2022    CO2 30 05/03/2018    ANIONGAP 8 05/03/2018    BUN 16 10/20/2022    BUN 16 05/03/2018    CREATININE 0 97 10/20/2022    GLUCOSE 91 05/03/2018    CALCIUM 9 8 10/20/2022    CALCIUM 9 9 05/03/2018    AST 15 10/20/2022    AST 39 (H) 05/03/2018    ALT 19 10/20/2022    ALT 51 05/03/2018    ALKPHOS 49 10/20/2022    ALKPHOS 63 05/03/2018    PROT 7 0 05/03/2018    BILITOT 0 4 05/03/2018    EGFR 56 10/20/2022   ,   Lipid Panel:   Lab Results   Component Value Date    CHOL 179 05/03/2018   ,   Coags:   Lab Results   Component Value Date    PTT 35 04/27/2019    INR 0 99 04/27/2019   ,       Review of Systems   Constitutional: Negative  HENT: Negative  Eyes: Negative  Respiratory: Negative  Cardiovascular: Positive for chest pain  Gastrointestinal: Negative  Endocrine: Negative  Genitourinary: Negative  Musculoskeletal: Positive for arthralgias, back pain and neck pain  Skin: Negative  Allergic/Immunologic: Negative  Neurological: Positive for dizziness, numbness and headaches  Hematological: Negative  Psychiatric/Behavioral: Negative            Objective:      /70 (BP Location: Left arm, Patient Position: Sitting, Cuff Size: Large)   Pulse 67   Ht 5' 2" (1 575 m)   Wt 81 7 kg (180 lb 3 2 oz)   SpO2 98%   BMI 32 96 kg/m²          Physical Exam

## 2022-11-22 NOTE — ASSESSMENT & PLAN NOTE
Asymptomatic left carotid artery stenosis, 60-70%  We discussed the findings on recent duplex and CT angiogram which concur with this degree of stenosis  We also discussed the indications for further evaluation and treatment  At this point no further intervention is necessary other than continued medical management with antiplatelet therapy and cholesterol management  She is currently maintained on Plavix and Zetia  We will plan standard follow-up with duplex imaging in 6 months

## 2022-12-08 ENCOUNTER — HOSPITAL ENCOUNTER (OUTPATIENT)
Dept: ULTRASOUND IMAGING | Facility: CLINIC | Age: 77
Discharge: HOME | End: 2022-12-08

## 2022-12-08 ENCOUNTER — HOSPITAL ENCOUNTER (OUTPATIENT)
Dept: MAMMOGRAPHY | Facility: CLINIC | Age: 77
Discharge: HOME | End: 2022-12-08

## 2022-12-08 DIAGNOSIS — N64.4 BREAST PAIN: ICD-10-CM

## 2023-01-19 NOTE — PROGRESS NOTES
Assessment/Plan:   Bridget Christian is a 69 y  o female who is here for a breast concern  On exam and upon review of recent breast imaging:  December 8, 2022 mammogram and ultrasound  Right breast is negative  Left breast has a possible 6 mm hypoechoic lesion within the nipple at the site of the concern skin lesion  It is not amenable to core needle biopsy  Possible left breast nipple 6 mm skin lesion this correlates with what essentially is a pimple in the nipple of the left breast   She has a similar pimple in the right breast although smaller  Both of these have been off and on for months  No major risk factors  No discharge or mass    Clinically appearance of "pimples" of the nipple and I suspect they are just plugged up ducts  There is no evidence of cancer, no retraction blood or mass  Plan: Compresses and massage  I do not think antibiotics are needed at this time  See back in 2 weeks  May need to surgically "prick" this open  History of coronary artery disease, left carotid artery disease, peripheral vascular disease  She had a hysterectomy and oophorectomy at age 40 but it is unclear why except for a cyst   There was no cancer  She also has a long midline incision but she is not quite sure why she thinks is from some type of exploration  In preparation for this visit all relevant and known prior office notes, prior consultations, emergency room visits, blood work results, and imaging studies were personally reviewed  A total of  10 minutes was spent reviewing all of this information  However a great deal of time over half an hour of 30 minutes of using  and trying to drill down on her history which was very vague and difficult to pinpoint   _______________________________________________________  HPI:  Bridget Christian is a 69 y  o female who was referred for evaluation of Breast Complaint ( used [id number]:  Lt breast pain and a white spot in the nipple  No discharge from the nipple  This has been going on for approx 6 months  )    Very challenging historian  Used a  through the language line but her answers were long and convoluted and indirect  Able to get some clear history from her and not sure she knew her entire history including surgical history  She did not have any family that spoke [place] with her  We did recommend that her daughter call here so we can elaborate our conversation  Currently: Duration symptoms:     Symptoms:   Left nipple 6 mm pimple at the very skin edge of the nipple  Right nipple shows a 2 mm pimple  No masses or discharge  Diffuse breast pain without focal lesion  Most recent mammogram: BI-RADS 2 benign from 2022  Number of children: G 3    Age at first child or pregnancy >3 months: 21    Menopause: 40 hysterectomy    Hormone Replacement Therapy:   no    Previous breast biopsy: None reported    Family history: A cousin had breast cancer in her 45s and  from this disease and the cousin's daughter also had breast cancer  Previous Genetic Screening: no    ROS:  General ROS: negative  negative for - chills, fatigue, fever or night sweats, weight loss  Respiratory ROS: no cough, shortness of breath, or wheezing  Cardiovascular ROS: no chest pain or dyspnea on exertion  Genito-Urinary ROS: no dysuria, trouble voiding, or hematuria  Musculoskeletal ROS: negative for - gait disturbance, joint pain or muscle pain  Neurological ROS: no TIA or stroke symptoms  Breast ROS: see HPI  GI ROS: see HPI  Skin ROS: no new rashes or lesions   Lymphatic ROS: no new adenopathy noted by pt     GYN ROS: see HPI, no new GYN history or bleeding noted  Psy ROS: no new mental or behavioral disturbances               Patient Active Problem List   Diagnosis   • Essential hypertension   • Coronary artery disease of native artery of native heart with stable angina pectoris ([facility] [address] )   • Dyslipidemia   • Stenosis of left carotid artery   • Peripheral vascular disease, unspecified (HCC)   • Cervical radiculopathy   • Lumbar post-laminectomy syndrome         Allergies: Codeine    Meds:   Current Outpatient Medications:   •  atorvastatin (LIPITOR) 40 mg tablet, Take 40 mg by mouth daily, Disp: , Rfl:   •  celecoxib (CeleBREX) 200 mg capsule, TAKE ONE CAPSULE BY MOUTH TWICE A DAY, Disp: 180 capsule, Rfl: 0  •  clopidogrel (PLAVIX) 75 mg tablet, TAKE ONE TABLET BY MOUTH ONCE DAILY, Disp: 90 tablet, Rfl: 2  •  colchicine (COLCRYS) 0 6 mg tablet, Take 0 6 mg by mouth daily, Disp: , Rfl:   •  diltiazem (CARDIZEM CD) 240 mg 24 hr capsule, TAKE ONE CAPSULE BY MOUTH ONCE DAILY, Disp: 90 capsule, Rfl: 2  •  ergocalciferol (VITAMIN D2) 50,000 units, Take 1 capsule (50,000 Units total) by mouth once a week, Disp: 12 capsule, Rfl: 3  •  ezetimibe (ZETIA) 10 mg tablet, TAKE ONE TABLET BY MOUTH ONCE DAILY, Disp: 90 tablet, Rfl: 2  •  folic acid (FOLVITE) 1 mg tablet, TAKE ONE TABLET BY MOUTH ONCE DAILY, Disp: 90 tablet, Rfl: 10  •  hydrochlorothiazide (HYDRODIURIL) 25 mg tablet, TAKE ONE TABLET BY MOUTH ONCE DAILY, Disp: 90 tablet, Rfl: 2  •  levothyroxine (Synthroid) 100 mcg tablet, Take 1 tablet (100 mcg total) by mouth daily in the early morning, Disp: 90 tablet, Rfl: 3  •  losartan (COZAAR) 100 MG tablet, TAKE ONE TABLET BY MOUTH ONCE DAILY, Disp: 90 tablet, Rfl: 2  •  metoprolol succinate (TOPROL-XL) 50 mg 24 hr tablet, TAKE ONE TABLET BY MOUTH ONCE DAILY, Disp: 90 tablet, Rfl: 2  •  omeprazole (PriLOSEC) 20 mg delayed release capsule, Take 1 capsule (20 mg total) by mouth daily before breakfast, Disp: 90 capsule, Rfl: 3  •  predniSONE 5 mg tablet, Take 1 tablet (5 mg total) by mouth daily, Disp: 90 tablet, Rfl: 1  •  benzonatate (TESSALON PERLES) 100 mg capsule, Take 1 capsule (100 mg total) by mouth 3 (three) times a day as needed for cough (Patient not taking: Reported on 6/23/2022), Disp: 20 capsule, Rfl: 0  •  Calcium Carb-Cholecalciferol (OSCAL-D) 500 mg-200 units per tablet, Reported on 1/12/2017  (Patient not taking: Reported on 6/23/2022), Disp: , Rfl:   •  diclofenac (VOLTAREN) 75 mg EC tablet, Take 1 tablet (75 mg total) by mouth 2 (two) times a day (Patient not taking: Reported on 6/23/2022), Disp: 20 tablet, Rfl: 0  •  Diclofenac Sodium (VOLTAREN) 1 %, Apply 2 g topically 4 (four) times a day (Patient not taking: Reported on 6/23/2022), Disp: 150 g, Rfl: 0  •  FeroSul 325 (65 Fe) MG tablet, TAKE ONE TABLET BY MOUTH ONCE DAILY WITH BREAKFAST (Patient not taking: No sig reported), Disp: 90 tablet, Rfl: 2  •  isosorbide mononitrate (IMDUR) 30 mg 24 hr tablet, Take 1 tablet (30 mg total) by mouth daily (Patient not taking: Reported on 4/27/2019), Disp: 90 tablet, Rfl: 3  •  methocarbamol (Robaxin-750) 750 mg tablet, Take 1 tablet (750 mg total) by mouth 3 (three) times a day (Patient not taking: Reported on 6/23/2022), Disp: 30 tablet, Rfl: 0  •  metoprolol succinate (TOPROL-XL) 50 mg 24 hr tablet, Take 1 tablet (50 mg total) by mouth daily (Patient not taking: Reported on 8/9/2022), Disp: 90 tablet, Rfl: 3  •  naproxen (NAPROSYN) 500 mg tablet, Take 1 tablet (500 mg total) by mouth 2 (two) times a day with meals (Patient not taking: Reported on 5/4/2022), Disp: 30 tablet, Rfl: 0  •  naproxen (NAPROSYN) 500 mg tablet, TAKE ONE TABLET BY MOUTH TWICE A DAY WITH MEALS (Patient not taking: No sig reported), Disp: 30 tablet, Rfl: 0  •  nitroglycerin (NITROSTAT) 0 4 mg SL tablet, Place 1 tablet under the tongue every 5 (five) minutes as needed (Patient not taking: Reported on 6/23/2022), Disp: , Rfl:   •  olmesartan (BENICAR) 40 mg tablet, Take 1 tablet by mouth daily (Patient not taking: Reported on 5/4/2022), Disp: , Rfl:   •  predniSONE 10 mg tablet, Take 7T PO x 1 day, then take 6T PO x 1 day, and continue to decrease  by 1T until finished   Quantity-28 (Patient not taking: Reported on 5/4/2022), Disp: 28 tablet, Rfl: 0  • predniSONE 10 mg tablet, Take 6 tabs days 1&2, 5 tabs days 3&4, 4 tabs days 5&6, 3 tabs days 7&8, 2 tabs days 9&10, 1 tab days 11&12 (Patient not taking: Reported on 6/23/2022), Disp: 42 tablet, Rfl: 0  •  predniSONE 20 mg tablet, Take 2 tablets (40 mg total) by mouth daily (Patient not taking: Reported on 6/23/2022), Disp: 8 tablet, Rfl: 0  •  pregabalin (LYRICA) 75 mg capsule, Take 75 mg by mouth (Patient not taking: Reported on 5/4/2022), Disp: , Rfl:   •  ranolazine (RANEXA) 500 mg 12 hr tablet, Take 1 tablet (500 mg total) by mouth 2 (two) times a day (Patient not taking: Reported on 5/4/2022), Disp: 60 tablet, Rfl: 11    PMH:   Past Medical History:   Diagnosis Date   • Arthritis    • Coronary artery disease    • Disease of thyroid gland    • Hyperlipidemia    • Hypertension        PSHx:   Past Surgical History:   Procedure Laterality Date   • BACK SURGERY     • BREAST SURGERY     • CARPAL TUNNEL RELEASE     • CHOLECYSTECTOMY     • HYSTERECTOMY     • OOPHORECTOMY     • ORTHOPEDIC SURGERY         Family History:   Family History   Problem Relation Age of Onset   • No Known Problems Mother    • Lung cancer Father    • Ovarian cancer Sister    • No Known Problems Sister    • No Known Problems Sister    • No Known Problems Maternal Grandmother    • No Known Problems Maternal Grandfather    • No Known Problems Paternal Grandmother    • No Known Problems Paternal Grandfather    • No Known Problems Maternal Aunt    • No Known Problems Paternal Aunt    • No Known Problems Paternal Aunt    • Breast cancer Cousin 39       Social History:  reports that she quit smoking about 3 years ago  Her smoking use included cigarettes  She has never used smokeless tobacco  She reports that she does not currently use alcohol  She reports that she does not use drugs  Imaging: I personally reviewed the radiology studies, my impression is as follows: Recent films were benign fluting ultrasound        Lab Results   Component Value Date    WBC 13 00 (H) 10/20/2022    HGB 13 1 10/20/2022    HCT 41 9 10/20/2022    MCV 86 10/20/2022     10/20/2022     Lab Results   Component Value Date     05/03/2018    K 3 7 10/20/2022     10/20/2022    CO2 30 10/20/2022    ANIONGAP 8 05/03/2018    BUN 16 10/20/2022    CREATININE 0 97 10/20/2022    GLUCOSE 91 05/03/2018    GLUF 88 03/25/2022    CALCIUM 9 8 10/20/2022    AST 15 10/20/2022    ALT 19 10/20/2022    ALKPHOS 49 10/20/2022    PROT 7 0 05/03/2018    BILITOT 0 4 05/03/2018    EGFR 56 10/20/2022       Vitals:    01/23/23 0820   BP: 140/72   Pulse: 64   Temp: (!) 96 3 °F (35 7 °C)          PHYSICAL EXAM  General Appearance:    Alert, cooperative, no distress,    Head:    Normocephalic without obvious abnormality   Eyes:    PERRL, conjunctiva/corneas clear      Neck:   Supple, no adenopathy, no JVD   Back:     Symmetric, no spinal or CVA tenderness   Lungs:     Clear to auscultation bilaterally,   Heart:  Breast:    Regular rate and rhythm, S1 and S2 normal, no murmur    normal appearance, no masses or tenderness, breasts appear normal, no suspicious masses, no skin or nipple changes or axillary nodes    However the left nipple on the 3 o'clock position has a 5 or 6 mm pimple which white and exophytic  The right nipple has a 2 mm similar located pimple more in the medial aspect at the 3 o'clock position  These are not fragile and do not pop easily  There is no associated mass although there is some pain with the pimple  Breast and axilla are benign  Abdomen:     Soft, nontender cholecystectomy incision in a very long subxiphoid total abdomen incision for a "exploration of unknown" origin  Bowel was resected according to the patient  Extremities:   Extremities normal  No clubbing, cyanosis or edema   Psych:   Normal Affect, AOx3  Neurologic:  Skin:   CNII-XII intact   Strength symmetric, speech intact    Warm, dry, intact, no visible rashes or lesions Signature:     Errol Boles MD    Date: 1/23/2023 Time: 9:10 AM                 Some portions of this record may have been generated with voice recognition software  There may be translation, syntax,  or grammatical errors  Occasional wrong word or "sound-a-like" substitutions may have occurred due to the inherent limitations of the voice recognition software  Read the chart carefully and recognize, using context, where substitutions may have occurred  If you have any questions, please contact the dictating provider for clarification or correction, as needed  This encounter has been coded by a physician, non certified coder

## 2023-01-23 ENCOUNTER — OFFICE VISIT (OUTPATIENT)
Dept: SURGERY | Facility: CLINIC | Age: 78
End: 2023-01-23

## 2023-01-23 VITALS
DIASTOLIC BLOOD PRESSURE: 72 MMHG | WEIGHT: 174.8 LBS | SYSTOLIC BLOOD PRESSURE: 140 MMHG | HEIGHT: 62 IN | BODY MASS INDEX: 32.17 KG/M2 | TEMPERATURE: 96.3 F | HEART RATE: 64 BPM

## 2023-01-23 DIAGNOSIS — N64.9 BREAST LESION: Primary | ICD-10-CM

## 2023-01-23 RX ORDER — COLCHICINE 0.6 MG/1
0.6 TABLET ORAL DAILY
COMMUNITY
Start: 2023-01-12

## 2023-02-02 NOTE — PROGRESS NOTES
Assessment/Plan:   Phuong Chisholm is a 69 y  o female who is here for a breast concern  Bilateral "pimples" in the edges of the nipple  On exam and upon review of recent breast imaging:  December 8, 2022 mammogram and ultrasound  Right breast is negative  Left breast has a possible 6 mm hypoechoic lesion within the nipple at the site of the concern skin lesion  It is not amenable to core needle biopsy  Possible left breast nipple 6 mm skin lesion this correlates with what essentially is a pimple in the nipple of the left breast   She has a similar pimple in the right breast although smaller  Both of these have been off and on for months  No major risk factors  No discharge or mass    Clinically appearance of "pimples" of the nipple and I suspect they are just plugged up ducts  There is no evidence of cancer, no retraction blood or mass  Plan:   I was able to unroofed this in the office today, and this completely evacuated the "pimple" in the nipple area  No residual mass effect  No bleeding  Discussed wound care with the granddaughter who was present  I will see her back as an as-needed basis  The right side is completely resolved  In preparation for this visit all relevant and known prior office notes, prior consultations, emergency room visits, blood work results, and imaging studies were personally reviewed  A total of  10 minutes was spent reviewing all of this information  However a great deal of time over half an hour of 30 minutes of using  and trying to drill down on her history which was very vague and difficult to pinpoint   _______________________________________________________  HPI:  Phuong Chisholm is a 69 y  o female who was referred for evaluation of Follow-up (Breast check - no real changes per patient  )    Very challenging historian  Used a  through the language line but her answers were long and convoluted and indirect    Able to get some clear history from her and not sure she knew her entire history including surgical history  She did not have any family that spoke [place] with her  We did recommend that her daughter call here so we can elaborate our conversation  Currently: Duration symptoms:     Symptoms:   Left nipple 6 mm pimple at the very skin edge of the nipple  Right nipple shows a 2 mm pimple  No masses or discharge  Diffuse breast pain without focal lesion  Most recent mammogram: BI-RADS 2 benign from 2022  Number of children: G 3    Age at first child or pregnancy >3 months: 22    Menopause: 40 hysterectomy    Hormone Replacement Therapy:   no    Previous breast biopsy: None reported    Family history: A cousin had breast cancer in her 45s and  from this disease and the cousin's daughter also had breast cancer  Previous Genetic Screening: no    ROS:  General ROS: negative  negative for - chills, fatigue, fever or night sweats, weight loss  Respiratory ROS: no cough, shortness of breath, or wheezing  Cardiovascular ROS: no chest pain or dyspnea on exertion  Genito-Urinary ROS: no dysuria, trouble voiding, or hematuria  Musculoskeletal ROS: negative for - gait disturbance, joint pain or muscle pain  Neurological ROS: no TIA or stroke symptoms  Breast ROS: see HPI  GI ROS: see HPI  Skin ROS: no new rashes or lesions   Lymphatic ROS: no new adenopathy noted by pt     GYN ROS: see HPI, no new GYN history or bleeding noted  Psy ROS: no new mental or behavioral disturbances               Patient Active Problem List   Diagnosis   • Essential hypertension   • Coronary artery disease of native artery of native heart with stable angina pectoris (HCC)   • Dyslipidemia   • Stenosis of left carotid artery   • Peripheral vascular disease, unspecified (HCC)   • Cervical radiculopathy   • Lumbar post-laminectomy syndrome         Allergies: Codeine    Meds:   Current Outpatient Medications:   •  atorvastatin (LIPITOR) 40 mg tablet, Take 40 mg by mouth daily, Disp: , Rfl:   •  celecoxib (CeleBREX) 200 mg capsule, TAKE ONE CAPSULE BY MOUTH TWICE A DAY, Disp: 180 capsule, Rfl: 0  •  clopidogrel (PLAVIX) 75 mg tablet, TAKE ONE TABLET BY MOUTH ONCE DAILY, Disp: 90 tablet, Rfl: 2  •  colchicine (COLCRYS) 0 6 mg tablet, Take 0 6 mg by mouth daily, Disp: , Rfl:   •  diltiazem (CARDIZEM CD) 240 mg 24 hr capsule, TAKE ONE CAPSULE BY MOUTH ONCE DAILY, Disp: 90 capsule, Rfl: 2  •  ergocalciferol (VITAMIN D2) 50,000 units, Take 1 capsule (50,000 Units total) by mouth once a week, Disp: 12 capsule, Rfl: 3  •  ezetimibe (ZETIA) 10 mg tablet, TAKE ONE TABLET BY MOUTH ONCE DAILY, Disp: 90 tablet, Rfl: 2  •  folic acid (FOLVITE) 1 mg tablet, TAKE ONE TABLET BY MOUTH ONCE DAILY, Disp: 90 tablet, Rfl: 10  •  hydrochlorothiazide (HYDRODIURIL) 25 mg tablet, TAKE ONE TABLET BY MOUTH ONCE DAILY, Disp: 90 tablet, Rfl: 2  •  levothyroxine (Synthroid) 100 mcg tablet, Take 1 tablet (100 mcg total) by mouth daily in the early morning, Disp: 90 tablet, Rfl: 3  •  losartan (COZAAR) 100 MG tablet, TAKE ONE TABLET BY MOUTH ONCE DAILY, Disp: 90 tablet, Rfl: 2  •  metoprolol succinate (TOPROL-XL) 50 mg 24 hr tablet, TAKE ONE TABLET BY MOUTH ONCE DAILY, Disp: 90 tablet, Rfl: 2  •  omeprazole (PriLOSEC) 20 mg delayed release capsule, Take 1 capsule (20 mg total) by mouth daily before breakfast, Disp: 90 capsule, Rfl: 3  •  benzonatate (TESSALON PERLES) 100 mg capsule, Take 1 capsule (100 mg total) by mouth 3 (three) times a day as needed for cough (Patient not taking: Reported on 6/23/2022), Disp: 20 capsule, Rfl: 0  •  Calcium Carb-Cholecalciferol (OSCAL-D) 500 mg-200 units per tablet, Reported on 1/12/2017  (Patient not taking: Reported on 6/23/2022), Disp: , Rfl:   •  diclofenac (VOLTAREN) 75 mg EC tablet, Take 1 tablet (75 mg total) by mouth 2 (two) times a day (Patient not taking: Reported on 6/23/2022), Disp: 20 tablet, Rfl: 0  •  Diclofenac Sodium (VOLTAREN) 1 %, Apply 2 g topically 4 (four) times a day (Patient not taking: Reported on 6/23/2022), Disp: 150 g, Rfl: 0  •  FeroSul 325 (65 Fe) MG tablet, TAKE ONE TABLET BY MOUTH ONCE DAILY WITH BREAKFAST (Patient not taking: No sig reported), Disp: 90 tablet, Rfl: 2  •  isosorbide mononitrate (IMDUR) 30 mg 24 hr tablet, Take 1 tablet (30 mg total) by mouth daily (Patient not taking: Reported on 4/27/2019), Disp: 90 tablet, Rfl: 3  •  methocarbamol (Robaxin-750) 750 mg tablet, Take 1 tablet (750 mg total) by mouth 3 (three) times a day (Patient not taking: Reported on 6/23/2022), Disp: 30 tablet, Rfl: 0  •  metoprolol succinate (TOPROL-XL) 50 mg 24 hr tablet, Take 1 tablet (50 mg total) by mouth daily (Patient not taking: Reported on 8/9/2022), Disp: 90 tablet, Rfl: 3  •  naproxen (NAPROSYN) 500 mg tablet, Take 1 tablet (500 mg total) by mouth 2 (two) times a day with meals (Patient not taking: Reported on 5/4/2022), Disp: 30 tablet, Rfl: 0  •  naproxen (NAPROSYN) 500 mg tablet, TAKE ONE TABLET BY MOUTH TWICE A DAY WITH MEALS (Patient not taking: No sig reported), Disp: 30 tablet, Rfl: 0  •  nitroglycerin (NITROSTAT) 0 4 mg SL tablet, Place 1 tablet under the tongue every 5 (five) minutes as needed (Patient not taking: Reported on 6/23/2022), Disp: , Rfl:   •  olmesartan (BENICAR) 40 mg tablet, Take 1 tablet by mouth daily (Patient not taking: Reported on 5/4/2022), Disp: , Rfl:   •  predniSONE 10 mg tablet, Take 7T PO x 1 day, then take 6T PO x 1 day, and continue to decrease  by 1T until finished   Quantity-28 (Patient not taking: Reported on 5/4/2022), Disp: 28 tablet, Rfl: 0  •  predniSONE 10 mg tablet, Take 6 tabs days 1&2, 5 tabs days 3&4, 4 tabs days 5&6, 3 tabs days 7&8, 2 tabs days 9&10, 1 tab days 11&12 (Patient not taking: Reported on 6/23/2022), Disp: 42 tablet, Rfl: 0  •  predniSONE 20 mg tablet, Take 2 tablets (40 mg total) by mouth daily (Patient not taking: Reported on 6/23/2022), Disp: 8 tablet, Rfl: 0  • predniSONE 5 mg tablet, Take 1 tablet (5 mg total) by mouth daily (Patient not taking: Reported on 2/6/2023), Disp: 90 tablet, Rfl: 1  •  pregabalin (LYRICA) 75 mg capsule, Take 75 mg by mouth (Patient not taking: Reported on 5/4/2022), Disp: , Rfl:   •  ranolazine (RANEXA) 500 mg 12 hr tablet, Take 1 tablet (500 mg total) by mouth 2 (two) times a day (Patient not taking: Reported on 5/4/2022), Disp: 60 tablet, Rfl: 11    PMH:   Past Medical History:   Diagnosis Date   • Arthritis    • Coronary artery disease    • Disease of thyroid gland    • Hyperlipidemia    • Hypertension        PSHx:   Past Surgical History:   Procedure Laterality Date   • BACK SURGERY     • BREAST SURGERY     • CARPAL TUNNEL RELEASE     • CHOLECYSTECTOMY     • HYSTERECTOMY     • OOPHORECTOMY     • ORTHOPEDIC SURGERY         Family History:   Family History   Problem Relation Age of Onset   • No Known Problems Mother    • Lung cancer Father    • Ovarian cancer Sister    • No Known Problems Sister    • No Known Problems Sister    • No Known Problems Maternal Grandmother    • No Known Problems Maternal Grandfather    • No Known Problems Paternal Grandmother    • No Known Problems Paternal Grandfather    • No Known Problems Maternal Aunt    • No Known Problems Paternal Aunt    • No Known Problems Paternal Aunt    • Breast cancer Cousin 39       Social History:  reports that she quit smoking about 3 years ago  Her smoking use included cigarettes  She has never used smokeless tobacco  She reports that she does not currently use alcohol  She reports that she does not use drugs  Imaging: I personally reviewed the radiology studies, my impression is as follows: Recent films were benign fluting ultrasound        Lab Results   Component Value Date    WBC 13 00 (H) 10/20/2022    HGB 13 1 10/20/2022    HCT 41 9 10/20/2022    MCV 86 10/20/2022     10/20/2022     Lab Results   Component Value Date     05/03/2018    K 3 7 10/20/2022    CL 102 10/20/2022    CO2 30 10/20/2022    ANIONGAP 8 05/03/2018    BUN 16 10/20/2022    CREATININE 0 97 10/20/2022    GLUCOSE 91 05/03/2018    GLUF 88 03/25/2022    CALCIUM 9 8 10/20/2022    AST 15 10/20/2022    ALT 19 10/20/2022    ALKPHOS 49 10/20/2022    PROT 7 0 05/03/2018    BILITOT 0 4 05/03/2018    EGFR 56 10/20/2022       Vitals:    02/06/23 1258   BP: 120/80   Pulse: 67   Temp: (!) 96 4 °F (35 8 °C)          PHYSICAL EXAM  General Appearance:    Alert, cooperative, no distress,    Head:    Normocephalic without obvious abnormality   Eyes:    PERRL, conjunctiva/corneas clear      Neck:   Supple, no adenopathy, no JVD   Back:     Symmetric, no spinal or CVA tenderness   Lungs:     Clear to auscultation bilaterally,   Heart:  Breast:    Regular rate and rhythm, S1 and S2 normal, no murmur    normal appearance, no masses or tenderness, breasts appear normal, no suspicious masses, no skin or nipple changes or axillary nodes    However the left nipple on the 3 o'clock position has a 5 or 6 mm pimple which white and exophytic  Tth the pimple  Breast and axilla are benign  Right side is now completely resolved      Abdomen:     Soft, nontender cholecystectomy incision in a very long subxiphoid total abdomen incision for a "exploration of unknown" origin  Bowel was resected according to the patient  Extremities:   Extremities normal  No clubbing, cyanosis or edema   Psych:   Normal Affect, AOx3  Neurologic:  Skin:   CNII-XII intact  Strength symmetric, speech intact    Warm, dry, intact, no visible rashes or lesions                       Signature:     Ynes Chung MD    Date: 2/6/2023 Time: 1:27 PM                 Some portions of this record may have been generated with voice recognition software  There may be translation, syntax,  or grammatical errors  Occasional wrong word or "sound-a-like" substitutions may have occurred due to the inherent limitations of the voice recognition software   Read the chart carefully and recognize, using context, where substitutions may have occurred  If you have any questions, please contact the dictating provider for clarification or correction, as needed  This encounter has been coded by a physician, non certified coder

## 2023-02-06 ENCOUNTER — OFFICE VISIT (OUTPATIENT)
Dept: SURGERY | Facility: CLINIC | Age: 78
End: 2023-02-06

## 2023-02-06 VITALS
TEMPERATURE: 96.4 F | SYSTOLIC BLOOD PRESSURE: 120 MMHG | DIASTOLIC BLOOD PRESSURE: 80 MMHG | WEIGHT: 180.6 LBS | BODY MASS INDEX: 33.23 KG/M2 | HEIGHT: 62 IN | HEART RATE: 67 BPM

## 2023-02-06 DIAGNOSIS — N61.1 ABSCESS OF LEFT NIPPLE: Primary | ICD-10-CM

## 2023-03-08 ENCOUNTER — OFFICE VISIT (OUTPATIENT)
Dept: CARDIOLOGY CLINIC | Facility: CLINIC | Age: 78
End: 2023-03-08

## 2023-03-08 VITALS
DIASTOLIC BLOOD PRESSURE: 62 MMHG | HEIGHT: 62 IN | BODY MASS INDEX: 32.31 KG/M2 | WEIGHT: 175.6 LBS | HEART RATE: 58 BPM | SYSTOLIC BLOOD PRESSURE: 124 MMHG

## 2023-03-08 DIAGNOSIS — M96.1 LUMBAR POST-LAMINECTOMY SYNDROME: ICD-10-CM

## 2023-03-08 DIAGNOSIS — N18.31 STAGE 3A CHRONIC KIDNEY DISEASE (HCC): ICD-10-CM

## 2023-03-08 DIAGNOSIS — I73.9 PERIPHERAL VASCULAR DISEASE, UNSPECIFIED (HCC): ICD-10-CM

## 2023-03-08 DIAGNOSIS — I10 ESSENTIAL HYPERTENSION: ICD-10-CM

## 2023-03-08 DIAGNOSIS — M54.12 CERVICAL RADICULOPATHY: ICD-10-CM

## 2023-03-08 DIAGNOSIS — R68.84 JAW PAIN, NON-TMJ: ICD-10-CM

## 2023-03-08 DIAGNOSIS — I25.118 CORONARY ARTERY DISEASE OF NATIVE ARTERY OF NATIVE HEART WITH STABLE ANGINA PECTORIS (HCC): Primary | ICD-10-CM

## 2023-03-08 NOTE — PROGRESS NOTES
[facility]  [address] [facility], [place] [address]  Phone#  [phone]  Fax#  [phone]  *-*-*-*-*-*-*-*-*-*-*-*-*-*-*-*-*-*-*-*-*-*-*-*-*-*-*-*-*-*-*-*-*-*-*-*-*-*-*-*-*-*-*-*-*-*-*-*-*-*-*-*-*-*  ENCOUNTER DATE: 03/08/23 11:53 AM  PATIENT NAME: Shannan Lopez   1945    [id number]  AGE:77 y o  SEX: female  [facility]     PRIMARY CARE PHYSICIAN: Kip Solis MD    DIAGNOSES:  1  Unspecified coronary artery disease by cardiac catheterization in 2009  Medically managed for small-vessel disease without significant occlusive disease  2  Primary hypertension  3  Dyslipidemia  4  Carotid artery disease  5  Degenerative joint disease with cervical radiculopathy, chronic pain in both shoulders with bilateral bursitis  6  Hypothyroidism    REGADENOSON NUCLEAR STRESS TEST Chiqui 3, 2022:  1  No ECG changes and no angina noted during stress test   2  Normal myocardial perfusion scan with normal left ventricular function and normal regional motion  EF of 67%  3  Normal resting ECG with no significant changes and no significant arrhythmia noted during stress test   4  Normal resting blood pressure with appropriate blood pressure response noted during stress test       ECHOCARDIOGRAM June 2022: Normal left ventricular cavity size, mild concentric LVH, normal left ventricular systolic function, EF 72%, grade 1 diastolic dysfunction, normal right ventricular size and systolic function, normal left and right atrial size, intact interatrial septum, mild mitral annular calcification, trace mitral valve regurgitation, trace tricuspid valve regurgitation, no obvious pulmonary hypertension, no pulmonary hypertension and no pericardial effusion  REGADENOSON NUCLEAR STRESS TEST in April 2019 the showed normal myocardial perfusion, hyperdynamic LV function with EF of 76%      ECHOCARDIOGRAM in April 2019 showed normal left ventricular systolic function, EF 98%, grade 1 diastolic dysfunction, normal right ventricular size and function, normal left and right atrial size, mild mitral annular calcification without stenosis or regurgitation, mildly thickened aortic valve with no stenosis or regurgitation, no tricuspid or pulmonic valve regurgitation and no pulmonary hypertension and pericardial effusion  CURRENT ECG   No results found for this visit on 03/08/23  CARDIOLOGY ASSESSMENT & PLAN     1  Coronary artery disease of native artery of native heart with stable angina pectoris ([facility] [address] )        2  Essential hypertension        3  Peripheral vascular disease, unspecified ([facility] [address] )        4  Lumbar post-laminectomy syndrome        5  Cervical radiculopathy        6  Jaw pain, non-TMJ  Ambulatory referral to Neurology      7  Stage 3a chronic kidney disease (HCC)          Coronary artery disease of native artery of native heart with stable angina pectoris [facility])  Ms Renard Salinas is overall stable from cardiac perspective but is dealing with recurring and severe jaw pain  She does have cervical spine disease and that would explain her symptoms in the neck and the shoulder region but not jaw pain  Some of the description of the symptoms is suggestive of possible trigeminal neuralgia  She had extensive cardiac testing last year and did not there was no evidence of ischemia on nuclear stress test   Her blood pressure seems to be well controlled  She is on good medical regimen  She is requesting referral to her doctor for work-up for jaw pain  She also had dental screening last year apparently and no specific etiology was identified  --At this time I am advising her to continue current cardiac medications  -- Due to recurring symptoms I am referring her to a neurologist for screening for possible trigeminal neuralgia although I am not sure  -- Advising her to try to lose weight through dietary changes and physical activity    -- She is advised to follow-up with her primary care physician regularly  -- We will arrange for cardiology follow-up in 8 months  -- Dietary and medical compliance are reinforced  --She is advised  to report any concerning symptoms such as chest pain, shortness of breath, decline in exercise tolerance or presyncope/syncope  INTERVAL HISTORY & HISTORY OF PRESENT ILLNESS     Dg Zacarias is here for follow-up regarding her cardiac comorbidities which include:  Coronary artery disease, carotid artery disease, primary hypertension, dyslipidemia and other comorbidities  She was last seen by me in June 2022  She is here for follow-up accompanied with her   She reports that since last visit she has had no new diagnosis or hospitalizations or significant illnesses  She does report of continuing to deal with pain beginning in her jaws going into her throat and down her sternum and then to the right lateral chest wall  Symptoms occur randomly sometimes at rest and sometimes in her sleep  There is no accompanying symptoms with this symptom  In September she went to the emergency room for similar symptoms  Was ruled out for acute coronary syndrome and was discharged home  Describes pain as sudden sharp pain originating from the jaw or region and radiating downwards  Occasionally this symptom is relieved after she had to drink some water  Has had extensive work-up and seen dentist last year apparently no significant etiology was identified  He is on chronic 5 mg of prednisone therapy for suspected inflammatory arthritis  Denies any dizziness or palpitations or orthopnea or PND  Ports chronic neck pain which is worse at night when she is lying down so is using a special pillow around the neck  Also reports burning and numbness in the feet and legs bilaterally  Functional capacity status: Moderate, limited due to pain   (Excellent- >10 METs; Good: (7-10 METs); Moderate (4-7 METs);  Poor (<= 4 METs)    Any chronic stressors:  None   (feeling of poor health, financial problems, and social isolation etc)  Tobacco or alcohol dependence:  She does not smoke and she does not drink  She quit smoking last year    Current cardiac medications: Cardizem  mg once daily, HCTZ 25 mg once daily, metoprolol succinate 50 mg daily, losartan 100 mg daily, clopidogrel 75 mg daily, ezetimibe 10 mg daily  Blood work from 10/20/2022 significant for sodium 140 potassium 3 7 creatinine 2 bicarb 30 BUN 16 creatinine 0 97 normal LFTs, lipid profile from March 2022 showed Total cholesterol was 167, triglyceride was 96, HDL was 75, calculated LDL was 73  Recent serum protein electrophoresis negative for monoclonal bands  Hemoglobin A1c 6 0 in October 2022  TSH 2 42    REVIEW OF SYSTEMS   Positive for:  As noted above in HPI  Negative for: All remaining as reviewed below and in HPI  SYSTEM SYMPTOMS REVIEWED:  General--weight change, fever, night sweats  Respiratory--cough, wheezing, shortness of breath, sputum production  Cardiovascular--chest pain, syncope, dyspnea on exertion, edema, decline in exercise tolerance, claudication   Gastrointestinal--persistent vomiting, diarrhea, abdominal distention, blood in stool   Muscular or skeletal--joint pain or swelling   Neurologic--headaches, syncope, abnormal movement  Hematologic--history of easy bruising and bleeding   Endocrine--thyroid enlargement, heat or cold intolerance, polyuria   Psychiatric--anxiety, depression     *-*-*-*-*-*-*-*-*-*-*-*-*-*-*-*-*-*-*-*-*-*-*-*-*-*-*-*-*-*-*-*-*-*-*-*-*-*-*-*-*-*-*-*-*-*-*-*-*-*-*-*-*-*-  VITAL SIGNS     CURRENT VITAL SIGNS:   Vitals:    03/08/23 1126   BP: 124/62   BP Location: Right arm   Patient Position: Sitting   Cuff Size: Large   Pulse: 58   Weight: 79 7 kg (175 lb 9 6 oz)   Height: 5' 2" (1 575 m)       BMI: Body mass index is 32 12 kg/m²      WEIGHTS:   Wt Readings from Last 25 Encounters:   03/08/23 79 7 kg (175 lb 9 6 oz)   02/06/23 81 9 kg (180 lb 9 6 oz)   01/23/23 79 3 kg (174 lb 12 8 oz)   01/11/23 81 6 kg (180 lb)   11/22/22 81 7 kg (180 lb 3 2 oz)   11/14/22 79 8 kg (176 lb)   10/20/22 80 kg (176 lb 6 4 oz)   09/29/22 81 3 kg (179 lb 3 7 oz)   09/22/22 78 9 kg (174 lb)   08/09/22 78 5 kg (173 lb)   07/19/22 79 2 kg (174 lb 9 6 oz)   07/14/22 77 1 kg (170 lb)   07/06/22 77 6 kg (171 lb)   06/23/22 77 7 kg (171 lb 3 2 oz)   06/21/22 78 kg (172 lb)   06/03/22 80 3 kg (177 lb)   06/03/22 80 3 kg (177 lb)   05/31/22 77 1 kg (170 lb)   05/10/22 80 3 kg (177 lb)   05/05/22 80 3 kg (177 lb)   05/04/22 77 1 kg (170 lb)   04/11/22 76 7 kg (169 lb)   03/22/22 77 6 kg (171 lb)   03/21/22 77 1 kg (170 lb)   02/22/22 77 6 kg (171 lb)        *-*-*-*-*-*-*-*-*-*-*-*-*-*-*-*-*-*-*-*-*-*-*-*-*-*-*-*-*-*-*-*-*-*-*-*-*-*-*-*-*-*-*-*-*-*-*-*-*-*-*-*-*-*-  PHYSICAL EXAM     General Appearance:    Alert, cooperative, no distress, appears stated age,increased BMI   Head, Eyes, ENT:    No obvious abnormality, moist mucous mebranes  Neck:   Supple, no carotid bruit or JVD   Back:     Symmetric, no curvature  Lungs:     Respirations unlabored  Clear to auscultation bilaterally,    Chest wall:    No tenderness or deformity   Heart:    Regular rate and rhythm, S1 and S2 normal, no murmur, rub  or gallop  Abdomen:     Soft, non-tender,    Extremities:   Extremities warm, , there is edema and tenderness of the wrist and fingers  There is trace lower extremity edema  Skin:   No venostatic changes in lower extremities  Normal skin color, texture, and turgor   No rashes or lesions     *-*-*-*-*-*-*-*-*-*-*-*-*-*-*-*-*-*-*-*-*-*-*-*-*-*-*-*-*-*-*-*-*-*-*-*-*-*-*-*-*-*-*-*-*-*-*-*-*-*-*-*-*-*-  CURRENT MEDICATIONS LIST     Current Outpatient Medications:   •  atorvastatin (LIPITOR) 40 mg tablet, Take 40 mg by mouth daily, Disp: , Rfl:   •  Calcium Carb-Cholecalciferol (OSCAL-D) 500 mg-200 units per tablet, , Disp: , Rfl:   •  celecoxib (CeleBREX) 200 mg capsule, TAKE ONE CAPSULE BY MOUTH TWICE A DAY, Disp: 180 capsule, Rfl: 0  •  clopidogrel (PLAVIX) 75 mg tablet, TAKE ONE TABLET BY MOUTH ONCE DAILY, Disp: 90 tablet, Rfl: 1  •  colchicine (COLCRYS) 0 6 mg tablet, Take 0 6 mg by mouth daily, Disp: , Rfl:   •  diltiazem (CARDIZEM CD) 240 mg 24 hr capsule, TAKE ONE CAPSULE BY MOUTH ONCE DAILY, Disp: 90 capsule, Rfl: 2  •  ergocalciferol (VITAMIN D2) 50,000 units, Take 1 capsule (50,000 Units total) by mouth once a week, Disp: 12 capsule, Rfl: 3  •  ezetimibe (ZETIA) 10 mg tablet, TAKE ONE TABLET BY MOUTH ONCE DAILY, Disp: 90 tablet, Rfl: 2  •  folic acid (FOLVITE) 1 mg tablet, TAKE ONE TABLET BY MOUTH ONCE DAILY, Disp: 90 tablet, Rfl: 10  •  hydrochlorothiazide (HYDRODIURIL) 25 mg tablet, TAKE ONE TABLET BY MOUTH ONCE DAILY, Disp: 90 tablet, Rfl: 2  •  levothyroxine (Synthroid) 100 mcg tablet, Take 1 tablet (100 mcg total) by mouth daily in the early morning, Disp: 90 tablet, Rfl: 3  •  losartan (COZAAR) 100 MG tablet, TAKE ONE TABLET BY MOUTH ONCE DAILY, Disp: 90 tablet, Rfl: 2  •  metoprolol succinate (TOPROL-XL) 50 mg 24 hr tablet, TAKE ONE TABLET BY MOUTH ONCE DAILY, Disp: 90 tablet, Rfl: 2  •  nitroglycerin (NITROSTAT) 0 4 mg SL tablet, Place 1 tablet under the tongue every 5 (five) minutes as needed, Disp: , Rfl:   •  olmesartan (BENICAR) 40 mg tablet, Take 1 tablet by mouth daily, Disp: , Rfl:   •  omeprazole (PriLOSEC) 20 mg delayed release capsule, Take 1 capsule (20 mg total) by mouth daily before breakfast, Disp: 90 capsule, Rfl: 3  •  benzonatate (TESSALON PERLES) 100 mg capsule, Take 1 capsule (100 mg total) by mouth 3 (three) times a day as needed for cough (Patient not taking: Reported on 6/23/2022), Disp: 20 capsule, Rfl: 0  •  diclofenac (VOLTAREN) 75 mg EC tablet, Take 1 tablet (75 mg total) by mouth 2 (two) times a day (Patient not taking: Reported on 6/23/2022), Disp: 20 tablet, Rfl: 0  •  Diclofenac Sodium (VOLTAREN) 1 %, Apply 2 g topically 4 (four) times a day (Patient not taking: Reported on 6/23/2022), Disp: 150 g, Rfl: 0  •  FeroSul 325 (65 Fe) MG tablet, TAKE ONE TABLET BY MOUTH ONCE DAILY WITH BREAKFAST (Patient not taking: No sig reported), Disp: 90 tablet, Rfl: 2  •  methocarbamol (Robaxin-750) 750 mg tablet, Take 1 tablet (750 mg total) by mouth 3 (three) times a day (Patient not taking: Reported on 6/23/2022), Disp: 30 tablet, Rfl: 0  •  metoprolol succinate (TOPROL-XL) 50 mg 24 hr tablet, Take 1 tablet (50 mg total) by mouth daily (Patient not taking: Reported on 8/9/2022), Disp: 90 tablet, Rfl: 3  •  naproxen (NAPROSYN) 500 mg tablet, Take 1 tablet (500 mg total) by mouth 2 (two) times a day with meals (Patient not taking: Reported on 5/4/2022), Disp: 30 tablet, Rfl: 0  •  naproxen (NAPROSYN) 500 mg tablet, TAKE ONE TABLET BY MOUTH TWICE A DAY WITH MEALS (Patient not taking: No sig reported), Disp: 30 tablet, Rfl: 0  •  predniSONE 10 mg tablet, Take 6 tabs days 1&2, 5 tabs days 3&4, 4 tabs days 5&6, 3 tabs days 7&8, 2 tabs days 9&10, 1 tab days 11&12 (Patient not taking: Reported on 6/23/2022), Disp: 42 tablet, Rfl: 0  •  pregabalin (LYRICA) 75 mg capsule, Take 75 mg by mouth (Patient not taking: Reported on 5/4/2022), Disp: , Rfl:        ALLERGIES     Allergies   Allergen Reactions   • Codeine        *-*-*-*-*-*-*-*-*-*-*-*-*-*-*-*-*-*-*-*-*-*-*-*-*-*-*-*-*-*-*-*-*-*-*-*-*-*-*-*-*-*-*-*-*-*-*-*-*-*-*-*-*-*-  LABORATORY DATA     Sodium   Date Value Ref Range Status   05/03/2018 139 137 - 147 MEQ/L Final     Sodium   Date Value Ref Range Status   05/03/2018 139 137 - 147 MEQ/L Final     Potassium   Date Value Ref Range Status   10/20/2022 3 7 3 5 - 5 3 mmol/L Final   09/29/2022 3 4 (L) 3 5 - 5 3 mmol/L Final   03/25/2022 4 0 3 6 - 5 0 mmol/L Final   05/03/2018 4 5 3 6 - 5 0 MEQ/L Final     Chloride   Date Value Ref Range Status   10/20/2022 102 96 - 108 mmol/L Final   09/29/2022 100 96 - 108 mmol/L Final   03/25/2022 104 97 - 108 mmol/L Final   05/03/2018 100 97 - 108 MEQ/L Final     CO2   Date Value Ref Range Status   10/20/2022 30 21 - 32 mmol/L Final   09/29/2022 32 21 - 32 mmol/L Final   03/25/2022 31 (H) 22 - 30 mmol/L Final   05/03/2018 30 22 - 30 MMOL/L Final     Anion Gap   Date Value Ref Range Status   05/03/2018 8 5 - 14 MMOL/L Final     BUN   Date Value Ref Range Status   10/20/2022 16 5 - 25 mg/dL Final   09/29/2022 23 5 - 25 mg/dL Final   07/18/2022 27 (H) 5 - 25 mg/dL Final   05/03/2018 16 5 - 25 MG/DL Final     Creatinine   Date Value Ref Range Status   10/20/2022 0 97 0 60 - 1 30 mg/dL Final     Comment:     Standardized to IDMS reference method   09/29/2022 0 74 0 60 - 1 20 mg/dL Final     Comment:     Standardized to IDMS reference method   07/18/2022 0 84 0 60 - 1 20 mg/dL Final     Comment:     Standardized to IDMS reference method     eGFR   Date Value Ref Range Status   10/20/2022 56 ml/min/1 73sq m Final   09/29/2022 78 ml/min/1 73sq m Final   07/18/2022 67 ml/min/1 73sq m Final     Glucose   Date Value Ref Range Status   05/03/2018 91 70 - 99 MG/DL Final     Calcium   Date Value Ref Range Status   10/20/2022 9 8 8 3 - 10 1 mg/dL Final   09/29/2022 9 4 8 4 - 10 2 mg/dL Final   03/25/2022 9 8 8 4 - 10 2 mg/dL Final   05/03/2018 9 9 8 4 - 10 2 MG/DL Final     AST   Date Value Ref Range Status   10/20/2022 15 5 - 45 U/L Final     Comment:     Specimen collection should occur prior to Sulfasalazine administration due to the potential for falsely depressed results  03/25/2022 23 14 - 36 U/L Final     Comment:     Specimen collection should occur prior to Sulfasalazine administration due to the potential for falsely depressed results  09/28/2021 60 (H) 14 - 36 U/L Final     Comment:     Specimen collection should occur prior to Sulfasalazine administration due to the potential for falsely depressed results      05/03/2018 39 (H) 14 - 36 U/L Final     ALT   Date Value Ref Range Status   10/20/2022 19 12 - 78 U/L Final     Comment:     Specimen collection should occur prior to Sulfasalazine administration due to the potential for falsely depressed results  03/25/2022 17 <35 U/L Final     Comment:     Specimen collection should occur prior to Sulfasalazine administration due to the potential for falsely depressed results  09/28/2021 59 (H) <35 U/L Final     Comment:     Specimen collection should occur prior to Sulfasalazine administration due to the potential for falsely depressed results      05/03/2018 51 9 - 52 U/L Final     Alkaline Phosphatase   Date Value Ref Range Status   10/20/2022 49 46 - 116 U/L Final   03/25/2022 55 43 - 122 U/L Final   09/28/2021 90 43 - 122 U/L Final   05/03/2018 63 43 - 122 U/L Final     Total Protein   Date Value Ref Range Status   05/03/2018 7 0 5 9 - 8 4 G/DL Final     Total Bilirubin   Date Value Ref Range Status   05/03/2018 0 4 <1 3 mg/dL Final     WBC   Date Value Ref Range Status   10/20/2022 13 00 (H) 4 31 - 10 16 Thousand/uL Final   09/29/2022 10 41 (H) 4 31 - 10 16 Thousand/uL Final   03/25/2022 8 70 4 50 - 11 00 Thousand/uL Final   05/03/2018 7 7 4 5 - 11 0 K/MCL Final     Hemoglobin   Date Value Ref Range Status   10/20/2022 13 1 11 5 - 15 4 g/dL Final   09/29/2022 12 9 11 5 - 15 4 g/dL Final   03/25/2022 12 2 12 0 - 16 0 g/dL Final   05/03/2018 12 8 12 0 - 16 0 G/DL Final     Platelets   Date Value Ref Range Status   10/20/2022 232 149 - 390 Thousands/uL Final   09/29/2022 234 149 - 390 Thousands/uL Final   03/25/2022 297 150 - 450 Thousands/uL Final   05/03/2018 215 150 - 450 K/MCL Final     PTT   Date Value Ref Range Status   04/27/2019 35 26 - 38 seconds Final     Comment:     Therapeutic Heparin Range =  60-90 seconds     INR   Date Value Ref Range Status   04/27/2019 0 99 0 86 - 1 17 Final     No results found for: CKMB, DIGOXIN  No results found for: TSH  Cholesterol   Date Value Ref Range Status   05/03/2018 179 <200 mg/dL Final     Comment:            NCEP ATP III      <200           DESIRABLE   200-239     BORDERLINE HIGH   > = 240                HIGH        Hemoglobin A1C   Date Value Ref Range Status   10/11/2022 6 0 (H) Normal 3 8-5 6%; PreDiabetic 5 7-6 4%; Diabetic >=6 5%; Glycemic control for adults with diabetes <7 0% % Final     Urine Culture   Date Value Ref Range Status   2020 80,000-89,000 cfu/ml Escherichia coli ESBL (A)  Final     Comment:     An Extended-Spectrum Beta-Lactamase is being produced by this organism (requires contact precautions)  Cephalosporins are NOT effective for treating these organisms  For SEVERE infections (i e  bacteremia, septic shock, etc ), Carbapenems are the drug of choice  For MILD infections (i e  isolated urinary or biliary infection), high-dose Zosyn may be used  2020 <10,000 cfu/ml  Final     Comment:     Mixed Contaminants X2       *-*-*-*-*-*-*-*-*-*-*-*-*-*-*-*-*-*-*-*-*-*-*-*-*-*-*-*-*-*-*-*-*-*-*-*-*-*-*-*-*-*-*-*-*-*-*-*-*-*-*-*-*-*-  PREVIOUS CARDIOLOGY & RADIOLOGY TEST RESULTS   I have personally reviewed pertinent results of cardiovascular tests noted below  Results for orders placed during the hospital encounter of 19    Echo complete with contrast if indicated    Narrative  [address] [place] [address], [address] [facility]  [phone]    Transthoracic Echocardiogram  2D, M-mode, Doppler, and Color Doppler    Study date:  2019    Patient: Karyna Andrade  MR number: [id number]  Account number: [de-identified]  : 10-Stephen-1945  Age: 68 years  Gender: Female  Status: Outpatient  Location: Echo lab  Height: 61 in  Weight: 182 lb  BP: 114/ 68 mmHg    Indications: Coronary artery disease      Diagnoses: I25 83 - Coronary atherosclerosis due to lipid rich plaque    Sonographer:  Karlene Rangel RDCS  Primary Physician:  Henyn Dueñas MD  Referring Physician:  Rajendra Briseno MD  Group:  [facility]  Interpreting Physician:  Julia Monroe, DO    SUMMARY    LEFT VENTRICLE:  Systolic function was normal  Ejection fraction was estimated to be 65 %  There were no regional wall motion abnormalities  Wall thickness was mildly increased  Doppler parameters were consistent with abnormal left ventricular relaxation (grade 1 diastolic dysfunction)  MITRAL VALVE:  There was mild annular calcification  HISTORY: PRIOR HISTORY: Hypertension, Hyperlipidemia, Coronary artery disease  PROCEDURE: The procedure was performed in the echo lab  This was a routine study  The transthoracic approach was used  The study included complete 2D imaging, M-mode, complete spectral Doppler, and color Doppler  The heart rate was 55 bpm,  at the start of the study  Images were obtained from the parasternal, apical, subcostal, and suprasternal notch acoustic windows  Echocardiographic views were limited due to lung interference  This was a technically difficult study  LEFT VENTRICLE: Size was normal  Systolic function was normal  Ejection fraction was estimated to be 65 %  There were no regional wall motion abnormalities  Wall thickness was mildly increased  DOPPLER: Doppler parameters were consistent  with abnormal left ventricular relaxation (grade 1 diastolic dysfunction)  RIGHT VENTRICLE: The size was normal  Systolic function was normal  Wall thickness was normal     LEFT ATRIUM: Size was normal     RIGHT ATRIUM: Size was normal     MITRAL VALVE: There was mild annular calcification  DOPPLER: There was no evidence for stenosis  There was no regurgitation  AORTIC VALVE: The valve was trileaflet  Leaflets exhibited mildly increased thickness, normal cuspal separation, and sclerosis  DOPPLER: Transaortic velocity was within the normal range  There was no evidence for stenosis  There was no  regurgitation  TRICUSPID VALVE: The valve structure was normal  There was normal leaflet separation  DOPPLER: The transtricuspid velocity was within the normal range  There was no evidence for stenosis  There was no regurgitation      PULMONIC VALVE: Leaflets exhibited normal thickness, no calcification, and normal cuspal separation  DOPPLER: The transpulmonic velocity was within the normal range  There was no regurgitation  PERICARDIUM: There was no pericardial effusion  The pericardium was normal in appearance  AORTA: The root exhibited normal size  SYSTEMIC VEINS: IVC: The inferior vena cava was normal in size and course  Respirophasic changes were normal     PULMONARY ARTERY: DOPPLER: The tricuspid jet envelope definition was inadequate for estimation of RV systolic pressure  There are no indirect findings (abnormal RV volume or geometry, altered pulmonary flow velocity profile, or leftward  septal displacement) which would suggest moderate or severe pulmonary hypertension  SYSTEM MEASUREMENT TABLES    2D  %FS: 41 3 %  Ao Diam: 3 cm  EDV(Teich): 81 4 ml  EF(Teich): 72 5 %  ESV(Teich): 22 4 ml  IVSd: 1 1 cm  LA Area: 10 6 cm2  LA Diam: 3 2 cm  LVEDV MOD A4C: 66 1 ml  LVEF MOD A4C: 70 8 %  LVESV MOD A4C: 19 3 ml  LVIDd: 4 3 cm  LVIDs: 2 5 cm  LVLd A4C: 7 3 cm  LVLs A4C: 5 9 cm  LVPWd: 1 cm  RA Area: 12 3 cm2  RVIDd: 2 5 cm  SV MOD A4C: 46 8 ml  SV(Teich): 59 ml    PW  E': 0 1 m/s  E/E': 8 8  MV A Alexander: 0 9 m/s  MV Dec Adams: 1 9 m/s2  MV DecT: 269 1 ms  MV E Alexander: 0 5 m/s  MV E/A Ratio: 0 6  MV PHT: 78 1 ms  MVA By PHT: 2 8 cm2    [employer] Accredited Echocardiography Laboratory    Prepared and electronically signed by    Dianah Dandy, DO  Signed 12-Apr-2019 15:57:18    No results found for this or any previous visit  No results found for this or any previous visit  No results found for this or any previous visit  Mammo diagnostic bilateral w 3d & cad, US breast left limited (diagnostic)  Addendum: ADDENDUM     Study was reviewed for  purposes  Initial interpretation is unchanged  END ADDENDUM  Narrative: DIAGNOSIS: Breast pain     TECHNIQUE:   Digital diagnostic mammography was performed   Computer Aided Detection   (CAD) analyzed all applicable images  Ultrasound of the left breast(s) was performed  COMPARISONS: Prior breast imaging dated: 10/11/2021, 11/27/2018,   11/27/2018, 04/18/2018, 04/18/2018, 04/18/2018, 04/18/2018, 04/04/2018,   04/04/2018, 03/03/2017, 03/03/2017, 12/01/2015, 09/25/2014, 09/25/2014,   09/25/2013, 09/24/2013, and 09/21/2012    RELEVANT HISTORY:   Family Breast Cancer History: History of breast cancer in [address] [place] [address]  Family Medical History: Family medical history includes breast cancer in   cousin and ovarian cancer in sister  Personal History: No known relevant hormone history  Surgical history   includes hysterectomy and oophorectomy  No known relevant medical history  RISK ASSESSMENT:   5 Year Tyrer-Cuzick: 0 65 %  10 Year Tyrer-Cuzick: No Score  Lifetime Tyrer-Cuzick: 1 24 %    TISSUE DENSITY:   There are scattered areas of fibroglandular density  INDICATION: Brigette Sanchez is a 69 y o  female presenting for left breast   pain  Diffuse left breast pain involving the entire breast small lesion on   the left nipple  FINDINGS:   Bilateral  Mammo diagnostic bilateral w 3d & cad  There are no suspicious masses, grouped microcalcifications or areas of   unexplained architectural distortion  The skin and nipple areolar complex   are unremarkable  Left  US breast left limited (diagnostic)  The region of the left nipple was evaluated  There is a possible 6 mm   poorly defined hyperechoic lesion within the central portion of the nipple   at the area of concern  Shadowing from the surrounding nipple does limit   evaluation  Appropriate clinical evaluation and management as indicated  Impression: Right breast: No mammographic evidence of malignancy  Left breast: No mammographic evidence of malignancy  There is a possible   6 mm hyperechoic lesion within the nipple at the site of concern/skin   lesion  The patient also reports diffuse breast pain    Clinical evaluation of both symptoms is indicated with appropriate management as   clinically indicated  If the nipple finding is suspicious then surgical   consultation is recommended  This is not amenable to ultrasound-guided   core needle biopsy  ASSESSMENT/BI-RADS CATEGORY:  Left: 2 - Benign  Right: 2 - Benign  Overall: 2 - Benign    RECOMMENDATION:       - Routine screening mammogram in 1 year for both breasts      Workstation ID: [id number]         *-*-*-*-*-*-*-*-*-*-*-*-*-*-*-*-*-*-*-*-*-*-*-*-*-*-*-*-*-*-*-*-*-*-*-*-*-*-*-*-*-*-*-*-*-*-*-*-*-*-*-*-*-*-  SIGNATURES:   @DWK@   Johnny Hinojosa MD; ELIN    *-*-*-*-*-*-*-*-*-*-*-*-*-*-*-*-*-*-*-*-*-*-*-*-*-*-*-*-*-*-*-*-*-*-*-*-*-*-*-*-*-*-*-*-*-*-*-*-*-*-*-*-*-*-  PAST MEDICAL HISTORY:  Past Medical History:   Diagnosis Date   • Arthritis    • Coronary artery disease    • Disease of thyroid gland    • Hyperlipidemia    • Hypertension     PAST SURGICAL HISTORY:  Past Surgical History:   Procedure Laterality Date   • BACK SURGERY     • BREAST SURGERY     • CARPAL TUNNEL RELEASE     • CHOLECYSTECTOMY     • HYSTERECTOMY     • OOPHORECTOMY     • ORTHOPEDIC SURGERY           FAMILY HISTORY:  Family History   Problem Relation Age of Onset   • No Known Problems Mother    • Lung cancer Father    • Ovarian cancer Sister    • No Known Problems Sister    • No Known Problems Sister    • No Known Problems Maternal Grandmother    • No Known Problems Maternal Grandfather    • No Known Problems Paternal Grandmother    • No Known Problems Paternal Grandfather    • No Known Problems Maternal Aunt    • No Known Problems Paternal Aunt    • No Known Problems Paternal Aunt    • Breast cancer Cousin 39    SOCIAL HISTORY:  Social History     Tobacco Use   Smoking Status Former   • Types: Cigarettes   • Quit date: 2020   • Years since quitting: 3 1   Smokeless Tobacco Never      Social History     Substance and Sexual Activity   Alcohol Use Not Currently     Social History     Substance and Sexual Activity   Drug Use Never    [unfilled]     *-*-*-*-*-*-*-*-*-*-*-*-*-*-*-*-*-*-*-*-*-*-*-*-*-*-*-*-*-*-*-*-*-*-*-*-*-*-*-*-*-*-*-*-*-*-*-*-*-*-*-*-*-*  ALLERGIES:  Allergies   Allergen Reactions   • Codeine     CURRENT SCHEDULED MEDICATIONS:    Current Outpatient Medications:   •  atorvastatin (LIPITOR) 40 mg tablet, Take 40 mg by mouth daily, Disp: , Rfl:   •  Calcium Carb-Cholecalciferol (OSCAL-D) 500 mg-200 units per tablet, , Disp: , Rfl:   •  celecoxib (CeleBREX) 200 mg capsule, TAKE ONE CAPSULE BY MOUTH TWICE A DAY, Disp: 180 capsule, Rfl: 0  •  clopidogrel (PLAVIX) 75 mg tablet, TAKE ONE TABLET BY MOUTH ONCE DAILY, Disp: 90 tablet, Rfl: 1  •  colchicine (COLCRYS) 0 6 mg tablet, Take 0 6 mg by mouth daily, Disp: , Rfl:   •  diltiazem (CARDIZEM CD) 240 mg 24 hr capsule, TAKE ONE CAPSULE BY MOUTH ONCE DAILY, Disp: 90 capsule, Rfl: 2  •  ergocalciferol (VITAMIN D2) 50,000 units, Take 1 capsule (50,000 Units total) by mouth once a week, Disp: 12 capsule, Rfl: 3  •  ezetimibe (ZETIA) 10 mg tablet, TAKE ONE TABLET BY MOUTH ONCE DAILY, Disp: 90 tablet, Rfl: 2  •  folic acid (FOLVITE) 1 mg tablet, TAKE ONE TABLET BY MOUTH ONCE DAILY, Disp: 90 tablet, Rfl: 10  •  hydrochlorothiazide (HYDRODIURIL) 25 mg tablet, TAKE ONE TABLET BY MOUTH ONCE DAILY, Disp: 90 tablet, Rfl: 2  •  levothyroxine (Synthroid) 100 mcg tablet, Take 1 tablet (100 mcg total) by mouth daily in the early morning, Disp: 90 tablet, Rfl: 3  •  losartan (COZAAR) 100 MG tablet, TAKE ONE TABLET BY MOUTH ONCE DAILY, Disp: 90 tablet, Rfl: 2  •  metoprolol succinate (TOPROL-XL) 50 mg 24 hr tablet, TAKE ONE TABLET BY MOUTH ONCE DAILY, Disp: 90 tablet, Rfl: 2  •  nitroglycerin (NITROSTAT) 0 4 mg SL tablet, Place 1 tablet under the tongue every 5 (five) minutes as needed, Disp: , Rfl:   •  olmesartan (BENICAR) 40 mg tablet, Take 1 tablet by mouth daily, Disp: , Rfl:   •  omeprazole (PriLOSEC) 20 mg delayed release capsule, Take 1 capsule (20 mg total) by mouth daily before breakfast, Disp: 90 capsule, Rfl: 3  •  benzonatate (TESSALON PERLES) 100 mg capsule, Take 1 capsule (100 mg total) by mouth 3 (three) times a day as needed for cough (Patient not taking: Reported on 6/23/2022), Disp: 20 capsule, Rfl: 0  •  diclofenac (VOLTAREN) 75 mg EC tablet, Take 1 tablet (75 mg total) by mouth 2 (two) times a day (Patient not taking: Reported on 6/23/2022), Disp: 20 tablet, Rfl: 0  •  Diclofenac Sodium (VOLTAREN) 1 %, Apply 2 g topically 4 (four) times a day (Patient not taking: Reported on 6/23/2022), Disp: 150 g, Rfl: 0  •  FeroSul 325 (65 Fe) MG tablet, TAKE ONE TABLET BY MOUTH ONCE DAILY WITH BREAKFAST (Patient not taking: No sig reported), Disp: 90 tablet, Rfl: 2  •  methocarbamol (Robaxin-750) 750 mg tablet, Take 1 tablet (750 mg total) by mouth 3 (three) times a day (Patient not taking: Reported on 6/23/2022), Disp: 30 tablet, Rfl: 0  •  metoprolol succinate (TOPROL-XL) 50 mg 24 hr tablet, Take 1 tablet (50 mg total) by mouth daily (Patient not taking: Reported on 8/9/2022), Disp: 90 tablet, Rfl: 3  •  naproxen (NAPROSYN) 500 mg tablet, Take 1 tablet (500 mg total) by mouth 2 (two) times a day with meals (Patient not taking: Reported on 5/4/2022), Disp: 30 tablet, Rfl: 0  •  naproxen (NAPROSYN) 500 mg tablet, TAKE ONE TABLET BY MOUTH TWICE A DAY WITH MEALS (Patient not taking: No sig reported), Disp: 30 tablet, Rfl: 0  •  predniSONE 10 mg tablet, Take 6 tabs days 1&2, 5 tabs days 3&4, 4 tabs days 5&6, 3 tabs days 7&8, 2 tabs days 9&10, 1 tab days 11&12 (Patient not taking: Reported on 6/23/2022), Disp: 42 tablet, Rfl: 0  •  pregabalin (LYRICA) 75 mg capsule, Take 75 mg by mouth (Patient not taking: Reported on 5/4/2022), Disp: , Rfl:      *-*-*-*-*-*-*-*-*-*-*-*-*-*-*-*-*-*-*-*-*-*-*-*-*-*-*-*-*-*-*-*-*-*-*-*-*-*-*-*-*-*-*-*-*-*-*-*-*-*-*-*-*-*

## 2023-03-08 NOTE — ASSESSMENT & PLAN NOTE
Ms Nidia Prater is overall stable from cardiac perspective but is dealing with recurring and severe jaw pain  She does have cervical spine disease and that would explain her symptoms in the neck and the shoulder region but not jaw pain  Some of the description of the symptoms is suggestive of possible trigeminal neuralgia  She had extensive cardiac testing last year and did not there was no evidence of ischemia on nuclear stress test   Her blood pressure seems to be well controlled  She is on good medical regimen  She is requesting referral to her doctor for work-up for jaw pain  She also had dental screening last year apparently and no specific etiology was identified  --At this time I am advising her to continue current cardiac medications  -- Due to recurring symptoms I am referring her to a neurologist for screening for possible trigeminal neuralgia although I am not sure  -- Advising her to try to lose weight through dietary changes and physical activity  -- She is advised to follow-up with her primary care physician regularly  -- We will arrange for cardiology follow-up in 8 months  -- Dietary and medical compliance are reinforced  --She is advised  to report any concerning symptoms such as chest pain, shortness of breath, decline in exercise tolerance or presyncope/syncope

## 2023-03-08 NOTE — PATIENT INSTRUCTIONS
CARDIOLOGY ASSESSMENT & PLAN     1  Coronary artery disease of native artery of native heart with stable angina pectoris ([facility] [address] )        2  Essential hypertension        3  Peripheral vascular disease, unspecified ([facility] [address] )        4  Lumbar post-laminectomy syndrome        5  Cervical radiculopathy        6  Jaw pain, non-TMJ  Ambulatory referral to Neurology      7  Stage 3a chronic kidney disease (HCC)          Coronary artery disease of native artery of native heart with stable angina pectoris [facility])  Ms Tarik Rooney is overall stable from cardiac perspective but is dealing with recurring and severe jaw pain  She does have cervical spine disease and that would explain her symptoms in the neck and the shoulder region but not jaw pain  Some of the description of the symptoms is suggestive of possible trigeminal neuralgia  She had extensive cardiac testing last year and did not there was no evidence of ischemia on nuclear stress test   Her blood pressure seems to be well controlled  She is on good medical regimen  She is requesting referral to her doctor for work-up for jaw pain  She also had dental screening last year apparently and no specific etiology was identified  --At this time I am advising her to continue current cardiac medications  -- Due to recurring symptoms I am referring her to a neurologist for screening for possible trigeminal neuralgia although I am not sure  -- Advising her to try to lose weight through dietary changes and physical activity  -- She is advised to follow-up with her primary care physician regularly  -- We will arrange for cardiology follow-up in 8 months  -- Dietary and medical compliance are reinforced  --She is advised  to report any concerning symptoms such as chest pain, shortness of breath, decline in exercise tolerance or presyncope/syncope

## 2023-04-25 ENCOUNTER — OFFICE VISIT (OUTPATIENT)
Dept: PAIN MEDICINE | Facility: MEDICAL CENTER | Age: 78
End: 2023-04-25

## 2023-04-25 VITALS
SYSTOLIC BLOOD PRESSURE: 125 MMHG | HEIGHT: 62 IN | RESPIRATION RATE: 98 BRPM | HEART RATE: 60 BPM | BODY MASS INDEX: 32.39 KG/M2 | WEIGHT: 176 LBS | DIASTOLIC BLOOD PRESSURE: 74 MMHG

## 2023-04-25 DIAGNOSIS — M47.812 CERVICAL SPONDYLOSIS: ICD-10-CM

## 2023-04-25 DIAGNOSIS — M48.02 CERVICAL SPINAL STENOSIS: Primary | ICD-10-CM

## 2023-04-25 DIAGNOSIS — M54.12 CERVICAL RADICULOPATHY: ICD-10-CM

## 2023-04-25 NOTE — PROGRESS NOTES
Assessment:  1  Cervical spinal stenosis    2  Cervical radiculopathy    3  Cervical spondylosis        Plan:  While the patient was in the office today, I did have a thorough conversation via  [id number] regarding their chronic pain syndrome, medication management, and treatment plan options  After discussing options, I have recommended that the patient proceed with the consultation with one of our neurosurgeons to discuss potential surgical options  She reports less than a month of relief with the cervical epidural steroid injection done in August 2022  She is not interested in repeating injection therapy given the short-lived relief  She will follow-up with us on a as needed basis  My impressions and treatment recommendations were discussed in detail with the patient who verbalized understanding and had no further questions  Discharge instructions were provided  I personally saw and examined the patient and I agree with the above discussed plan of care  Orders Placed This Encounter   Procedures   • Ambulatory referral to Neurosurgery     Standing Status:   Future     Standing Expiration Date:   4/25/2024     Referral Priority:   Routine     Referral Type:   Consult - AMB     Referral Reason:   Specialty Services Required     Referred to Provider:   Prasanna Carr MD     Requested Specialty:   Neurosurgery     Number of Visits Requested:   1     Expiration Date:   4/25/2024     No orders of the defined types were placed in this encounter  History of Present Illness:  Sarah Miller is a 69 y o  female who presents for a follow up office visit in regards to neck pain  The patient’s current symptoms include chronic radicular neck pain that she presently rates a 7 out of 10 on the pain scale describes it as a constant burning and cramping pain with numbness and paresthesias in bilateral upper extremities    On 8/2/2022 she underwent a C7-T1 interlaminar epidural steroid injection and reported relief that lasted for 1 month followed by return of the same pain  She states that over time her upper extremities feel more weak  She states that she does not think that she had a surgical consultation but does state that she has an appointment with neurology in September and is scheduled for an EMG of the upper extremities  I have personally reviewed and/or updated the patient's past medical history, past surgical history, family history, social history, current medications, allergies, and vital signs today  Review of Systems   Constitutional: Negative for chills and fever  HENT: Negative for ear pain and sore throat  Eyes: Negative for pain and visual disturbance  Respiratory: Negative for cough and shortness of breath  Cardiovascular: Negative for chest pain and palpitations  Gastrointestinal: Negative for abdominal pain and vomiting  Genitourinary: Negative for dysuria and hematuria  Musculoskeletal: Positive for back pain and myalgias  Negative for arthralgias  Skin: Negative for color change and rash  Neurological: Positive for weakness  Negative for seizures and syncope  Psychiatric/Behavioral: Positive for sleep disturbance  All other systems reviewed and are negative        Patient Active Problem List   Diagnosis   • Essential hypertension   • Coronary artery disease of native artery of native heart with stable angina pectoris ([facility] [address] )   • Dyslipidemia   • Stenosis of left carotid artery   • Peripheral vascular disease, unspecified (HCC)   • Cervical radiculopathy   • Lumbar post-laminectomy syndrome   • Stage 3a chronic kidney disease ([address] )       Past Medical History:   Diagnosis Date   • Arthritis    • Coronary artery disease    • Disease of thyroid gland    • Hyperlipidemia    • Hypertension        Past Surgical History:   Procedure Laterality Date   • BACK SURGERY     • BREAST SURGERY     • CARPAL TUNNEL RELEASE     • CHOLECYSTECTOMY     • HYSTERECTOMY     • OOPHORECTOMY     • ORTHOPEDIC SURGERY         Family History   Problem Relation Age of Onset   • No Known Problems Mother    • Lung cancer Father    • Ovarian cancer Sister    • No Known Problems Sister    • No Known Problems Sister    • No Known Problems Maternal Grandmother    • No Known Problems Maternal Grandfather    • No Known Problems Paternal Grandmother    • No Known Problems Paternal Grandfather    • No Known Problems Maternal Aunt    • No Known Problems Paternal Aunt    • No Known Problems Paternal Aunt    • Breast cancer Cousin 39       Social History     Occupational History   • Not on file   Tobacco Use   • Smoking status: Former     Types: Cigarettes     Quit date: 2020     Years since quitting: 3 3   • Smokeless tobacco: Never   Vaping Use   • Vaping Use: Never used   Substance and Sexual Activity   • Alcohol use: Not Currently   • Drug use: Never   • Sexual activity: Not on file       Current Outpatient Medications on File Prior to Visit   Medication Sig   • atorvastatin (LIPITOR) 40 mg tablet Take 40 mg by mouth daily   • Calcium Carb-Cholecalciferol (OSCAL-D) 500 mg-200 units per tablet    • celecoxib (CeleBREX) 200 mg capsule TAKE ONE CAPSULE BY MOUTH TWICE A DAY   • clopidogrel (PLAVIX) 75 mg tablet TAKE ONE TABLET BY MOUTH ONCE DAILY   • colchicine (COLCRYS) 0 6 mg tablet Take 0 6 mg by mouth daily   • diltiazem (CARDIZEM CD) 240 mg 24 hr capsule TAKE ONE CAPSULE BY MOUTH ONCE DAILY   • ergocalciferol (VITAMIN D2) 50,000 units Take 1 capsule (50,000 Units total) by mouth once a week   • ezetimibe (ZETIA) 10 mg tablet TAKE ONE TABLET BY MOUTH ONCE DAILY   • folic acid (FOLVITE) 1 mg tablet TAKE ONE TABLET BY MOUTH ONCE DAILY   • hydrochlorothiazide (HYDRODIURIL) 25 mg tablet TAKE ONE TABLET BY MOUTH ONCE DAILY   • levothyroxine (Synthroid) 100 mcg tablet Take 1 tablet (100 mcg total) by mouth daily in the early morning   • losartan (COZAAR) 100 MG tablet TAKE ONE TABLET BY MOUTH ONCE DAILY   • "nitroglycerin (NITROSTAT) 0 4 mg SL tablet Place 1 tablet under the tongue every 5 (five) minutes as needed   • olmesartan (BENICAR) 40 mg tablet Take 1 tablet by mouth daily   • omeprazole (PriLOSEC) 20 mg delayed release capsule Take 1 capsule (20 mg total) by mouth daily before breakfast   • predniSONE 10 mg tablet Take 6 tabs days 1&2, 5 tabs days 3&4, 4 tabs days 5&6, 3 tabs days 7&8, 2 tabs days 9&10, 1 tab days 11&12   • benzonatate (TESSALON PERLES) 100 mg capsule Take 1 capsule (100 mg total) by mouth 3 (three) times a day as needed for cough (Patient not taking: Reported on 6/23/2022)   • diclofenac (VOLTAREN) 75 mg EC tablet Take 1 tablet (75 mg total) by mouth 2 (two) times a day (Patient not taking: Reported on 6/23/2022)   • Diclofenac Sodium (VOLTAREN) 1 % Apply 2 g topically 4 (four) times a day (Patient not taking: Reported on 6/23/2022)   • FeroSul 325 (65 Fe) MG tablet TAKE ONE TABLET BY MOUTH ONCE DAILY WITH BREAKFAST (Patient not taking: No sig reported)   • methocarbamol (Robaxin-750) 750 mg tablet Take 1 tablet (750 mg total) by mouth 3 (three) times a day (Patient not taking: Reported on 6/23/2022)   • metoprolol succinate (TOPROL-XL) 50 mg 24 hr tablet TAKE ONE TABLET BY MOUTH ONCE DAILY   • metoprolol succinate (TOPROL-XL) 50 mg 24 hr tablet Take 1 tablet (50 mg total) by mouth daily (Patient not taking: Reported on 8/9/2022)   • naproxen (NAPROSYN) 500 mg tablet Take 1 tablet (500 mg total) by mouth 2 (two) times a day with meals (Patient not taking: Reported on 5/4/2022)   • naproxen (NAPROSYN) 500 mg tablet TAKE ONE TABLET BY MOUTH TWICE A DAY WITH MEALS (Patient not taking: No sig reported)   • pregabalin (LYRICA) 75 mg capsule Take 75 mg by mouth (Patient not taking: Reported on 5/4/2022)     No current facility-administered medications on file prior to visit         Allergies   Allergen Reactions   • Codeine        Physical Exam:    /74   Pulse 60   Resp (!) 98   Ht 5' 2\" " (1 575 m)   Wt 79 8 kg (176 lb)   BMI 32 19 kg/m²     Constitutional:normal, well developed, well nourished, alert, in no distress and non-toxic and no overt pain behavior  Eyes:anicteric  HEENT:grossly intact  Neck:supple, symmetric, trachea midline and no masses   Pulmonary:even and unlabored  Cardiovascular:No edema or pitting edema present  Skin:Normal without rashes or lesions and well hydrated  Psychiatric:Mood and affect appropriate  Neurologic:Cranial Nerves II-XII grossly intact  Musculoskeletal: Tender to palpation over bilateral cervical facet joints and the paraspinal muscles, limited range of motion      Imaging

## 2023-05-03 ENCOUNTER — CONSULT (OUTPATIENT)
Dept: NEUROSURGERY | Facility: CLINIC | Age: 78
End: 2023-05-03

## 2023-05-03 VITALS
DIASTOLIC BLOOD PRESSURE: 82 MMHG | WEIGHT: 180.1 LBS | HEIGHT: 62 IN | HEART RATE: 71 BPM | BODY MASS INDEX: 33.14 KG/M2 | TEMPERATURE: 97.2 F | OXYGEN SATURATION: 96 % | SYSTOLIC BLOOD PRESSURE: 144 MMHG | RESPIRATION RATE: 19 BRPM

## 2023-05-03 DIAGNOSIS — M54.12 CERVICAL RADICULOPATHY: Primary | ICD-10-CM

## 2023-05-03 DIAGNOSIS — M48.02 CERVICAL SPINAL STENOSIS: ICD-10-CM

## 2023-05-03 DIAGNOSIS — M54.14 THORACIC RADICULOPATHY: ICD-10-CM

## 2023-05-03 NOTE — PROGRESS NOTES
Letter drafted and routed to provider for approval.   Neurosurgery Office Note  Alma Sicard 69 y o  female MRN: [id number]      Assessment/Plan     Cervical spinal stenosis  Patient presents for evaluation of worsening neck pain, L>R upper arm pain, difficulty with fine motor skills, and balance difficulty  · Patient has known cervical stenosis, worse at C5-6  Current radicular complaints are in a C5 distrubution bilaterally  · Has tried PT and SUMMER in the past with little relief  · On exam, BUE hyperreflexia and hoffmans  Clumsiness with bilateral RFM  No abnormal long track signs BLE    Imaging:  · MRI cervical w/o, 4/7/22: Worsened multilevel degenerative changes of cervical spine with varying degrees of canal stenosis (moderate C5-C6) and foraminal narrowing (moderate left C5-C6)    Plan:  · Continue to monitor neurological exam  · MRI cervical spine w/o ordered to evaluate for worsening cervical stenosis given progressive symptoms since previous MRI over 1 year ago  This will determine if surgical intervention will be offered  · Continue pain control with current regimen  · Continue home exercises as tolerated  · Follow up in the office after MRI as a joint appointment with a spine surgeon in case there is surgical pathology to discuss    Thoracic radiculopathy  Patient also complaining of acute onset mid thoracic pain x2 months with no preceding injury   · Pain starts abruptly, not related to movement / position, and lasts 1-5 minutes  Severe in nature  Radiates around ribcage bilaterally       Plan:  · Given new onset severe mid thoracic back pain with radicular complaints, in addition to the above myelopathic complaints, will also obtain MRI thoracic spine to evaluate for compression fracture, disc herniation, and any other abnormal pathology       Diagnoses and all orders for this visit:    Cervical radiculopathy  -     MRI cervical spine without contrast; Future    Cervical spinal stenosis  -     Ambulatory referral to Neurosurgery  -     MRI cervical spine without contrast; Future    Thoracic radiculopathy  -     MRI thoracic spine without contrast; Future          I have spent a total time of 30 minutes on 05/03/23 in caring for this patient including Diagnostic results, Prognosis, Patient and family education, Impressions, Counseling / Coordination of care, Documenting in the medical record, Reviewing / ordering tests, medicine, procedures  , Obtaining or reviewing history   and Communicating with other healthcare professionals   [place] interpreters [id number] and [id number] (new  after getting disconnected) were used during this appointment  CHIEF COMPLAINT    Chief Complaint   Patient presents with    Consult       HISTORY    This is a 75yo Czech speaking female with a PMH significant for HTN, HLD, CAD on plavix, thyroid disease who presents today for evaluation of worsening neck pain, cervical radiculopathy, and mid thoracic pain with associated radiculopathy  She is here today with her  and an K[place]z  was used via iPAD  She states that her neck pain has been present for 15+ years but recently started getting worse  It is located in her mid to upper cervical spine and radiates down her L>R arm to her elbow  The pain does not go below her elbow but she does describe bilateral hand numbness  She has BUE fatigue and pain limiting weakness but no focal weakness  She takes robaxin, Lyrica, NSAIDs for her symptoms  She tried PT in the past which caused her increased pain  She had a GRISEL in  Auguest 2022 but this give her only 1 month of relief; she is not interested in further injections  She also has progressive difficulty with fine motor skills and started dropping things  She has difficulty with balance and has fallen once  She does not use ambulation assistive devices such as a cane or walker  She denies urinary incontinence, retention, saddle anesthesia, or bowel incontinence       She also describes a 2 month history of mid thoracic back pain (T4-6 level) with associated pain wrapping around her ribs bilaterally  She denies any preceding fall or injury  This pain comes and goes at random and lasts for 1-5 minutes  She describes it as severe and sharp in nature, causing significant discomfort  It can happen while she is in any position including sitting and laying, and with any type of activity  Her  states it has woken her up at night  I do not see a DEXA scan in the system for her evaluating for osteoporosis  REVIEW OF SYSTEMS    Review of Systems   Constitutional: Negative  HENT: Negative  Eyes: Negative  Respiratory: Negative  Cardiovascular: Negative  Gastrointestinal: Negative  Endocrine: Negative  Genitourinary: Negative  Musculoskeletal: Positive for back pain (ocassioinally between shouldedr blads doesnt last long), gait problem (feels unbalanced at times , no recent falls), myalgias (in legs cmuscle cramps) and neck pain (radiates from side to side accross both shoulders down both arms to both hands)  Skin: Negative  Allergic/Immunologic: Negative  Neurological: Positive for weakness (both hands weak tana to ), light-headedness and numbness (hands are numb left hand all fingers numb, both legs knee down both feet all toes)  Hematological: Bruises/bleeds easily (medications)  Psychiatric/Behavioral: Positive for sleep disturbance (interrupted sleep due to pain)  ROS was personally reviewed and changes made as needed   ROS obtained by MA  Reviewed  See HPI       Meds/Allergies     Current Outpatient Medications   Medication Sig Dispense Refill    atorvastatin (LIPITOR) 40 mg tablet Take 40 mg by mouth daily      clopidogrel (PLAVIX) 75 mg tablet TAKE ONE TABLET BY MOUTH ONCE DAILY 90 tablet 1    diltiazem (CARDIZEM CD) 240 mg 24 hr capsule TAKE ONE CAPSULE BY MOUTH ONCE DAILY 90 capsule 1    ergocalciferol (VITAMIN D2) 50,000 units Take 1 capsule (50,000 Units total) by mouth once a week 12 capsule 3    ezetimibe (ZETIA) 10 mg tablet TAKE ONE TABLET BY MOUTH ONCE DAILY 90 tablet 2    folic acid (FOLVITE) 1 mg tablet TAKE ONE TABLET BY MOUTH ONCE DAILY 90 tablet 10    hydrochlorothiazide (HYDRODIURIL) 25 mg tablet TAKE ONE TABLET BY MOUTH ONCE DAILY 90 tablet 2    levothyroxine 100 mcg tablet TAKE ONE TABLET BY MOUTH ONCE DAILY IN THE EARLY MORNING 90 tablet 2    losartan (COZAAR) 100 MG tablet TAKE ONE TABLET BY MOUTH ONCE DAILY 90 tablet 1    methocarbamol (Robaxin-750) 750 mg tablet Take 1 tablet (750 mg total) by mouth 3 (three) times a day 30 tablet 0    metoprolol succinate (TOPROL-XL) 50 mg 24 hr tablet TAKE ONE TABLET BY MOUTH ONCE DAILY 90 tablet 2    omeprazole (PriLOSEC) 20 mg delayed release capsule Take 1 capsule (20 mg total) by mouth daily before breakfast 90 capsule 3    predniSONE 10 mg tablet Take 6 tabs days 1&2, 5 tabs days 3&4, 4 tabs days 5&6, 3 tabs days 7&8, 2 tabs days 9&10, 1 tab days 11&12 42 tablet 0    benzonatate (TESSALON PERLES) 100 mg capsule Take 1 capsule (100 mg total) by mouth 3 (three) times a day as needed for cough (Patient not taking: Reported on 6/23/2022) 20 capsule 0    Calcium Carb-Cholecalciferol (OSCAL-D) 500 mg-200 units per tablet  (Patient not taking: Reported on 5/3/2023)      celecoxib (CeleBREX) 200 mg capsule TAKE ONE CAPSULE BY MOUTH TWICE A DAY (Patient not taking: Reported on 5/3/2023) 180 capsule 0    colchicine (COLCRYS) 0 6 mg tablet Take 0 6 mg by mouth daily (Patient not taking: Reported on 5/3/2023)      diclofenac (VOLTAREN) 75 mg EC tablet Take 1 tablet (75 mg total) by mouth 2 (two) times a day (Patient not taking: Reported on 6/23/2022) 20 tablet 0    Diclofenac Sodium (VOLTAREN) 1 % Apply 2 g topically 4 (four) times a day (Patient not taking: Reported on 6/23/2022) 150 g 0    FeroSul 325 (65 Fe) MG tablet TAKE ONE TABLET BY MOUTH ONCE DAILY WITH BREAKFAST (Patient not taking: No sig reported) 90 tablet 2    metoprolol succinate (TOPROL-XL) 50 mg 24 hr tablet Take 1 tablet (50 mg total) by mouth daily (Patient not taking: Reported on 8/9/2022) 90 tablet 3    naproxen (NAPROSYN) 500 mg tablet Take 1 tablet (500 mg total) by mouth 2 (two) times a day with meals (Patient not taking: Reported on 5/4/2022) 30 tablet 0    naproxen (NAPROSYN) 500 mg tablet TAKE ONE TABLET BY MOUTH TWICE A DAY WITH MEALS (Patient not taking: No sig reported) 30 tablet 0    nitroglycerin (NITROSTAT) 0 4 mg SL tablet Place 1 tablet under the tongue every 5 (five) minutes as needed (Patient not taking: Reported on 5/3/2023)      olmesartan (BENICAR) 40 mg tablet Take 1 tablet by mouth daily (Patient not taking: Reported on 5/3/2023)      pregabalin (LYRICA) 75 mg capsule Take 75 mg by mouth (Patient not taking: Reported on 5/4/2022)       No current facility-administered medications for this visit         Allergies   Allergen Reactions    Codeine        PAST HISTORY    Past Medical History:   Diagnosis Date    Arthritis     Coronary artery disease     Disease of thyroid gland     Hyperlipidemia     Hypertension        Past Surgical History:   Procedure Laterality Date    BACK SURGERY      BREAST SURGERY      CARPAL TUNNEL RELEASE      CHOLECYSTECTOMY      HYSTERECTOMY      OOPHORECTOMY      ORTHOPEDIC SURGERY         Social History     Tobacco Use    Smoking status: Former     Types: Cigarettes     Quit date: 2020     Years since quitting: 3 3    Smokeless tobacco: Never   Vaping Use    Vaping Use: Never used   Substance Use Topics    Alcohol use: Not Currently    Drug use: Never       Family History   Problem Relation Age of Onset    No Known Problems Mother     Lung cancer Father     Ovarian cancer Sister     No Known Problems Sister     No Known Problems Sister     No Known Problems Maternal Grandmother     No Known Problems Maternal Grandfather     No Known Problems Paternal Grandmother "   No Known Problems Paternal Grandfather     No Known Problems Maternal Aunt     No Known Problems Paternal Aunt     No Known Problems Paternal Aunt     Breast cancer Cousin 39         Above history personally reviewed  EXAM    Vitals:Blood pressure 144/82, pulse 71, temperature (!) 97 2 °F (36 2 °C), resp  rate 19, height 5' 2\" (1 575 m), weight 81 7 kg (180 lb 1 6 oz), SpO2 96 %, not currently breastfeeding  ,Body mass index is 32 94 kg/m²  Physical Exam  Vitals and nursing note reviewed  Constitutional:       Appearance: Normal appearance  She is well-developed and normal weight  HENT:      Head: Normocephalic and atraumatic  Eyes:      Extraocular Movements: Extraocular movements intact  Pupils: Pupils are equal, round, and reactive to light  Cardiovascular:      Rate and Rhythm: Normal rate  Pulmonary:      Effort: Pulmonary effort is normal  No respiratory distress  Abdominal:      Palpations: Abdomen is soft  Musculoskeletal:         General: Normal range of motion  Cervical back: Normal range of motion  Skin:     General: Skin is warm and dry  Neurological:      Mental Status: She is alert and oriented to person, place, and time  Cranial Nerves: Cranial nerves 2-12 are intact  Gait: Gait is intact  Deep Tendon Reflexes:      Reflex Scores:       Tricep reflexes are 2+ on the left side  Bicep reflexes are 3+ on the right side and 3+ on the left side  Brachioradialis reflexes are 3+ on the right side and 3+ on the left side  Patellar reflexes are 2+ on the right side and 2+ on the left side  Psychiatric:         Mood and Affect: Mood normal          Speech: Speech normal          Behavior: Behavior normal          Thought Content: Thought content normal          Judgment: Judgment normal          Neurologic Exam     Mental Status   Oriented to person, place, and time  Follows 2 step commands     Attention: normal  Concentration: " normal    Speech: speech is normal   Level of consciousness: alert  Knowledge: good  Able to repeat  Normal comprehension  Cranial Nerves   Cranial nerves II through XII intact  CN III, IV, VI   Pupils are equal, round, and reactive to light  Motor Exam   Muscle bulk: normal  Overall muscle tone: normal    Strength   Strength 5/5 except as noted  LLE 4+/5 in all muscle groups     Sensory Exam   Light touch normal      Decreased sensation to LT top of right foot     Gait, Coordination, and Reflexes     Gait  Gait: normal    Tremor   Resting tremor: absent    Reflexes   Right brachioradialis: 3+  Left brachioradialis: 3+  Right biceps: 3+  Left biceps: 3+  Left triceps: 2+  Right patellar: 2+  Left patellar: 2+  Right Cruz: present  Left Cruz: present  Right ankle clonus: absent  Left ankle clonus: absent        MEDICAL DECISION MAKING    Imaging Studies:     MRI cervical spine from April 2022 personally reviewed as above    I have personally reviewed pertinent reports     and I have personally reviewed pertinent films in PACS

## 2023-05-03 NOTE — ASSESSMENT & PLAN NOTE
Patient presents for evaluation of worsening neck pain, L>R upper arm pain, difficulty with fine motor skills, and balance difficulty  · Patient has known cervical stenosis, worse at C5-6  Current radicular complaints are in a C5 distrubution bilaterally  · Has tried PT and SUMMER in the past with little relief  · On exam, BUE hyperreflexia and hoffmans  Clumsiness with bilateral RFM  No abnormal long track signs BLE    Imaging:  · MRI cervical w/o, 4/7/22: Worsened multilevel degenerative changes of cervical spine with varying degrees of canal stenosis (moderate C5-C6) and foraminal narrowing (moderate left C5-C6)    Plan:  · Continue to monitor neurological exam  · MRI cervical spine w/o ordered to evaluate for worsening cervical stenosis given progressive symptoms since previous MRI over 1 year ago  This will determine if surgical intervention will be offered     · Continue pain control with current regimen  · Continue home exercises as tolerated  · Follow up in the office after MRI as a joint appointment with a spine surgeon in case there is surgical pathology to discuss

## 2023-05-03 NOTE — ASSESSMENT & PLAN NOTE
Patient also complaining of acute onset mid thoracic pain x2 months with no preceding injury   · Pain starts abruptly, not related to movement / position, and lasts 1-5 minutes  Severe in nature  Radiates around ribcage bilaterally       Plan:  · Given new onset severe mid thoracic back pain with radicular complaints, in addition to the above myelopathic complaints, will also obtain MRI thoracic spine to evaluate for compression fracture, disc herniation, and any other abnormal pathology

## 2023-05-13 ENCOUNTER — HOSPITAL ENCOUNTER (OUTPATIENT)
Dept: MRI IMAGING | Facility: HOSPITAL | Age: 78
Discharge: HOME | End: 2023-05-13

## 2023-05-13 DIAGNOSIS — M48.02 CERVICAL SPINAL STENOSIS: ICD-10-CM

## 2023-05-13 DIAGNOSIS — M54.14 THORACIC RADICULOPATHY: ICD-10-CM

## 2023-05-13 DIAGNOSIS — M54.12 CERVICAL RADICULOPATHY: ICD-10-CM

## 2023-05-23 ENCOUNTER — HOSPITAL ENCOUNTER (OUTPATIENT)
Dept: NON INVASIVE DIAGNOSTICS | Facility: CLINIC | Age: 78
Discharge: HOME | End: 2023-05-23

## 2023-05-23 DIAGNOSIS — I65.22 STENOSIS OF LEFT CAROTID ARTERY: ICD-10-CM

## 2023-05-25 ENCOUNTER — OFFICE VISIT (OUTPATIENT)
Dept: NEUROSURGERY | Facility: CLINIC | Age: 78
End: 2023-05-25

## 2023-05-25 VITALS
HEIGHT: 62 IN | DIASTOLIC BLOOD PRESSURE: 72 MMHG | TEMPERATURE: 97.7 F | SYSTOLIC BLOOD PRESSURE: 144 MMHG | BODY MASS INDEX: 33.23 KG/M2 | HEART RATE: 66 BPM | WEIGHT: 180.6 LBS | OXYGEN SATURATION: 98 %

## 2023-05-25 DIAGNOSIS — M48.02 CERVICAL SPINAL STENOSIS: Primary | ICD-10-CM

## 2023-05-25 NOTE — PROGRESS NOTES
Neurosurgery Office Note  Korene Claude 69 y o  female MRN: [id number]      Assessment/Plan     Cervical spinal stenosis  Patient presents for evaluation of worsening neck pain, L>R upper arm pain, difficulty with fine motor skills, and balance difficulty  · Patient has known cervical stenosis, worse at C5-6  · Has tried PT and SUMMER in the past with little relief    Imaging reviewed with Dr Jo Hippo:    MRI cervical w/o, 5/13/23:Multilevel cervical spondylosis, as described above  Most notable level is at C5-C6 where multifactorial disease results in overall severe canal stenosis with underlying cord compression  Severe left foraminal narrowing is present at this level  No definite intrinsic cord signal abnormality identified  Resolution of previously seen inflammatory enthesopathy from C5-C7 adjacent to the spinous processes  MRI thoracic spine wo 5/13/23:Degenerative changes of the thoracic spine, as described above  Most notable level is at T9-T10 where multifactorial disease results in overall moderate canal stenosis with contact of the cord  Plan:  · Continue to monitor neurological exam  · Reviewed imaging extensively with patient and   · Continue pain control with current regimen  · Continue home exercises as tolerated  · No neurosurgical intervention warranted at this time, patient requires further work-up for mid back pain radiating across chest as well as pain in her throat  · Spoke with patient however thoracic MRI does not demonstrate any etiology to explain the symptoms  · Patient is on multiple medications that can cause heartburn  Spoke with her about reaching out to her rheumatologist or PCP in regards to this and possible switching medication    · Patient will follow-up as needed or if symptoms worsen  · Patient made aware to seek care sooner if she develops any new or worsening neurological change or red flag signs  · Patient made aware to contact neurosurgery with any further questions or concerns  Thoracic radiculopathy  Patient also complaining of acute onset mid thoracic pain x few months with no preceding injury   · Pain starts abruptly, not related to movement / position, and lasts 1-5 minutes  Severe in nature  Radiates around ribcage bilaterally  Plan:  · See above plan       Diagnoses and all orders for this visit:    Cervical spinal stenosis          I have spent a total time of 40 minutes on 05/25/23 in caring for this patient including Diagnostic results, Prognosis, Risks and benefits of tx options, Instructions for management, Patient and family education, Importance of tx compliance, Risk factor reductions, Impressions, Counseling / Coordination of care, Documenting in the medical record, Reviewing / ordering tests, medicine, procedures  , Obtaining or reviewing history   and Communicating with other healthcare professionals   CHIEF COMPLAINT    Chief Complaint   Patient presents with   • Follow-up       HISTORY    History of Present Illness     69y o  year old female with past medical history significant for left carotid artery stenosis, PVD, hypertension, CAD on Plavix, chronic kidney disease, and dyslipidemia  Patient seen in outpatient office today for ongoing work-up for cervical and possible thoracic radiculopathy  Patient presents to visit today with her ,  [id number] use during visit today  Patient states her neck pain has been present for 15+ years but has recently worsened  The pain is located in her mid to upper cervical spine which radiates down bilateral arms left worse than right to her elbow and at times beyond her forearm  Patient states it radiates into her lateral/anterior arms  She also endorses bilateral hand numbness  She reports her bilateral arms fatigue  She has tried physical therapy in the past which caused her symptoms to increase    She underwent a C7-T1 SUMMER in August 2022 which gave her about a month relief in her pain and symptoms, unfortunately she is not interested in any further injections  She also endorses progressive difficulty with fine motor skills as well as dropping objects  She does have difficulty with her balance and does have some falls  Patient states her last fall was approximately 3-4 months ago  She endorses that the numbness in her hand is constant  She does not use any assistive devices such as a cane or walker  She also reports chronic ongoing low back pain with bilateral lower extremity radiculopathy     She states standing for long periods of time her right knee down will go numb and she feels like it is going to give away  She states she continues to do exercises at home that she learned from physical therapy  She states her low back and leg symptoms have been ongoing for many years and usually occur with standing or walking  Patient states her mid thoracic spine pain is worse than her neck and low back pain  Patient describes approximately 2-month history of mid thoracic back pain with associated pain wrapping around her ribs bilaterally at the level of her bra line  She denies any precipitating events or traumas  She states the pain comes and goes at random and lasts for about 1-5 minutes  She describes his pain as severe and sharp in nature causing her significant discomfort  This is the patient's biggest complaint  It can happen while she is in any position including sitting and laying as well as any type of activity  Patient's  states it has woken her up at night and is most severe at night  She states her mid thoracic spine pain wraps around her bra line into her bilateral ribs into her throat  She states some days it does not occur and other days it can happen 3-4 times a day      She does endorse some occasional headaches and denies any she also reports burning sensation and numbness in her legs dizziness, blurry vision, chest pain, shortness of breath, abdominal pain, nausea, vomiting, diarrhea, no problems with bowel or bladder, no new weakness or numbness/tingling  HPI    See Discussion    REVIEW OF SYSTEMS    Review of Systems   Constitutional: Negative  HENT: Negative  Eyes: Negative  Respiratory: Negative  Cardiovascular: Negative  Gastrointestinal: Negative  Endocrine: Negative  Genitourinary: Negative  Musculoskeletal: Positive for back pain (low back pain that causes periods of time of sharp, stabbing pain that radiates to the front and into her chest), gait problem (feels unbalanced at times , no recent falls), myalgias (in legs cmuscle cramps) and neck pain (radiates from side to side accross both shoulders down both arms to both hands left side worse than right- night time is worse)  Skin: Negative  Allergic/Immunologic: Negative  Neurological: Positive for weakness (both hands weak tana to ), light-headedness, numbness (hands are numb left hand all fingers numb, both legs knee down both feet all toes) and headaches  Hematological: Bruises/bleeds easily (medications)  Psychiatric/Behavioral: Positive for sleep disturbance (interrupted sleep due to pain)         ROS reviewed with patient and agree and changes were made as needed    Meds/Allergies     Current Outpatient Medications   Medication Sig Dispense Refill   • atorvastatin (LIPITOR) 40 mg tablet Take 40 mg by mouth daily     • clopidogrel (PLAVIX) 75 mg tablet TAKE ONE TABLET BY MOUTH ONCE DAILY 90 tablet 1   • diltiazem (CARDIZEM CD) 240 mg 24 hr capsule TAKE ONE CAPSULE BY MOUTH ONCE DAILY 90 capsule 1   • ergocalciferol (VITAMIN D2) 50,000 units Take 1 capsule (50,000 Units total) by mouth once a week 12 capsule 3   • ezetimibe (ZETIA) 10 mg tablet TAKE ONE TABLET BY MOUTH ONCE DAILY 90 tablet 2   • folic acid (FOLVITE) 1 mg tablet TAKE ONE TABLET BY MOUTH ONCE DAILY 90 tablet 10   • hydrochlorothiazide (HYDRODIURIL) 25 mg tablet TAKE ONE TABLET BY MOUTH ONCE DAILY 90 tablet 2   • levothyroxine 100 mcg tablet TAKE ONE TABLET BY MOUTH ONCE DAILY IN THE EARLY MORNING 90 tablet 2   • losartan (COZAAR) 100 MG tablet TAKE ONE TABLET BY MOUTH ONCE DAILY 90 tablet 1   • methocarbamol (Robaxin-750) 750 mg tablet Take 1 tablet (750 mg total) by mouth 3 (three) times a day 30 tablet 0   • metoprolol succinate (TOPROL-XL) 50 mg 24 hr tablet TAKE ONE TABLET BY MOUTH ONCE DAILY 90 tablet 2   • omeprazole (PriLOSEC) 20 mg delayed release capsule Take 1 capsule (20 mg total) by mouth daily before breakfast 90 capsule 3   • predniSONE 10 mg tablet Take 6 tabs days 1&2, 5 tabs days 3&4, 4 tabs days 5&6, 3 tabs days 7&8, 2 tabs days 9&10, 1 tab days 11&12 42 tablet 0   • benzonatate (TESSALON PERLES) 100 mg capsule Take 1 capsule (100 mg total) by mouth 3 (three) times a day as needed for cough (Patient not taking: Reported on 6/23/2022) 20 capsule 0   • Calcium Carb-Cholecalciferol (OSCAL-D) 500 mg-200 units per tablet  (Patient not taking: Reported on 5/3/2023)     • celecoxib (CeleBREX) 200 mg capsule TAKE ONE CAPSULE BY MOUTH TWICE A DAY (Patient not taking: Reported on 5/3/2023) 180 capsule 0   • colchicine (COLCRYS) 0 6 mg tablet Take 0 6 mg by mouth daily (Patient not taking: Reported on 5/3/2023)     • diclofenac (VOLTAREN) 75 mg EC tablet Take 1 tablet (75 mg total) by mouth 2 (two) times a day (Patient not taking: Reported on 6/23/2022) 20 tablet 0   • Diclofenac Sodium (VOLTAREN) 1 % Apply 2 g topically 4 (four) times a day (Patient not taking: Reported on 6/23/2022) 150 g 0   • FeroSul 325 (65 Fe) MG tablet TAKE ONE TABLET BY MOUTH ONCE DAILY WITH BREAKFAST (Patient not taking: No sig reported) 90 tablet 2   • metoprolol succinate (TOPROL-XL) 50 mg 24 hr tablet Take 1 tablet (50 mg total) by mouth daily (Patient not taking: Reported on 8/9/2022) 90 tablet 3   • naproxen (NAPROSYN) 500 mg tablet Take 1 tablet (500 mg total) by mouth 2 (two) times a day with meals (Patient not taking: Reported on 5/4/2022) 30 tablet 0   • naproxen (NAPROSYN) 500 mg tablet TAKE ONE TABLET BY MOUTH TWICE A DAY WITH MEALS (Patient not taking: No sig reported) 30 tablet 0   • nitroglycerin (NITROSTAT) 0 4 mg SL tablet Place 1 tablet under the tongue every 5 (five) minutes as needed (Patient not taking: Reported on 5/3/2023)     • olmesartan (BENICAR) 40 mg tablet Take 1 tablet by mouth daily (Patient not taking: Reported on 5/3/2023)     • pregabalin (LYRICA) 75 mg capsule Take 75 mg by mouth (Patient not taking: Reported on 5/4/2022)       No current facility-administered medications for this visit  Allergies   Allergen Reactions   • Codeine        PAST HISTORY    Past Medical History:   Diagnosis Date   • Arthritis    • Coronary artery disease    • Disease of thyroid gland    • Hyperlipidemia    • Hypertension        Past Surgical History:   Procedure Laterality Date   • BACK SURGERY     • BREAST SURGERY     • CARPAL TUNNEL RELEASE     • CHOLECYSTECTOMY     • HYSTERECTOMY     • OOPHORECTOMY     • ORTHOPEDIC SURGERY         Social History     Tobacco Use   • Smoking status: Former     Types: Cigarettes     Quit date: 2020     Years since quitting: 3 3   • Smokeless tobacco: Never   Vaping Use   • Vaping Use: Never used   Substance Use Topics   • Alcohol use: Not Currently   • Drug use: Never       Family History   Problem Relation Age of Onset   • No Known Problems Mother    • Lung cancer Father    • Ovarian cancer Sister    • No Known Problems Sister    • No Known Problems Sister    • No Known Problems Maternal Grandmother    • No Known Problems Maternal Grandfather    • No Known Problems Paternal Grandmother    • No Known Problems Paternal Grandfather    • No Known Problems Maternal Aunt    • No Known Problems Paternal Aunt    • No Known Problems Paternal Aunt    • Breast cancer Cousin 39         Above history personally reviewed         EXAM    Vitals:Blood pressure 144/72, pulse 66, temperature 97 7 °F "(36 5 °C), temperature source Temporal, height 5' 2\" (1 575 m), weight 81 9 kg (180 lb 9 6 oz), SpO2 98 %, not currently breastfeeding  ,Body mass index is 33 03 kg/m²  Physical Exam  Vitals reviewed  Constitutional:       General: She is awake  She is not in acute distress  Appearance: Normal appearance  She is well-developed  She is not ill-appearing  HENT:      Head: Normocephalic and atraumatic  Eyes:      Extraocular Movements: Extraocular movements intact and EOM normal       Conjunctiva/sclera: Conjunctivae normal       Pupils: Pupils are equal, round, and reactive to light  Cardiovascular:      Rate and Rhythm: Normal rate  Pulmonary:      Effort: Pulmonary effort is normal  No respiratory distress  Chest:      Chest wall: No tenderness  Abdominal:      General: There is no distension  Palpations: Abdomen is soft  Tenderness: There is no abdominal tenderness  Musculoskeletal:         General: Normal range of motion  Cervical back: Normal range of motion and neck supple  Tenderness present  Spinous process tenderness present  No muscular tenderness  Thoracic back: Tenderness present  Lumbar back: No tenderness  Skin:     General: Skin is warm and dry  Neurological:      Mental Status: She is alert and oriented to person, place, and time  Coordination: Finger-Nose-Finger Test normal       Deep Tendon Reflexes:      Reflex Scores:       Tricep reflexes are 2+ on the right side and 2+ on the left side  Bicep reflexes are 2+ on the right side and 2+ on the left side  Brachioradialis reflexes are 2+ on the right side and 2+ on the left side  Patellar reflexes are 2+ on the right side and 2+ on the left side  Achilles reflexes are 2+ on the right side and 2+ on the left side    Psychiatric:         Attention and Perception: Attention and perception normal          Mood and Affect: Mood and affect normal          Speech: Speech normal    " Behavior: Behavior normal  Behavior is cooperative  Thought Content: Thought content normal          Cognition and Memory: Cognition and memory normal          Judgment: Judgment normal          Neurologic Exam     Mental Status   Oriented to person, place, and time  Follows 2 step commands  Attention: normal  Concentration: normal    Speech: speech is normal   Level of consciousness: alert  Knowledge: good  Able to perform simple calculations  Able to name object  Normal comprehension  Cranial Nerves     CN III, IV, VI   Pupils are equal, round, and reactive to light  Extraocular motions are normal    CN III: no CN III palsy  CN VI: no CN VI palsy  Nystagmus: none   Diplopia: none  Conjugate gaze: present    CN V   Facial sensation intact  CN VII   Facial expression full, symmetric  CN VIII   CN VIII normal    Hearing: intact    CN IX, X   CN IX normal      CN XI   CN XI normal      CN XII   CN XII normal      Motor Exam   Muscle bulk: normal  Overall muscle tone: normal  Right arm pronator drift: absent  Left arm pronator drift: absent    Strength   Strength 5/5 except as noted   LLE 4+/5 throughout  L bicep 4/5    B/L hands swollen     Sensory Exam   Light touch normal    Proprioception normal    Pinprick normal      Gait, Coordination, and Reflexes     Coordination   Finger to nose coordination: normal    Tremor   Resting tremor: absent  Intention tremor: absent  Action tremor: absent    Reflexes   Right brachioradialis: 2+  Left brachioradialis: 2+  Right biceps: 2+  Left biceps: 2+  Right triceps: 2+  Left triceps: 2+  Right patellar: 2+  Left patellar: 2+  Right achilles: 2+  Left achilles: 2+  Right Cruz: absent  Left Cruz: absent  Right ankle clonus: absent  Left ankle clonus: absent        MEDICAL DECISION MAKING    Imaging Studies:     VAS carotid complete study    Result Date: 5/23/2023  Narrative:  [facility]PORT [facility]: Indications:  6 month surveillance of carotid artery disease  Patient is asymptomatic at this time  Operative History: No Cardiovascular Surgeries Risk Factors: HTN, Hyperlipidemia and CAD  Clinical Right Pressure:  144/82 mm Hg, Left Pressure:  144/82 mm Hg  FINDINGS:  Right        PSV  EDV (cm/s)  Ratio  Dist  ICA     80          28   1 62  Mid  ICA      66          24   1 33  Prox  ICA     47          13   0 95  Dist CCA      52          13         Mid CCA       49          14   1 15  Prox CCA      43          11   0 17  Ext Carotid   66           9   1 33  Prox Vert     24           8         Subclavian   229          23         Innominate   256          19          Left         PSV  EDV (cm/s)  Ratio  Dist  ICA     62          20   0 77  Mid  ICA     126          14   1 57  Prox  ICA    165          25   1 94  Dist CCA      60          13         Mid CCA       80          23   1 74  Prox CCA      46          12         Ext Carotid  185          19   2 31  Prox Vert     45          18         Subclavian   116           0            CONCLUSION:  Impression RIGHT: There is <50% stenosis in the internal carotid artery  Plaque is heterogenous and irregular  Vertebral artery flow is antegrade  There is no significant subclavian artery disease  LEFT: There is <50% stenosis in the internal carotid artery  Plaque is heterogenous and irregular  There is a ulceration vs irregular plaque at the location of mid/distal common carotid  Vertebral artery flow is antegrade  There is no significant subclavian artery disease  In comparison to the study of  11/8/2022,  there is no significant change  SIGNATURE: Electronically Signed by: Bentley Smith on 2023-05-23 03:45:05 PM    MRI thoracic spine without contrast    Result Date: 5/17/2023  Narrative: MRI THORACIC SPINE WITHOUT CONTRAST INDICATION: M54 14: Radiculopathy, thoracic region  COMPARISON: Correlation made to outside CT report from 2013   TECHNIQUE:  Multiplanar, multisequence imaging of the thoracic spine was performed    IMAGE QUALITY: Diagnostic  FINDINGS: ALIGNMENT: Normal alignment of the thoracic spine  No compression fracture  No subluxation  No scoliosis  MARROW SIGNAL:  Normal marrow signal is identified within the visualized bony structures  No discrete marrow lesion  THORACIC CORD: Normal signal within the thoracic cord  PARAVERTEBRAL SOFT TISSUES:  Normal  THORACIC DEGENERATIVE CHANGE: There is multilevel ligamentum flavum thickening with mass effect on the dorsal aspect of the thecal sac at T3-T4, T4-T5, T8-T9 and T10-T11  There is mild to moderate left foraminal narrowing at T8-T9 and T10-T11  At T9-T10 there is a bulging annulus  There is ligamentum flavum thickening  There is moderate canal stenosis with effacement of the ventral thecal sac and contact of the cord  Findings were evaluated on axial T2 weighted imaging  There is mild to moderate bilateral foraminal narrowing at this level  There is multilevel ventral osteophytosis from T4-T5 through T8-T9  OTHER FINDINGS: There is a partially imaged left renal cyst as well as distention of the left renal pelvis  Patient is status post reported cholecystectomy with biliary ductal dilatation  Findings were described on outside CT report  Impression: Degenerative changes of the thoracic spine, as described above  Most notable level is at T9-T10 where multifactorial disease results in overall moderate canal stenosis with contact of the cord  Workstation performed: [id number]     MRI cervical spine without contrast    Result Date: 5/17/2023  Narrative: MRI CERVICAL SPINE WITHOUT CONTRAST INDICATION: M48 02: Spinal stenosis, cervical region M54 12: Radiculopathy, cervical region  COMPARISON: MRI from 4/7/2022 TECHNIQUE:  Multiplanar, multisequence imaging of the cervical spine was performed    IMAGE QUALITY:  Diagnostic FINDINGS: ALIGNMENT: There is mild reversal of the normal cervical lordosis   There is no significant spondylolisthesis  MARROW SIGNAL: There is no suspicious marrow signal abnormality identified  There is Modic type II endplate generative change at C5-C6  CERVICAL AND VISUALIZED THORACIC CORD:  Normal signal within the visualized cord  PREVERTEBRAL AND PARASPINAL SOFT TISSUES: Resolution of previously seen fluid adjacent to the spinous processes from C5-C7  VISUALIZED POSTERIOR FOSSA:  The visualized posterior fossa demonstrates no abnormal signal  CERVICAL DISC SPACES: C2-C3:  Normal  C3-C4: Tiny central disc protrusion without significant canal stenosis or foraminal narrowing  C4-C5: Central disc protrusion  Mild to moderate canal stenosis with mild cord compression  No significant foraminal narrowing  Findings are stable  C5-C6: Disc degeneration and narrowing  Disc osteophyte complex with uncovertebral spurring  Severe canal stenosis with underlying cord compression  Findings are stable  Severe left and mild right foraminal narrowing  C6-C7: Bulging annulus facet arthrosis  No significant canal stenosis or foraminal narrowing    C7-T1:  Normal  UPPER THORACIC DISC SPACES:  Normal  OTHER FINDINGS:  None  Impression: Multilevel cervical spondylosis, as described above  Most notable level is at C5-C6 where multifactorial disease results in overall severe canal stenosis with underlying cord compression  Severe left foraminal narrowing is present at this level  No definite intrinsic cord signal abnormality identified  Resolution of previously seen inflammatory enthesopathy from C5-C7 adjacent to the spinous processes  Workstation performed: [id number]       I have personally reviewed pertinent reports     and I have personally reviewed pertinent films in PACS

## 2023-05-25 NOTE — ASSESSMENT & PLAN NOTE
Patient presents for evaluation of worsening neck pain, L>R upper arm pain, difficulty with fine motor skills, and balance difficulty  · Patient has known cervical stenosis, worse at C5-6  · Has tried PT and SUMMER in the past with little relief    Imaging reviewed with Dr Camargo Hence:    MRI cervical w/o, 5/13/23:Multilevel cervical spondylosis, as described above  Most notable level is at C5-C6 where multifactorial disease results in overall severe canal stenosis with underlying cord compression  Severe left foraminal narrowing is present at this level  No definite intrinsic cord signal abnormality identified  Resolution of previously seen inflammatory enthesopathy from C5-C7 adjacent to the spinous processes  MRI thoracic spine wo 5/13/23:Degenerative changes of the thoracic spine, as described above  Most notable level is at T9-T10 where multifactorial disease results in overall moderate canal stenosis with contact of the cord  Plan:  · Continue to monitor neurological exam  · Reviewed imaging extensively with patient and   · Continue pain control with current regimen  · Continue home exercises as tolerated  · No neurosurgical intervention warranted at this time, patient requires further work-up for mid back pain radiating across chest as well as pain in her throat  · Spoke with patient however thoracic MRI does not demonstrate any etiology to explain the symptoms  · Patient is on multiple medications that can cause heartburn  Spoke with her about reaching out to her rheumatologist or PCP in regards to this and possible switching medication  · Patient will follow-up as needed or if symptoms worsen  · Patient made aware to seek care sooner if she develops any new or worsening neurological change or red flag signs  · Patient made aware to contact neurosurgery with any further questions or concerns

## 2023-05-25 NOTE — ASSESSMENT & PLAN NOTE
Patient also complaining of acute onset mid thoracic pain x few months with no preceding injury   · Pain starts abruptly, not related to movement / position, and lasts 1-5 minutes  Severe in nature  Radiates around ribcage bilaterally       Plan:  · See above plan

## 2023-08-07 ENCOUNTER — OFFICE VISIT (OUTPATIENT)
Dept: FAMILY MEDICINE CLINIC | Facility: CLINIC | Age: 78
End: 2023-08-07
Payer: MEDICARE

## 2023-08-07 ENCOUNTER — OFFICE VISIT (OUTPATIENT)
Dept: CARDIOLOGY CLINIC | Facility: CLINIC | Age: 78
End: 2023-08-07
Payer: MEDICARE

## 2023-08-07 VITALS
OXYGEN SATURATION: 96 % | HEIGHT: 61 IN | BODY MASS INDEX: 33.61 KG/M2 | HEART RATE: 67 BPM | SYSTOLIC BLOOD PRESSURE: 118 MMHG | TEMPERATURE: 96.5 F | DIASTOLIC BLOOD PRESSURE: 60 MMHG | WEIGHT: 178 LBS

## 2023-08-07 VITALS
HEART RATE: 62 BPM | SYSTOLIC BLOOD PRESSURE: 126 MMHG | HEIGHT: 61 IN | DIASTOLIC BLOOD PRESSURE: 64 MMHG | BODY MASS INDEX: 33.3 KG/M2 | WEIGHT: 176.4 LBS

## 2023-08-07 DIAGNOSIS — R07.89 OTHER CHEST PAIN: Primary | ICD-10-CM

## 2023-08-07 DIAGNOSIS — Z23 NEED FOR PNEUMOCOCCAL 20-VALENT CONJUGATE VACCINATION: ICD-10-CM

## 2023-08-07 DIAGNOSIS — Z78.0 POST-MENOPAUSAL: ICD-10-CM

## 2023-08-07 DIAGNOSIS — M48.02 CERVICAL SPINAL STENOSIS: ICD-10-CM

## 2023-08-07 DIAGNOSIS — I65.22 STENOSIS OF LEFT CAROTID ARTERY: ICD-10-CM

## 2023-08-07 DIAGNOSIS — E78.5 DYSLIPIDEMIA: ICD-10-CM

## 2023-08-07 DIAGNOSIS — M13.0 POLYARTHROPATHY: ICD-10-CM

## 2023-08-07 DIAGNOSIS — E55.9 VITAMIN D DEFICIENCY: ICD-10-CM

## 2023-08-07 DIAGNOSIS — N18.31 STAGE 3A CHRONIC KIDNEY DISEASE (HCC): ICD-10-CM

## 2023-08-07 DIAGNOSIS — E78.2 MIXED HYPERLIPIDEMIA: ICD-10-CM

## 2023-08-07 DIAGNOSIS — Z13.29 SCREENING FOR THYROID DISORDER: ICD-10-CM

## 2023-08-07 DIAGNOSIS — M54.14 THORACIC RADICULOPATHY: ICD-10-CM

## 2023-08-07 DIAGNOSIS — E66.09 CLASS 1 OBESITY DUE TO EXCESS CALORIES WITH SERIOUS COMORBIDITY AND BODY MASS INDEX (BMI) OF 33.0 TO 33.9 IN ADULT: ICD-10-CM

## 2023-08-07 DIAGNOSIS — M54.12 CERVICAL RADICULOPATHY: ICD-10-CM

## 2023-08-07 DIAGNOSIS — I25.118 CORONARY ARTERY DISEASE OF NATIVE ARTERY OF NATIVE HEART WITH STABLE ANGINA PECTORIS (HCC): ICD-10-CM

## 2023-08-07 PROCEDURE — 93000 ELECTROCARDIOGRAM COMPLETE: CPT | Performed by: INTERNAL MEDICINE

## 2023-08-07 PROCEDURE — 90677 PCV20 VACCINE IM: CPT

## 2023-08-07 PROCEDURE — 99204 OFFICE O/P NEW MOD 45 MIN: CPT | Performed by: FAMILY MEDICINE

## 2023-08-07 PROCEDURE — 99214 OFFICE O/P EST MOD 30 MIN: CPT | Performed by: INTERNAL MEDICINE

## 2023-08-07 PROCEDURE — G0009 ADMIN PNEUMOCOCCAL VACCINE: HCPCS

## 2023-08-07 RX ORDER — MULTIVIT-MIN/IRON/FOLIC ACID/K 18-600-40
2000 CAPSULE ORAL DAILY
Qty: 30 CAPSULE | Refills: 3 | Status: SHIPPED | OUTPATIENT
Start: 2023-08-07

## 2023-08-07 RX ORDER — ATORVASTATIN CALCIUM 40 MG/1
40 TABLET, FILM COATED ORAL DAILY
Qty: 90 TABLET | Refills: 0 | Status: SHIPPED | OUTPATIENT
Start: 2023-08-07

## 2023-08-07 NOTE — PROGRESS NOTES
dysfunction, normal right ventricular size and function, normal left and right atrial size, mild mitral annular calcification without stenosis or regurgitation, mildly thickened aortic valve with no stenosis or regurgitation, no tricuspid or pulmonic valve regurgitation and no pulmonary hypertension and pericardial effusion. CURRENT ECG     Results for orders placed or performed in visit on 08/07/23   POCT ECG    Narrative    Sinus rhythm, HR 62 bpm, first-degree AV block, left anterior hemiblock, left ventricular hypertrophy by voltage criteria, no evidence of prior infarction, nonspecific ST-T wave abnormalities. CARDIOLOGY ASSESSMENT & PLAN     1. Other chest pain  POCT ECG    Echo follow up/limited w/ contrast if indicated      2. Stenosis of left carotid artery        3. Cervical radiculopathy        4. Thoracic radiculopathy        5. Stage 3a chronic kidney disease (HCC)        6. Cervical spinal stenosis        7. Dyslipidemia          Other chest pain  Ms. Carlos Wooten is continuing to experience on and off jaw pain and neck pain and chest pain. Description of the pain is not typical for angina. There are no accompanying symptoms and there is no consistency of the pain with physical activity. She does have significant cervical stenosis and radiculopathy. Her blood pressure is well controlled. She had extensive testing including a nuclear stress test last year which did not identify evidence of ischemia. Her left vein function is normal.  She is on good medical regimen. -- At this time I am requesting a limited echocardiogram to reassess left ventricular function. If this is noted to be concerning or abnormal then will consider cardiac catheterization. -- For now I am advising her to continue current medications and to keep a diary of symptoms. -- I am encouraging her to try to be active and exercise.   -- I have reached out to her primary care physician to consider  work-up for possible chronic pain syndrome versus fibromyalgia versus pain related to severe degenerative joint disease of the spine. -- I will plan to see her back again in 3 months time. If she continues to experience and other work-up  is negative  or if there are abnormalities on echocardiogram then the neck step would be a cardiac catheterization. -- Dietary and medical compliance are reinforced. -- She is advised  to report any concerning symptoms such as chest pain, shortness of breath, decline in exercise tolerance or presyncope/syncope. INTERVAL HISTORY & HISTORY OF PRESENT ILLNESS     Raul Gamboa is here for follow-up regarding her cardiac comorbidities which include:  Coronary artery disease, carotid artery disease, primary hypertension, dyslipidemia and other comorbidities. She was last seen by me in March 2023. She is here for visit accompanied with her son-in-law. She was with her primary physician this morning and reported that she was experiencing chest pain. Her primary physician reach out to me and we requested follow-up in the office. Patient says that for the last 5 to 6 months she has been experiencing on and off jaw pain radiating down her chest and to her lateral chest wall and to her back. Symptoms occur randomly sometimes at rest and sometimes with activity and sometimes wake her up from sleep. Symptom is not accompanied with nausea vomiting diaphoresis. There are no aggravating factors. She finds relief after taking some cold water. She denies accompanying nausea vomiting or diaphoresis. She reports that she is not physically very active due to her back problems and knee pain. Functional capacity status: Moderate, limited due to knee pain and back pain   (Excellent- >10 METs; Good: (7-10 METs); Moderate (4-7 METs); Poor (<= 4 METs)    Any chronic stressors:  None   (feeling of poor health, financial problems, and social isolation etc).     Tobacco or alcohol dependence:  She does not smoke and she does not drink. She quit smoking last year    Current cardiac medications: Cardizem  mg once daily, HCTZ 25 mg once daily, metoprolol succinate 50 mg daily, losartan 100 mg daily, clopidogrel 75 mg daily, ezetimibe 10 mg daily. Most recent blood work is from 4/12/2023:  Sodium 142 potassium 4.5 chloride 100 bicarb 32 BUN 14 creatinine 0.96 GFR 57  Normal LFTs  Triglyceride 167 triglyceride 131 HDL 69 calculated LDL 66  TSH 3.637,  Hemoglobin A1c 5.6  Serum protein electrophoresis October 2022 negative for monoclonal bands. REVIEW OF SYSTEMS   Positive for:  As noted above in HPI  Negative for: All remaining as reviewed below and in HPI. SYSTEM SYMPTOMS REVIEWED:  General--weight change, fever, night sweats  Respiratory--cough, wheezing, shortness of breath, sputum production  Cardiovascular--chest pain, syncope, dyspnea on exertion, edema, decline in exercise tolerance, claudication   Gastrointestinal--persistent vomiting, diarrhea, abdominal distention, blood in stool   Muscular or skeletal--joint pain or swelling   Neurologic--headaches, syncope, abnormal movement  Hematologic--history of easy bruising and bleeding   Endocrine--thyroid enlargement, heat or cold intolerance, polyuria   Psychiatric--anxiety, depression     *-*-*-*-*-*-*-*-*-*-*-*-*-*-*-*-*-*-*-*-*-*-*-*-*-*-*-*-*-*-*-*-*-*-*-*-*-*-*-*-*-*-*-*-*-*-*-*-*-*-*-*-*-*-  VITAL SIGNS     CURRENT VITAL SIGNS:   Vitals:    08/07/23 1333   BP: 126/64   BP Location: Left arm   Patient Position: Sitting   Cuff Size: Large   Pulse: 62   Weight: 80 kg (176 lb 6.4 oz)   Height: 5' 0.55" (1.538 m)       BMI: Body mass index is 33.83 kg/m².     WEIGHTS:   Wt Readings from Last 25 Encounters:   08/07/23 80 kg (176 lb 6.4 oz)   08/07/23 80.7 kg (178 lb)   07/18/23 82.1 kg (181 lb)   05/25/23 81.9 kg (180 lb 9.6 oz)   05/03/23 81.7 kg (180 lb 1.6 oz)   04/25/23 79.8 kg (176 lb)   04/18/23 79.8 kg (176 lb)   03/08/23 79.7 kg (175 lb 9.6 oz)   02/06/23 81.9 kg (180 lb 9.6 oz)   01/23/23 79.3 kg (174 lb 12.8 oz)   01/11/23 81.6 kg (180 lb)   11/22/22 81.7 kg (180 lb 3.2 oz)   11/14/22 79.8 kg (176 lb)   10/20/22 80 kg (176 lb 6.4 oz)   09/29/22 81.3 kg (179 lb 3.7 oz)   09/22/22 78.9 kg (174 lb)   08/09/22 78.5 kg (173 lb)   07/19/22 79.2 kg (174 lb 9.6 oz)   07/14/22 77.1 kg (170 lb)   07/06/22 77.6 kg (171 lb)   06/23/22 77.7 kg (171 lb 3.2 oz)   06/21/22 78 kg (172 lb)   06/03/22 80.3 kg (177 lb)   06/03/22 80.3 kg (177 lb)   05/31/22 77.1 kg (170 lb)        *-*-*-*-*-*-*-*-*-*-*-*-*-*-*-*-*-*-*-*-*-*-*-*-*-*-*-*-*-*-*-*-*-*-*-*-*-*-*-*-*-*-*-*-*-*-*-*-*-*-*-*-*-*-  PHYSICAL EXAM     General Appearance:    Alert, cooperative, no distress, appears stated age,increased BMI   Head, Eyes, ENT:    No obvious abnormality, moist mucous mebranes. Neck:   Supple, no carotid bruit or JVD   Back:     Symmetric, no curvature. Lungs:     Respirations unlabored. Clear to auscultation bilaterally,    Chest wall:    No tenderness or deformity   Heart:    Regular rate and rhythm, S1 and S2 normal, no murmur, rub  or gallop. Abdomen:     Soft, non-tender,    Extremities:   Extremities warm, , there is edema and tenderness of the wrist and fingers. There is trace lower extremity edema. Skin:   No venostatic changes in lower extremities. Normal skin color, texture, and turgor.  No rashes or lesions     *-*-*-*-*-*-*-*-*-*-*-*-*-*-*-*-*-*-*-*-*-*-*-*-*-*-*-*-*-*-*-*-*-*-*-*-*-*-*-*-*-*-*-*-*-*-*-*-*-*-*-*-*-*-  CURRENT MEDICATIONS LIST     Current Outpatient Medications:   •  atorvastatin (LIPITOR) 40 mg tablet, Take 1 tablet (40 mg total) by mouth daily, Disp: 90 tablet, Rfl: 0  •  clopidogrel (PLAVIX) 75 mg tablet, TAKE ONE TABLET BY MOUTH ONCE DAILY, Disp: 90 tablet, Rfl: 0  •  colchicine (COLCRYS) 0.6 mg tablet, Take 0.6 mg by mouth daily, Disp: , Rfl:   •  diltiazem (CARDIZEM CD) 240 mg 24 hr capsule, TAKE ONE CAPSULE BY MOUTH ONCE DAILY, Disp: 90 capsule, Rfl: 0  •  ezetimibe (ZETIA) 10 mg tablet, Take 1 tablet (10 mg total) by mouth daily, Disp: 90 tablet, Rfl: 2  •  folic acid (FOLVITE) 1 mg tablet, TAKE ONE TABLET BY MOUTH ONCE DAILY, Disp: 90 tablet, Rfl: 10  •  hydrochlorothiazide (HYDRODIURIL) 25 mg tablet, TAKE ONE TABLET BY MOUTH ONCE DAILY, Disp: 90 tablet, Rfl: 1  •  levothyroxine 100 mcg tablet, TAKE ONE TABLET BY MOUTH ONCE DAILY IN THE EARLY MORNING, Disp: 90 tablet, Rfl: 2  •  losartan (COZAAR) 100 MG tablet, TAKE ONE TABLET BY MOUTH ONCE DAILY, Disp: 90 tablet, Rfl: 1  •  metoprolol succinate (TOPROL-XL) 50 mg 24 hr tablet, TAKE ONE TABLET BY MOUTH ONCE DAILY, Disp: 90 tablet, Rfl: 2  •  nitroglycerin (NITROSTAT) 0.4 mg SL tablet, Place 1 tablet under the tongue every 5 (five) minutes as needed, Disp: , Rfl:   •  omeprazole (PriLOSEC) 20 mg delayed release capsule, TAKE ONE CAPSULE BY MOUTH ONCE DAILY BEFORE BREAKFEST, Disp: 90 capsule, Rfl: 2  •  Vitamin D, Cholecalciferol, 50 MCG (2000 UT) CAPS, Take 2,000 Int'l Units by mouth daily, Disp: 30 capsule, Rfl: 3  •  methocarbamol (Robaxin-750) 750 mg tablet, Take 1 tablet (750 mg total) by mouth 3 (three) times a day (Patient not taking: Reported on 8/7/2023), Disp: 30 tablet, Rfl: 0  •  pregabalin (LYRICA) 75 mg capsule, Take 75 mg by mouth (Patient not taking: Reported on 5/4/2022), Disp: , Rfl:        ALLERGIES     Allergies   Allergen Reactions   • Codeine        *-*-*-*-*-*-*-*-*-*-*-*-*-*-*-*-*-*-*-*-*-*-*-*-*-*-*-*-*-*-*-*-*-*-*-*-*-*-*-*-*-*-*-*-*-*-*-*-*-*-*-*-*-*-  LABORATORY DATA     Sodium   Date Value Ref Range Status   05/03/2018 139 137 - 147 MEQ/L Final     Sodium   Date Value Ref Range Status   05/03/2018 139 137 - 147 MEQ/L Final     Potassium   Date Value Ref Range Status   04/12/2023 3.5 3.5 - 5.3 mmol/L Final   10/20/2022 3.7 3.5 - 5.3 mmol/L Final   09/29/2022 3.4 (L) 3.5 - 5.3 mmol/L Final   05/03/2018 4.5 3.6 - 5.0 MEQ/L Final     Chloride   Date Value Ref Range Status 04/12/2023 102 96 - 108 mmol/L Final   10/20/2022 102 96 - 108 mmol/L Final   09/29/2022 100 96 - 108 mmol/L Final   05/03/2018 100 97 - 108 MEQ/L Final     CO2   Date Value Ref Range Status   04/12/2023 32 21 - 32 mmol/L Final   10/20/2022 30 21 - 32 mmol/L Final   09/29/2022 32 21 - 32 mmol/L Final   05/03/2018 30 22 - 30 MMOL/L Final     Anion Gap   Date Value Ref Range Status   05/03/2018 8 5 - 14 MMOL/L Final     BUN   Date Value Ref Range Status   04/12/2023 14 5 - 25 mg/dL Final   10/20/2022 16 5 - 25 mg/dL Final   09/29/2022 23 5 - 25 mg/dL Final   05/03/2018 16 5 - 25 MG/DL Final     Creatinine   Date Value Ref Range Status   04/12/2023 0.96 0.60 - 1.30 mg/dL Final     Comment:     Standardized to IDMS reference method   10/20/2022 0.97 0.60 - 1.30 mg/dL Final     Comment:     Standardized to IDMS reference method   09/29/2022 0.74 0.60 - 1.20 mg/dL Final     Comment:     Standardized to IDMS reference method     eGFR   Date Value Ref Range Status   04/12/2023 57 ml/min/1.73sq m Final   10/20/2022 56 ml/min/1.73sq m Final   09/29/2022 78 ml/min/1.73sq m Final     Glucose   Date Value Ref Range Status   05/03/2018 91 70 - 99 MG/DL Final     Calcium   Date Value Ref Range Status   04/12/2023 10.1 8.4 - 10.2 mg/dL Final   10/20/2022 9.8 8.3 - 10.1 mg/dL Final   09/29/2022 9.4 8.4 - 10.2 mg/dL Final   05/03/2018 9.9 8.4 - 10.2 MG/DL Final     AST   Date Value Ref Range Status   04/12/2023 13 13 - 39 U/L Final   10/20/2022 15 5 - 45 U/L Final     Comment:     Specimen collection should occur prior to Sulfasalazine administration due to the potential for falsely depressed results. 03/25/2022 23 14 - 36 U/L Final     Comment:     Specimen collection should occur prior to Sulfasalazine administration due to the potential for falsely depressed results.     05/03/2018 39 (H) 14 - 36 U/L Final     ALT   Date Value Ref Range Status   04/12/2023 9 7 - 52 U/L Final     Comment:     Specimen collection should occur prior to Sulfasalazine administration due to the potential for falsely depressed results. 10/20/2022 19 12 - 78 U/L Final     Comment:     Specimen collection should occur prior to Sulfasalazine administration due to the potential for falsely depressed results. 03/25/2022 17 <35 U/L Final     Comment:     Specimen collection should occur prior to Sulfasalazine administration due to the potential for falsely depressed results.     05/03/2018 51 9 - 52 U/L Final     Alkaline Phosphatase   Date Value Ref Range Status   04/12/2023 44 34 - 104 U/L Final   10/20/2022 49 46 - 116 U/L Final   03/25/2022 55 43 - 122 U/L Final   05/03/2018 63 43 - 122 U/L Final     Total Protein   Date Value Ref Range Status   05/03/2018 7.0 5.9 - 8.4 G/DL Final     Total Bilirubin   Date Value Ref Range Status   05/03/2018 0.4 <1.3 mg/dL Final     WBC   Date Value Ref Range Status   04/12/2023 9.28 4.31 - 10.16 Thousand/uL Final   10/20/2022 13.00 (H) 4.31 - 10.16 Thousand/uL Final   09/29/2022 10.41 (H) 4.31 - 10.16 Thousand/uL Final   05/03/2018 7.7 4.5 - 11.0 K/MCL Final     Hemoglobin   Date Value Ref Range Status   04/12/2023 13.1 11.5 - 15.4 g/dL Final   10/20/2022 13.1 11.5 - 15.4 g/dL Final   09/29/2022 12.9 11.5 - 15.4 g/dL Final   05/03/2018 12.8 12.0 - 16.0 G/DL Final     Platelets   Date Value Ref Range Status   04/12/2023 273 149 - 390 Thousands/uL Final   10/20/2022 232 149 - 390 Thousands/uL Final   09/29/2022 234 149 - 390 Thousands/uL Final   05/03/2018 215 150 - 450 K/MCL Final     PTT   Date Value Ref Range Status   04/27/2019 35 26 - 38 seconds Final     Comment:     Therapeutic Heparin Range =  60-90 seconds     INR   Date Value Ref Range Status   04/27/2019 0.99 0.86 - 1.17 Final     No results found for: "CKMB", "DIGOXIN"  No results found for: "TSH"  Cholesterol   Date Value Ref Range Status   05/03/2018 179 <200 mg/dL Final     Comment:            NCEP ATP III      <200           DESIRABLE   200-239 BORDERLINE HIGH   > = 240                HIGH        Hemoglobin A1C   Date Value Ref Range Status   2023 5.6 Normal 3.8-5.6%; PreDiabetic 5.7-6.4%; Diabetic >=6.5%; Glycemic control for adults with diabetes <7.0% % Final     Urine Culture   Date Value Ref Range Status   2020 80,000-89,000 cfu/ml Escherichia coli ESBL (A)  Final     Comment:     An Extended-Spectrum Beta-Lactamase is being produced by this organism (requires contact precautions). Cephalosporins are NOT effective for treating these organisms. For SEVERE infections (i.e. bacteremia, septic shock, etc.), Carbapenems are the drug of choice. For MILD infections (i.e. isolated urinary or biliary infection), high-dose Zosyn may be used. 2020 <10,000 cfu/ml  Final     Comment:     Mixed Contaminants X2       *-*-*-*-*-*-*-*-*-*-*-*-*-*-*-*-*-*-*-*-*-*-*-*-*-*-*-*-*-*-*-*-*-*-*-*-*-*-*-*-*-*-*-*-*-*-*-*-*-*-*-*-*-*-  PREVIOUS CARDIOLOGY & RADIOLOGY TEST RESULTS   I have personally reviewed pertinent results of cardiovascular tests noted below. Results for orders placed during the hospital encounter of 19    Echo complete with contrast if indicated    Narrative  [address] [place] [address]. [address]  [phone]    Transthoracic Echocardiogram  2D, M-mode, Doppler, and Color Doppler    Study date:  2019    Patient: Raul Gamboa  MR number: [id number]  Account number: [de-identified]  : 10-Stephen-1945  Age: 68 years  Gender: Female  Status: Outpatient  Location: Echo lab  Height: 61 in  Weight: 182 lb  BP: 114/ 68 mmHg    Indications: Coronary artery disease.     Diagnoses: I25.83 - Coronary atherosclerosis due to lipid rich plaque    Sonographer:  Kely Tan RDCS  Primary Physician:  Jasmeet Mcgarry MD  Referring Physician:  Britany Alonzo MD  Group:  [facility]  Interpreting Physician:  DO SANTIAGO Lechuga    LEFT VENTRICLE:  Systolic function was normal. Ejection fraction was estimated to be 65 %. There were no regional wall motion abnormalities. Wall thickness was mildly increased. Doppler parameters were consistent with abnormal left ventricular relaxation (grade 1 diastolic dysfunction). MITRAL VALVE:  There was mild annular calcification. HISTORY: PRIOR HISTORY: Hypertension, Hyperlipidemia, Coronary artery disease. PROCEDURE: The procedure was performed in the echo lab. This was a routine study. The transthoracic approach was used. The study included complete 2D imaging, M-mode, complete spectral Doppler, and color Doppler. The heart rate was 55 bpm,  at the start of the study. Images were obtained from the parasternal, apical, subcostal, and suprasternal notch acoustic windows. Echocardiographic views were limited due to lung interference. This was a technically difficult study. LEFT VENTRICLE: Size was normal. Systolic function was normal. Ejection fraction was estimated to be 65 %. There were no regional wall motion abnormalities. Wall thickness was mildly increased. DOPPLER: Doppler parameters were consistent  with abnormal left ventricular relaxation (grade 1 diastolic dysfunction). RIGHT VENTRICLE: The size was normal. Systolic function was normal. Wall thickness was normal.    LEFT ATRIUM: Size was normal.    RIGHT ATRIUM: Size was normal.    MITRAL VALVE: There was mild annular calcification. DOPPLER: There was no evidence for stenosis. There was no regurgitation. AORTIC VALVE: The valve was trileaflet. Leaflets exhibited mildly increased thickness, normal cuspal separation, and sclerosis. DOPPLER: Transaortic velocity was within the normal range. There was no evidence for stenosis. There was no  regurgitation. TRICUSPID VALVE: The valve structure was normal. There was normal leaflet separation. DOPPLER: The transtricuspid velocity was within the normal range. There was no evidence for stenosis.  There was no regurgitation. PULMONIC VALVE: Leaflets exhibited normal thickness, no calcification, and normal cuspal separation. DOPPLER: The transpulmonic velocity was within the normal range. There was no regurgitation. PERICARDIUM: There was no pericardial effusion. The pericardium was normal in appearance. AORTA: The root exhibited normal size. SYSTEMIC VEINS: IVC: The inferior vena cava was normal in size and course. Respirophasic changes were normal.    PULMONARY ARTERY: DOPPLER: The tricuspid jet envelope definition was inadequate for estimation of RV systolic pressure. There are no indirect findings (abnormal RV volume or geometry, altered pulmonary flow velocity profile, or leftward  septal displacement) which would suggest moderate or severe pulmonary hypertension. SYSTEM MEASUREMENT TABLES    2D  %FS: 41.3 %  Ao Diam: 3 cm  EDV(Teich): 81.4 ml  EF(Teich): 72.5 %  ESV(Teich): 22.4 ml  IVSd: 1.1 cm  LA Area: 10.6 cm2  LA Diam: 3.2 cm  LVEDV MOD A4C: 66.1 ml  LVEF MOD A4C: 70.8 %  LVESV MOD A4C: 19.3 ml  LVIDd: 4.3 cm  LVIDs: 2.5 cm  LVLd A4C: 7.3 cm  LVLs A4C: 5.9 cm  LVPWd: 1 cm  RA Area: 12.3 cm2  RVIDd: 2.5 cm  SV MOD A4C: 46.8 ml  SV(Teich): 59 ml    PW  E': 0.1 m/s  E/E': 8.8  MV A Alexander: 0.9 m/s  MV Dec Cameron: 1.9 m/s2  MV DecT: 269.1 ms  MV E Alexander: 0.5 m/s  MV E/A Ratio: 0.6  MV PHT: 78.1 ms  MVA By PHT: 2.8 cm2    [employer] Accredited Echocardiography [facility]    Prepared and electronically signed by    Dayne Quick DO  Signed 12-Apr-2019 15:57:18    No results found for this or any previous visit. No results found for this or any previous visit. No results found for this or any previous visit. VAS carotid complete study     [facility] REPORT  CLINICAL:  Indications:  6 month surveillance of carotid artery disease. Patient is  asymptomatic at this time. Operative History:  No Cardiovascular Surgeries  Risk Factors: HTN, Hyperlipidemia and CAD.   Clinical  Right Pressure:  144/82 mm Hg, Left Pressure:  144/82 mm Hg. FINDINGS:     Right        PSV  EDV (cm/s)  Ratio    Dist. ICA     80          28   1.62    Mid. ICA      66          24   1.33    Prox. ICA     47          13   0.95    Dist CCA      52          13           Mid CCA       49          14   1.15    Prox CCA      43          11   0.17    Ext Carotid   66           9   1.33    Prox Vert     24           8           Subclavian   229          23           Innominate   256          19              Left         PSV  EDV (cm/s)  Ratio    Dist. ICA     62          20   0.77    Mid. ICA     126          14   1.57    Prox. ICA    165          25   1.94    Dist CCA      60          13           Mid CCA       80          23   1.74    Prox CCA      46          12           Ext Carotid  185          19   2.31    Prox Vert     45          18           Subclavian   116           0                    CONCLUSION:     Impression  RIGHT:  There is <50% stenosis in the internal carotid artery. Plaque is heterogenous  and irregular. Vertebral artery flow is antegrade. There is no significant subclavian artery  disease. LEFT:  There is <50% stenosis in the internal carotid artery. Plaque is heterogenous  and irregular. There is a ulceration vs irregular plaque at the location of mid/distal common  carotid. Vertebral artery flow is antegrade. There is no significant subclavian artery  disease. In comparison to the study of  11/8/2022,  there is no significant change.      SIGNATURE:  Electronically Signed by: Dheeraj Burgos on 2023-05-23 03:45:05 PM        *-*-*-*-*-*-*-*-*-*-*-*-*-*-*-*-*-*-*-*-*-*-*-*-*-*-*-*-*-*-*-*-*-*-*-*-*-*-*-*-*-*-*-*-*-*-*-*-*-*-*-*-*-*-  SIGNATURES:   @NU@   Johnny Hinojosa MD; [facility]    *-*-*-*-*-*-*-*-*-*-*-*-*-*-*-*-*-*-*-*-*-*-*-*-*-*-*-*-*-*-*-*-*-*-*-*-*-*-*-*-*-*-*-*-*-*-*-*-*-*-*-*-*-*-  PAST MEDICAL HISTORY:  Past Medical History:   Diagnosis Date   • Arthritis    • Coronary artery disease    • Disease of thyroid gland    • Hyperlipidemia    • Hypertension     PAST SURGICAL HISTORY:  Past Surgical History:   Procedure Laterality Date   • BACK SURGERY     • BREAST SURGERY     • CARPAL TUNNEL RELEASE     • CHOLECYSTECTOMY     • HYSTERECTOMY     • OOPHORECTOMY     • ORTHOPEDIC SURGERY           FAMILY HISTORY:  Family History   Problem Relation Age of Onset   • No Known Problems Mother    • Lung cancer Father    • Ovarian cancer Sister    • No Known Problems Sister    • No Known Problems Sister    • No Known Problems Maternal Grandmother    • No Known Problems Maternal Grandfather    • No Known Problems Paternal Grandmother    • No Known Problems Paternal Grandfather    • No Known Problems Maternal Aunt    • No Known Problems Paternal Aunt    • No Known Problems Paternal Aunt    • Breast cancer Cousin 39    SOCIAL HISTORY:  Social History     Tobacco Use   Smoking Status Former   • Packs/day: 0.10   • Years: 52.00   • Total pack years: 5.20   • Types: Cigarettes   • Start date: 27   • Quit date: 2020   • Years since quitting: 3.6   • Passive exposure: Never   Smokeless Tobacco Never      Social History     Substance and Sexual Activity   Alcohol Use Not Currently     Social History     Substance and Sexual Activity   Drug Use Never    [unfilled]     *-*-*-*-*-*-*-*-*-*-*-*-*-*-*-*-*-*-*-*-*-*-*-*-*-*-*-*-*-*-*-*-*-*-*-*-*-*-*-*-*-*-*-*-*-*-*-*-*-*-*-*-*-*  ALLERGIES:  Allergies   Allergen Reactions   • Codeine     CURRENT SCHEDULED MEDICATIONS:    Current Outpatient Medications:   •  atorvastatin (LIPITOR) 40 mg tablet, Take 1 tablet (40 mg total) by mouth daily, Disp: 90 tablet, Rfl: 0  •  clopidogrel (PLAVIX) 75 mg tablet, TAKE ONE TABLET BY MOUTH ONCE DAILY, Disp: 90 tablet, Rfl: 0  •  colchicine (COLCRYS) 0.6 mg tablet, Take 0.6 mg by mouth daily, Disp: , Rfl:   •  diltiazem (CARDIZEM CD) 240 mg 24 hr capsule, TAKE ONE CAPSULE BY MOUTH ONCE DAILY, Disp: 90 capsule, Rfl: 0  •  ezetimibe ([place]) 10 mg tablet, Take 1 tablet (10 mg total) by mouth daily, Disp: 90 tablet, Rfl: 2  •  folic acid (FOLVITE) 1 mg tablet, TAKE ONE TABLET BY MOUTH ONCE DAILY, Disp: 90 tablet, Rfl: 10  •  hydrochlorothiazide (HYDRODIURIL) 25 mg tablet, TAKE ONE TABLET BY MOUTH ONCE DAILY, Disp: 90 tablet, Rfl: 1  •  levothyroxine 100 mcg tablet, TAKE ONE TABLET BY MOUTH ONCE DAILY IN THE EARLY MORNING, Disp: 90 tablet, Rfl: 2  •  losartan (COZAAR) 100 MG tablet, TAKE ONE TABLET BY MOUTH ONCE DAILY, Disp: 90 tablet, Rfl: 1  •  metoprolol succinate (TOPROL-XL) 50 mg 24 hr tablet, TAKE ONE TABLET BY MOUTH ONCE DAILY, Disp: 90 tablet, Rfl: 2  •  nitroglycerin (NITROSTAT) 0.4 mg SL tablet, Place 1 tablet under the tongue every 5 (five) minutes as needed, Disp: , Rfl:   •  omeprazole (PriLOSEC) 20 mg delayed release capsule, TAKE ONE CAPSULE BY MOUTH ONCE DAILY BEFORE BREAKFEST, Disp: 90 capsule, Rfl: 2  •  Vitamin D, Cholecalciferol, 50 MCG (2000 UT) CAPS, Take 2,000 Int'l Units by mouth daily, Disp: 30 capsule, Rfl: 3  •  methocarbamol (Robaxin-750) 750 mg tablet, Take 1 tablet (750 mg total) by mouth 3 (three) times a day (Patient not taking: Reported on 8/7/2023), Disp: 30 tablet, Rfl: 0  •  pregabalin (LYRICA) 75 mg capsule, Take 75 mg by mouth (Patient not taking: Reported on 5/4/2022), Disp: , Rfl:      *-*-*-*-*-*-*-*-*-*-*-*-*-*-*-*-*-*-*-*-*-*-*-*-*-*-*-*-*-*-*-*-*-*-*-*-*-*-*-*-*-*-*-*-*-*-*-*-*-*-*-*-*-*

## 2023-08-07 NOTE — PROGRESS NOTES
Name: Sandra Beaulieu      : 1945      MRN: [id number]  Encounter Provider: Cheryle Peon, MD  Encounter Date: 2023   Encounter department: [address] [place] [address] [id number]     1. Other chest pain  Assessment & Plan:  Symptomatic care for the last 4 to 5 months she been complaining from pain in her chest visual gait due to however back noticed on and off sometimes happen and exertion patient known to have history of coronary artery disease EKG in the office has been reviewed  Per patient she has not been taking her statin for a while  Contact and consult cardiology who will be evaluated her in his office today at 2 PM  I recommend the patient to restart taking her atorvastatin  After cardiology evaluated patient we had discussion he recommended to do echocardiogram as a start for work-up and if is abnormal plan for cath  Well pain possible secondary to her musculoskeletal patient already been following up with the pain management and orthopedic and neurosurgeon    Orders:  -     CBC and differential; Future  -     Comprehensive metabolic panel; Future  -     HAMMAD w/Reflex; Future  -     C-reactive protein; Future  -     Lyme Disease Serology w/Reflex; Future  -     Cyclic citrul peptide antibody, IgG; Future  -     Echo stress test, dobutamine; Future; Expected date: 2023  -     Sedimentation rate, automated; Future  -     Uric acid; Future  -     Urinalysis with microscopic    2. Coronary artery disease of native artery of native heart with stable angina pectoris [facility])  Assessment & Plan:  Chronic patient follow-up with the cardiology preoperatively she is already on beta-blocker ARB she is on Plavix and aspirin patient lately has not been taking statin for a while we discussed the patient important compliant with the statin    Orders:  -     atorvastatin (LIPITOR) 40 mg tablet;  Take 1 tablet (40 mg total) by mouth daily  -     CBC and differential; Future  - Comprehensive metabolic panel; Future  -     HAMMAD w/Reflex; Future  -     C-reactive protein; Future  -     Lyme Disease Serology w/Reflex; Future  -     Cyclic citrul peptide antibody, IgG; Future  -     Echo stress test, dobutamine; Future; Expected date: 08/07/2023  -     Sedimentation rate, automated; Future  -     Uric acid; Future  -     Urinalysis with microscopic    3. Cervical spinal stenosis  Assessment & Plan:  Chronic symptomatic patient already evaluated by neurosurgeon no recommendation for surgical intervention and no neurological problem patient symptom is getting worse recommend the patient to call her pain management for possible injection      4. Vitamin D deficiency  -     Vitamin D, Cholecalciferol, 50 MCG (2000 UT) CAPS; Take 2,000 Int'l Units by mouth daily  -     CBC and differential; Future  -     Comprehensive metabolic panel; Future  -     HAMMAD w/Reflex; Future  -     C-reactive protein; Future  -     Lyme Disease Serology w/Reflex; Future  -     Cyclic citrul peptide antibody, IgG; Future  -     Echo stress test, dobutamine; Future; Expected date: 08/07/2023  -     Sedimentation rate, automated; Future  -     Uric acid; Future  -     Urinalysis with microscopic    5. Mixed hyperlipidemia  -     Lipid Panel with Direct LDL reflex; Future  -     HAMMAD w/Reflex; Future  -     C-reactive protein; Future  -     Lyme Disease Serology w/Reflex; Future  -     Cyclic citrul peptide antibody, IgG; Future  -     Echo stress test, dobutamine; Future; Expected date: 08/07/2023  -     Sedimentation rate, automated; Future  -     Uric acid; Future  -     Urinalysis with microscopic    6. Screening for thyroid disorder  -     TSH, 3rd generation with Free T4 reflex; Future  -     HAMMAD w/Reflex; Future  -     C-reactive protein; Future  -     Lyme Disease Serology w/Reflex; Future  -     Cyclic citrul peptide antibody, IgG; Future  -     Echo stress test, dobutamine;  Future; Expected date: 08/07/2023  - Sedimentation rate, automated; Future  -     Uric acid; Future  -     Urinalysis with microscopic    7. Post-menopausal  -     DXA bone density spine hip and pelvis; Future; Expected date: 08/07/2023    8. Need for pneumococcal 20-valent conjugate vaccination  Assessment & Plan:  Benefit versus side effect reviewed with the patient and her     Orders:  -     Pneumococcal Conjugate Vaccine 20-valent (Pcv20)    9. Stage 3a chronic kidney disease [facility])  Assessment & Plan:  Lab Results   Component Value Date    EGFR 57 04/12/2023    EGFR 56 10/20/2022    EGFR 78 09/29/2022    CREATININE 0.96 04/12/2023    CREATININE 0.97 10/20/2022    CREATININE 0.74 09/29/2022   Chronic asymptomatic GFR stable secondary to the pain patient has been taking some ibuprofen discussed with patient avoid NSAID recommended Tylenol for the pain I recommend to follow-up with the pain management if pain not better with Tylenol      10. Polyarthropathy  Assessment & Plan:  Multiple joint pain recent history of elevated C-reactive protein elevated ESR recommend to recheck ESR C-reactive protein uric acid anti-CCP HAMMAD      11. Class 1 obesity due to excess calories with serious comorbidity and body mass index (BMI) of 33.0 to 33.9 in adult  Assessment & Plan:  BMI today 33.83 discussed with patient portion control low-carb low-fat diet and increase physical activity        BMI Counseling: Body mass index is 34.13 kg/m². The BMI is above normal. Nutrition recommendations include decreasing portion sizes, decreasing fast food intake and limiting drinks that contain sugar. Exercise recommendations include exercising 3-5 times per week. No pharmacotherapy was ordered. Rationale for BMI follow-up plan is due to patient being overweight or obese. Depression Screening and Follow-up Plan: Patient was screened for depression during today's encounter. They screened negative with a PHQ-2 score of 0.         Subjective      Patient here new in my office with her  to establish care and patient concerned about 4 to 5 months history of pain in her chest generalized radiated to her back it comes and goes and sometimes it happens on exertion and sometimes to happen while she sleeps and no cough no wheezing no hematosis patient known to have history of coronary artery disease hyperlipidemia hypertension also patient known to have history of cervical spine stenosis and she been evaluated previously by pain management who recommended to see a neurosurgeon consult from neurosurgeon reviewed with the patient and her  who did not recommend any surgical intervention patient still having pain for which she been taking ibuprofen but with patient chronic kidney disease is supposed not to take it reviewed with the patient and her  her record including past medical surgical family and social history and medication    Review of Systems   Constitutional: Negative for chills and fever. HENT: Negative for ear pain and sore throat. Eyes: Negative for pain and visual disturbance. Respiratory: Negative for cough and shortness of breath. Cardiovascular: Positive for chest pain. Negative for palpitations. Gastrointestinal: Negative for abdominal pain, constipation, diarrhea and vomiting. Genitourinary: Negative for dysuria and hematuria. Musculoskeletal: Positive for arthralgias, back pain and neck pain. Skin: Negative for color change and rash. Neurological: Negative for seizures and syncope. All other systems reviewed and are negative.       Current Outpatient Medications on File Prior to Visit   Medication Sig   • clopidogrel (PLAVIX) 75 mg tablet TAKE ONE TABLET BY MOUTH ONCE DAILY   • colchicine (COLCRYS) 0.6 mg tablet Take 0.6 mg by mouth daily   • diltiazem (CARDIZEM CD) 240 mg 24 hr capsule TAKE ONE CAPSULE BY MOUTH ONCE DAILY   • ezetimibe (ZETIA) 10 mg tablet Take 1 tablet (10 mg total) by mouth daily   • folic acid (FOLVITE) 1 mg tablet TAKE ONE TABLET BY MOUTH ONCE DAILY   • hydrochlorothiazide (HYDRODIURIL) 25 mg tablet TAKE ONE TABLET BY MOUTH ONCE DAILY   • levothyroxine 100 mcg tablet TAKE ONE TABLET BY MOUTH ONCE DAILY IN THE EARLY MORNING   • losartan (COZAAR) 100 MG tablet TAKE ONE TABLET BY MOUTH ONCE DAILY   • metoprolol succinate (TOPROL-XL) 50 mg 24 hr tablet TAKE ONE TABLET BY MOUTH ONCE DAILY   • omeprazole (PriLOSEC) 20 mg delayed release capsule TAKE ONE CAPSULE BY MOUTH ONCE DAILY BEFORE BREAKFEST   • methocarbamol (Robaxin-750) 750 mg tablet Take 1 tablet (750 mg total) by mouth 3 (three) times a day (Patient not taking: Reported on 8/7/2023)   • nitroglycerin (NITROSTAT) 0.4 mg SL tablet Place 1 tablet under the tongue every 5 (five) minutes as needed   • pregabalin (LYRICA) 75 mg capsule Take 75 mg by mouth (Patient not taking: Reported on 5/4/2022)       Objective     /60 (BP Location: Left arm, Patient Position: Sitting)   Pulse 67   Temp (!) 96.5 °F (35.8 °C) (Tympanic)   Ht 5' 0.55" (1.538 m)   Wt 80.7 kg (178 lb)   SpO2 96%   BMI 34.13 kg/m²     Physical Exam  Constitutional:       General: She is not in acute distress. Appearance: She is well-developed. She is not diaphoretic. HENT:      Head: Normocephalic. Right Ear: External ear normal.      Left Ear: External ear normal.      Nose: No rhinorrhea. Mouth/Throat:      Pharynx: No posterior oropharyngeal erythema. Eyes:      General:         Right eye: No discharge. Left eye: No discharge. Conjunctiva/sclera: Conjunctivae normal.   Neck:      Vascular: No JVD. Cardiovascular:      Rate and Rhythm: Normal rate and regular rhythm. Heart sounds: Murmur heard. No gallop. Pulmonary:      Effort: Pulmonary effort is normal. No respiratory distress. Breath sounds: Normal breath sounds. No stridor. No wheezing or rales. Chest:      Chest wall: No tenderness. Abdominal:      General: There is no distension. Palpations: Abdomen is soft. There is no mass. Tenderness: There is no abdominal tenderness. There is no rebound. Musculoskeletal:      Cervical back: Normal range of motion and neck supple. Tenderness present. No signs of trauma or bony tenderness. Thoracic back: Tenderness present. No signs of trauma or bony tenderness. Lumbar back: Tenderness present. No signs of trauma or bony tenderness. Lymphadenopathy:      Cervical: No cervical adenopathy. Skin:     General: Skin is warm. Findings: No erythema or rash. Neurological:      Mental Status: She is alert and oriented to person, place, and time.        Cade Post MD

## 2023-08-08 PROBLEM — R70.0 ELEVATED SED RATE: Status: ACTIVE | Noted: 2022-10-19

## 2023-08-08 PROBLEM — M11.89 PSEUDOGOUT INVOLVING MULTIPLE JOINTS: Status: ACTIVE | Noted: 2023-01-12

## 2023-08-08 PROBLEM — M25.511 CHRONIC PAIN OF BOTH SHOULDERS: Status: ACTIVE | Noted: 2022-10-19

## 2023-08-08 PROBLEM — M25.512 CHRONIC PAIN OF BOTH SHOULDERS: Status: ACTIVE | Noted: 2022-10-19

## 2023-08-08 PROBLEM — Z23 NEED FOR PNEUMOCOCCAL 20-VALENT CONJUGATE VACCINATION: Status: ACTIVE | Noted: 2023-08-08

## 2023-08-08 PROBLEM — R79.82 ELEVATED C-REACTIVE PROTEIN (CRP): Status: ACTIVE | Noted: 2022-10-19

## 2023-08-08 PROBLEM — G89.29 CHRONIC PAIN OF BOTH SHOULDERS: Status: ACTIVE | Noted: 2022-10-19

## 2023-08-08 PROBLEM — M13.0 POLYARTHROPATHY: Status: ACTIVE | Noted: 2022-10-19

## 2023-08-08 PROBLEM — E66.811 CLASS 1 OBESITY DUE TO EXCESS CALORIES WITH SERIOUS COMORBIDITY AND BODY MASS INDEX (BMI) OF 33.0 TO 33.9 IN ADULT: Status: ACTIVE | Noted: 2023-08-08

## 2023-08-08 PROBLEM — E66.09 CLASS 1 OBESITY DUE TO EXCESS CALORIES WITH SERIOUS COMORBIDITY AND BODY MASS INDEX (BMI) OF 33.0 TO 33.9 IN ADULT: Status: ACTIVE | Noted: 2023-08-08

## 2023-08-08 NOTE — ASSESSMENT & PLAN NOTE
Chronic symptomatic patient already evaluated by neurosurgeon no recommendation for surgical intervention and no neurological problem patient symptom is getting worse recommend the patient to call her pain management for possible injection

## 2023-08-08 NOTE — ASSESSMENT & PLAN NOTE
Symptomatic care for the last 4 to 5 months she been complaining from pain in her chest visual gait due to however back noticed on and off sometimes happen and exertion patient known to have history of coronary artery disease EKG in the office has been reviewed  Per patient she has not been taking her statin for a while  Contact and consult cardiology who will be evaluated her in his office today at 2 PM  I recommend the patient to restart taking her atorvastatin  After cardiology evaluated patient we had discussion he recommended to do echocardiogram as a start for work-up and if is abnormal plan for cath  Well pain possible secondary to her musculoskeletal patient already been following up with the pain management and orthopedic and neurosurgeon

## 2023-08-08 NOTE — ASSESSMENT & PLAN NOTE
Lab Results   Component Value Date    EGFR 57 04/12/2023    EGFR 56 10/20/2022    EGFR 78 09/29/2022    CREATININE 0.96 04/12/2023    CREATININE 0.97 10/20/2022    CREATININE 0.74 09/29/2022   Chronic asymptomatic GFR stable secondary to the pain patient has been taking some ibuprofen discussed with patient avoid NSAID recommended Tylenol for the pain I recommend to follow-up with the pain management if pain not better with Tylenol

## 2023-08-08 NOTE — ASSESSMENT & PLAN NOTE
Chronic patient follow-up with the cardiology preoperatively she is already on beta-blocker ARB she is on Plavix and aspirin patient lately has not been taking statin for a while we discussed the patient important compliant with the statin

## 2023-08-08 NOTE — PATIENT INSTRUCTIONS
CARDIOLOGY ASSESSMENT & PLAN     1. Other chest pain  POCT ECG    Echo follow up/limited w/ contrast if indicated      2. Stenosis of left carotid artery        3. Cervical radiculopathy        4. Thoracic radiculopathy        5. Stage 3a chronic kidney disease (HCC)        6. Cervical spinal stenosis        7. Dyslipidemia          Other chest pain  Ms. Jenifer Morton is continuing to experience on and off jaw pain and neck pain and chest pain. Description of the pain is not typical for angina. There are no accompanying symptoms and there is no consistency of the pain with physical activity. She does have significant cervical stenosis and radiculopathy. Her blood pressure is well controlled. She had extensive testing including a nuclear stress test last year which did not identify evidence of ischemia. Her left vein function is normal.  She is on good medical regimen. -- At this time I am requesting a limited echocardiogram to reassess left ventricular function. If this is noted to be concerning or abnormal then will consider cardiac catheterization. -- For now I am advising her to continue current medications and to keep a diary of symptoms. -- I am encouraging her to try to be active and exercise. -- I have reached out to her primary care physician to consider  work-up for possible chronic pain syndrome versus fibromyalgia versus pain related to severe degenerative joint disease of the spine. -- I will plan to see her back again in 3 months time. If she continues to experience and other work-up  is negative  or if there are abnormalities on echocardiogram then the neck step would be a cardiac catheterization. -- Dietary and medical compliance are reinforced. -- She is advised  to report any concerning symptoms such as chest pain, shortness of breath, decline in exercise tolerance or presyncope/syncope.

## 2023-08-08 NOTE — ASSESSMENT & PLAN NOTE
Multiple joint pain recent history of elevated C-reactive protein elevated ESR recommend to recheck ESR C-reactive protein uric acid anti-CCP HAMMAD

## 2023-08-08 NOTE — ASSESSMENT & PLAN NOTE
BMI today 33.83 discussed with patient portion control low-carb low-fat diet and increase physical activity

## 2023-08-08 NOTE — ASSESSMENT & PLAN NOTE
Ms. Mirella Kimbrough is continuing to experience on and off jaw pain and neck pain and chest pain. Description of the pain is not typical for angina. There are no accompanying symptoms and there is no consistency of the pain with physical activity. She does have significant cervical stenosis and radiculopathy. Her blood pressure is well controlled. She had extensive testing including a nuclear stress test last year which did not identify evidence of ischemia. Her left vein function is normal.  She is on good medical regimen. -- At this time I am requesting a limited echocardiogram to reassess left ventricular function. If this is noted to be concerning or abnormal then will consider cardiac catheterization. -- For now I am advising her to continue current medications and to keep a diary of symptoms. -- I am encouraging her to try to be active and exercise. -- I have reached out to her primary care physician to consider  work-up for possible chronic pain syndrome versus fibromyalgia versus pain related to severe degenerative joint disease of the spine. -- I will plan to see her back again in 3 months time. If she continues to experience and other work-up  is negative  or if there are abnormalities on echocardiogram then the neck step would be a cardiac catheterization. -- Dietary and medical compliance are reinforced. -- She is advised  to report any concerning symptoms such as chest pain, shortness of breath, decline in exercise tolerance or presyncope/syncope.

## 2023-08-16 ENCOUNTER — TELEPHONE (OUTPATIENT)
Dept: FAMILY MEDICINE CLINIC | Facility: CLINIC | Age: 78
End: 2023-08-16

## 2023-08-16 NOTE — TELEPHONE ENCOUNTER
----- Message from Rae Hernandez MD sent at 8/8/2023 12:40 PM EDT -----  To cancel her stress test ,per Cardiology will order just echo and he already order it

## 2023-08-18 ENCOUNTER — HOSPITAL ENCOUNTER (OUTPATIENT)
Dept: NON INVASIVE DIAGNOSTICS | Facility: HOSPITAL | Age: 78
Discharge: HOME | End: 2023-08-18
Attending: INTERNAL MEDICINE
Payer: MEDICARE

## 2023-08-18 VITALS
DIASTOLIC BLOOD PRESSURE: 64 MMHG | HEIGHT: 60 IN | SYSTOLIC BLOOD PRESSURE: 126 MMHG | HEART RATE: 62 BPM | WEIGHT: 176 LBS | BODY MASS INDEX: 34.55 KG/M2

## 2023-08-18 DIAGNOSIS — R07.89 OTHER CHEST PAIN: ICD-10-CM

## 2023-08-18 PROCEDURE — 93308 TTE F-UP OR LMTD: CPT

## 2023-08-18 PROCEDURE — 93325 DOPPLER ECHO COLOR FLOW MAPG: CPT

## 2023-08-18 PROCEDURE — 93321 DOPPLER ECHO F-UP/LMTD STD: CPT

## 2023-08-20 LAB
AORTIC ROOT: 2.9 CM
APICAL FOUR CHAMBER EJECTION FRACTION: 76 %
E WAVE DECELERATION TIME: 256 MS
FRACTIONAL SHORTENING: 37 % (ref 28–44)
INTERVENTRICULAR SEPTUM IN DIASTOLE (PARASTERNAL SHORT AXIS VIEW): 1 CM
INTERVENTRICULAR SEPTUM: 1 CM (ref 0.6–1.1)
LEFT ATRIUM SIZE: 3.5 CM
LEFT INTERNAL DIMENSION IN SYSTOLE: 2.7 CM (ref 2.1–4)
LEFT VENTRICULAR INTERNAL DIMENSION IN DIASTOLE: 4.3 CM (ref 3.5–6)
LEFT VENTRICULAR POSTERIOR WALL IN END DIASTOLE: 1 CM
LEFT VENTRICULAR STROKE VOLUME: 57 ML
LVSV (TEICH): 57 ML
MV E'TISSUE VEL-SEP: 6 CM/S
MV PEAK A VEL: 1.03 M/S
MV PEAK E VEL: 83 CM/S
MV STENOSIS PRESSURE HALF TIME: 74 MS
MV VALVE AREA P 1/2 METHOD: 2.97 CM2
SL CV PED ECHO LEFT VENTRICLE DIASTOLIC VOLUME (MOD BIPLANE) 2D: 83 ML
SL CV PED ECHO LEFT VENTRICLE SYSTOLIC VOLUME (MOD BIPLANE) 2D: 27 ML

## 2023-08-20 PROCEDURE — 93321 DOPPLER ECHO F-UP/LMTD STD: CPT | Performed by: INTERNAL MEDICINE

## 2023-08-20 PROCEDURE — 93308 TTE F-UP OR LMTD: CPT | Performed by: INTERNAL MEDICINE

## 2023-08-20 PROCEDURE — 93325 DOPPLER ECHO COLOR FLOW MAPG: CPT | Performed by: INTERNAL MEDICINE

## 2023-09-01 ENCOUNTER — APPOINTMENT (OUTPATIENT)
Dept: LAB | Facility: HOSPITAL | Age: 78
End: 2023-09-01
Payer: MEDICARE

## 2023-09-01 DIAGNOSIS — M25.511 CHRONIC PAIN OF BOTH SHOULDERS: ICD-10-CM

## 2023-09-01 DIAGNOSIS — R07.89 OTHER CHEST PAIN: ICD-10-CM

## 2023-09-01 DIAGNOSIS — M25.512 CHRONIC PAIN OF BOTH SHOULDERS: ICD-10-CM

## 2023-09-01 DIAGNOSIS — G89.29 CHRONIC PAIN OF BOTH SHOULDERS: ICD-10-CM

## 2023-09-01 DIAGNOSIS — R70.0 ELEVATED SED RATE: ICD-10-CM

## 2023-09-01 DIAGNOSIS — M19.90 INFLAMMATORY ARTHRITIS: ICD-10-CM

## 2023-09-01 DIAGNOSIS — Z13.29 SCREENING FOR THYROID DISORDER: ICD-10-CM

## 2023-09-01 DIAGNOSIS — E78.2 MIXED HYPERLIPIDEMIA: ICD-10-CM

## 2023-09-01 DIAGNOSIS — M79.89 SWELLING OF BOTH HANDS: ICD-10-CM

## 2023-09-01 DIAGNOSIS — E55.9 VITAMIN D DEFICIENCY: ICD-10-CM

## 2023-09-01 DIAGNOSIS — M13.0 POLYARTHROPATHY: ICD-10-CM

## 2023-09-01 DIAGNOSIS — M11.89 PSEUDOGOUT INVOLVING MULTIPLE JOINTS: ICD-10-CM

## 2023-09-01 DIAGNOSIS — M79.672 FOOT PAIN, BILATERAL: ICD-10-CM

## 2023-09-01 DIAGNOSIS — R20.2 NUMBNESS AND TINGLING IN BOTH HANDS: ICD-10-CM

## 2023-09-01 DIAGNOSIS — R20.0 NUMBNESS AND TINGLING IN BOTH HANDS: ICD-10-CM

## 2023-09-01 DIAGNOSIS — R79.82 ELEVATED C-REACTIVE PROTEIN: ICD-10-CM

## 2023-09-01 DIAGNOSIS — I25.118 CORONARY ARTERY DISEASE OF NATIVE ARTERY OF NATIVE HEART WITH STABLE ANGINA PECTORIS (HCC): ICD-10-CM

## 2023-09-01 DIAGNOSIS — M79.671 FOOT PAIN, BILATERAL: ICD-10-CM

## 2023-09-01 LAB
ALBUMIN SERPL BCP-MCNC: 4.3 G/DL (ref 3.5–5)
ALP SERPL-CCNC: 48 U/L (ref 34–104)
ALT SERPL W P-5'-P-CCNC: 20 U/L (ref 7–52)
ANA SER QL IA: NEGATIVE
ANION GAP SERPL CALCULATED.3IONS-SCNC: 8 MMOL/L
AST SERPL W P-5'-P-CCNC: 18 U/L (ref 13–39)
B BURGDOR IGG+IGM SER QL IA: NEGATIVE
BACTERIA UR QL AUTO: ABNORMAL /HPF
BASOPHILS # BLD AUTO: 0.09 THOUSANDS/ÂΜL (ref 0–0.1)
BASOPHILS NFR BLD AUTO: 1 % (ref 0–1)
BILIRUB SERPL-MCNC: 0.57 MG/DL (ref 0.2–1)
BILIRUB UR QL STRIP: NEGATIVE
BUN SERPL-MCNC: 20 MG/DL (ref 5–25)
CALCIUM SERPL-MCNC: 10 MG/DL (ref 8.4–10.2)
CHLORIDE SERPL-SCNC: 101 MMOL/L (ref 96–108)
CHOLEST SERPL-MCNC: 110 MG/DL
CLARITY UR: ABNORMAL
CO2 SERPL-SCNC: 31 MMOL/L (ref 21–32)
COLOR UR: YELLOW
CREAT SERPL-MCNC: 0.85 MG/DL (ref 0.6–1.3)
CRP SERPL QL: 1.7 MG/L
EOSINOPHIL # BLD AUTO: 0.18 THOUSAND/ÂΜL (ref 0–0.61)
EOSINOPHIL NFR BLD AUTO: 3 % (ref 0–6)
ERYTHROCYTE [DISTWIDTH] IN BLOOD BY AUTOMATED COUNT: 13.2 % (ref 11.6–15.1)
ERYTHROCYTE [SEDIMENTATION RATE] IN BLOOD: 10 MM/HOUR (ref 0–29)
GFR SERPL CREATININE-BSD FRML MDRD: 66 ML/MIN/1.73SQ M
GLUCOSE P FAST SERPL-MCNC: 90 MG/DL (ref 65–99)
GLUCOSE UR STRIP-MCNC: NEGATIVE MG/DL
HCT VFR BLD AUTO: 42.1 % (ref 34.8–46.1)
HDLC SERPL-MCNC: 62 MG/DL
HGB BLD-MCNC: 13.2 G/DL (ref 11.5–15.4)
HGB UR QL STRIP.AUTO: 10
IMM GRANULOCYTES # BLD AUTO: 0.03 THOUSAND/UL (ref 0–0.2)
IMM GRANULOCYTES NFR BLD AUTO: 0 % (ref 0–2)
KETONES UR STRIP-MCNC: NEGATIVE MG/DL
LDLC SERPL CALC-MCNC: 28 MG/DL (ref 0–100)
LEUKOCYTE ESTERASE UR QL STRIP: NEGATIVE
LYMPHOCYTES # BLD AUTO: 2.65 THOUSANDS/ÂΜL (ref 0.6–4.47)
LYMPHOCYTES NFR BLD AUTO: 37 % (ref 14–44)
MCH RBC QN AUTO: 27 PG (ref 26.8–34.3)
MCHC RBC AUTO-ENTMCNC: 31.4 G/DL (ref 31.4–37.4)
MCV RBC AUTO: 86 FL (ref 82–98)
MONOCYTES # BLD AUTO: 0.66 THOUSAND/ÂΜL (ref 0.17–1.22)
MONOCYTES NFR BLD AUTO: 9 % (ref 4–12)
MUCOUS THREADS UR QL AUTO: ABNORMAL
NEUTROPHILS # BLD AUTO: 3.65 THOUSANDS/ÂΜL (ref 1.85–7.62)
NEUTS SEG NFR BLD AUTO: 50 % (ref 43–75)
NITRITE UR QL STRIP: NEGATIVE
NON-SQ EPI CELLS URNS QL MICRO: ABNORMAL /HPF
NRBC BLD AUTO-RTO: 0 /100 WBCS
PH UR STRIP.AUTO: 6.5 [PH]
PLATELET # BLD AUTO: 252 THOUSANDS/UL (ref 149–390)
PMV BLD AUTO: 11.7 FL (ref 8.9–12.7)
POTASSIUM SERPL-SCNC: 3.4 MMOL/L (ref 3.5–5.3)
PROT SERPL-MCNC: 6.9 G/DL (ref 6.4–8.4)
PROT UR STRIP-MCNC: NEGATIVE MG/DL
RBC # BLD AUTO: 4.88 MILLION/UL (ref 3.81–5.12)
RBC #/AREA URNS AUTO: ABNORMAL /HPF
SODIUM SERPL-SCNC: 140 MMOL/L (ref 135–147)
SP GR UR STRIP.AUTO: 1.01 (ref 1–1.04)
TRIGL SERPL-MCNC: 102 MG/DL
TSH SERPL DL<=0.05 MIU/L-ACNC: 1.1 UIU/ML (ref 0.45–4.5)
URATE SERPL-MCNC: 5.6 MG/DL (ref 2–7.5)
UROBILINOGEN UA: NEGATIVE MG/DL
WBC # BLD AUTO: 7.26 THOUSAND/UL (ref 4.31–10.16)
WBC #/AREA URNS AUTO: ABNORMAL /HPF

## 2023-09-01 PROCEDURE — 86225 DNA ANTIBODY NATIVE: CPT

## 2023-09-01 PROCEDURE — 84550 ASSAY OF BLOOD/URIC ACID: CPT

## 2023-09-01 PROCEDURE — 80061 LIPID PANEL: CPT

## 2023-09-01 PROCEDURE — 86618 LYME DISEASE ANTIBODY: CPT

## 2023-09-01 PROCEDURE — 36415 COLL VENOUS BLD VENIPUNCTURE: CPT

## 2023-09-01 PROCEDURE — 84443 ASSAY THYROID STIM HORMONE: CPT

## 2023-09-01 PROCEDURE — 86430 RHEUMATOID FACTOR TEST QUAL: CPT

## 2023-09-01 PROCEDURE — 86140 C-REACTIVE PROTEIN: CPT

## 2023-09-01 PROCEDURE — 85652 RBC SED RATE AUTOMATED: CPT

## 2023-09-01 PROCEDURE — 86200 CCP ANTIBODY: CPT

## 2023-09-01 PROCEDURE — 86038 ANTINUCLEAR ANTIBODIES: CPT

## 2023-09-01 PROCEDURE — 85025 COMPLETE CBC W/AUTO DIFF WBC: CPT

## 2023-09-01 PROCEDURE — 86235 NUCLEAR ANTIGEN ANTIBODY: CPT

## 2023-09-01 PROCEDURE — 80053 COMPREHEN METABOLIC PANEL: CPT

## 2023-09-02 LAB
ANA TITR SER IF: NEGATIVE {TITER}
DSDNA AB SER-ACNC: <1 IU/ML (ref 0–9)
ENA RNP AB SER-ACNC: <0.2 AI (ref 0–0.9)
ENA SCL70 AB SER-ACNC: <0.2 AI (ref 0–0.9)
ENA SM AB SER-ACNC: <0.2 AI (ref 0–0.9)
ENA SS-A AB SER-ACNC: <0.2 AI (ref 0–0.9)
ENA SS-B AB SER-ACNC: <0.2 AI (ref 0–0.9)
RHEUMATOID FACT SER QL LA: NEGATIVE

## 2023-09-07 ENCOUNTER — OFFICE VISIT (OUTPATIENT)
Dept: FAMILY MEDICINE CLINIC | Facility: CLINIC | Age: 78
End: 2023-09-07
Payer: MEDICARE

## 2023-09-07 VITALS
TEMPERATURE: 95.1 F | SYSTOLIC BLOOD PRESSURE: 130 MMHG | OXYGEN SATURATION: 97 % | HEART RATE: 65 BPM | DIASTOLIC BLOOD PRESSURE: 80 MMHG | BODY MASS INDEX: 34.08 KG/M2 | HEIGHT: 60 IN | WEIGHT: 173.6 LBS

## 2023-09-07 DIAGNOSIS — I10 ESSENTIAL HYPERTENSION: ICD-10-CM

## 2023-09-07 DIAGNOSIS — R79.82 ELEVATED C-REACTIVE PROTEIN (CRP): ICD-10-CM

## 2023-09-07 DIAGNOSIS — E03.9 ACQUIRED HYPOTHYROIDISM: Primary | ICD-10-CM

## 2023-09-07 DIAGNOSIS — E78.5 DYSLIPIDEMIA: ICD-10-CM

## 2023-09-07 DIAGNOSIS — Z23 NEED FOR IMMUNIZATION AGAINST INFLUENZA: ICD-10-CM

## 2023-09-07 PROCEDURE — G0008 ADMIN INFLUENZA VIRUS VAC: HCPCS | Performed by: FAMILY MEDICINE

## 2023-09-07 PROCEDURE — 99214 OFFICE O/P EST MOD 30 MIN: CPT | Performed by: FAMILY MEDICINE

## 2023-09-07 PROCEDURE — 90662 IIV NO PRSV INCREASED AG IM: CPT | Performed by: FAMILY MEDICINE

## 2023-09-07 RX ORDER — PREDNISONE 5 MG/1
5 TABLET ORAL DAILY
COMMUNITY
Start: 2023-08-21

## 2023-09-07 NOTE — ASSESSMENT & PLAN NOTE
Patient follow-up with dermatology.   Lesions seen recently started on prednisone 5 mg for 1 month tolerated well

## 2023-09-07 NOTE — ASSESSMENT & PLAN NOTE
Chronic asymptomatic well-controlled blood pressure today 140/82 I rechecked it proximally 130/80 discussed with patient continue current management low-salt diet discussed

## 2023-09-07 NOTE — PROGRESS NOTES
Name: Lydia Perez      : 1945      MRN: [id number]  Encounter Provider: Ericka Adame MD  Encounter Date: 2023   Encounter department: [address] [place] [address]     1. Acquired hypothyroidism  Assessment & Plan:  Chronic asymptomatic TSH is normal we will continue with the levothyroxine 100 mcg once a day    Orders:  -     Basic metabolic panel; Future    2. Essential hypertension  Assessment & Plan:  Chronic asymptomatic well-controlled blood pressure today 140/82 I rechecked it proximally 130/80 discussed with patient continue current management low-salt diet discussed    Orders:  -     Basic metabolic panel; Future    3. Need for immunization against influenza  -     FLUZONE HIGH-DOSE: influenza vaccine, high-dose, preservative-free 0.7 mL    4. Elevated C-reactive protein (CRP)  Assessment & Plan:  Patient follow-up with dermatology. Lesions seen recently started on prednisone 5 mg for 1 month tolerated well      5. Dyslipidemia  Assessment & Plan:  Chronic fair control continue with atorvastatin 40 mg once a day low-fat diet discussed with patient             Subjective      Patient here follow-up with a chronic condition compliant with the medication tolerated well recent blood work discussed with the patient  Since been seen last time patient already evaluated by cardiology record review also she been evaluated by rheumatology recently and she is currently on prednisone 5 mg tolerated well    Review of Systems   Constitutional: Negative for chills and fever. HENT: Negative for ear pain and sore throat. Eyes: Negative for pain and visual disturbance. Respiratory: Negative for cough and shortness of breath. Cardiovascular: Negative for chest pain and palpitations. Gastrointestinal: Negative for abdominal pain, constipation, diarrhea and vomiting. Genitourinary: Negative for dysuria and hematuria.    Skin: Negative for color change and rash.   Neurological: Negative for seizures and syncope. Hematological: Does not bruise/bleed easily. All other systems reviewed and are negative. Current Outpatient Medications on File Prior to Visit   Medication Sig   • atorvastatin (LIPITOR) 40 mg tablet Take 1 tablet (40 mg total) by mouth daily   • clopidogrel (PLAVIX) 75 mg tablet TAKE ONE TABLET BY MOUTH ONCE DAILY   • colchicine (COLCRYS) 0.6 mg tablet Take 0.6 mg by mouth daily   • diltiazem (CARDIZEM CD) 240 mg 24 hr capsule TAKE ONE CAPSULE BY MOUTH ONCE DAILY   • ezetimibe (ZETIA) 10 mg tablet Take 1 tablet (10 mg total) by mouth daily   • folic acid (FOLVITE) 1 mg tablet TAKE ONE TABLET BY MOUTH ONCE DAILY   • hydrochlorothiazide (HYDRODIURIL) 25 mg tablet TAKE ONE TABLET BY MOUTH ONCE DAILY   • levothyroxine 100 mcg tablet TAKE ONE TABLET BY MOUTH ONCE DAILY IN THE EARLY MORNING   • losartan (COZAAR) 100 MG tablet TAKE ONE TABLET BY MOUTH ONCE DAILY   • methocarbamol (Robaxin-750) 750 mg tablet Take 1 tablet (750 mg total) by mouth 3 (three) times a day   • metoprolol succinate (TOPROL-XL) 50 mg 24 hr tablet TAKE ONE TABLET BY MOUTH ONCE DAILY   • omeprazole (PriLOSEC) 20 mg delayed release capsule TAKE ONE CAPSULE BY MOUTH ONCE DAILY BEFORE BREAKFEST   • predniSONE 5 mg tablet Take 5 mg by mouth daily   • Vitamin D, Cholecalciferol, 50 MCG (2000 UT) CAPS Take 2,000 Int'l Units by mouth daily   • nitroglycerin (NITROSTAT) 0.4 mg SL tablet Place 1 tablet under the tongue every 5 (five) minutes as needed (Patient not taking: Reported on 9/7/2023)   • pregabalin (LYRICA) 75 mg capsule Take 75 mg by mouth (Patient not taking: Reported on 5/4/2022)       Objective     /80   Pulse 65   Temp (!) 95.1 °F (35.1 °C) (Tympanic)   Ht 5' (1.524 m)   Wt 78.7 kg (173 lb 9.6 oz)   SpO2 97%   BMI 33.90 kg/m²     Physical Exam  Vitals and nursing note reviewed. Constitutional:       General: She is not in acute distress.      Appearance: She is well-developed. HENT:      Head: Normocephalic and atraumatic. Right Ear: Tympanic membrane normal. There is no impacted cerumen. Left Ear: Tympanic membrane normal. There is no impacted cerumen. Nose: No rhinorrhea. Mouth/Throat:      Pharynx: No posterior oropharyngeal erythema. Eyes:      General:         Right eye: No discharge. Left eye: No discharge. Conjunctiva/sclera: Conjunctivae normal.   Neck:      Vascular: No JVD. Cardiovascular:      Rate and Rhythm: Normal rate and regular rhythm. Heart sounds: Murmur heard. No friction rub. No gallop. Pulmonary:      Effort: Pulmonary effort is normal.      Breath sounds: Normal breath sounds. Abdominal:      General: Bowel sounds are normal.      Palpations: Abdomen is soft. Tenderness: There is no abdominal tenderness. Skin:     Findings: No erythema or rash. Neurological:      Mental Status: She is oriented to person, place, and time.        Rosio Gustafson MD

## 2023-09-07 NOTE — ASSESSMENT & PLAN NOTE
Chronic fair control continue with atorvastatin 40 mg once a day low-fat diet discussed with patient

## 2023-09-09 LAB — CCP AB SER IA-ACNC: 0.5

## 2023-09-13 DIAGNOSIS — R68.84 JAW PAIN: Primary | ICD-10-CM

## 2023-09-14 ENCOUNTER — CONSULT (OUTPATIENT)
Dept: NEUROLOGY | Facility: CLINIC | Age: 78
End: 2023-09-14
Payer: MEDICARE

## 2023-09-14 VITALS
WEIGHT: 175.2 LBS | SYSTOLIC BLOOD PRESSURE: 130 MMHG | HEART RATE: 65 BPM | BODY MASS INDEX: 34.4 KG/M2 | DIASTOLIC BLOOD PRESSURE: 78 MMHG | HEIGHT: 60 IN

## 2023-09-14 DIAGNOSIS — M54.12 CERVICAL RADICULOPATHY: Primary | ICD-10-CM

## 2023-09-14 DIAGNOSIS — R20.0 SENSORY LOSS: ICD-10-CM

## 2023-09-14 DIAGNOSIS — M54.14 THORACIC RADICULOPATHY: ICD-10-CM

## 2023-09-14 DIAGNOSIS — R68.84 JAW PAIN, NON-TMJ: ICD-10-CM

## 2023-09-14 PROCEDURE — 99204 OFFICE O/P NEW MOD 45 MIN: CPT | Performed by: STUDENT IN AN ORGANIZED HEALTH CARE EDUCATION/TRAINING PROGRAM

## 2023-09-14 RX ORDER — PREGABALIN 150 MG/1
150 CAPSULE ORAL DAILY
Qty: 90 CAPSULE | Refills: 3 | Status: SHIPPED | OUTPATIENT
Start: 2023-09-14

## 2023-09-14 NOTE — PATIENT INSTRUCTIONS
Patient Instructions:  -schedule MRI brain without contrast  -increase lyrica up to 150 mg once daily  -follow up in about 6 months

## 2023-09-14 NOTE — PROGRESS NOTES
[facility] Consult  PATIENT:  Franchesca Olivia  MRN:  [id number]  :  1945  DATE OF SERVICE:  2023  REFERRED BY: Johnny Hinojosa MD  PMD: Amos Jordan MD    Assessment/Plan:     Franchesca Olivia is a very pleasant 69 y.o. female with a past medical history that includes hypothyrodism, HTN, cervical and thoracic radiculopathy, CKD, HLD who presents for evaluation to r/o possible trigeminal neuralgia. Cervical radiculopathy  Thoracic radiculopathy  Atypical facial pain  -pt describes pain which radiates from her bilateral jaws down into her neck and middle of her back. sxs likely secondary to her known cervical and thoracic radiculopathy  -increase lyrica up to 150 mg once daily    L hemibody numbness  -exam notable today for decreased sensation to light touch and pinprick over the entire L hemibody. She denies any known history of stroke or other CNS pathology  -MRI brain w/wo for further workup    CC:   Neck pain    History of Present Illness:     69 y.o. female with a past medical history that includes hypothyrodism, HTN, cervical and thoracic radiculopathy, CKD, HLD who presents for evaluation to r/o possible trigeminal neuralgia. Daughter provided translation over the phone today. She presents as a referral from her cardiologist with concern for possible trigeminal neuralgia. She describes sharp, shooting pain that starts from her bilateral jaws, radiates down her back and at times can curve around to the front of her chest. This can occur both when she is awake and can also wake her up from sleep. Symptoms are typically brief and usually last a few minutes. Denies focal weakness associated with these symptoms. She takes robaxin, lyrica and nsaids for her symptoms when she has them. PT and SUMMRE have been ineffective for her symptoms and she is not interested in further injections at this time. She denies any preceding fall or injury.  Pain can happen while she is in any position including sitting and laying, and with any type of activity. She was seen by neurosurgery previously for these symptoms. MRI cervical/thoracic spine were performed previously which showed multilevel cervical spondylosis most notable at C5-C6. Thoracic MRI showed similar multilevel degenerative changes with moderate canal stenosis with contact of the cord at T9-10. She was determined not to be a surgical candidate and conservative management was recommended.      Past Medical History:     Past Medical History:   Diagnosis Date   • Arthritis    • Coronary artery disease    • Disease of thyroid gland    • Hyperlipidemia    • Hypertension        Patient Active Problem List   Diagnosis   • Essential hypertension   • Coronary artery disease of native artery of native heart with stable angina pectoris (HCC)   • Dyslipidemia   • Stenosis of left carotid artery   • Peripheral vascular disease, unspecified (HCC)   • Cervical radiculopathy   • Lumbar post-laminectomy syndrome   • Stage 3a chronic kidney disease (HCC)   • Cervical spinal stenosis   • Thoracic radiculopathy   • Other chest pain   • Chronic pain of both shoulders   • Elevated C-reactive protein (CRP)   • Elevated sed rate   • Herpes zoster   • Hypothyroidism   • Osteoarthritis of knee   • Polyarthropathy   • Pseudogout involving multiple joints   • Class 1 obesity due to excess calories with serious comorbidity and body mass index (BMI) of 33.0 to 33.9 in adult   • Need for pneumococcal 20-valent conjugate vaccination       Medications:      Current Outpatient Medications   Medication Sig Dispense Refill   • atorvastatin (LIPITOR) 40 mg tablet Take 1 tablet (40 mg total) by mouth daily 90 tablet 0   • clopidogrel (PLAVIX) 75 mg tablet TAKE ONE TABLET BY MOUTH ONCE DAILY 90 tablet 0   • colchicine (COLCRYS) 0.6 mg tablet Take 0.6 mg by mouth daily     • diltiazem (CARDIZEM CD) 240 mg 24 hr capsule TAKE ONE CAPSULE BY MOUTH ONCE DAILY 90 capsule 0   • ezetimibe (ZETIA) 10 mg tablet Take 1 tablet (10 mg total) by mouth daily 90 tablet 2   • folic acid (FOLVITE) 1 mg tablet TAKE ONE TABLET BY MOUTH ONCE DAILY 90 tablet 12   • hydrochlorothiazide (HYDRODIURIL) 25 mg tablet TAKE ONE TABLET BY MOUTH ONCE DAILY 90 tablet 1   • levothyroxine 100 mcg tablet TAKE ONE TABLET BY MOUTH ONCE DAILY IN THE EARLY MORNING 90 tablet 2   • losartan (COZAAR) 100 MG tablet TAKE ONE TABLET BY MOUTH ONCE DAILY 90 tablet 0   • methocarbamol (Robaxin-750) 750 mg tablet Take 1 tablet (750 mg total) by mouth 3 (three) times a day 30 tablet 0   • metoprolol succinate (TOPROL-XL) 50 mg 24 hr tablet TAKE ONE TABLET BY MOUTH ONCE DAILY 90 tablet 2   • omeprazole (PriLOSEC) 20 mg delayed release capsule TAKE ONE CAPSULE BY MOUTH ONCE DAILY BEFORE BREAKFEST 90 capsule 2   • predniSONE 5 mg tablet Take 5 mg by mouth daily     • pregabalin (LYRICA) 75 mg capsule Take 75 mg by mouth     • Vitamin D, Cholecalciferol, 50 MCG (2000 UT) CAPS Take 2,000 Int'l Units by mouth daily 30 capsule 3   • nitroglycerin (NITROSTAT) 0.4 mg SL tablet Place 1 tablet under the tongue every 5 (five) minutes as needed (Patient not taking: Reported on 9/7/2023)       No current facility-administered medications for this visit. Allergies:       Allergies   Allergen Reactions   • Codeine        Family History:     Family History   Problem Relation Age of Onset   • No Known Problems Mother    • Lung cancer Father    • Ovarian cancer Sister    • No Known Problems Sister    • No Known Problems Sister    • No Known Problems Maternal Grandmother    • No Known Problems Maternal Grandfather    • No Known Problems Paternal Grandmother    • No Known Problems Paternal Grandfather    • No Known Problems Maternal Aunt    • No Known Problems Paternal Aunt    • No Known Problems Paternal Aunt    • Breast cancer Cousin 39       Social History:       Social History     Socioeconomic History   • Marital status: /Civil Union     Spouse name: Not on file   • Number of children: Not on file   • Years of education: Not on file   • Highest education level: Not on file   Occupational History   • Not on file   Tobacco Use   • Smoking status: Former     Packs/day: 0.10     Years: 52.00     Total pack years: 5.20     Types: Cigarettes     Start date: 27     Quit date: 2020     Years since quitting: 3.7     Passive exposure: Never   • Smokeless tobacco: Never   Vaping Use   • Vaping Use: Never used   Substance and Sexual Activity   • Alcohol use: Not Currently   • Drug use: Never   • Sexual activity: Not on file   Other Topics Concern   • Not on file   Social History Narrative   • Not on file     Social Determinants of Health     Financial Resource Strain: Not on file   Food Insecurity: Not on file   Transportation Needs: Not on file   Physical Activity: Not on file   Stress: Not on file   Social Connections: Not on file   Intimate Partner Violence: Not on file   Housing Stability: Not on file         Objective:   /78 (BP Location: Left arm, Patient Position: Sitting, Cuff Size: Standard)   Pulse 65   Ht 5' (1.524 m)   Wt 79.5 kg (175 lb 3.2 oz)   BMI 34.22 kg/m²     General: Patient is not in any acute/apparent distress, well nourished, well developed and cooperative. HEENT: normocephalic, atraumatic, moist membranes  Neck: supple  Extremities: no edema noted   Skin: no lesions or rash  Musculosketal: no bony abnormalities    Neurologic Examination:   Mental status: alert, awake, oriented X 3 and following commands. Speech/Language: Speech is fluent without any dysarthria, no aphasia noted, can name, comprehension intact    Cranial Nerves:   CN I: smell not tested  CN II: Visual fields full to confrontation  CN III, IV, VI: Extraocular movements intact bilaterally. Pupils equal round and reactive to light bilaterally. CN V: Facial sensation is normal.  CN VII: Full and symmetric facial movement.   CN VIII: Hearing is normal.  CN IX, X: Palate elevates symmetrically. CN XI: Shoulder shrug strength is normal.  CN XII: Tongue midline without atrophy or fasciculations. Motor:   Strength 5/5 in all 4 extremities. Bulk/tone - normal.  Fasiculations - none    Sensory:   Sensation intact to soft touch in all 4 extremities. Cerebellar:   Finger-to-nose intact  Reflexes: 2+ in all 4 extremities  Pathologic reflexes - babinski reflex negative    Gait:   Normal gait, able to tandem walk, Romberg sign negative       Imaging:   MRI cervical spine  FINDINGS:     ALIGNMENT: There is mild reversal of the normal cervical lordosis. There is no significant spondylolisthesis.     MARROW SIGNAL: There is no suspicious marrow signal abnormality identified. There is Modic type II endplate generative change at C5-C6.     CERVICAL AND VISUALIZED THORACIC CORD:  Normal signal within the visualized cord.     PREVERTEBRAL AND PARASPINAL SOFT TISSUES: Resolution of previously seen fluid adjacent to the spinous processes from C5-C7.     VISUALIZED POSTERIOR FOSSA:  The visualized posterior fossa demonstrates no abnormal signal.     CERVICAL DISC SPACES:     C2-C3:  Normal.     C3-C4: Tiny central disc protrusion without significant canal stenosis or foraminal narrowing.     C4-C5: Central disc protrusion. Mild to moderate canal stenosis with mild cord compression. No significant foraminal narrowing. Findings are stable.     C5-C6: Disc degeneration and narrowing. Disc osteophyte complex with uncovertebral spurring. Severe canal stenosis with underlying cord compression. Findings are stable. Severe left and mild right foraminal narrowing.     C6-C7: Bulging annulus facet arthrosis. No significant canal stenosis or foraminal narrowing. .     C7-T1:  Normal.     UPPER THORACIC DISC SPACES:  Normal.     OTHER FINDINGS:  None.     IMPRESSION:  Multilevel cervical spondylosis, as described above.     Most notable level is at C5-C6 where multifactorial disease results in overall severe canal stenosis with underlying cord compression. Severe left foraminal narrowing is present at this level. No definite intrinsic cord signal abnormality identified.     Resolution of previously seen inflammatory enthesopathy from C5-C7 adjacent to the spinous processes. MRI thoracic spine  FINDINGS:     ALIGNMENT: Normal alignment of the thoracic spine. No compression fracture. No subluxation. No scoliosis.     MARROW SIGNAL:  Normal marrow signal is identified within the visualized bony structures. No discrete marrow lesion.     THORACIC CORD: Normal signal within the thoracic cord.     PARAVERTEBRAL SOFT TISSUES:  Normal.     THORACIC DEGENERATIVE CHANGE: There is multilevel ligamentum flavum thickening with mass effect on the dorsal aspect of the thecal sac at T3-T4, T4-T5, T8-T9 and T10-T11. There is mild to moderate left foraminal narrowing at T8-T9 and T10-T11.     At T9-T10 there is a bulging annulus. There is ligamentum flavum thickening. There is moderate canal stenosis with effacement of the ventral thecal sac and contact of the cord. Findings were evaluated on axial T2 weighted imaging. There is mild to   moderate bilateral foraminal narrowing at this level.     There is multilevel ventral osteophytosis from T4-T5 through T8-T9.     OTHER FINDINGS: There is a partially imaged left renal cyst as well as distention of the left renal pelvis. Patient is status post reported cholecystectomy with biliary ductal dilatation. Findings were described on outside CT report.     IMPRESSION:     Degenerative changes of the thoracic spine, as described above.     Most notable level is at T9-T10 where multifactorial disease results in overall moderate canal stenosis with contact of the cord.        Review of Systems:     ROS:    Review of Systems   Constitutional: Negative for appetite change, fatigue and fever. HENT: Positive for hearing loss (Little bit), tinnitus and trouble swallowing (Little bit). Negative for voice change. Eyes: Negative. Negative for photophobia, pain and visual disturbance. Respiratory: Negative. Negative for shortness of breath. Cardiovascular: Negative. Negative for palpitations. Sometimes she has tightness in her chest   Gastrointestinal: Negative. Negative for nausea and vomiting. Endocrine: Negative. Negative for cold intolerance. Genitourinary: Negative. Negative for dysuria, frequency and urgency. Musculoskeletal: Negative for back pain, gait problem, myalgias and neck pain. Skin: Negative. Negative for rash. Allergic/Immunologic: Negative. Neurological: Negative. Negative for dizziness, tremors, seizures, syncope, facial asymmetry, speech difficulty, weakness, light-headedness, numbness and headaches. Hands legs and arms pain and swelling  Pain in her jaw and down her neck into her chest   Hematological: Negative. Does not bruise/bleed easily. Psychiatric/Behavioral: Negative. Negative for confusion, hallucinations and sleep disturbance. I have spent 47 minutes with Patient on 9/14/23 in which greater than 50% of this time was spent in counseling/coordination of care regarding Risks and benefits of tx options, Instructions for management, Patient and family education, Risk factor reductions and Impressions.  I also spent 155 minutes non face to face for this patient on on 9/14/23

## 2023-09-21 ENCOUNTER — TELEPHONE (OUTPATIENT)
Dept: FAMILY MEDICINE CLINIC | Facility: CLINIC | Age: 78
End: 2023-09-21

## 2023-09-21 DIAGNOSIS — R79.82 ELEVATED C-REACTIVE PROTEIN: Primary | ICD-10-CM

## 2023-09-22 NOTE — TELEPHONE ENCOUNTER
Patient  called in to see if Dr. David Parham would place referral for rheumatologist. Patient is currently seeing [facility] and would like to switch to [facility]. Please advise.
Per Dr. Lupe Chávez. Order placed for rheumatology. Called and spoke with patient  and advised.
numerical 0-10

## 2023-09-26 ENCOUNTER — OFFICE VISIT (OUTPATIENT)
Dept: RHEUMATOLOGY | Facility: CLINIC | Age: 78
End: 2023-09-26
Payer: MEDICARE

## 2023-09-26 VITALS
DIASTOLIC BLOOD PRESSURE: 70 MMHG | SYSTOLIC BLOOD PRESSURE: 122 MMHG | HEART RATE: 58 BPM | HEIGHT: 60 IN | OXYGEN SATURATION: 97 % | BODY MASS INDEX: 34.44 KG/M2 | WEIGHT: 175.4 LBS

## 2023-09-26 DIAGNOSIS — M25.552 BILATERAL HIP PAIN: Primary | ICD-10-CM

## 2023-09-26 DIAGNOSIS — R79.82 ELEVATED C-REACTIVE PROTEIN: ICD-10-CM

## 2023-09-26 DIAGNOSIS — M19.90 INFLAMMATORY ARTHRITIS: ICD-10-CM

## 2023-09-26 DIAGNOSIS — M25.551 BILATERAL HIP PAIN: Primary | ICD-10-CM

## 2023-09-26 DIAGNOSIS — M11.20 CALCIUM PYROPHOSPHATE DEPOSITION DISEASE (CPPD): ICD-10-CM

## 2023-09-26 PROCEDURE — 99204 OFFICE O/P NEW MOD 45 MIN: CPT | Performed by: INTERNAL MEDICINE

## 2023-09-26 RX ORDER — COLCHICINE 0.6 MG/1
0.6 TABLET ORAL DAILY
Qty: 90 TABLET | Refills: 2 | Status: SHIPPED | OUTPATIENT
Start: 2023-09-26

## 2023-09-26 RX ORDER — PREDNISONE 5 MG/1
5 TABLET ORAL DAILY
Qty: 90 TABLET | Refills: 1 | Status: SHIPPED | OUTPATIENT
Start: 2023-09-26

## 2023-09-26 RX ORDER — HYDROXYCHLOROQUINE SULFATE 200 MG/1
200 TABLET, FILM COATED ORAL
Qty: 90 TABLET | Refills: 3 | Status: SHIPPED | OUTPATIENT
Start: 2023-09-26 | End: 2024-09-26

## 2023-09-26 NOTE — PROGRESS NOTES
This is a Rheumatology Consult seen at the request of Dr. Finn Soler      HPI: Roberto Cornejo is a 68 y/o female who presents for further evaluation CPPD, inflammatory arthritis. She has past medical history CAD, HLD, HTN    She is transferring care from [facility]. She has has chronic pain, swelling and stiffness small joints hands and feet > 1 year duration. Per notes [facility] rheumatology she was diagnosed CPPD and seronegative inflammatory arthritis    Maintained on baseline 5 mg and colchicine 0.6 daily. She has had episodes pain, swelling stiffness wrists. She is steroid responsive but symptoms have recurred when she has tried to d/c prednisone    HCQ was considered last visit and she would like to try that. Serologies including HAMMAD, RF, CCP ESR and CRP normal (inflammatory markers elevated in the past)        --------------------------------------------------------------------------------------------------------        ROS:      - for: Fevers, Chills or sweats. No HAs or scalp tenderness. No jaw claudication. No acute visual or eye changes. No dry eyes. No auditory complaints. No oral lesions or ulcers. No dry mouth. No sore throat or cough. No chest pains or palpitations. No shortness of breath, dyspnea on exertion or wheezing. No hemotpysis. No abdominal pain, GERD symptoms, diarrhea or constipation. No urinary complaints. No numbness, tingling or weakness. No rashes.     All other ROS was reviewed and negative except as above         --------------------------------------------------------------------------------------------------------    Past Medical History    Past Medical History:   Diagnosis Date   • Arthritis    • Coronary artery disease    • Disease of thyroid gland    • Hyperlipidemia    • Hypertension            Past Surgical History    Past Surgical History:   Procedure Laterality Date   • BACK SURGERY     • BREAST SURGERY     • CARPAL TUNNEL RELEASE     • CHOLECYSTECTOMY     • HYSTERECTOMY     • OOPHORECTOMY     • ORTHOPEDIC SURGERY             Family History    Family History   Problem Relation Age of Onset   • No Known Problems Mother    • Lung cancer Father    • Ovarian cancer Sister    • No Known Problems Sister    • No Known Problems Sister    • No Known Problems Maternal Grandmother    • No Known Problems Maternal Grandfather    • No Known Problems Paternal Grandmother    • No Known Problems Paternal Grandfather    • No Known Problems Maternal Aunt    • No Known Problems Paternal Aunt    • No Known Problems Paternal Aunt    • Breast cancer Cousin 39            Social History    Social History     Tobacco Use   • Smoking status: Former     Packs/day: 0.10     Years: 52.00     Total pack years: 5.20     Types: Cigarettes     Start date: 1967     Quit date: 2020     Years since quitting: 3.7     Passive exposure: Never   • Smokeless tobacco: Never   Vaping Use   • Vaping Use: Never used   Substance Use Topics   • Alcohol use: Not Currently   • Drug use: Never         Allergies    Allergies   Allergen Reactions   • Codeine          Medications    Current Outpatient Medications   Medication Instructions   • atorvastatin (LIPITOR) 40 mg, Oral, Daily   • clopidogrel (PLAVIX) 75 mg tablet TAKE ONE TABLET BY MOUTH ONCE DAILY   • colchicine (COLCRYS) 0.6 mg, Oral, Daily   • diltiazem (CARDIZEM CD) 240 mg 24 hr capsule TAKE ONE CAPSULE BY MOUTH ONCE DAILY   • ezetimibe (ZETIA) 10 mg, Oral, Daily   • folic acid (FOLVITE) 1 mg tablet TAKE ONE TABLET BY MOUTH ONCE DAILY   • hydrochlorothiazide (HYDRODIURIL) 25 mg tablet TAKE ONE TABLET BY MOUTH ONCE DAILY   • levothyroxine 100 mcg tablet TAKE ONE TABLET BY MOUTH ONCE DAILY IN THE EARLY MORNING   • losartan (COZAAR) 100 MG tablet TAKE ONE TABLET BY MOUTH ONCE DAILY   • methocarbamol (ROBAXIN-750) 750 mg, Oral, 3 times daily   • metoprolol succinate (TOPROL-XL) 50 mg 24 hr tablet TAKE ONE TABLET BY MOUTH ONCE DAILY   • nitroglycerin (NITROSTAT) 0.4 mg SL tablet 1 tablet, Every 5 minutes PRN   • omeprazole (PriLOSEC) 20 mg delayed release capsule TAKE ONE CAPSULE BY MOUTH ONCE DAILY BEFORE BREAKFEST   • predniSONE 5 mg, Oral, Daily   • pregabalin (LYRICA) 150 mg, Oral, Daily   • Vitamin D, Cholecalciferol, 50 MCG (2000 UT) CAPS 2,000 Int'l Units, Oral, Daily          Physical Exam    /70   Pulse 58   Ht 5' (1.524 m)   Wt 79.6 kg (175 lb 6.4 oz)   SpO2 97%   BMI 34.26 kg/m²     GEN: AAO, No apparent distress. Patient is well developed. HEENT:  Pupils are equal, round and reactive. Sclera are clear. Fundoscopic exam is normal.  External ears are without lesions. Oral pharynx is clear of ulcers or other lesions. MMM. NECK:  Supple. There is no adenopathy appreciable in anterior or posterior cervical chains or supraclavicularly. JVP is normal.    HEART: Regular rate and rhythm. There is no appreciable murmur, gallop or rub. LUNGS: Clear to auscultation. ABD:  Soft, without tenderness, rebound or guarding. No appreciable organomegally. NEURO: Speech and cognition are normal.  Strength is 5/5 throughout. Tone is normal.  DTRs are 2/4 at the knees, ankles and elbows.   Gait is normal.  SKIN: There are no rashes or lesions    MUSCULOSKELETAL:   + LE edema      ________________________________________________________________________          Results Review    Component      Latest Ref Rng 9/1/2023   ANTI DNA DOUBLE STRANDED      0 - 9 IU/mL <1    CYCLIC CITRULLINATED PEPTIDE ANTIBODY      See comment  0.5    RHEUMATOID FACTOR      Negative  Negative    C-REACTIVE PROTEIN      <3.0 mg/L 1.7    Sed Rate      0 - 29 mm/hour 10    Lyme total antibody      Negative  Negative    HAMMAD      Negative  Negative              Impressions     CPPD  Seronegative inflammatory arthritis  LE edema    Plans:    Eulalio Benavides is a 68 y/o female transitioning care from rheumatology at [facility]    She has has chronic pain, swelling and stiffness small joints hands and feet > 1 year duration. Per notes [facility] rheumatology she was diagnosed CPPD and seronegative inflammatory arthritis    She has been on varying doses of prednisone and HCQ was discussed last visit    Add     Continue colchine for now    Can d/c baseline prednisone 5 mg daily in 2-3 months    Reviewed labs    Check Hip x-rays, refer to Orthopedics    RTC 6 months        CC:     Thank you for involving me in this patient's care.         Nahid Raza MD  Department of Rheumatology  [address] [facility]

## 2023-10-11 ENCOUNTER — HOSPITAL ENCOUNTER (OUTPATIENT)
Dept: MRI IMAGING | Facility: HOSPITAL | Age: 78
Discharge: HOME | End: 2023-10-11
Attending: STUDENT IN AN ORGANIZED HEALTH CARE EDUCATION/TRAINING PROGRAM
Payer: MEDICARE

## 2023-10-11 DIAGNOSIS — R20.0 SENSORY LOSS: ICD-10-CM

## 2023-10-11 PROCEDURE — 70553 MRI BRAIN STEM W/O & W/DYE: CPT

## 2023-10-11 PROCEDURE — G1004 CDSM NDSC: HCPCS

## 2023-10-11 PROCEDURE — A9585 GADOBUTROL INJECTION: HCPCS | Performed by: STUDENT IN AN ORGANIZED HEALTH CARE EDUCATION/TRAINING PROGRAM

## 2023-10-11 RX ORDER — GADOBUTROL 604.72 MG/ML
7 INJECTION INTRAVENOUS
Status: COMPLETED | OUTPATIENT
Start: 2023-10-11 | End: 2023-10-11

## 2023-10-11 RX ADMIN — GADOBUTROL 7 ML: 604.72 INJECTION INTRAVENOUS at 09:52

## 2023-10-15 ENCOUNTER — HOSPITAL ENCOUNTER (EMERGENCY)
Facility: HOSPITAL | Age: 78
Discharge: HOME | End: 2023-10-15
Attending: EMERGENCY MEDICINE | Admitting: EMERGENCY MEDICINE
Payer: MEDICARE

## 2023-10-15 VITALS
OXYGEN SATURATION: 99 % | TEMPERATURE: 98.6 F | WEIGHT: 176.9 LBS | BODY MASS INDEX: 34.55 KG/M2 | SYSTOLIC BLOOD PRESSURE: 112 MMHG | HEART RATE: 55 BPM | RESPIRATION RATE: 18 BRPM | DIASTOLIC BLOOD PRESSURE: 61 MMHG

## 2023-10-15 DIAGNOSIS — T23.002A BURN OF LEFT HAND: Primary | ICD-10-CM

## 2023-10-15 PROCEDURE — 90715 TDAP VACCINE 7 YRS/> IM: CPT | Performed by: EMERGENCY MEDICINE

## 2023-10-15 RX ORDER — GINSENG 100 MG
1 CAPSULE ORAL ONCE
Status: COMPLETED | OUTPATIENT
Start: 2023-10-15 | End: 2023-10-15

## 2023-10-15 RX ORDER — MORPHINE SULFATE 4 MG/ML
4 INJECTION, SOLUTION INTRAMUSCULAR; INTRAVENOUS ONCE
Status: COMPLETED | OUTPATIENT
Start: 2023-10-15 | End: 2023-10-15

## 2023-10-15 RX ORDER — OXYCODONE HYDROCHLORIDE AND ACETAMINOPHEN 5; 325 MG/1; MG/1
1 TABLET ORAL EVERY 6 HOURS PRN
Qty: 20 TABLET | Refills: 0 | Status: SHIPPED | OUTPATIENT
Start: 2023-10-15

## 2023-10-15 RX ADMIN — BACITRACIN 1 LARGE APPLICATION: 500 OINTMENT TOPICAL at 15:24

## 2023-10-15 RX ADMIN — TETANUS TOXOID, REDUCED DIPHTHERIA TOXOID AND ACELLULAR PERTUSSIS VACCINE, ADSORBED 0.5 ML: 5; 2.5; 8; 8; 2.5 SUSPENSION INTRAMUSCULAR at 12:48

## 2023-10-15 RX ADMIN — MORPHINE SULFATE 4 MG: 4 INJECTION, SOLUTION INTRAMUSCULAR; INTRAVENOUS at 12:48
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
## 2023-10-16 DIAGNOSIS — I10 ESSENTIAL HYPERTENSION: ICD-10-CM

## 2023-10-16 RX ORDER — METOPROLOL SUCCINATE 50 MG/1
50 TABLET, EXTENDED RELEASE ORAL DAILY
Qty: 90 TABLET | Refills: 0 | Status: SHIPPED | OUTPATIENT
Start: 2023-10-16

## 2023-10-24 DIAGNOSIS — I25.118 CORONARY ARTERY DISEASE OF NATIVE ARTERY OF NATIVE HEART WITH STABLE ANGINA PECTORIS (HCC): ICD-10-CM

## 2023-10-24 RX ORDER — ATORVASTATIN CALCIUM 40 MG/1
40 TABLET, FILM COATED ORAL DAILY
Qty: 90 TABLET | Refills: 0 | Status: SHIPPED | OUTPATIENT
Start: 2023-10-24 | End: 2023-10-30

## 2023-10-30 DIAGNOSIS — I25.118 CORONARY ARTERY DISEASE OF NATIVE ARTERY OF NATIVE HEART WITH STABLE ANGINA PECTORIS (HCC): ICD-10-CM

## 2023-10-30 RX ORDER — ATORVASTATIN CALCIUM 40 MG/1
40 TABLET, FILM COATED ORAL DAILY
Qty: 90 TABLET | Refills: 0 | Status: SHIPPED | OUTPATIENT
Start: 2023-10-30

## 2023-11-06 DIAGNOSIS — E55.9 VITAMIN D DEFICIENCY: ICD-10-CM

## 2023-11-07 RX ORDER — ACETAMINOPHEN 160 MG
2000 TABLET,DISINTEGRATING ORAL DAILY
Qty: 30 CAPSULE | Refills: 2 | Status: SHIPPED | OUTPATIENT
Start: 2023-11-07

## 2023-12-08 ENCOUNTER — HOSPITAL ENCOUNTER (OUTPATIENT)
Dept: BONE DENSITY | Facility: CLINIC | Age: 78
End: 2023-12-08
Payer: MEDICARE

## 2023-12-08 DIAGNOSIS — Z78.0 POST-MENOPAUSAL: ICD-10-CM

## 2023-12-08 PROCEDURE — 77080 DXA BONE DENSITY AXIAL: CPT

## 2023-12-13 ENCOUNTER — HOSPITAL ENCOUNTER (OUTPATIENT)
Dept: BONE DENSITY | Facility: CLINIC | Age: 78
Discharge: HOME | End: 2023-12-13

## 2023-12-13 DIAGNOSIS — Z13.820 SCREENING FOR OSTEOPOROSIS: ICD-10-CM

## 2023-12-28 ENCOUNTER — TELEPHONE (OUTPATIENT)
Dept: RHEUMATOLOGY | Facility: CLINIC | Age: 78
End: 2023-12-28

## 2023-12-28 NOTE — TELEPHONE ENCOUNTER
LM for them letting them know there is a template change in 2024 for scheduling and their appointment was moved on 3/26/24 from 10 to 11 and if they have questions to please reach out to the office.

## 2024-01-03 ENCOUNTER — APPOINTMENT (OUTPATIENT)
Dept: LAB | Facility: HOSPITAL | Age: 79
End: 2024-01-03
Payer: MEDICARE

## 2024-01-03 DIAGNOSIS — I10 ESSENTIAL HYPERTENSION: ICD-10-CM

## 2024-01-03 DIAGNOSIS — E03.9 ACQUIRED HYPOTHYROIDISM: ICD-10-CM

## 2024-01-03 LAB
ANION GAP SERPL CALCULATED.3IONS-SCNC: 10 MMOL/L
BUN SERPL-MCNC: 15 MG/DL (ref 5–25)
CALCIUM SERPL-MCNC: 10.2 MG/DL (ref 8.4–10.2)
CHLORIDE SERPL-SCNC: 102 MMOL/L (ref 96–108)
CO2 SERPL-SCNC: 29 MMOL/L (ref 21–32)
CREAT SERPL-MCNC: 0.96 MG/DL (ref 0.6–1.3)
GFR SERPL CREATININE-BSD FRML MDRD: 57 ML/MIN/1.73SQ M
GLUCOSE P FAST SERPL-MCNC: 79 MG/DL (ref 65–99)
POTASSIUM SERPL-SCNC: 3.5 MMOL/L (ref 3.5–5.3)
SODIUM SERPL-SCNC: 141 MMOL/L (ref 135–147)

## 2024-01-03 PROCEDURE — 80048 BASIC METABOLIC PNL TOTAL CA: CPT

## 2024-01-03 PROCEDURE — 36415 COLL VENOUS BLD VENIPUNCTURE: CPT

## 2024-01-08 ENCOUNTER — OFFICE VISIT (OUTPATIENT)
Dept: FAMILY MEDICINE CLINIC | Facility: CLINIC | Age: 79
End: 2024-01-08
Payer: MEDICARE

## 2024-01-08 VITALS
HEART RATE: 64 BPM | OXYGEN SATURATION: 96 % | WEIGHT: 183 LBS | DIASTOLIC BLOOD PRESSURE: 78 MMHG | HEIGHT: 60 IN | BODY MASS INDEX: 35.93 KG/M2 | TEMPERATURE: 95.8 F | SYSTOLIC BLOOD PRESSURE: 110 MMHG

## 2024-01-08 DIAGNOSIS — R10.13 DYSPEPSIA: ICD-10-CM

## 2024-01-08 DIAGNOSIS — I25.118 CORONARY ARTERY DISEASE OF NATIVE ARTERY OF NATIVE HEART WITH STABLE ANGINA PECTORIS (HCC): Primary | ICD-10-CM

## 2024-01-08 DIAGNOSIS — I10 ESSENTIAL HYPERTENSION: ICD-10-CM

## 2024-01-08 DIAGNOSIS — N18.31 STAGE 3A CHRONIC KIDNEY DISEASE (HCC): ICD-10-CM

## 2024-01-08 DIAGNOSIS — E55.9 VITAMIN D DEFICIENCY: ICD-10-CM

## 2024-01-08 DIAGNOSIS — E03.9 ACQUIRED HYPOTHYROIDISM: ICD-10-CM

## 2024-01-08 DIAGNOSIS — Z12.31 ENCOUNTER FOR SCREENING MAMMOGRAM FOR MALIGNANT NEOPLASM OF BREAST: ICD-10-CM

## 2024-01-08 DIAGNOSIS — M13.80 SERONEGATIVE INFLAMMATORY ARTHRITIS: ICD-10-CM

## 2024-01-08 PROBLEM — I73.9 PERIPHERAL VASCULAR DISEASE, UNSPECIFIED (HCC): Status: RESOLVED | Noted: 2021-01-08 | Resolved: 2024-01-08

## 2024-01-08 PROCEDURE — 99214 OFFICE O/P EST MOD 30 MIN: CPT | Performed by: FAMILY MEDICINE

## 2024-01-08 RX ORDER — ATORVASTATIN CALCIUM 40 MG/1
40 TABLET, FILM COATED ORAL DAILY
Qty: 90 TABLET | Refills: 0 | Status: SHIPPED | OUTPATIENT
Start: 2024-01-08

## 2024-01-08 RX ORDER — CLOPIDOGREL BISULFATE 75 MG/1
75 TABLET ORAL DAILY
Qty: 90 TABLET | Refills: 0 | Status: SHIPPED | OUTPATIENT
Start: 2024-01-08

## 2024-01-08 RX ORDER — ERGOCALCIFEROL 1.25 MG/1
CAPSULE ORAL
COMMUNITY
Start: 2023-10-11 | End: 2024-01-08

## 2024-01-08 RX ORDER — METOPROLOL SUCCINATE 50 MG/1
50 TABLET, EXTENDED RELEASE ORAL DAILY
Qty: 90 TABLET | Refills: 0 | Status: SHIPPED | OUTPATIENT
Start: 2024-01-08

## 2024-01-08 RX ORDER — ACETAMINOPHEN 160 MG
2000 TABLET,DISINTEGRATING ORAL DAILY
Qty: 30 CAPSULE | Refills: 2 | Status: SHIPPED | OUTPATIENT
Start: 2024-01-08

## 2024-01-08 RX ORDER — PANTOPRAZOLE SODIUM 40 MG/1
40 TABLET, DELAYED RELEASE ORAL
Qty: 30 TABLET | Refills: 5 | Status: SHIPPED | OUTPATIENT
Start: 2024-01-08 | End: 2024-07-06

## 2024-01-08 RX ORDER — CLOPIDOGREL BISULFATE 75 MG/1
75 TABLET ORAL DAILY
Qty: 90 TABLET | Refills: 0 | Status: SHIPPED | OUTPATIENT
Start: 2024-01-08 | End: 2024-01-08 | Stop reason: SDUPTHER

## 2024-01-08 RX ORDER — DILTIAZEM HYDROCHLORIDE 240 MG/1
240 CAPSULE, COATED, EXTENDED RELEASE ORAL DAILY
Qty: 90 CAPSULE | Refills: 0 | Status: SHIPPED | OUTPATIENT
Start: 2024-01-08

## 2024-01-08 RX ORDER — COLCHICINE 0.6 MG/1
0.6 TABLET ORAL DAILY
Qty: 90 TABLET | Refills: 2 | Status: SHIPPED | OUTPATIENT
Start: 2024-01-08

## 2024-01-08 RX ORDER — HYDROCHLOROTHIAZIDE 25 MG/1
25 TABLET ORAL DAILY
Qty: 90 TABLET | Refills: 0 | Status: SHIPPED | OUTPATIENT
Start: 2024-01-08

## 2024-01-08 RX ORDER — LEVOTHYROXINE SODIUM 0.1 MG/1
100 TABLET ORAL EVERY MORNING
Qty: 90 TABLET | Refills: 1 | Status: SHIPPED | OUTPATIENT
Start: 2024-01-08

## 2024-01-08 RX ORDER — LOSARTAN POTASSIUM 100 MG/1
100 TABLET ORAL DAILY
Qty: 90 TABLET | Refills: 0 | Status: SHIPPED | OUTPATIENT
Start: 2024-01-08

## 2024-01-08 NOTE — ASSESSMENT & PLAN NOTE
Chronic asymptomatic fair control continue current management patient tolerated well and low-salt diet important lose weight reviewed

## 2024-01-08 NOTE — ASSESSMENT & PLAN NOTE
Chronic asymptomatic can patient follow-up with cardiology preoperatively patient already on beta-blocker on statin and Plavix

## 2024-01-08 NOTE — ASSESSMENT & PLAN NOTE
Chronic fair control patient currently on omeprazole recommend the patient to stop it secondary to being on Plavix and no shortness absorption recommend stopping the dose of 40 mg once a day proper use and possible side effects reviewed

## 2024-01-08 NOTE — ASSESSMENT & PLAN NOTE
Chronic since patient started Plaquenil improvement in her symptoms she is already follow-up with the rheumatology recommend the patient to be evaluated by ophthalmology every 6 months for eye checkup

## 2024-01-08 NOTE — ASSESSMENT & PLAN NOTE
Lab Results   Component Value Date    EGFR 57 01/03/2024    EGFR 66 09/01/2023    EGFR 57 04/12/2023    CREATININE 0.96 01/03/2024    CREATININE 0.85 09/01/2023    CREATININE 0.96 04/12/2023   Chronic asymptomatic GFR decline compared with before continue well hydration avoid NSAIDs

## 2024-01-08 NOTE — PROGRESS NOTES
Name: Ariadna Jernigan      : 1945      MRN: [id number]  Encounter Provider: Olivier Atkins MD  Encounter Date: 2024   Encounter department: [facility]    Assessment & Plan     1. Coronary artery disease of native artery of native heart with stable angina pectoris (HCC)  Assessment & Plan:  Chronic asymptomatic can patient follow-up with cardiology preoperatively patient already on beta-blocker on statin and Plavix    Orders:  -     atorvastatin (LIPITOR) 40 mg tablet; Take 1 tablet (40 mg total) by mouth daily  -     CBC and differential; Future  -     Lipid panel; Future  -     colchicine (COLCRYS) 0.6 mg tablet; Take 1 tablet (0.6 mg total) by mouth daily  -     clopidogrel (PLAVIX) 75 mg tablet; Take 1 tablet (75 mg total) by mouth daily    2. Essential hypertension  Assessment & Plan:  Chronic asymptomatic fair control continue current management patient tolerated well and low-salt diet important lose weight reviewed    Orders:  -     diltiazem (CARDIZEM CD) 240 mg 24 hr capsule; Take 1 capsule (240 mg total) by mouth daily  -     hydrochlorothiazide (HYDRODIURIL) 25 mg tablet; Take 1 tablet (25 mg total) by mouth daily  -     losartan (COZAAR) 100 MG tablet; Take 1 tablet (100 mg total) by mouth daily  -     metoprolol succinate (TOPROL-XL) 50 mg 24 hr tablet; Take 1 tablet (50 mg total) by mouth daily    3. Acquired hypothyroidism  -     levothyroxine 100 mcg tablet; Take 1 tablet (100 mcg total) by mouth every morning  -     TSH, 3rd generation with Free T4 reflex; Future    4. Dyspepsia  Assessment & Plan:  Chronic fair control patient currently on omeprazole recommend the patient to stop it secondary to being on Plavix and no shortness absorption recommend stopping the dose of 40 mg once a day proper use and possible side effects reviewed    Orders:  -     pantoprazole (PROTONIX) 40 mg tablet; Take 1 tablet (40 mg total) by mouth daily before breakfast    5.  Vitamin D deficiency  -     Cholecalciferol (Vitamin D3) 50 MCG (2000 UT) capsule; Take 1 capsule (2,000 Units total) by mouth daily  -     Vitamin D 25 hydroxy; Future    6. Stage 3a chronic kidney disease (HCC)  Assessment & Plan:  Lab Results   Component Value Date    EGFR 57 01/03/2024    EGFR 66 09/01/2023    EGFR 57 04/12/2023    CREATININE 0.96 01/03/2024    CREATININE 0.85 09/01/2023    CREATININE 0.96 04/12/2023   Chronic asymptomatic GFR decline compared with before continue well hydration avoid NSAIDs    Orders:  -     Comprehensive metabolic panel; Future    7. Encounter for screening mammogram for malignant neoplasm of breast  -     Mammo screening bilateral w 3d & cad; Future; Expected date: 01/08/2024    8. Seronegative inflammatory arthritis  Assessment & Plan:  Chronic since patient started Plaquenil improvement in her symptoms she is already follow-up with the rheumatology recommend the patient to be evaluated by ophthalmology every 6 months for eye checkup             Subjective      Patient here to follow-up with a chronic condition patient compliant with medication without side effect recent blood work reviewed with the patient since been seen last time evaluated by rheumatology started on that recommend increase in her symptom complex and her C-reactive protein      Review of Systems   Constitutional:  Negative for chills and fever.   HENT:  Negative for ear pain and sore throat.    Eyes:  Negative for pain and visual disturbance.   Respiratory:  Negative for cough and shortness of breath.    Cardiovascular:  Negative for chest pain and palpitations.   Gastrointestinal:  Negative for abdominal pain, constipation, diarrhea and vomiting.   Genitourinary:  Negative for dysuria and hematuria.   Musculoskeletal:  Negative for gait problem.   Skin:  Negative for color change and rash.   Neurological:  Negative for seizures and syncope.   All other systems reviewed and are negative.      Current  Outpatient Medications on File Prior to Visit   Medication Sig   • ezetimibe (ZETIA) 10 mg tablet Take 1 tablet (10 mg total) by mouth daily   • folic acid (FOLVITE) 1 mg tablet TAKE ONE TABLET BY MOUTH ONCE DAILY   • hydroxychloroquine (PLAQUENIL) 200 mg tablet Take 1 tablet (200 mg total) by mouth daily with breakfast   • predniSONE 5 mg tablet Take 1 tablet (5 mg total) by mouth daily   • pregabalin (LYRICA) 150 mg capsule Take 1 capsule (150 mg total) by mouth daily   • [DISCONTINUED] atorvastatin (LIPITOR) 40 mg tablet TAKE ONE TABLET BY MOUTH ONCE DAILY   • [DISCONTINUED] Cholecalciferol (Vitamin D3) 50 MCG (2000 UT) capsule TAKE ONE CAPSULE BY MOUTH ONCE DAILY   • [DISCONTINUED] clopidogrel (PLAVIX) 75 mg tablet TAKE ONE TABLET BY MOUTH ONCE DAILY   • [DISCONTINUED] diltiazem (CARDIZEM CD) 240 mg 24 hr capsule TAKE ONE CAPSULE BY MOUTH ONCE DAILY   • [DISCONTINUED] hydrochlorothiazide (HYDRODIURIL) 25 mg tablet TAKE ONE TABLET BY MOUTH ONCE DAILY   • [DISCONTINUED] levothyroxine 100 mcg tablet TAKE ONE TABLET BY MOUTH ONCE DAILY IN THE MORNING   • [DISCONTINUED] losartan (COZAAR) 100 MG tablet TAKE ONE TABLET BY MOUTH ONCE DAILY   • [DISCONTINUED] omeprazole (PriLOSEC) 20 mg delayed release capsule TAKE ONE CAPSULE BY MOUTH ONCE DAILY BEFORE BREAKFEST   • nitroglycerin (NITROSTAT) 0.4 mg SL tablet Place 1 tablet under the tongue every 5 (five) minutes as needed (Patient not taking: Reported on 9/7/2023)   • [DISCONTINUED] colchicine (COLCRYS) 0.6 mg tablet Take 0.6 mg by mouth daily (Patient not taking: Reported on 1/8/2024)   • [DISCONTINUED] colchicine (COLCRYS) 0.6 mg tablet Take 1 tablet (0.6 mg total) by mouth daily   • [DISCONTINUED] ergocalciferol (VITAMIN D2) 50,000 units  (Patient not taking: Reported on 1/8/2024)   • [DISCONTINUED] methocarbamol (Robaxin-750) 750 mg tablet Take 1 tablet (750 mg total) by mouth 3 (three) times a day (Patient not taking: Reported on 1/8/2024)   • [DISCONTINUED]  metoprolol succinate (TOPROL-XL) 50 mg 24 hr tablet Take 1 tablet (50 mg total) by mouth daily (Patient not taking: Reported on 1/8/2024)   • [DISCONTINUED] oxyCODONE-acetaminophen (Percocet) 5-325 mg per tablet Take 1 tablet by mouth every 6 (six) hours as needed for severe pain Max Daily Amount: 4 tablets (Patient not taking: Reported on 1/8/2024)   • [DISCONTINUED] predniSONE 5 mg tablet Take 5 mg by mouth daily (Patient not taking: Reported on 1/8/2024)       Objective     /78 (BP Location: Left arm, Patient Position: Sitting)   Pulse 64   Temp (!) 95.8 °F (35.4 °C) (Tympanic)   Ht 5' (1.524 m)   Wt 83 kg (183 lb)   SpO2 96%   BMI 35.74 kg/m²     Physical Exam  Vitals and nursing note reviewed.   Constitutional:       General: She is not in acute distress.     Appearance: She is not diaphoretic.   HENT:      Head: Normocephalic and atraumatic.      Right Ear: Tympanic membrane normal. There is no impacted cerumen.      Left Ear: Tympanic membrane normal. There is no impacted cerumen.      Nose: Nose normal. No congestion or rhinorrhea.      Mouth/Throat:      Pharynx: No posterior oropharyngeal erythema.   Eyes:      General: No scleral icterus.        Right eye: No discharge.         Left eye: No discharge.   Cardiovascular:      Rate and Rhythm: Normal rate and regular rhythm.      Heart sounds: Murmur heard.   Pulmonary:      Effort: Pulmonary effort is normal. No respiratory distress.   Abdominal:      General: There is no distension.      Tenderness: There is no abdominal tenderness.   Musculoskeletal:         General: Normal range of motion.      Cervical back: Normal range of motion.      Right lower leg: No edema.      Left lower leg: No edema.   Skin:     Findings: No rash.   Neurological:      Mental Status: She is alert and oriented to person, place, and time.       Olivier Atkins MD

## 2024-03-26 ENCOUNTER — OFFICE VISIT (OUTPATIENT)
Dept: RHEUMATOLOGY | Facility: CLINIC | Age: 79
End: 2024-03-26
Payer: MEDICARE

## 2024-03-26 VITALS
HEIGHT: 60 IN | SYSTOLIC BLOOD PRESSURE: 130 MMHG | OXYGEN SATURATION: 98 % | BODY MASS INDEX: 36.71 KG/M2 | HEART RATE: 61 BPM | DIASTOLIC BLOOD PRESSURE: 70 MMHG | WEIGHT: 187 LBS

## 2024-03-26 DIAGNOSIS — M19.90 INFLAMMATORY ARTHRITIS: Primary | ICD-10-CM

## 2024-03-26 PROCEDURE — 99214 OFFICE O/P EST MOD 30 MIN: CPT | Performed by: INTERNAL MEDICINE

## 2024-03-26 PROCEDURE — G2211 COMPLEX E/M VISIT ADD ON: HCPCS | Performed by: INTERNAL MEDICINE

## 2024-03-26 RX ORDER — HYDROXYCHLOROQUINE SULFATE 200 MG/1
200 TABLET, FILM COATED ORAL 2 TIMES DAILY WITH MEALS
Qty: 60 TABLET | Refills: 11 | Status: SHIPPED | OUTPATIENT
Start: 2024-03-26 | End: 2025-03-26

## 2024-03-26 RX ORDER — PREDNISONE 5 MG/1
5 TABLET ORAL DAILY
Qty: 30 TABLET | Refills: 11 | Status: SHIPPED | OUTPATIENT
Start: 2024-03-26

## 2024-03-26 NOTE — PROGRESS NOTES
HPI: Ariadna Jernigan is a 79 y/o female who presents for further evaluation CPPD, inflammatory arthritis. History obtained via language line.    Active issues related to chronic neck and back pain.     She has been tolerating     She has past medical history CAD, HLD, HTN.     She is transferring care from [facility]. She has has chronic pain, swelling and stiffness small joints hands and feet > 1 year duration. Per notes [facility] rheumatology she was diagnosed CPPD and seronegative inflammatory arthritis    Maintained on baseline 5 mg and colchicine 0.6 daily.     She has had episodes pain, swelling stiffness wrists. She is steroid responsive but symptoms have recurred when she has tried to d/c prednisone    Serologies including HAMMAD, RF, CCP ESR and CRP normal (inflammatory markers elevated in the past)        --------------------------------------------------------------------------------------------------------        ROS:      - for: Fevers, Chills or sweats.  No HAs or scalp tenderness.  No jaw claudication.  No acute visual or eye changes.  No dry eyes.  No auditory complaints.  No oral lesions or ulcers.  No dry mouth.  No sore throat or cough.  No chest pains or palpitations.  No shortness of breath, dyspnea on exertion or wheezing.  No hemotpysis.  No abdominal pain, GERD symptoms, diarrhea or constipation.  No urinary complaints.  No numbness, tingling or weakness.  No rashes.    All other ROS was reviewed and negative except as above         --------------------------------------------------------------------------------------------------------    Past Medical History    Past Medical History:   Diagnosis Date    Arthritis     Coronary artery disease     Disease of thyroid gland     Hyperlipidemia     Hypertension            Past Surgical History    Past Surgical History:   Procedure Laterality Date    BACK SURGERY      BREAST SURGERY      CARPAL TUNNEL RELEASE      CHOLECYSTECTOMY      HYSTERECTOMY       FYI - no action needed. OOPHORECTOMY      ORTHOPEDIC SURGERY             Family History    Family History   Problem Relation Age of Onset    No Known Problems Mother     Lung cancer Father     Ovarian cancer Sister     No Known Problems Sister     No Known Problems Sister     No Known Problems Maternal Grandmother     No Known Problems Maternal Grandfather     No Known Problems Paternal Grandmother     No Known Problems Paternal Grandfather     No Known Problems Maternal Aunt     No Known Problems Paternal Aunt     No Known Problems Paternal Aunt     Breast cancer Cousin 45            Social History    Social History     Tobacco Use    Smoking status: Former     Current packs/day: 0.00     Average packs/day: 0.1 packs/day for 52.0 years (5.2 ttl pk-yrs)     Types: Cigarettes     Start date:      Quit date:      Years since quittin.2     Passive exposure: Never    Smokeless tobacco: Never   Vaping Use    Vaping status: Never Used   Substance Use Topics    Alcohol use: Not Currently    Drug use: Never         Allergies    Allergies   Allergen Reactions    Codeine          Medications    Current Outpatient Medications   Medication Instructions    atorvastatin (LIPITOR) 40 mg, Oral, Daily    clopidogrel (PLAVIX) 75 mg, Oral, Daily    colchicine (COLCRYS) 0.6 mg, Oral, Daily    diltiazem (CARDIZEM CD) 240 mg, Oral, Daily    ezetimibe (ZETIA) 10 mg, Oral, Daily    folic acid (FOLVITE) 1 mg tablet TAKE ONE TABLET BY MOUTH ONCE DAILY    hydroCHLOROthiazide 25 mg, Oral, Daily    hydroxychloroquine (PLAQUENIL) 200 mg, Oral, Daily with breakfast    levothyroxine 100 mcg, Oral, Every morning    losartan (COZAAR) 100 mg, Oral, Daily    metoprolol succinate (TOPROL-XL) 50 mg, Oral, Daily    nitroglycerin (NITROSTAT) 0.4 mg SL tablet 1 tablet, Sublingual, Every 5 minutes PRN    omeprazole (PriLOSEC) 20 mg delayed release capsule TAKE ONE CAPSULE BY MOUTH ONCE DAILY BEFORE BREAKFAST    pantoprazole (PROTONIX) 40 mg, Oral, Daily before  breakfast    predniSONE 5 mg, Oral, Daily    pregabalin (LYRICA) 150 mg, Oral, Daily    Vitamin D3 2,000 Units, Oral, Daily          Physical Exam    /70   Pulse 61   Ht 5' (1.524 m)   Wt 84.8 kg (187 lb)   SpO2 98%   BMI 36.52 kg/m²     GEN: AAO, No apparent distress.  Patient is well developed.  HEENT:  Pupils are equal, round and reactive.  Sclera are clear.  Fundoscopic exam is normal.  External ears are without lesions.  Oral pharynx is clear of ulcers or other lesions.  MMM.   NECK:  Supple.  There is no adenopathy appreciable in anterior or posterior cervical chains or supraclavicularly.  JVP is normal.    HEART: Regular rate and rhythm.  There is no appreciable murmur, gallop or rub.  LUNGS: Clear to auscultation.  ABD:  Soft, without tenderness, rebound or guarding.  No appreciable organomegally.  NEURO: Speech and cognition are normal.  Strength is 5/5 throughout.  Tone is normal.  DTRs are 2/4 at the knees, ankles and elbows.  Gait is normal.  SKIN: There are no rashes or lesions    MUSCULOSKELETAL:   + LE edema      ________________________________________________________________________          Results Review    Component      Latest Ref Rng 9/1/2023   ANTI DNA DOUBLE STRANDED      0 - 9 IU/mL <1    CYCLIC CITRULLINATED PEPTIDE ANTIBODY      See comment  0.5    RHEUMATOID FACTOR      Negative  Negative    C-REACTIVE PROTEIN      <3.0 mg/L 1.7    Sed Rate      0 - 29 mm/hour 10    Lyme total antibody      Negative  Negative    HAMMAD      Negative  Negative              Impressions     CPPD  Seronegative inflammatory arthritis  LE edema    Plans:    Ariadna Jernigan is a 77 y/o female transitioning care from rheumatology at [facility]    Per notes [facility] rheumatology she was diagnosed CPPD and seronegative inflammatory arthritis    She has been on varying doses of prednisone    Increase  BID    Continue colchine for now    Unable to lower prednisone dose (has been chronically)    Continue 5 mg  prednisone     Reviewed labs    Chronic neck pain, spinal stenosis: f/u NS and pain management     RTC 6 months        Thank you for involving me in this patient's care.        Serjio Harrison MD  Department of Rheumatology  [facility]

## 2024-03-27 ENCOUNTER — RA CDI HCC (OUTPATIENT)
Dept: OTHER | Facility: HOSPITAL | Age: 79
End: 2024-03-27

## 2024-03-27 DIAGNOSIS — I10 ESSENTIAL HYPERTENSION: ICD-10-CM

## 2024-03-27 RX ORDER — HYDROCHLOROTHIAZIDE 25 MG/1
25 TABLET ORAL DAILY
Qty: 90 TABLET | Refills: 1 | Status: SHIPPED | OUTPATIENT
Start: 2024-03-27

## 2024-03-27 RX ORDER — METOPROLOL SUCCINATE 50 MG/1
50 TABLET, EXTENDED RELEASE ORAL DAILY
Qty: 90 TABLET | Refills: 1 | Status: SHIPPED | OUTPATIENT
Start: 2024-03-27

## 2024-03-27 RX ORDER — LOSARTAN POTASSIUM 100 MG/1
100 TABLET ORAL DAILY
Qty: 90 TABLET | Refills: 1 | Status: SHIPPED | OUTPATIENT
Start: 2024-03-27

## 2024-04-03 ENCOUNTER — HOSPITAL ENCOUNTER (OUTPATIENT)
Dept: MAMMOGRAPHY | Facility: CLINIC | Age: 79
Discharge: HOME | End: 2024-04-03
Payer: MEDICARE

## 2024-04-03 VITALS — BODY MASS INDEX: 36.71 KG/M2 | WEIGHT: 187 LBS | HEIGHT: 60 IN

## 2024-04-03 DIAGNOSIS — Z12.31 ENCOUNTER FOR SCREENING MAMMOGRAM FOR MALIGNANT NEOPLASM OF BREAST: ICD-10-CM

## 2024-04-03 PROCEDURE — 77063 BREAST TOMOSYNTHESIS BI: CPT

## 2024-04-03 PROCEDURE — 77067 SCR MAMMO BI INCL CAD: CPT

## 2024-04-05 ENCOUNTER — APPOINTMENT (OUTPATIENT)
Dept: LAB | Facility: HOSPITAL | Age: 79
End: 2024-04-05
Payer: MEDICARE

## 2024-04-05 DIAGNOSIS — Z13.29 SCREENING FOR THYROID DISORDER: ICD-10-CM

## 2024-04-05 DIAGNOSIS — I25.118 CORONARY ARTERY DISEASE OF NATIVE ARTERY OF NATIVE HEART WITH STABLE ANGINA PECTORIS (HCC): ICD-10-CM

## 2024-04-05 DIAGNOSIS — N18.31 STAGE 3A CHRONIC KIDNEY DISEASE (HCC): ICD-10-CM

## 2024-04-05 DIAGNOSIS — E78.2 MIXED HYPERLIPIDEMIA: ICD-10-CM

## 2024-04-05 DIAGNOSIS — E03.9 ACQUIRED HYPOTHYROIDISM: ICD-10-CM

## 2024-04-05 DIAGNOSIS — E55.9 VITAMIN D DEFICIENCY: ICD-10-CM

## 2024-04-05 DIAGNOSIS — R07.89 OTHER CHEST PAIN: ICD-10-CM

## 2024-04-05 LAB
25(OH)D3 SERPL-MCNC: 30.8 NG/ML (ref 30–100)
ALBUMIN SERPL BCP-MCNC: 4.2 G/DL (ref 3.5–5)
ALP SERPL-CCNC: 59 U/L (ref 34–104)
ALT SERPL W P-5'-P-CCNC: 20 U/L (ref 7–52)
ANION GAP SERPL CALCULATED.3IONS-SCNC: 10 MMOL/L (ref 4–13)
AST SERPL W P-5'-P-CCNC: 19 U/L (ref 13–39)
BASOPHILS # BLD AUTO: 0.09 THOUSANDS/ÂΜL (ref 0–0.1)
BASOPHILS NFR BLD AUTO: 1 % (ref 0–1)
BILIRUB SERPL-MCNC: 0.41 MG/DL (ref 0.2–1)
BUN SERPL-MCNC: 20 MG/DL (ref 5–25)
CALCIUM SERPL-MCNC: 10 MG/DL (ref 8.4–10.2)
CHLORIDE SERPL-SCNC: 101 MMOL/L (ref 96–108)
CHOLEST SERPL-MCNC: 127 MG/DL
CO2 SERPL-SCNC: 32 MMOL/L (ref 21–32)
CREAT SERPL-MCNC: 0.98 MG/DL (ref 0.6–1.3)
CRP SERPL QL: <1 MG/L
EOSINOPHIL # BLD AUTO: 0.14 THOUSAND/ÂΜL (ref 0–0.61)
EOSINOPHIL NFR BLD AUTO: 2 % (ref 0–6)
ERYTHROCYTE [DISTWIDTH] IN BLOOD BY AUTOMATED COUNT: 14 % (ref 11.6–15.1)
ERYTHROCYTE [SEDIMENTATION RATE] IN BLOOD: 2 MM/HOUR (ref 0–29)
GFR SERPL CREATININE-BSD FRML MDRD: 55 ML/MIN/1.73SQ M
GLUCOSE P FAST SERPL-MCNC: 81 MG/DL (ref 65–99)
HCT VFR BLD AUTO: 41.5 % (ref 34.8–46.1)
HDLC SERPL-MCNC: 77 MG/DL
HGB BLD-MCNC: 13.2 G/DL (ref 11.5–15.4)
IMM GRANULOCYTES # BLD AUTO: 0.03 THOUSAND/UL (ref 0–0.2)
IMM GRANULOCYTES NFR BLD AUTO: 0 % (ref 0–2)
LDLC SERPL CALC-MCNC: 24 MG/DL (ref 0–100)
LYMPHOCYTES # BLD AUTO: 2.77 THOUSANDS/ÂΜL (ref 0.6–4.47)
LYMPHOCYTES NFR BLD AUTO: 30 % (ref 14–44)
MCH RBC QN AUTO: 28.1 PG (ref 26.8–34.3)
MCHC RBC AUTO-ENTMCNC: 31.8 G/DL (ref 31.4–37.4)
MCV RBC AUTO: 88 FL (ref 82–98)
MONOCYTES # BLD AUTO: 0.8 THOUSAND/ÂΜL (ref 0.17–1.22)
MONOCYTES NFR BLD AUTO: 9 % (ref 4–12)
NEUTROPHILS # BLD AUTO: 5.45 THOUSANDS/ÂΜL (ref 1.85–7.62)
NEUTS SEG NFR BLD AUTO: 58 % (ref 43–75)
NONHDLC SERPL-MCNC: 50 MG/DL
NRBC BLD AUTO-RTO: 0 /100 WBCS
PLATELET # BLD AUTO: 225 THOUSANDS/UL (ref 149–390)
PMV BLD AUTO: 11.8 FL (ref 8.9–12.7)
POTASSIUM SERPL-SCNC: 3.6 MMOL/L (ref 3.5–5.3)
PROT SERPL-MCNC: 6.4 G/DL (ref 6.4–8.4)
RBC # BLD AUTO: 4.7 MILLION/UL (ref 3.81–5.12)
SODIUM SERPL-SCNC: 143 MMOL/L (ref 135–147)
TRIGL SERPL-MCNC: 130 MG/DL
TSH SERPL DL<=0.05 MIU/L-ACNC: 1.66 UIU/ML (ref 0.45–4.5)
WBC # BLD AUTO: 9.28 THOUSAND/UL (ref 4.31–10.16)

## 2024-04-05 PROCEDURE — 82306 VITAMIN D 25 HYDROXY: CPT

## 2024-04-05 PROCEDURE — 85025 COMPLETE CBC W/AUTO DIFF WBC: CPT

## 2024-04-05 PROCEDURE — 85652 RBC SED RATE AUTOMATED: CPT

## 2024-04-05 PROCEDURE — 36415 COLL VENOUS BLD VENIPUNCTURE: CPT

## 2024-04-05 PROCEDURE — 86140 C-REACTIVE PROTEIN: CPT

## 2024-04-05 PROCEDURE — 84443 ASSAY THYROID STIM HORMONE: CPT

## 2024-04-05 PROCEDURE — 80053 COMPREHEN METABOLIC PANEL: CPT

## 2024-04-05 PROCEDURE — 86200 CCP ANTIBODY: CPT

## 2024-04-05 PROCEDURE — 80061 LIPID PANEL: CPT

## 2024-04-08 ENCOUNTER — TELEPHONE (OUTPATIENT)
Dept: FAMILY MEDICINE CLINIC | Facility: CLINIC | Age: 79
End: 2024-04-08

## 2024-04-08 DIAGNOSIS — Z12.31 ENCOUNTER FOR SCREENING MAMMOGRAM FOR MALIGNANT NEOPLASM OF BREAST: Primary | ICD-10-CM

## 2024-04-08 NOTE — TELEPHONE ENCOUNTER
----- Message from Olivier Atkins MD sent at 4/8/2024  8:00 AM EDT -----  Negative mammogram repeat in one year

## 2024-04-08 NOTE — TELEPHONE ENCOUNTER
Left message to notify patient.     Patient is scheduled for next year on 4/9/25 at 2 pm at [facility] in [place]

## 2024-04-09 ENCOUNTER — OFFICE VISIT (OUTPATIENT)
Dept: FAMILY MEDICINE CLINIC | Facility: CLINIC | Age: 79
End: 2024-04-09
Payer: MEDICARE

## 2024-04-09 VITALS
BODY MASS INDEX: 36.71 KG/M2 | HEART RATE: 64 BPM | WEIGHT: 187 LBS | DIASTOLIC BLOOD PRESSURE: 70 MMHG | SYSTOLIC BLOOD PRESSURE: 110 MMHG | HEIGHT: 60 IN | TEMPERATURE: 95.9 F | OXYGEN SATURATION: 97 %

## 2024-04-09 DIAGNOSIS — E66.01 OBESITY, MORBID (HCC): ICD-10-CM

## 2024-04-09 DIAGNOSIS — R10.13 DYSPEPSIA: ICD-10-CM

## 2024-04-09 DIAGNOSIS — R49.8 VOICE FATIGUE: ICD-10-CM

## 2024-04-09 DIAGNOSIS — I10 ESSENTIAL HYPERTENSION: ICD-10-CM

## 2024-04-09 DIAGNOSIS — E03.9 ACQUIRED HYPOTHYROIDISM: ICD-10-CM

## 2024-04-09 DIAGNOSIS — I25.118 CORONARY ARTERY DISEASE OF NATIVE ARTERY OF NATIVE HEART WITH STABLE ANGINA PECTORIS (HCC): ICD-10-CM

## 2024-04-09 DIAGNOSIS — Z13.220 SCREENING, LIPID: ICD-10-CM

## 2024-04-09 DIAGNOSIS — E55.9 VITAMIN D DEFICIENCY: ICD-10-CM

## 2024-04-09 DIAGNOSIS — Z00.00 MEDICARE ANNUAL WELLNESS VISIT, SUBSEQUENT: Primary | ICD-10-CM

## 2024-04-09 LAB — CCP AB SER IA-ACNC: 0.6

## 2024-04-09 PROCEDURE — G0439 PPPS, SUBSEQ VISIT: HCPCS | Performed by: FAMILY MEDICINE

## 2024-04-09 PROCEDURE — 99214 OFFICE O/P EST MOD 30 MIN: CPT | Performed by: FAMILY MEDICINE

## 2024-04-09 RX ORDER — COLCHICINE 0.6 MG/1
0.6 TABLET ORAL DAILY
Qty: 90 TABLET | Refills: 0 | Status: SHIPPED | OUTPATIENT
Start: 2024-04-09

## 2024-04-09 RX ORDER — LEVOTHYROXINE SODIUM 0.1 MG/1
100 TABLET ORAL EVERY MORNING
Qty: 90 TABLET | Refills: 0 | Status: SHIPPED | OUTPATIENT
Start: 2024-04-09

## 2024-04-09 RX ORDER — ACETAMINOPHEN 160 MG
2000 TABLET,DISINTEGRATING ORAL DAILY
Qty: 90 CAPSULE | Refills: 0 | Status: SHIPPED | OUTPATIENT
Start: 2024-04-09

## 2024-04-09 RX ORDER — PANTOPRAZOLE SODIUM 40 MG/1
40 TABLET, DELAYED RELEASE ORAL
Qty: 90 TABLET | Refills: 0 | Status: SHIPPED | OUTPATIENT
Start: 2024-04-09 | End: 2024-10-06

## 2024-04-09 RX ORDER — CLOPIDOGREL BISULFATE 75 MG/1
75 TABLET ORAL DAILY
Qty: 90 TABLET | Refills: 0 | Status: SHIPPED | OUTPATIENT
Start: 2024-04-09

## 2024-04-09 RX ORDER — ATORVASTATIN CALCIUM 40 MG/1
40 TABLET, FILM COATED ORAL DAILY
Qty: 90 TABLET | Refills: 0 | Status: SHIPPED | OUTPATIENT
Start: 2024-04-09

## 2024-04-09 RX ORDER — DILTIAZEM HYDROCHLORIDE 240 MG/1
240 CAPSULE, COATED, EXTENDED RELEASE ORAL DAILY
Qty: 90 CAPSULE | Refills: 0 | Status: SHIPPED | OUTPATIENT
Start: 2024-04-09

## 2024-04-09 NOTE — PATIENT INSTRUCTIONS
Medicare Preventive Visit Patient Instructions  Thank you for completing your Welcome to Medicare Visit or Medicare Annual Wellness Visit today. Your next wellness visit will be due in one year (4/10/2025).  The screening/preventive services that you may require over the next 5-10 years are detailed below. Some tests may not apply to you based off risk factors and/or age. Screening tests ordered at today's visit but not completed yet may show as past due. Also, please note that scanned in results may not display below.  Preventive Screenings:  Service Recommendations Previous Testing/Comments   Colorectal Cancer Screening  * Colonoscopy    * Fecal Occult Blood Test (FOBT)/Fecal Immunochemical Test (FIT)  * Fecal DNA/Cologuard Test  * Flexible Sigmoidoscopy Age: 45-75 years old   Colonoscopy: every 10 years (may be performed more frequently if at higher risk)  OR  FOBT/FIT: every 1 year  OR  Cologuard: every 3 years  OR  Sigmoidoscopy: every 5 years  Screening may be recommended earlier than age 45 if at higher risk for colorectal cancer. Also, an individualized decision between you and your healthcare provider will decide whether screening between the ages of 76-85 would be appropriate. Colonoscopy: Not on file  FOBT/FIT: Not on file  Cologuard: Not on file  Sigmoidoscopy: Not on file          Breast Cancer Screening Age: 40+ years old  Frequency: every 1-2 years  Not required if history of left and right mastectomy Mammogram: 04/03/2024    Screening Current   Cervical Cancer Screening Between the ages of 21-29, pap smear recommended once every 3 years.   Between the ages of 30-65, can perform pap smear with HPV co-testing every 5 years.   Recommendations may differ for women with a history of total hysterectomy, cervical cancer, or abnormal pap smears in past. Pap Smear: Not on file    Screening Not Indicated   Hepatitis C Screening Once for adults born between 1945 and 1965  More frequently in patients at high  risk for Hepatitis C Hep C Antibody: 10/20/2022    Screening Current   Diabetes Screening 1-2 times per year if you're at risk for diabetes or have pre-diabetes Fasting glucose: 81 mg/dL (4/5/2024)  A1C: 5.6 % (4/12/2023)  Screening Current   Cholesterol Screening Once every 5 years if you don't have a lipid disorder. May order more often based on risk factors. Lipid panel: 04/05/2024    Screening Not Indicated  History Lipid Disorder     Other Preventive Screenings Covered by Medicare:  Abdominal Aortic Aneurysm (AAA) Screening: covered once if your at risk. You're considered to be at risk if you have a family history of AAA.  Lung Cancer Screening: covers low dose CT scan once per year if you meet all of the following conditions: (1) Age 55-77; (2) No signs or symptoms of lung cancer; (3) Current smoker or have quit smoking within the last 15 years; (4) You have a tobacco smoking history of at least 20 pack years (packs per day multiplied by number of years you smoked); (5) You get a written order from a healthcare provider.  Glaucoma Screening: covered annually if you're considered high risk: (1) You have diabetes OR (2) Family history of glaucoma OR (3)  aged 50 and older OR (4)  American aged 65 and older  Osteoporosis Screening: covered every 2 years if you meet one of the following conditions: (1) You're estrogen deficient and at risk for osteoporosis based off medical history and other findings; (2) Have a vertebral abnormality; (3) On glucocorticoid therapy for more than 3 months; (4) Have primary hyperparathyroidism; (5) On osteoporosis medications and need to assess response to drug therapy.   Last bone density test (DXA Scan): 12/13/2023.  HIV Screening: covered annually if you're between the age of 15-65. Also covered annually if you are younger than 15 and older than 65 with risk factors for HIV infection. For pregnant patients, it is covered up to 3 times per  pregnancy.    Immunizations:  Immunization Recommendations   Influenza Vaccine Annual influenza vaccination during flu season is recommended for all persons aged >= 6 months who do not have contraindications   Pneumococcal Vaccine   * Pneumococcal conjugate vaccine = PCV13 (Prevnar 13), PCV15 (Vaxneuvance), PCV20 (Prevnar 20)  * Pneumococcal polysaccharide vaccine = PPSV23 (Pneumovax) Adults 19-65 yo with certain risk factors or if 65+ yo  If never received any pneumonia vaccine: recommend Prevnar 20 (PCV20)  Give PCV20 if previously received 1 dose of PCV13 or PPSV23   Hepatitis B Vaccine 3 dose series if at intermediate or high risk (ex: diabetes, end stage renal disease, liver disease)   Respiratory syncytial virus (RSV) Vaccine - COVERED BY MEDICARE PART D  * RSVPreF3 (Arexvy) CDC recommends that adults 60 years of age and older may receive a single dose of RSV vaccine using shared clinical decision-making (SCDM)   Tetanus (Td) Vaccine - COST NOT COVERED BY MEDICARE PART B Following completion of primary series, a booster dose should be given every 10 years to maintain immunity against tetanus. Td may also be given as tetanus wound prophylaxis.   Tdap Vaccine - COST NOT COVERED BY MEDICARE PART B Recommended at least once for all adults. For pregnant patients, recommended with each pregnancy.   Shingles Vaccine (Shingrix) - COST NOT COVERED BY MEDICARE PART B  2 shot series recommended in those 19 years and older who have or will have weakened immune systems or those 50 years and older     Health Maintenance Due:      Topic Date Due   • Breast Cancer Screening: Mammogram  04/03/2025   • Hepatitis C Screening  Completed     Immunizations Due:      Topic Date Due   • COVID-19 Vaccine (4 - 2023-24 season) 09/01/2023     Advance Directives   What are advance directives?  Advance directives are legal documents that state your wishes and plans for medical care. These plans are made ahead of time in case you lose your  ability to make decisions for yourself. Advance directives can apply to any medical decision, such as the treatments you want, and if you want to donate organs.   What are the types of advance directives?  There are many types of advance directives, and each state has rules about how to use them. You may choose a combination of any of the following:  Living will:  This is a written record of the treatment you want. You can also choose which treatments you do not want, which to limit, and which to stop at a certain time. This includes surgery, medicine, IV fluid, and tube feedings.   Durable power of  for healthcare (DPAHC):  This is a written record that states who you want to make healthcare choices for you when you are unable to make them for yourself. This person, called a proxy, is usually a family member or a friend. You may choose more than 1 proxy.  Do not resuscitate (DNR) order:  A DNR order is used in case your heart stops beating or you stop breathing. It is a request not to have certain forms of treatment, such as CPR. A DNR order may be included in other types of advance directives.  Medical directive:  This covers the care that you want if you are in a coma, near death, or unable to make decisions for yourself. You can list the treatments you want for each condition. Treatment may include pain medicine, surgery, blood transfusions, dialysis, IV or tube feedings, and a ventilator (breathing machine).  Values history:  This document has questions about your views, beliefs, and how you feel and think about life. This information can help others choose the care that you would choose.  Why are advance directives important?  An advance directive helps you control your care. Although spoken wishes may be used, it is better to have your wishes written down. Spoken wishes can be misunderstood, or not followed. Treatments may be given even if you do not want them. An advance directive may make it easier  for your family to make difficult choices about your care.   Weight Management   Why it is important to manage your weight:  Being overweight increases your risk of health conditions such as heart disease, high blood pressure, type 2 diabetes, and certain types of cancer. It can also increase your risk for osteoarthritis, sleep apnea, and other respiratory problems. Aim for a slow, steady weight loss. Even a small amount of weight loss can lower your risk of health problems.  How to lose weight safely:  A safe and healthy way to lose weight is to eat fewer calories and get regular exercise. You can lose up about 1 pound a week by decreasing the number of calories you eat by 500 calories each day.   Healthy meal plan for weight management:  A healthy meal plan includes a variety of foods, contains fewer calories, and helps you stay healthy. A healthy meal plan includes the following:  Eat whole-grain foods more often.  A healthy meal plan should contain fiber. Fiber is the part of grains, fruits, and vegetables that is not broken down by your body. Whole-grain foods are healthy and provide extra fiber in your diet. Some examples of whole-grain foods are whole-wheat breads and pastas, oatmeal, brown rice, and bulgur.  Eat a variety of vegetables every day.  Include dark, leafy greens such as spinach, kale, ankur greens, and mustard greens. Eat yellow and orange vegetables such as carrots, sweet potatoes, and winter squash.   Eat a variety of fruits every day.  Choose fresh or canned fruit (canned in its own juice or light syrup) instead of juice. Fruit juice has very little or no fiber.  Eat low-fat dairy foods.  Drink fat-free (skim) milk or 1% milk. Eat fat-free yogurt and low-fat cottage cheese. Try low-fat cheeses such as mozzarella and other reduced-fat cheeses.  Choose meat and other protein foods that are low in fat.  Choose beans or other legumes such as split peas or lentils. Choose fish, skinless poultry  (chicken or turkey), or lean cuts of red meat (beef or pork). Before you cook meat or poultry, cut off any visible fat.   Use less fat and oil.  Try baking foods instead of frying them. Add less fat, such as margarine, sour cream, regular salad dressing and mayonnaise to foods. Eat fewer high-fat foods. Some examples of high-fat foods include french fries, doughnuts, ice cream, and cakes.  Eat fewer sweets.  Limit foods and drinks that are high in sugar. This includes candy, cookies, regular soda, and sweetened drinks.  Exercise:  Exercise at least 30 minutes per day on most days of the week. Some examples of exercise include walking, biking, dancing, and swimming. You can also fit in more physical activity by taking the stairs instead of the elevator or parking farther away from stores. Ask your healthcare provider about the best exercise plan for you.      © Copyright [employer] 2018 Information is for End User's use only and may not be sold, redistributed or otherwise used for commercial purposes. All illustrations and images included in CareNotes® are the copyrighted property of [employer] or [employer]

## 2024-04-09 NOTE — ASSESSMENT & PLAN NOTE
Chronic uncontrolled BMI today 26.5 discussed portion control low-carb low-fat diet increase physical activity

## 2024-04-09 NOTE — PROGRESS NOTES
Assessment and Plan:     Problem List Items Addressed This Visit     Essential hypertension    Relevant Medications    diltiazem (CARDIZEM CD) 240 mg 24 hr capsule    Other Relevant Orders    CBC and differential    Basic metabolic panel    Coronary artery disease of native artery of native heart with stable angina pectoris (HCC)     Chronic asymptomatic fair control continue current management follow-up with the cardiology triopathy         Relevant Medications    atorvastatin (LIPITOR) 40 mg tablet    clopidogrel (PLAVIX) 75 mg tablet    colchicine (COLCRYS) 0.6 mg tablet    diltiazem (CARDIZEM CD) 240 mg 24 hr capsule    Other Relevant Orders    CBC and differential    Basic metabolic panel    Hypothyroidism     Chronic asymptomatic fair control continue with the levothyroxine 100 mcg once a day         Relevant Medications    levothyroxine 100 mcg tablet    Other Relevant Orders    CBC and differential    Basic metabolic panel    Dyspepsia    Relevant Medications    pantoprazole (PROTONIX) 40 mg tablet    Other Relevant Orders    CBC and differential    Basic metabolic panel    Vitamin D deficiency    Relevant Medications    Cholecalciferol (Vitamin D3) 50 MCG (2000 UT) CAPS    Other Relevant Orders    CBC and differential    Basic metabolic panel    Obesity, morbid (HCC)     Chronic uncontrolled BMI today 26.5 discussed portion control low-carb low-fat diet increase physical activity         Relevant Orders    CBC and differential    Basic metabolic panel    Medicare annual wellness visit, subsequent - Primary     Advice and education were given regarding nutrition, aerobic exercises, weight-bearing exercises, cardiovascular risk reduction, fall risk reduction, and age-appropriate supplements.     The patient was counseled regarding instructions for management, risk factor reductions, prognosis, risks and benefits of treatment options, patient and family education, and importance of compliance with  treatment.         Relevant Orders    CBC and differential    Basic metabolic panel    Voice fatigue     New diagnosis symptomatic patient already on pantoprazole for dyspepsia recommend the patient to be evaluated by ENT for possible laryngoscopy         Relevant Orders    Ambulatory Referral to Otolaryngology    CBC and differential    Basic metabolic panel   Other Visit Diagnoses     Screening, lipid        Relevant Orders    Lipid Panel with Direct LDL reflex           Preventive health issues were discussed with patient, and age appropriate screening tests were ordered as noted in patient's After Visit Summary.  Personalized health advice and appropriate referrals for health education or preventive services given if needed, as noted in patient's After Visit Summary.     History of Present Illness:     Patient presents for a Medicare Wellness Visit    Patient here for Medicare exam and follow-up with a chronic condition and she concerned about voice fatigue as she noticed for the last couple of months her voice gets scratchy and after that.  She will end up tired from talking and no cough no wheezing she does have history of heartburn she been taking pantoprazole and they feel it did not help much regarding this problem but her dyspepsia improved compared to before recent blood work discussed with the patient       Patient Care Team:  Olivier Atkins MD as PCP - General (Family Medicine)  Josef Maurice MD     Review of Systems:     Review of Systems   Constitutional:  Negative for chills and fever.   HENT:  Negative for ear pain and sore throat.    Eyes:  Negative for pain and visual disturbance.   Respiratory:  Negative for cough and shortness of breath.    Cardiovascular:  Negative for chest pain and palpitations.   Gastrointestinal:  Negative for abdominal pain, constipation, diarrhea and vomiting.   Genitourinary:  Negative for dysuria and hematuria.   Skin:  Negative for color change and rash.   Neurological:   Negative for seizures and syncope.   Hematological:  Does not bruise/bleed easily.   Psychiatric/Behavioral:  Negative for behavioral problems.    All other systems reviewed and are negative.       Problem List:     Patient Active Problem List   Diagnosis   • Essential hypertension   • Coronary artery disease of native artery of native heart with stable angina pectoris (HCC)   • Dyslipidemia   • Stenosis of left carotid artery   • Cervical radiculopathy   • Lumbar post-laminectomy syndrome   • Stage 3a chronic kidney disease (HCC)   • Cervical spinal stenosis   • Thoracic radiculopathy   • Other chest pain   • Chronic pain of both shoulders   • Elevated C-reactive protein (CRP)   • Elevated sed rate   • Herpes zoster   • Hypothyroidism   • Osteoarthritis of knee   • Polyarthropathy   • Pseudogout involving multiple joints   • Class 1 obesity due to excess calories with serious comorbidity and body mass index (BMI) of 33.0 to 33.9 in adult   • Need for pneumococcal 20-valent conjugate vaccination   • Absence of bladder continence   • Dyspepsia   • Vitamin D deficiency   • Seronegative inflammatory arthritis   • Obesity, morbid (HCC)   • Medicare annual wellness visit, subsequent   • Voice fatigue      Past Medical and Surgical History:     Past Medical History:   Diagnosis Date   • Arthritis    • Coronary artery disease    • Disease of thyroid gland    • Hyperlipidemia    • Hypertension      Past Surgical History:   Procedure Laterality Date   • BACK SURGERY     • BREAST SURGERY     • CARPAL TUNNEL RELEASE     • CHOLECYSTECTOMY     • HYSTERECTOMY     • OOPHORECTOMY     • ORTHOPEDIC SURGERY        Family History:     Family History   Problem Relation Age of Onset   • No Known Problems Mother    • Lung cancer Father    • Ovarian cancer Sister    • No Known Problems Sister    • No Known Problems Sister    • No Known Problems Maternal Grandmother    • No Known Problems Maternal Grandfather    • No Known Problems Paternal  Grandmother    • No Known Problems Paternal Grandfather    • No Known Problems Maternal Aunt    • No Known Problems Paternal Aunt    • No Known Problems Paternal Aunt    • Breast cancer Cousin 45      Social History:     Social History     Socioeconomic History   • Marital status: /Civil Union     Spouse name: None   • Number of children: None   • Years of education: None   • Highest education level: None   Occupational History   • None   Tobacco Use   • Smoking status: Former     Current packs/day: 0.00     Average packs/day: 0.1 packs/day for 52.0 years (5.2 ttl pk-yrs)     Types: Cigarettes     Start date:      Quit date:      Years since quittin.2     Passive exposure: Never   • Smokeless tobacco: Never   Vaping Use   • Vaping status: Never Used   Substance and Sexual Activity   • Alcohol use: Not Currently   • Drug use: Never   • Sexual activity: None   Other Topics Concern   • None   Social History Narrative   • None     Social Determinants of Health     Financial Resource Strain: Not on file   Food Insecurity: No Food Insecurity (2024)    Hunger Vital Sign    • Worried About Running Out of Food in the Last Year: Never true    • Ran Out of Food in the Last Year: Never true   Transportation Needs: No Transportation Needs (2024)    PRAPARE - Transportation    • Lack of Transportation (Medical): No    • Lack of Transportation (Non-Medical): No   Physical Activity: Not on file   Stress: Not on file   Social Connections: Not on file   Intimate Partner Violence: Not on file   Housing Stability: Low Risk  (2024)    Housing Stability Vital Sign    • Unable to Pay for Housing in the Last Year: No    • Number of Places Lived in the Last Year: 1    • Unstable Housing in the Last Year: No      Medications and Allergies:     Current Outpatient Medications   Medication Sig Dispense Refill   • atorvastatin (LIPITOR) 40 mg tablet Take 1 tablet (40 mg total) by mouth daily 90 tablet 0   •  Cholecalciferol (Vitamin D3) 50 MCG (2000 UT) CAPS Take 1 capsule (2,000 Units total) by mouth daily 90 capsule 0   • clopidogrel (PLAVIX) 75 mg tablet Take 1 tablet (75 mg total) by mouth daily 90 tablet 0   • colchicine (COLCRYS) 0.6 mg tablet Take 1 tablet (0.6 mg total) by mouth daily 90 tablet 0   • diltiazem (CARDIZEM CD) 240 mg 24 hr capsule Take 1 capsule (240 mg total) by mouth daily 90 capsule 0   • levothyroxine 100 mcg tablet Take 1 tablet (100 mcg total) by mouth every morning 90 tablet 0   • pantoprazole (PROTONIX) 40 mg tablet Take 1 tablet (40 mg total) by mouth daily before breakfast 90 tablet 0   • ezetimibe (ZETIA) 10 mg tablet TAKE ONE TABLET BY MOUTH ONCE DAILY 90 tablet 5   • folic acid (FOLVITE) 1 mg tablet TAKE ONE TABLET BY MOUTH ONCE DAILY 90 tablet 12   • hydroCHLOROthiazide 25 mg tablet TAKE ONE TABLET BY MOUTH ONCE DAILY 90 tablet 1   • hydroxychloroquine (PLAQUENIL) 200 mg tablet Take 1 tablet (200 mg total) by mouth daily with breakfast 90 tablet 3   • hydroxychloroquine (PLAQUENIL) 200 mg tablet Take 1 tablet (200 mg total) by mouth 2 (two) times a day with meals 60 tablet 11   • losartan (COZAAR) 100 MG tablet TAKE ONE TABLET BY MOUTH ONCE DAILY 90 tablet 1   • metoprolol succinate (TOPROL-XL) 50 mg 24 hr tablet TAKE ONE TABLET BY MOUTH ONCE DAILY 90 tablet 1   • nitroglycerin (NITROSTAT) 0.4 mg SL tablet Place 1 tablet under the tongue every 5 (five) minutes as needed     • predniSONE 5 mg tablet Take 1 tablet (5 mg total) by mouth daily 90 tablet 1   • predniSONE 5 mg tablet Take 1 tablet (5 mg total) by mouth daily 30 tablet 11   • pregabalin (LYRICA) 150 mg capsule Take 1 capsule (150 mg total) by mouth daily 90 capsule 3     No current facility-administered medications for this visit.     Allergies   Allergen Reactions   • Codeine    • Shingrix [Zoster Vac Recomb Adjuvanted] Rash      Immunizations:     Immunization History   Administered Date(s) Administered   • COVID-19 PFIZER  VACCINE 0.3 ML IM 01/28/2021, 03/25/2021, 11/03/2021   • INFLUENZA 10/22/2013   • Influenza, high dose seasonal 0.7 mL 09/07/2023   • Influenza, injectable, quadrivalent, preservative free 0.5 mL 10/26/2021   • Pneumococcal Conjugate Vaccine 20-valent (Pcv20), Polysace 08/07/2023   • Tdap 10/15/2023   • influenza, injectable, quadrivalent 10/05/2020      Health Maintenance:         Topic Date Due   • Breast Cancer Screening: Mammogram  04/03/2025   • Hepatitis C Screening  Completed         Topic Date Due   • COVID-19 Vaccine (4 - 2023-24 season) 09/01/2023      Medicare Screening Tests and Risk Assessments:     Ariadna is here for her Subsequent Wellness visit. Last Medicare Wellness visit information reviewed, patient interviewed and updates made to the record today.      Health Risk Assessment:   Patient rates overall health as good. Patient feels that their physical health rating is much worse. Patient is satisfied with their life. Hearing was rated as same. Patient feels that their emotional and mental health rating is slightly worse. Patients states they are sometimes angry. Patient states they are sometimes unusually tired/fatigued. Pain experienced in the last 7 days has been some. Patient's pain rating has been 7/10. Patient states that she has experienced no weight loss or gain in last 6 months. Multiple joint pain ,f/up with Rheumatology who manage med    Depression Screening:   PHQ-2 Score: 2      Fall Risk Screening:   In the past year, patient has experienced: no history of falling in past year      Urinary Incontinence Screening:   Patient has not leaked urine accidently in the last six months.     Home Safety:  Patient has trouble with stairs inside or outside of their home. Patient has working smoke alarms and has working carbon monoxide detector. Home safety hazards include: none.     Nutrition:   Current diet is Regular.     Medications:   Patient is currently taking over-the-counter supplements.  OTC medications include: see medication list. Patient is able to manage medications.     Activities of Daily Living (ADLs)/Instrumental Activities of Daily Living (IADLs):   Walk and transfer into and out of bed and chair?: Yes  Dress and groom yourself?: Yes    Bathe or shower yourself?: Yes    Feed yourself? Yes  Do your laundry/housekeeping?: Yes  Manage your money, pay your bills and track your expenses?: Yes  Make your own meals?: Yes    Do your own shopping?: Yes    Durable Medical Equipment Suppliers  none    Previous Hospitalizations:   Any hospitalizations or ED visits within the last 12 months?: No      Advance Care Planning:   Living will: No    Durable POA for healthcare: No    Advanced directive: No    Advanced directive counseling given: No    ACP document given: Yes    Patient declined ACP directive: No    End of Life Decisions reviewed with patient: Yes    Provider agrees with end of life decisions: Yes      Cognitive Screening:   Provider or family/friend/caregiver concerned regarding cognition?: No    PREVENTIVE SCREENINGS      Cardiovascular Screening:    General: Screening Not Indicated and History Lipid Disorder      Diabetes Screening:     General: Screening Current      Breast Cancer Screening:     General: Screening Current      Cervical Cancer Screening:    General: Screening Not Indicated      Osteoporosis Screening:    General: Screening Current      Abdominal Aortic Aneurysm (AAA) Screening:        General: Screening Not Indicated      Lung Cancer Screening:     General: Screening Not Indicated      Hepatitis C Screening:    General: Screening Current    Screening, Brief Intervention, and Referral to Treatment (SBIRT)    Screening  Typical number of drinks in a day: 0  Typical number of drinks in a week: 0  Interpretation: Low risk drinking behavior.    AUDIT-C Screenin) How often did you have a drink containing alcohol in the past year? never  2) How many drinks did you have on a  typical day when you were drinking in the past year? 0  3) How often did you have 6 or more drinks on one occasion in the past year? never    AUDIT-C Score: 0  Interpretation: Score 0-2 (female): Negative screen for alcohol misuse    Single Item Drug Screening:  How often have you used an illegal drug (including marijuana) or a prescription medication for non-medical reasons in the past year? never    Single Item Drug Screen Score: 0  Interpretation: Negative screen for possible drug use disorder    Brief Intervention  Alcohol & drug use screenings were reviewed. No concerns regarding substance use disorder identified.     No results found.     Physical Exam:     /70 (BP Location: Left arm, Patient Position: Sitting)   Pulse 64   Temp (!) 95.9 °F (35.5 °C) (Tympanic)   Ht 5' (1.524 m)   Wt 84.8 kg (187 lb)   SpO2 97%   Breastfeeding No   BMI 36.52 kg/m²     Physical Exam  Vitals and nursing note reviewed.   Constitutional:       General: She is not in acute distress.     Appearance: She is well-developed.   HENT:      Head: Normocephalic and atraumatic.      Right Ear: There is no impacted cerumen.      Left Ear: There is no impacted cerumen.      Nose: No congestion or rhinorrhea.      Mouth/Throat:      Pharynx: No oropharyngeal exudate or posterior oropharyngeal erythema.   Eyes:      General:         Right eye: No discharge.         Left eye: No discharge.      Conjunctiva/sclera: Conjunctivae normal.   Cardiovascular:      Rate and Rhythm: Normal rate and regular rhythm.      Heart sounds: Murmur heard.   Pulmonary:      Effort: Pulmonary effort is normal. No respiratory distress.      Breath sounds: Normal breath sounds.   Abdominal:      Palpations: Abdomen is soft.      Tenderness: There is no abdominal tenderness.   Musculoskeletal:         General: No swelling.      Cervical back: Neck supple.   Skin:     General: Skin is warm and dry.      Capillary Refill: Capillary refill takes less than 2  seconds.      Findings: No rash.   Neurological:      Mental Status: She is alert and oriented to person, place, and time.   Psychiatric:         Mood and Affect: Mood normal.          Olivier Atkins MD

## 2024-04-09 NOTE — ASSESSMENT & PLAN NOTE
New diagnosis symptomatic patient already on pantoprazole for dyspepsia recommend the patient to be evaluated by ENT for possible laryngoscopy

## 2024-04-30 ENCOUNTER — HOSPITAL ENCOUNTER (OUTPATIENT)
Dept: CT IMAGING | Facility: HOSPITAL | Age: 79
Discharge: HOME | End: 2024-04-30
Payer: MEDICARE

## 2024-04-30 ENCOUNTER — HOSPITAL ENCOUNTER (OUTPATIENT)
Dept: RADIOLOGY | Facility: HOSPITAL | Age: 79
Discharge: HOME | End: 2024-04-30
Payer: MEDICARE

## 2024-04-30 DIAGNOSIS — J33.9 NASAL POLYPOSIS: ICD-10-CM

## 2024-04-30 DIAGNOSIS — R51.9 FACIAL PAIN: ICD-10-CM

## 2024-04-30 DIAGNOSIS — R13.14 PHARYNGOESOPHAGEAL DYSPHAGIA: ICD-10-CM

## 2024-04-30 PROCEDURE — 74230 X-RAY XM SWLNG FUNCJ C+: CPT

## 2024-04-30 PROCEDURE — 70486 CT MAXILLOFACIAL W/O DYE: CPT

## 2024-04-30 PROCEDURE — 92611 MOTION FLUOROSCOPY/SWALLOW: CPT

## 2024-04-30 NOTE — PROCEDURES
"Speech Pathology Videofluoroscopic Swallow Study (VFSS/VBSS/MBSS)              Patient Name: Ariadna Jernigan    Today's Date: 4/30/2024        Impression/Summary:    Pt presents with minimal/mild  oropharyngeal dysphagia most notably characterized by prolonged mastication, mildly reduced base of tongue retraction, diminished pharyngeal stripping wave and inconsistent complete epiglottic inversion. Trace penetration observed during consecutive straw sips of thin liquid. No aspiration appreciated. Narrowing in cricopharyngeus noted secondary to external compression. Compression appeared related to \"disc osteophyte complex with uncovertebral spurring\" at C5-C6 that was previously identified on MRI of the cervical spine from 5/13/23. This contributed to occasional trace retroflow of the bolus to the PES. Narrowing in cricopharyngeus did not appear to significantly impact bolus flow, however suspect this could contribute to a globus sensation.   Recommend continuing Regular consistency/thin liquid diet.     Please see below for full analysis of swallow components.    Note: Images are available for review in PACS as desired.      General Information;  Pt is a 78 y.o. female with a PMH remarkable for CAD, HTN, arthritis, and thyroid disease.    Current concerns for dysphagia include patient's report of intermittent discomfort with swallowing.  A VFSS was recommended to assess oropharyngeal stage swallowing skills by pt's ENT. Pt was viewed standing in the lateral and AP positions. Trials administered were consistent with MBSImP Validated Protocol: 5-mL thin liquid x2, 20-mL cup sip thin, 40-mL sequential swallow thin, 5-mL nectar thick, 20-mL cup sip nectar thick, 40-mL sequential swallow nectar thick, 5-mL Honey thick, 5-mL pudding, ½ cookie coated with 3-mL pudding, 5-mL nectar thick in the AP position, and 5-mL pudding in the AP position. Pt was also given thin liquids by straw, a barium tablet with thin liquid, and " soft bread. A modified robust esophageal sweep test (mREST protocol) was utilized in conjunction with MBSImp protocol and included scanning the esophagus after a solid, multiple large volume sips of liquids, and a pill.        Oral stage:  Pt presented with minimal oral stage dysphagia.    Lip closure: intact with no labial escape   Bolus control with liquids: intact   Mastication: intact with prompt/smooth bolus formation   Anterior-Posterior Transit: intact with prompt, smooth transit   Oral residue: none   Initiation of pharyngeal swallow: Initiated when bolus head at posterior aspect of epiglottis      Pharyngeal stage:  Pt presented with minimal pharyngeal dysphagia.     Velopharyngeal Closure: intact with no bolus between soft palate and pharyngeal wall   Laryngeal Elevation:intact with complete superior movement of thyroid cartilage and approximation of epiglottic petiole and arytenoids   Anterior Hyoid Excursion: intact with complete anterior movement   Epiglottic Retroflexion: impaired with partial retroflexion   Laryngeal Vestibular Closure: intact with complete closure with no contrast in laryngeal vestibule   Pharyngeal Stripping Wave:impaired with diminished stripping   Pharyngeal Contraction (from AP view): intact withcomplete contraction unilaterally   Pharyngoesophageal segment Opening: partial extension and duration with no obstruction of bolus flow into the esophagus   Base of Tongue Retraction: impaired with trace column of contrast noted between the base of tongue and pharyngeal wall   Pharyngeal residue: intact with no residue remaining in pharynx      Aspiration Response and Efficacy:  No aspiration observed     Esophageal stage:  A sweep of esophagus was completed after administration of the barium tablet, large sips of thin liquid, and soft bread. No dysmotility or anatomic abnormality noted per REST criteria.     Penetration/Aspiration   8 point Penetration/Aspiration Scale score (Rosenpaulk  et al., 1996):   Thin liquids (STRAW, consecutive)  2-material enters the airway, remains above the vocal folds and is ejected from the airway     Thin liquids 1-material does not enter the airway     Nectar liquids 1-material does not enter the airway         Recommendations:   Recommended Diet:  regular diet and thin liquids   Recommended Form of Medications: whole with liquid   Aspiration precautions and compensatory swallowing strategies: upright posture  Consider referral to:  ENT already following       Results Reviewed with: patient     Patient Stated Goal: To have swallowing evaluated       Problem List  Active Problems:  There are no active Hospital Problems.      Past Medical History  Past Medical History:   Diagnosis Date    Arthritis     rheumatoid    Coronary artery disease     Disease of thyroid gland     Hyperlipidemia     Hypertension        Past Surgical History  Past Surgical History:   Procedure Laterality Date    BACK SURGERY      BREAST SURGERY      CARPAL TUNNEL RELEASE      CHOLECYSTECTOMY      HYSTERECTOMY      OOPHORECTOMY      ORTHOPEDIC SURGERY

## 2024-05-01 ENCOUNTER — OFFICE VISIT (OUTPATIENT)
Dept: CARDIOLOGY CLINIC | Facility: CLINIC | Age: 79
End: 2024-05-01
Payer: MEDICARE

## 2024-05-01 VITALS
BODY MASS INDEX: 36.21 KG/M2 | WEIGHT: 185.4 LBS | DIASTOLIC BLOOD PRESSURE: 50 MMHG | SYSTOLIC BLOOD PRESSURE: 117 MMHG | HEART RATE: 62 BPM

## 2024-05-01 DIAGNOSIS — I65.23 BILATERAL CAROTID ARTERY STENOSIS: ICD-10-CM

## 2024-05-01 DIAGNOSIS — E66.01 OBESITY, MORBID (HCC): ICD-10-CM

## 2024-05-01 DIAGNOSIS — N18.31 STAGE 3A CHRONIC KIDNEY DISEASE (HCC): ICD-10-CM

## 2024-05-01 DIAGNOSIS — I25.118 CORONARY ARTERY DISEASE OF NATIVE ARTERY OF NATIVE HEART WITH STABLE ANGINA PECTORIS (HCC): Primary | ICD-10-CM

## 2024-05-01 DIAGNOSIS — I10 ESSENTIAL HYPERTENSION: ICD-10-CM

## 2024-05-01 DIAGNOSIS — E78.5 DYSLIPIDEMIA: ICD-10-CM

## 2024-05-01 PROBLEM — E66.812 OBESITY, CLASS II, BMI 35-39.9: Status: ACTIVE | Noted: 2023-08-08

## 2024-05-01 PROBLEM — E66.9 OBESITY, CLASS II, BMI 35-39.9: Status: ACTIVE | Noted: 2023-08-08

## 2024-05-01 PROCEDURE — G2211 COMPLEX E/M VISIT ADD ON: HCPCS | Performed by: INTERNAL MEDICINE

## 2024-05-01 PROCEDURE — 99214 OFFICE O/P EST MOD 30 MIN: CPT | Performed by: INTERNAL MEDICINE

## 2024-05-01 RX ORDER — ATORVASTATIN CALCIUM 80 MG/1
80 TABLET, FILM COATED ORAL DAILY
Qty: 90 TABLET | Refills: 3 | Status: SHIPPED | OUTPATIENT
Start: 2024-05-01

## 2024-05-01 NOTE — ASSESSMENT & PLAN NOTE
Blood pressure is well-controlled.  Is on good medications.  -- Will continue current medications.

## 2024-05-01 NOTE — PROGRESS NOTES
[facility]  [facility]  Primary Office: [address] [place] [address]  Phone: [phone]; Fax: [phone]  *-*-*-*-*-*-*-*-*-*-*-*-*-*-*-*-*-*-*-*-*-*-*-*-*-*-*-*-*-*-*-*-*-*-*-*-*-*-*-*-*-*-*-*-*-*-*-*-*-*-*-*-*-*  ENCOUNTER DATE: 05/01/24 9:03 AM  PATIENT NAME: Ariadna Jernigan   1945    [id number]  AGE:78 y.o.      SEX: female  ENCOUNTER PROVIDER: Johnny Hinojosa MD, [facility], [facility]  PRIMARY CARE PHYSICIAN: Olivier Atkins MD    DIAGNOSES:  1. Unspecified coronary artery disease by cardiac catheterization in 2009. Medically managed for small-vessel disease without significant occlusive disease.  2. Primary hypertension  3. Dyslipidemia  4. Carotid artery disease  5. Degenerative joint disease with cervical radiculopathy, chronic pain in both shoulders with bilateral bursitis.  6. Hypothyroidism  7.  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 Normal left ventricle size and systolic function, early, compensated grade 1 diastolic dysfunction.  Left ventricular ejection fraction estimated as around 65%.  2. Normal right ventricle size and systolic function.  3. Normal left and right atrial size.  4. Aortic valve sclerosis with some focal calcification.  No aortic valve stenosis or regurgitation.  5. Mitral annular calcification, mitral valvular sclerosis, trace mitral valve regurgitation.  6. No significant tricuspid valve regurgitation.  7. No obvious pulmonary hypertension.  8. No pericardial effusion.  Compared to report of previous echocardiogram from 6/3/2023 there is overall no significant change.    REGADENOSON NUCLEAR STRESS TEST Chiqui 3, 2022:  1. No ECG changes and no angina noted during stress test.  2. Normal myocardial perfusion scan with normal left ventricular function and normal regional motion.  EF of 67%.  3. Normal resting ECG with no significant changes and no significant arrhythmia noted  during stress test.  4. Normal resting blood pressure with appropriate blood pressure response noted during stress test.      ECHOCARDIOGRAM June 2022: Normal left ventricular cavity size, mild concentric LVH, normal left ventricular systolic function, EF 65%, grade 1 diastolic dysfunction, normal right ventricular size and systolic function, normal left and right atrial size, intact interatrial septum, mild mitral annular calcification, trace mitral valve regurgitation, trace tricuspid valve regurgitation, no obvious pulmonary hypertension, no pulmonary hypertension and no pericardial effusion.     REGADENOSON NUCLEAR STRESS TEST in April 2019 the showed normal myocardial perfusion, hyperdynamic LV function with EF of 76%.    ECHOCARDIOGRAM in April 2019 showed normal left ventricular systolic function, EF 65%, grade 1 diastolic dysfunction, normal right ventricular size and function, normal left and right atrial size, mild mitral annular calcification without stenosis or regurgitation, mildly thickened aortic valve with no stenosis or regurgitation, no tricuspid or pulmonic valve regurgitation and no pulmonary hypertension and pericardial effusion.       CURRENT ECG   No results found for this visit on 05/01/24.     CARDIOLOGY ASSESSMENT & PLAN      Diagnosis ICD-10-CM Associated Orders   1. Coronary artery disease of native artery of native heart with stable angina pectoris ([facility])  I25.118 atorvastatin (LIPITOR) 80 mg tablet      2. Bilateral carotid artery stenosis  I65.23       3. Stage 3a chronic kidney disease ([facility])  N18.31       4. Dyslipidemia  E78.5       5. Obesity, morbid ([facility])  E66.01       6. Essential hypertension  I10          1. Coronary artery disease of native artery of native heart with stable angina pectoris ([facility])  Assessment & Plan:  This condition has been stable with no recent to anginal-like symptoms.  She does have chronic nonspecific specific pain.  Her lipids are noted to be abnormal.  --  We are increasing dose of atorvastatin to 80 mg daily.  -- She will continue metoprolol diltiazem hydrochlorothiazide clopidogrel and ezetimibe.  -- Encouraged physical activity and healthy eating to try to lose weight.    Orders:  -     atorvastatin (LIPITOR) 80 mg tablet; Take 1 tablet (80 mg total) by mouth daily    2. Bilateral carotid artery stenosis  Assessment & Plan:  Has bilateral less than 50% carotid artery stenosis with heterogeneous plaque.  No history of TIA/CVA.  -- Increasing the dose of statin to atorvastatin 80 mg daily.  -- Other recommendations as outlined below.      3. Stage 3a chronic kidney disease (HCC)    4. Dyslipidemia    5. Obesity, morbid (HCC)    6. Essential hypertension  Assessment & Plan:  Blood pressure is well-controlled.  Is on good medications.  -- Will continue current medications.        INTERVAL HISTORY, HISTORY OF PRESENT ILLNESS & COMPREHENSIVE VISIT INFORMATION     Ariadna Jernigan is here for follow-up regarding her cardiac comorbidities which include:  Coronary artery disease, carotid artery disease, primary hypertension, dyslipidemia and other comorbidities.  She was last seen by me in August 2023.  She is here for follow-up accompanied with her .  She mentions that since last visit she has had no new major diagnosis or hospitalizations or significant illnesses.  She reports that she continues to deal with multiple joint pains and aches.  She reports having episodes of sudden onset severe jaw pain radiating to her neck and subsequently to her chest.  Symptoms occurs randomly at rest and with activity and sometimes have woken up her from sleep.  This symptom involves sharp pain not associated with nausea vomiting diaphoresis typical anginal-like chest pain or palpitation.  Symptoms spontaneously resolved after few seconds to a few minutes.  Having cold water sometimes relieves the symptoms immediately.  Mentions that her general arthritis symptoms are still present  but slightly better since her medications were changed by her new rheumatologist.  She has had no recent falls.  Denies any bleeding or excessive bruising.      Functional capacity status:  Moderate, limited due to knee pain and back pain   (Excellent- >10 METs; Good: (7-10 METs); Moderate (4-7 METs); Poor (<= 4 METs)    Any chronic stressors:  None   (feeling of poor health, financial problems, and social isolation etc).    Tobacco or alcohol dependence:  She does not smoke and she does not drink. She quit smoking last year    Current cardiac medications: Cardizem  mg once daily, HCTZ 25 mg once daily, metoprolol succinate 50 mg daily, losartan 100 mg daily, clopidogrel 75 mg daily, ezetimibe 10 mg daily.    Last recent comprehensive blood work available:   Blood work 4/5/2024: Sodium 143 potassium 3.6 chloride 101 bicarb 32 BUN 20 creatinine 0.98 GFR 55  Normal liver function test  Total cholesterol 127 triglyceride 130 HDL 77 calculated   Vitamin D level 30.8  TSH 1.664  Hemoglobin A1c 5.6 on 4/12/2023  Serum protein electrophoresis October 2022 negative for monoclonal bands.     REVIEW OF SYSTEMS   Positive for: As noted above in HPI  Negative for: All remaining as reviewed below and in HPI.    SYSTEM SYMPTOMS REVIEWED:  General--weight change, fever, night sweats  Respiratory--cough, wheezing, shortness of breath, sputum production  Cardiovascular--chest pain, syncope, dyspnea on exertion, edema, decline in exercise tolerance, claudication   Gastrointestinal--persistent vomiting, diarrhea, abdominal distention, blood in stool   Muscular or skeletal--joint pain or swelling   Neurologic--headaches, syncope, abnormal movement  Hematologic--history of easy bruising and bleeding   Endocrine--thyroid enlargement, heat or cold intolerance, polyuria   Psychiatric--anxiety, depression     *-*-*-*-*-*-*-*-*-*-*-*-*-*-*-*-*-*-*-*-*-*-*-*-*-*-*-*-*-*-*-*-*-*-*-*-*-*-*-*-*-*-*-*-*-*-*-*-*-*-*-*-*-*-  VITAL  SIGNS     CURRENT VITAL SIGNS:   Vitals:    05/01/24 0809   BP: 117/50   BP Location: Left arm   Patient Position: Sitting   Cuff Size: Large   Pulse: 62   Weight: 84.1 kg (185 lb 6.4 oz)       BMI: Body mass index is 36.21 kg/m².    WEIGHTS:   Wt Readings from Last 25 Encounters:   05/01/24 84.1 kg (185 lb 6.4 oz)   04/16/24 85.3 kg (188 lb)   04/09/24 84.8 kg (187 lb)   04/03/24 84.8 kg (187 lb)   03/26/24 84.8 kg (187 lb)   01/08/24 83 kg (183 lb)   10/15/23 80.2 kg (176 lb 14.4 oz)   09/26/23 79.6 kg (175 lb 6.4 oz)   09/14/23 79.5 kg (175 lb 3.2 oz)   09/07/23 78.7 kg (173 lb 9.6 oz)   08/18/23 79.8 kg (176 lb)   08/07/23 80 kg (176 lb 6.4 oz)   08/07/23 80.7 kg (178 lb)   07/18/23 82.1 kg (181 lb)   05/25/23 81.9 kg (180 lb 9.6 oz)   05/03/23 81.7 kg (180 lb 1.6 oz)   04/25/23 79.8 kg (176 lb)   04/18/23 79.8 kg (176 lb)   03/08/23 79.7 kg (175 lb 9.6 oz)   02/06/23 81.9 kg (180 lb 9.6 oz)   01/23/23 79.3 kg (174 lb 12.8 oz)   01/11/23 81.6 kg (180 lb)   11/22/22 81.7 kg (180 lb 3.2 oz)   11/14/22 79.8 kg (176 lb)   10/20/22 80 kg (176 lb 6.4 oz)        *-*-*-*-*-*-*-*-*-*-*-*-*-*-*-*-*-*-*-*-*-*-*-*-*-*-*-*-*-*-*-*-*-*-*-*-*-*-*-*-*-*-*-*-*-*-*-*-*-*-*-*-*-*-  PHYSICAL EXAM     General Appearance:    Alert, cooperative, no distress, appears stated age, increased BMI   Head, Eyes, ENT:    No obvious abnormality, moist mucous mebranes.   Neck:   Supple, no carotid bruit. No JVD, no obvious lymphadenoapthy   Back:     Symmetric, no curvature.   Lungs:     Respirations unlabored. Clear to auscultation bilaterally,    Chest wall:    No tenderness or deformity   Heart:    Regular rate and rhythm, S1 and S2 normal, no murmur, rub  or gallop.   Abdomen:     Soft, non-tender,    Extremities:   Extremities warm, no cyanosis or edema    Skin: No venostatic changes in lower extremities.   Normal skin color, texture, and turgor. No rashes or lesions   Neuro: Pt is alert and oriented time 3 with no gross motor deficits.    Psych/ behavioral: Mood is normal. Behavior is normal.     *-*-*-*-*-*-*-*-*-*-*-*-*-*-*-*-*-*-*-*-*-*-*-*-*-*-*-*-*-*-*-*-*-*-*-*-*-*-*-*-*-*-*-*-*-*-*-*-*-*-*-*-*-*-  CURRENT MEDICATIONS LIST     Current Outpatient Medications:     atorvastatin (LIPITOR) 80 mg tablet, Take 1 tablet (80 mg total) by mouth daily, Disp: 90 tablet, Rfl: 3    Cholecalciferol (Vitamin D3) 50 MCG (2000 UT) CAPS, Take 1 capsule (2,000 Units total) by mouth daily, Disp: 90 capsule, Rfl: 0    clopidogrel (PLAVIX) 75 mg tablet, Take 1 tablet (75 mg total) by mouth daily, Disp: 90 tablet, Rfl: 0    colchicine (COLCRYS) 0.6 mg tablet, Take 1 tablet (0.6 mg total) by mouth daily, Disp: 90 tablet, Rfl: 0    diltiazem (CARDIZEM CD) 240 mg 24 hr capsule, Take 1 capsule (240 mg total) by mouth daily, Disp: 90 capsule, Rfl: 0    ezetimibe (ZETIA) 10 mg tablet, TAKE ONE TABLET BY MOUTH ONCE DAILY, Disp: 90 tablet, Rfl: 5    folic acid (FOLVITE) 1 mg tablet, TAKE ONE TABLET BY MOUTH ONCE DAILY, Disp: 90 tablet, Rfl: 12    hydroCHLOROthiazide 25 mg tablet, TAKE ONE TABLET BY MOUTH ONCE DAILY, Disp: 90 tablet, Rfl: 1    hydroxychloroquine (PLAQUENIL) 200 mg tablet, Take 1 tablet (200 mg total) by mouth daily with breakfast, Disp: 90 tablet, Rfl: 3    levothyroxine 100 mcg tablet, TAKE ONE TABLET BY MOUTH ONCE DAILY IN THE MORNING, Disp: 90 tablet, Rfl: 2    losartan (COZAAR) 100 MG tablet, TAKE ONE TABLET BY MOUTH ONCE DAILY, Disp: 90 tablet, Rfl: 1    metoprolol succinate (TOPROL-XL) 50 mg 24 hr tablet, TAKE ONE TABLET BY MOUTH ONCE DAILY, Disp: 90 tablet, Rfl: 1    nitroglycerin (NITROSTAT) 0.4 mg SL tablet, Place 1 tablet under the tongue every 5 (five) minutes as needed, Disp: , Rfl:     pantoprazole (PROTONIX) 40 mg tablet, Take 1 tablet (40 mg total) by mouth daily before breakfast, Disp: 90 tablet, Rfl: 0    predniSONE 5 mg tablet, Take 1 tablet (5 mg total) by mouth daily, Disp: 30 tablet, Rfl: 11    pregabalin (LYRICA) 150 mg capsule, Take 1  capsule (150 mg total) by mouth daily, Disp: 90 capsule, Rfl: 3    hydroxychloroquine (PLAQUENIL) 200 mg tablet, Take 1 tablet (200 mg total) by mouth 2 (two) times a day with meals, Disp: 60 tablet, Rfl: 11    predniSONE 5 mg tablet, Take 1 tablet (5 mg total) by mouth daily, Disp: 90 tablet, Rfl: 1  No current facility-administered medications for this visit.    Facility-Administered Medications Ordered in Other Visits:     barium sulfate tablet 1 tablet, 1 tablet, Oral, Once in imaging, Niya Lyle PA-C       ALLERGIES     Allergies   Allergen Reactions    Codeine     Shingrix [Zoster Vac Recomb Adjuvanted] Rash       *-*-*-*-*-*-*-*-*-*-*-*-*-*-*-*-*-*-*-*-*-*-*-*-*-*-*-*-*-*-*-*-*-*-*-*-*-*-*-*-*-*-*-*-*-*-*-*-*-*-*-*-*-*-  LABORATORY DATA     Sodium   Date Value Ref Range Status   05/03/2018 139 137 - 147 MEQ/L Final     Sodium   Date Value Ref Range Status   05/03/2018 139 137 - 147 MEQ/L Final     Potassium   Date Value Ref Range Status   04/05/2024 3.6 3.5 - 5.3 mmol/L Final   01/03/2024 3.5 3.5 - 5.3 mmol/L Final   09/01/2023 3.4 (L) 3.5 - 5.3 mmol/L Final   05/03/2018 4.5 3.6 - 5.0 MEQ/L Final     Chloride   Date Value Ref Range Status   04/05/2024 101 96 - 108 mmol/L Final   01/03/2024 102 96 - 108 mmol/L Final   09/01/2023 101 96 - 108 mmol/L Final   05/03/2018 100 97 - 108 MEQ/L Final     CO2   Date Value Ref Range Status   04/05/2024 32 21 - 32 mmol/L Final   01/03/2024 29 21 - 32 mmol/L Final   09/01/2023 31 21 - 32 mmol/L Final   05/03/2018 30 22 - 30 MMOL/L Final     Anion Gap   Date Value Ref Range Status   05/03/2018 8 5 - 14 MMOL/L Final     BUN   Date Value Ref Range Status   04/05/2024 20 5 - 25 mg/dL Final   01/03/2024 15 5 - 25 mg/dL Final   09/01/2023 20 5 - 25 mg/dL Final   05/03/2018 16 5 - 25 MG/DL Final     Creatinine   Date Value Ref Range Status   04/05/2024 0.98 0.60 - 1.30 mg/dL Final     Comment:     Standardized to IDMS reference method   01/03/2024 0.96 0.60 - 1.30 mg/dL  Final     Comment:     Standardized to IDMS reference method   09/01/2023 0.85 0.60 - 1.30 mg/dL Final     Comment:     Standardized to IDMS reference method     eGFR   Date Value Ref Range Status   04/05/2024 55 ml/min/1.73sq m Final   01/03/2024 57 ml/min/1.73sq m Final   09/01/2023 66 ml/min/1.73sq m Final     Glucose   Date Value Ref Range Status   05/03/2018 91 70 - 99 MG/DL Final     Calcium   Date Value Ref Range Status   04/05/2024 10.0 8.4 - 10.2 mg/dL Final   01/03/2024 10.2 8.4 - 10.2 mg/dL Final   09/01/2023 10.0 8.4 - 10.2 mg/dL Final   05/03/2018 9.9 8.4 - 10.2 MG/DL Final     AST   Date Value Ref Range Status   04/05/2024 19 13 - 39 U/L Final   09/01/2023 18 13 - 39 U/L Final   04/12/2023 13 13 - 39 U/L Final   05/03/2018 39 (H) 14 - 36 U/L Final     ALT   Date Value Ref Range Status   04/05/2024 20 7 - 52 U/L Final     Comment:     Specimen collection should occur prior to Sulfasalazine administration due to the potential for falsely depressed results.    09/01/2023 20 7 - 52 U/L Final     Comment:     Specimen collection should occur prior to Sulfasalazine administration due to the potential for falsely depressed results.    04/12/2023 9 7 - 52 U/L Final     Comment:     Specimen collection should occur prior to Sulfasalazine administration due to the potential for falsely depressed results.    05/03/2018 51 9 - 52 U/L Final     Alkaline Phosphatase   Date Value Ref Range Status   04/05/2024 59 34 - 104 U/L Final   09/01/2023 48 34 - 104 U/L Final   04/12/2023 44 34 - 104 U/L Final   05/03/2018 63 43 - 122 U/L Final     Total Protein   Date Value Ref Range Status   05/03/2018 7.0 5.9 - 8.4 G/DL Final     Total Bilirubin   Date Value Ref Range Status   05/03/2018 0.4 <1.3 mg/dL Final     WBC   Date Value Ref Range Status   04/05/2024 9.28 4.31 - 10.16 Thousand/uL Final   09/01/2023 7.26 4.31 - 10.16 Thousand/uL Final   04/12/2023 9.28 4.31 - 10.16 Thousand/uL Final   05/03/2018 7.7 4.5 - 11.0 K/MCL  "Final     Hemoglobin   Date Value Ref Range Status   04/05/2024 13.2 11.5 - 15.4 g/dL Final   09/01/2023 13.2 11.5 - 15.4 g/dL Final   04/12/2023 13.1 11.5 - 15.4 g/dL Final   05/03/2018 12.8 12.0 - 16.0 G/DL Final     Platelets   Date Value Ref Range Status   04/05/2024 225 149 - 390 Thousands/uL Final   09/01/2023 252 149 - 390 Thousands/uL Final   04/12/2023 273 149 - 390 Thousands/uL Final   05/03/2018 215 150 - 450 K/MCL Final     PTT   Date Value Ref Range Status   04/27/2019 35 26 - 38 seconds Final     Comment:     Therapeutic Heparin Range =  60-90 seconds     INR   Date Value Ref Range Status   04/27/2019 0.99 0.86 - 1.17 Final     No results found for: \"CK\", \"CKMB\", \"DIGOXIN\"  No results found for: \"TSH\"  Cholesterol   Date Value Ref Range Status   05/03/2018 179 <200 mg/dL Final     Comment:            NCEP ATP III      <200           DESIRABLE   200-239     BORDERLINE HIGH   > = 240                HIGH        Hemoglobin A1C   Date Value Ref Range Status   04/12/2023 5.6 Normal 3.8-5.6%; PreDiabetic 5.7-6.4%; Diabetic >=6.5%; Glycemic control for adults with diabetes <7.0% % Final     Urine Culture   Date Value Ref Range Status   07/21/2020 80,000-89,000 cfu/ml Escherichia coli ESBL (A)  Final     Comment:     An Extended-Spectrum Beta-Lactamase is being produced by this organism (requires contact precautions).  Cephalosporins are NOT effective for treating these organisms.  For SEVERE infections (i.e. bacteremia, septic shock, etc.), Carbapenems are the drug of choice.  For MILD infections (i.e. isolated urinary or biliary infection), high-dose Zosyn may be used.   07/21/2020 <10,000 cfu/ml  Final     Comment:     Mixed Contaminants X2        *-*-*-*-*-*-*-*-*-*-*-*-*-*-*-*-*-*-*-*-*-*-*-*-*-*-*-*-*-*-*-*-*-*-*-*-*-*-*-*-*-*-*-*-*-*-*-*-*-*-*-*-*-*-  SIGNATURES:   @SIG@   Johnny Hinojosa MD; " [facility]    *-*-*-*-*-*-*-*-*-*-*-*-*-*-*-*-*-*-*-*-*-*-*-*-*-*-*-*-*-*-*-*-*-*-*-*-*-*-*-*-*-*-*-*-*-*-*-*-*-*-*-*-*-*-  PAST MEDICAL HISTORY:  Past Medical History:   Diagnosis Date    Arthritis     rheumatoid    Coronary artery disease     Disease of thyroid gland     Hyperlipidemia     Hypertension     PAST SURGICAL HISTORY:  Past Surgical History:   Procedure Laterality Date    BACK SURGERY      BREAST SURGERY      CARPAL TUNNEL RELEASE      CHOLECYSTECTOMY      HYSTERECTOMY      OOPHORECTOMY      ORTHOPEDIC SURGERY           FAMILY HISTORY:  Family History   Problem Relation Age of Onset    No Known Problems Mother     Lung cancer Father     Ovarian cancer Sister     No Known Problems Sister     No Known Problems Sister     No Known Problems Maternal Grandmother     No Known Problems Maternal Grandfather     No Known Problems Paternal Grandmother     No Known Problems Paternal Grandfather     No Known Problems Maternal Aunt     No Known Problems Paternal Aunt     No Known Problems Paternal Aunt     Breast cancer Cousin 45    SOCIAL HISTORY:  Social History     Tobacco Use   Smoking Status Former    Current packs/day: 0.00    Average packs/day: 0.1 packs/day for 52.0 years (5.2 ttl pk-yrs)    Types: Cigarettes    Start date:     Quit date:     Years since quittin.3    Passive exposure: Never   Smokeless Tobacco Never      Social History     Substance and Sexual Activity   Alcohol Use Not Currently     Social History     Substance and Sexual Activity   Drug Use Never    [unfilled]     *-*-*-*-*-*-*-*-*-*-*-*-*-*-*-*-*-*-*-*-*-*-*-*-*-*-*-*-*-*-*-*-*-*-*-*-*-*-*-*-*-*-*-*-*-*-*-*-*-*-*-*-*-*  ALLERGIES:  Allergies   Allergen Reactions    Codeine     Shingrix [Zoster Vac Recomb Adjuvanted] Rash    CURRENT SCHEDULED MEDICATIONS:    Current Outpatient Medications:     atorvastatin (LIPITOR) 80 mg tablet, Take 1 tablet (80 mg total) by mouth daily, Disp: 90 tablet, Rfl: 3    Cholecalciferol (Vitamin D3) 50 MCG  (2000 UT) CAPS, Take 1 capsule (2,000 Units total) by mouth daily, Disp: 90 capsule, Rfl: 0    clopidogrel (PLAVIX) 75 mg tablet, Take 1 tablet (75 mg total) by mouth daily, Disp: 90 tablet, Rfl: 0    colchicine (COLCRYS) 0.6 mg tablet, Take 1 tablet (0.6 mg total) by mouth daily, Disp: 90 tablet, Rfl: 0    diltiazem (CARDIZEM CD) 240 mg 24 hr capsule, Take 1 capsule (240 mg total) by mouth daily, Disp: 90 capsule, Rfl: 0    ezetimibe (ZETIA) 10 mg tablet, TAKE ONE TABLET BY MOUTH ONCE DAILY, Disp: 90 tablet, Rfl: 5    folic acid (FOLVITE) 1 mg tablet, TAKE ONE TABLET BY MOUTH ONCE DAILY, Disp: 90 tablet, Rfl: 12    hydroCHLOROthiazide 25 mg tablet, TAKE ONE TABLET BY MOUTH ONCE DAILY, Disp: 90 tablet, Rfl: 1    hydroxychloroquine (PLAQUENIL) 200 mg tablet, Take 1 tablet (200 mg total) by mouth daily with breakfast, Disp: 90 tablet, Rfl: 3    levothyroxine 100 mcg tablet, TAKE ONE TABLET BY MOUTH ONCE DAILY IN THE MORNING, Disp: 90 tablet, Rfl: 2    losartan (COZAAR) 100 MG tablet, TAKE ONE TABLET BY MOUTH ONCE DAILY, Disp: 90 tablet, Rfl: 1    metoprolol succinate (TOPROL-XL) 50 mg 24 hr tablet, TAKE ONE TABLET BY MOUTH ONCE DAILY, Disp: 90 tablet, Rfl: 1    nitroglycerin (NITROSTAT) 0.4 mg SL tablet, Place 1 tablet under the tongue every 5 (five) minutes as needed, Disp: , Rfl:     pantoprazole (PROTONIX) 40 mg tablet, Take 1 tablet (40 mg total) by mouth daily before breakfast, Disp: 90 tablet, Rfl: 0    predniSONE 5 mg tablet, Take 1 tablet (5 mg total) by mouth daily, Disp: 30 tablet, Rfl: 11    pregabalin (LYRICA) 150 mg capsule, Take 1 capsule (150 mg total) by mouth daily, Disp: 90 capsule, Rfl: 3    hydroxychloroquine (PLAQUENIL) 200 mg tablet, Take 1 tablet (200 mg total) by mouth 2 (two) times a day with meals, Disp: 60 tablet, Rfl: 11    predniSONE 5 mg tablet, Take 1 tablet (5 mg total) by mouth daily, Disp: 90 tablet, Rfl: 1  No current facility-administered medications for this  visit.    Facility-Administered Medications Ordered in Other Visits:     barium sulfate tablet 1 tablet, 1 tablet, Oral, Once in imaging, Niya Lyle PA-C     *-*-*-*-*-*-*-*-*-*-*-*-*-*-*-*-*-*-*-*-*-*-*-*-*-*-*-*-*-*-*-*-*-*-*-*-*-*-*-*-*-*-*-*-*-*-*-*-*-*-*-*-*-*

## 2024-05-01 NOTE — ASSESSMENT & PLAN NOTE
Has bilateral less than 50% carotid artery stenosis with heterogeneous plaque.  No history of TIA/CVA.  -- Increasing the dose of statin to atorvastatin 80 mg daily.  -- Other recommendations as outlined below.

## 2024-05-01 NOTE — PATIENT INSTRUCTIONS
CARDIOLOGY ASSESSMENT & PLAN      Diagnosis ICD-10-CM Associated Orders   1. Coronary artery disease of native artery of native heart with stable angina pectoris (HCC)  I25.118 atorvastatin (LIPITOR) 80 mg tablet      2. Bilateral carotid artery stenosis  I65.23       3. Stage 3a chronic kidney disease (HCC)  N18.31       4. Dyslipidemia  E78.5       5. Obesity, morbid (HCC)  E66.01       6. Essential hypertension  I10          1. Coronary artery disease of native artery of native heart with stable angina pectoris (HCC)  Assessment & Plan:  This condition has been stable with no recent to anginal-like symptoms.  She does have chronic nonspecific specific pain.  Her lipids are noted to be abnormal.  -- We are increasing dose of atorvastatin to 80 mg daily.  -- She will continue metoprolol diltiazem hydrochlorothiazide clopidogrel and ezetimibe.  -- Encouraged physical activity and healthy eating to try to lose weight.    Orders:  -     atorvastatin (LIPITOR) 80 mg tablet; Take 1 tablet (80 mg total) by mouth daily    2. Bilateral carotid artery stenosis  Assessment & Plan:  Has bilateral less than 50% carotid artery stenosis with heterogeneous plaque.  No history of TIA/CVA.  -- Increasing the dose of statin to atorvastatin 80 mg daily.  -- Other recommendations as outlined below.      3. Stage 3a chronic kidney disease (HCC)    4. Dyslipidemia    5. Obesity, morbid (HCC)    6. Essential hypertension  Assessment & Plan:  Blood pressure is well-controlled.  Is on good medications.  -- Will continue current medications.

## 2024-05-01 NOTE — ASSESSMENT & PLAN NOTE
This condition has been stable with no recent to anginal-like symptoms.  She does have chronic nonspecific specific pain.  Her lipids are noted to be abnormal.  -- We are increasing dose of atorvastatin to 80 mg daily.  -- She will continue metoprolol diltiazem hydrochlorothiazide clopidogrel and ezetimibe.  -- Encouraged physical activity and healthy eating to try to lose weight.

## 2024-05-09 PROBLEM — Z00.00 MEDICARE ANNUAL WELLNESS VISIT, SUBSEQUENT: Status: RESOLVED | Noted: 2024-04-09 | Resolved: 2024-05-09

## 2024-05-22 ENCOUNTER — OFFICE VISIT (OUTPATIENT)
Dept: VASCULAR SURGERY | Facility: CLINIC | Age: 79
End: 2024-05-22
Payer: MEDICARE

## 2024-05-22 VITALS
DIASTOLIC BLOOD PRESSURE: 60 MMHG | HEART RATE: 67 BPM | WEIGHT: 186.8 LBS | OXYGEN SATURATION: 96 % | SYSTOLIC BLOOD PRESSURE: 100 MMHG | BODY MASS INDEX: 36.67 KG/M2 | HEIGHT: 60 IN

## 2024-05-22 DIAGNOSIS — I10 ESSENTIAL HYPERTENSION: ICD-10-CM

## 2024-05-22 DIAGNOSIS — E78.5 DYSLIPIDEMIA: ICD-10-CM

## 2024-05-22 DIAGNOSIS — I65.22 STENOSIS OF LEFT CAROTID ARTERY: Primary | ICD-10-CM

## 2024-05-22 DIAGNOSIS — I65.23 BILATERAL CAROTID ARTERY STENOSIS: ICD-10-CM

## 2024-05-22 PROCEDURE — 99214 OFFICE O/P EST MOD 30 MIN: CPT

## 2024-05-22 NOTE — ASSESSMENT & PLAN NOTE
Patient is currently denying any TIA/CVA symptoms.  She denies any amaurosis fugax, slurred speech, facial droop, dysphagia, or headache.  Patient reports that she does experience numbness to the left side of her body that has been progressively worsening over the last 10 years.  Of note, patient has had an MRI of her cervical spine which did not note severe canal stenosis and left foraminal narrowing at the C5-C6 level.      Imaging:  Carotid duplex 5/23/2023:  <50% stenosis bilateral ICA    Plan:  -We discussed the pathophysiology of carotid artery stenosis as well as contributing lifestyle factors.  -We discussed the results of carotid duplex at length. <50% stenosis to bilateral ICA, ulceration versus irregular plaque at the left mid/distal common carotid artery.  -Continue medical optimization with atorvastatin, Zetia, and clopidogrel  -Will repeat carotid duplex now as patient is due  -Instructed patient to report to the ED for any TIA/CVA symptoms  -Follow up in the office will depend on results of carotid duplex.

## 2024-05-22 NOTE — PROGRESS NOTES
Ambulatory Visit  Name: Ariadna Jernigan      : 1945      MRN: [id number]  Encounter Provider: RAVINDRA Vilchis  Encounter Date: 2024   Encounter department: [facility]    Assessment & Plan   1. Stenosis of left carotid artery  -     VAS carotid complete study; Future; Expected date: 2024  2. Bilateral carotid artery stenosis  Assessment & Plan:  Patient is currently denying any TIA/CVA symptoms.  She denies any amaurosis fugax, slurred speech, facial droop, dysphagia, or headache.  Patient reports that she does experience numbness to the left side of her body that has been progressively worsening over the last 10 years.  Of note, patient has had an MRI of her cervical spine which did not note severe canal stenosis and left foraminal narrowing at the C5-C6 level.      Imaging:  Carotid duplex 2023:  <50% stenosis bilateral ICA    Plan:  -We discussed the pathophysiology of carotid artery stenosis as well as contributing lifestyle factors.  -We discussed the results of carotid duplex at length. <50% stenosis to bilateral ICA, ulceration versus irregular plaque at the left mid/distal common carotid artery.  -Continue medical optimization with atorvastatin, Zetia, and clopidogrel  -Will repeat carotid duplex now as patient is due  -Instructed patient to report to the ED for any TIA/CVA symptoms  -Follow up in the office will depend on results of carotid duplex.    3. Essential hypertension  Assessment & Plan:  Well-controlled in the office, 100/60.    -Continue medical management per PCP  -Low-salt diet  4. Dyslipidemia  Assessment & Plan:  Continue atorvastatin and Zetia      History of Present Illness       Ariadna Jernigan is a 78 y.o. female, former smoker, with PMH HTN, HLD, CKD 3, CAD, hypothyroidism, osteoarthritis, obesity, and asymptomatic bilateral carotid artery stenosis.  Patient is presenting to the vascular surgery office to review results of carotid  duplex completed 5/3/2023.    Patient is currently denying any TIA/CVA symptoms.  She denies any amaurosis fugax, slurred speech, facial droop, dysphagia, or headache.  Patient reports that she does experience numbness to the left side of her body that has been progressively worsening over the last 10 years.  Of note, patient has had an MRI of her cervical spine which did not note severe canal stenosis and left foraminal narrowing at the C5-C6 level.  Patient is denying any concerning symptoms otherwise.  She remains active and independent with ADLs.      Review of Systems   Constitutional: Negative.    HENT: Negative.  Negative for trouble swallowing.    Eyes: Negative.  Negative for visual disturbance.   Respiratory: Negative.  Negative for shortness of breath.    Cardiovascular: Negative.  Negative for chest pain.   Gastrointestinal: Negative.    Endocrine: Negative.    Genitourinary: Negative.    Musculoskeletal: Negative.    Skin: Negative.    Allergic/Immunologic: Negative.    Neurological:  Positive for weakness (left side, unchanged x 10 years) and numbness (left hand/leg unchanged x 10 years). Negative for facial asymmetry, speech difficulty, light-headedness and headaches.   Hematological: Negative.    Psychiatric/Behavioral: Negative.       Pertinent Medical History   Past Medical History:   Diagnosis Date    Arthritis     rheumatoid    Coronary artery disease     Disease of thyroid gland     Hyperlipidemia     Hypertension      Medical History Reviewed by provider this encounter:       Current Outpatient Medications on File Prior to Visit   Medication Sig Dispense Refill    atorvastatin (LIPITOR) 80 mg tablet Take 1 tablet (80 mg total) by mouth daily 90 tablet 3    Cholecalciferol (Vitamin D3) 50 MCG (2000 UT) CAPS Take 1 capsule (2,000 Units total) by mouth daily 90 capsule 0    clopidogrel (PLAVIX) 75 mg tablet Take 1 tablet (75 mg total) by mouth daily 90 tablet 0    colchicine (COLCRYS) 0.6 mg  tablet Take 1 tablet (0.6 mg total) by mouth daily 90 tablet 0    diltiazem (CARDIZEM CD) 240 mg 24 hr capsule Take 1 capsule (240 mg total) by mouth daily 90 capsule 0    ezetimibe (ZETIA) 10 mg tablet TAKE ONE TABLET BY MOUTH ONCE DAILY 90 tablet 5    folic acid (FOLVITE) 1 mg tablet TAKE ONE TABLET BY MOUTH ONCE DAILY 90 tablet 12    hydroCHLOROthiazide 25 mg tablet TAKE ONE TABLET BY MOUTH ONCE DAILY 90 tablet 1    hydroxychloroquine (PLAQUENIL) 200 mg tablet Take 1 tablet (200 mg total) by mouth daily with breakfast 90 tablet 3    hydroxychloroquine (PLAQUENIL) 200 mg tablet Take 1 tablet (200 mg total) by mouth 2 (two) times a day with meals 60 tablet 11    levothyroxine 100 mcg tablet TAKE ONE TABLET BY MOUTH ONCE DAILY IN THE MORNING 90 tablet 2    losartan (COZAAR) 100 MG tablet TAKE ONE TABLET BY MOUTH ONCE DAILY 90 tablet 1    metoprolol succinate (TOPROL-XL) 50 mg 24 hr tablet TAKE ONE TABLET BY MOUTH ONCE DAILY 90 tablet 1    nitroglycerin (NITROSTAT) 0.4 mg SL tablet Place 1 tablet under the tongue every 5 (five) minutes as needed      pantoprazole (PROTONIX) 40 mg tablet Take 1 tablet (40 mg total) by mouth daily before breakfast 90 tablet 0    predniSONE 5 mg tablet Take 1 tablet (5 mg total) by mouth daily 90 tablet 1    pregabalin (LYRICA) 150 mg capsule Take 1 capsule (150 mg total) by mouth daily 90 capsule 3    predniSONE 5 mg tablet Take 1 tablet (5 mg total) by mouth daily (Patient not taking: Reported on 2024) 30 tablet 11     No current facility-administered medications on file prior to visit.      Social History     Tobacco Use    Smoking status: Former     Current packs/day: 0.00     Average packs/day: 0.1 packs/day for 52.0 years (5.2 ttl pk-yrs)     Types: Cigarettes     Start date:      Quit date:      Years since quittin.3     Passive exposure: Never    Smokeless tobacco: Never   Vaping Use    Vaping status: Never Used   Substance and Sexual Activity    Alcohol use:  Not Currently    Drug use: Never    Sexual activity: Not on file     Objective     /60 (BP Location: Left arm, Patient Position: Sitting, Cuff Size: Standard)   Pulse 67   Ht 5' (1.524 m)   Wt 84.7 kg (186 lb 12.8 oz)   SpO2 96%   BMI 36.48 kg/m²     Physical Exam  Vitals and nursing note reviewed.   Constitutional:       General: She is not in acute distress.     Appearance: Normal appearance. She is well-developed. She is obese.   HENT:      Head: Normocephalic and atraumatic.   Eyes:      Conjunctiva/sclera: Conjunctivae normal.   Neck:      Vascular: No carotid bruit.   Cardiovascular:      Rate and Rhythm: Normal rate and regular rhythm.      Pulses:           Radial pulses are 2+ on the right side and 2+ on the left side.        Dorsalis pedis pulses are 2+ on the right side and 2+ on the left side.      Heart sounds: Murmur heard.   Pulmonary:      Effort: Pulmonary effort is normal. No respiratory distress.      Breath sounds: Normal breath sounds.   Abdominal:      General: There is no distension.      Palpations: Abdomen is soft.      Tenderness: There is no abdominal tenderness.   Musculoskeletal:         General: No swelling or tenderness.      Cervical back: Normal range of motion and neck supple. No tenderness.      Right lower leg: No edema.      Left lower leg: No edema.   Skin:     General: Skin is warm and dry.      Capillary Refill: Capillary refill takes less than 2 seconds.      Findings: No lesion, rash or wound.   Neurological:      General: No focal deficit present.      Mental Status: She is alert and oriented to person, place, and time.      Cranial Nerves: No cranial nerve deficit, dysarthria or facial asymmetry.      Sensory: Sensation is intact.      Motor: Motor function is intact. No weakness.   Psychiatric:         Mood and Affect: Mood normal.         Behavior: Behavior normal.       Administrative Statements     I have spent a total time of 30 minutes on 05/22/24 In  caring for this patient including Diagnostic results, Prognosis, Risks and benefits of tx options, Instructions for management, Patient and family education, Importance of tx compliance, Risk factor reductions, Impressions, Counseling / Coordination of care, Documenting in the medical record, Reviewing / ordering tests, medicine, procedures  , and Obtaining or reviewing history  .

## 2024-06-10 ENCOUNTER — APPOINTMENT (EMERGENCY)
Dept: RADIOLOGY | Facility: HOSPITAL | Age: 79
DRG: 554 | End: 2024-06-10
Payer: MEDICARE

## 2024-06-10 ENCOUNTER — HOSPITAL ENCOUNTER (INPATIENT)
Facility: HOSPITAL | Age: 79
LOS: 1 days | Discharge: HOME HEALTH | DRG: 554 | End: 2024-06-12
Attending: EMERGENCY MEDICINE | Admitting: INTERNAL MEDICINE
Payer: MEDICARE

## 2024-06-10 DIAGNOSIS — M25.561 RIGHT KNEE PAIN: ICD-10-CM

## 2024-06-10 DIAGNOSIS — R26.2 AMBULATORY DYSFUNCTION: Primary | ICD-10-CM

## 2024-06-10 PROBLEM — I10 ESSENTIAL HYPERTENSION: Chronic | Status: ACTIVE | Noted: 2018-02-27

## 2024-06-10 PROBLEM — R79.82 ELEVATED C-REACTIVE PROTEIN (CRP): Status: RESOLVED | Noted: 2022-10-19 | Resolved: 2024-06-10

## 2024-06-10 PROBLEM — R07.89 OTHER CHEST PAIN: Status: RESOLVED | Noted: 2023-08-07 | Resolved: 2024-06-10

## 2024-06-10 PROBLEM — I25.118 CORONARY ARTERY DISEASE OF NATIVE ARTERY OF NATIVE HEART WITH STABLE ANGINA PECTORIS (HCC): Chronic | Status: ACTIVE | Noted: 2018-02-27

## 2024-06-10 PROBLEM — E66.01 OBESITY, MORBID (HCC): Status: RESOLVED | Noted: 2024-04-09 | Resolved: 2024-06-10

## 2024-06-10 PROBLEM — N18.31 STAGE 3A CHRONIC KIDNEY DISEASE (HCC): Chronic | Status: ACTIVE | Noted: 2023-03-08

## 2024-06-10 PROBLEM — E66.9 OBESITY, CLASS II, BMI 35-39.9: Chronic | Status: ACTIVE | Noted: 2023-08-08

## 2024-06-10 PROBLEM — R70.0 ELEVATED SED RATE: Status: RESOLVED | Noted: 2022-10-19 | Resolved: 2024-06-10

## 2024-06-10 PROBLEM — E66.812 OBESITY, CLASS II, BMI 35-39.9: Chronic | Status: ACTIVE | Noted: 2023-08-08

## 2024-06-10 LAB
ANION GAP SERPL CALCULATED.3IONS-SCNC: 8 MMOL/L (ref 4–13)
BASOPHILS # BLD AUTO: 0.06 THOUSANDS/ÂΜL (ref 0–0.1)
BASOPHILS NFR BLD AUTO: 1 % (ref 0–1)
BUN SERPL-MCNC: 17 MG/DL (ref 5–25)
CALCIUM SERPL-MCNC: 9.9 MG/DL (ref 8.4–10.2)
CHLORIDE SERPL-SCNC: 102 MMOL/L (ref 96–108)
CO2 SERPL-SCNC: 30 MMOL/L (ref 21–32)
CREAT SERPL-MCNC: 1.15 MG/DL (ref 0.6–1.3)
CRP SERPL QL: <1 MG/L
EOSINOPHIL # BLD AUTO: 0.04 THOUSAND/ÂΜL (ref 0–0.61)
EOSINOPHIL NFR BLD AUTO: 0 % (ref 0–6)
ERYTHROCYTE [DISTWIDTH] IN BLOOD BY AUTOMATED COUNT: 14 % (ref 11.6–15.1)
ERYTHROCYTE [SEDIMENTATION RATE] IN BLOOD: 8 MM/HOUR (ref 0–29)
GFR SERPL CREATININE-BSD FRML MDRD: 45 ML/MIN/1.73SQ M
GLUCOSE SERPL-MCNC: 132 MG/DL (ref 65–140)
HCT VFR BLD AUTO: 41.8 % (ref 34.8–46.1)
HGB BLD-MCNC: 13 G/DL (ref 11.5–15.4)
IMM GRANULOCYTES # BLD AUTO: 0.03 THOUSAND/UL (ref 0–0.2)
IMM GRANULOCYTES NFR BLD AUTO: 0 % (ref 0–2)
LYMPHOCYTES # BLD AUTO: 1.62 THOUSANDS/ÂΜL (ref 0.6–4.47)
LYMPHOCYTES NFR BLD AUTO: 17 % (ref 14–44)
MCH RBC QN AUTO: 27.7 PG (ref 26.8–34.3)
MCHC RBC AUTO-ENTMCNC: 31.1 G/DL (ref 31.4–37.4)
MCV RBC AUTO: 89 FL (ref 82–98)
MONOCYTES # BLD AUTO: 0.68 THOUSAND/ÂΜL (ref 0.17–1.22)
MONOCYTES NFR BLD AUTO: 7 % (ref 4–12)
NEUTROPHILS # BLD AUTO: 6.91 THOUSANDS/ÂΜL (ref 1.85–7.62)
NEUTS SEG NFR BLD AUTO: 75 % (ref 43–75)
NRBC BLD AUTO-RTO: 0 /100 WBCS
PLATELET # BLD AUTO: 223 THOUSANDS/UL (ref 149–390)
PMV BLD AUTO: 11.7 FL (ref 8.9–12.7)
POTASSIUM SERPL-SCNC: 3.5 MMOL/L (ref 3.5–5.3)
RBC # BLD AUTO: 4.7 MILLION/UL (ref 3.81–5.12)
SODIUM SERPL-SCNC: 140 MMOL/L (ref 135–147)
WBC # BLD AUTO: 9.34 THOUSAND/UL (ref 4.31–10.16)

## 2024-06-10 PROCEDURE — 73564 X-RAY EXAM KNEE 4 OR MORE: CPT

## 2024-06-10 PROCEDURE — 99285 EMERGENCY DEPT VISIT HI MDM: CPT | Performed by: EMERGENCY MEDICINE

## 2024-06-10 PROCEDURE — 80048 BASIC METABOLIC PNL TOTAL CA: CPT | Performed by: EMERGENCY MEDICINE

## 2024-06-10 PROCEDURE — 85652 RBC SED RATE AUTOMATED: CPT | Performed by: EMERGENCY MEDICINE

## 2024-06-10 PROCEDURE — 36415 COLL VENOUS BLD VENIPUNCTURE: CPT | Performed by: EMERGENCY MEDICINE

## 2024-06-10 PROCEDURE — 86140 C-REACTIVE PROTEIN: CPT | Performed by: EMERGENCY MEDICINE

## 2024-06-10 PROCEDURE — 99285 EMERGENCY DEPT VISIT HI MDM: CPT

## 2024-06-10 PROCEDURE — 85025 COMPLETE CBC W/AUTO DIFF WBC: CPT | Performed by: EMERGENCY MEDICINE

## 2024-06-10 PROCEDURE — 99223 1ST HOSP IP/OBS HIGH 75: CPT | Performed by: PHYSICIAN ASSISTANT

## 2024-06-10 RX ORDER — METOPROLOL SUCCINATE 50 MG/1
50 TABLET, EXTENDED RELEASE ORAL DAILY
Status: DISCONTINUED | OUTPATIENT
Start: 2024-06-11 | End: 2024-06-12 | Stop reason: HOSPADM

## 2024-06-10 RX ORDER — NITROGLYCERIN 0.4 MG/1
0.4 TABLET SUBLINGUAL
Status: DISCONTINUED | OUTPATIENT
Start: 2024-06-10 | End: 2024-06-12 | Stop reason: HOSPADM

## 2024-06-10 RX ORDER — PREDNISONE 5 MG/1
5 TABLET ORAL DAILY
Status: DISCONTINUED | OUTPATIENT
Start: 2024-06-11 | End: 2024-06-12 | Stop reason: HOSPADM

## 2024-06-10 RX ORDER — DILTIAZEM HYDROCHLORIDE 120 MG/1
240 CAPSULE, COATED, EXTENDED RELEASE ORAL DAILY
Status: DISCONTINUED | OUTPATIENT
Start: 2024-06-11 | End: 2024-06-12 | Stop reason: HOSPADM

## 2024-06-10 RX ORDER — POTASSIUM CHLORIDE 20 MEQ/1
20 TABLET, EXTENDED RELEASE ORAL ONCE
Status: COMPLETED | OUTPATIENT
Start: 2024-06-10 | End: 2024-06-10

## 2024-06-10 RX ORDER — LEVOTHYROXINE SODIUM 0.1 MG/1
100 TABLET ORAL EVERY MORNING
Status: DISCONTINUED | OUTPATIENT
Start: 2024-06-11 | End: 2024-06-12 | Stop reason: HOSPADM

## 2024-06-10 RX ORDER — HYDROCHLOROTHIAZIDE 25 MG/1
25 TABLET ORAL DAILY
Status: DISCONTINUED | OUTPATIENT
Start: 2024-06-11 | End: 2024-06-12 | Stop reason: HOSPADM

## 2024-06-10 RX ORDER — ACETAMINOPHEN 325 MG/1
975 TABLET ORAL EVERY 8 HOURS SCHEDULED
Status: DISCONTINUED | OUTPATIENT
Start: 2024-06-10 | End: 2024-06-12 | Stop reason: HOSPADM

## 2024-06-10 RX ORDER — ENOXAPARIN SODIUM 100 MG/ML
40 INJECTION SUBCUTANEOUS DAILY
Status: DISCONTINUED | OUTPATIENT
Start: 2024-06-11 | End: 2024-06-12 | Stop reason: HOSPADM

## 2024-06-10 RX ORDER — EZETIMIBE 10 MG/1
10 TABLET ORAL DAILY
Status: DISCONTINUED | OUTPATIENT
Start: 2024-06-11 | End: 2024-06-12 | Stop reason: HOSPADM

## 2024-06-10 RX ORDER — COLCHICINE 0.6 MG/1
1.2 TABLET ORAL ONCE
Status: COMPLETED | OUTPATIENT
Start: 2024-06-10 | End: 2024-06-10

## 2024-06-10 RX ORDER — COLCHICINE 0.6 MG/1
0.6 TABLET ORAL DAILY
Status: DISCONTINUED | OUTPATIENT
Start: 2024-06-11 | End: 2024-06-12 | Stop reason: HOSPADM

## 2024-06-10 RX ORDER — PANTOPRAZOLE SODIUM 40 MG/1
40 TABLET, DELAYED RELEASE ORAL
Status: DISCONTINUED | OUTPATIENT
Start: 2024-06-11 | End: 2024-06-12 | Stop reason: HOSPADM

## 2024-06-10 RX ORDER — CLOPIDOGREL BISULFATE 75 MG/1
75 TABLET ORAL DAILY
Status: DISCONTINUED | OUTPATIENT
Start: 2024-06-11 | End: 2024-06-12 | Stop reason: HOSPADM

## 2024-06-10 RX ORDER — ATORVASTATIN CALCIUM 80 MG/1
80 TABLET, FILM COATED ORAL DAILY
Status: DISCONTINUED | OUTPATIENT
Start: 2024-06-11 | End: 2024-06-12 | Stop reason: HOSPADM

## 2024-06-10 RX ORDER — LOSARTAN POTASSIUM 50 MG/1
100 TABLET ORAL DAILY
Status: DISCONTINUED | OUTPATIENT
Start: 2024-06-11 | End: 2024-06-12 | Stop reason: HOSPADM

## 2024-06-10 RX ORDER — HYDROXYCHLOROQUINE SULFATE 200 MG/1
200 TABLET, FILM COATED ORAL
Status: DISCONTINUED | OUTPATIENT
Start: 2024-06-11 | End: 2024-06-12 | Stop reason: HOSPADM

## 2024-06-10 RX ORDER — FOLIC ACID 1 MG/1
1000 TABLET ORAL DAILY
Status: DISCONTINUED | OUTPATIENT
Start: 2024-06-11 | End: 2024-06-12 | Stop reason: HOSPADM

## 2024-06-10 RX ORDER — ONDANSETRON 2 MG/ML
4 INJECTION INTRAMUSCULAR; INTRAVENOUS EVERY 6 HOURS PRN
Status: DISCONTINUED | OUTPATIENT
Start: 2024-06-10 | End: 2024-06-12 | Stop reason: HOSPADM

## 2024-06-10 RX ADMIN — DICLOFENAC SODIUM 2 G: 10 GEL TOPICAL at 23:05

## 2024-06-10 RX ADMIN — POTASSIUM CHLORIDE 20 MEQ: 1500 TABLET, EXTENDED RELEASE ORAL at 23:05

## 2024-06-10 RX ADMIN — COLCHICINE 1.2 MG: 0.6 TABLET ORAL at 19:26

## 2024-06-10 RX ADMIN — ACETAMINOPHEN 975 MG: 325 TABLET ORAL at 23:05

## 2024-06-10 NOTE — ED PROVIDER NOTES
History  Chief Complaint   Patient presents with    Knee Pain     Reports she has chronic knee pain - Rt knee pain. Reports she took prednisone today. Pain got worse today.      HPI  Patient is a 79-year-old female presenting with right knee pain.  States that she has been suffering from chronic right knee pain for years however acutely got worse for the past 4 days.  Today it got to the point where she is no longer able to ambulate.  Patient has had treatment done for osteoarthritis as well as other joints with pseudogout where she received steroids as well as colchicine.  Patient denies any significant swelling of the right knee.  Also reports no fever, chills, nausea, vomiting, diarrhea.  Is able to mobilize her right knee but with significant pain.    Prior to Admission Medications   Prescriptions Last Dose Informant Patient Reported? Taking?   Cholecalciferol (Vitamin D3) 50 MCG (2000 UT) CAPS 6/9/2024 Self No Yes   Sig: Take 1 capsule (2,000 Units total) by mouth daily   atorvastatin (LIPITOR) 80 mg tablet 6/10/2024 Self No Yes   Sig: Take 1 tablet (80 mg total) by mouth daily   clopidogrel (PLAVIX) 75 mg tablet 6/9/2024 Self No Yes   Sig: Take 1 tablet (75 mg total) by mouth daily   colchicine (COLCRYS) 0.6 mg tablet 6/10/2024 Self No Yes   Sig: Take 1 tablet (0.6 mg total) by mouth daily   diltiazem (CARDIZEM CD) 240 mg 24 hr capsule 6/10/2024 Self No Yes   Sig: Take 1 capsule (240 mg total) by mouth daily   ezetimibe (ZETIA) 10 mg tablet 6/9/2024 Self No Yes   Sig: TAKE ONE TABLET BY MOUTH ONCE DAILY   folic acid (FOLVITE) 1 mg tablet 6/10/2024 Self No Yes   Sig: TAKE ONE TABLET BY MOUTH ONCE DAILY   hydroCHLOROthiazide 25 mg tablet 6/10/2024 Self No Yes   Sig: TAKE ONE TABLET BY MOUTH ONCE DAILY   hydroxychloroquine (PLAQUENIL) 200 mg tablet 6/10/2024 Self No Yes   Sig: Take 1 tablet (200 mg total) by mouth daily with breakfast   levothyroxine 100 mcg tablet 6/9/2024 Self No Yes   Sig: TAKE ONE TABLET BY  MOUTH ONCE DAILY IN THE MORNING   losartan (COZAAR) 100 MG tablet 6/9/2024 Self No Yes   Sig: TAKE ONE TABLET BY MOUTH ONCE DAILY   metoprolol succinate (TOPROL-XL) 50 mg 24 hr tablet 6/9/2024 Self No Yes   Sig: TAKE ONE TABLET BY MOUTH ONCE DAILY   nitroglycerin (NITROSTAT) 0.4 mg SL tablet Unknown Self Yes No   Sig: Place 1 tablet under the tongue every 5 (five) minutes as needed   pantoprazole (PROTONIX) 40 mg tablet 6/9/2024 Self No Yes   Sig: Take 1 tablet (40 mg total) by mouth daily before breakfast   predniSONE 5 mg tablet 6/10/2024 Self No Yes   Sig: Take 1 tablet (5 mg total) by mouth daily   predniSONE 5 mg tablet  Self No No   Sig: Take 1 tablet (5 mg total) by mouth daily   Patient not taking: Reported on 5/22/2024      Facility-Administered Medications: None       Past Medical History:   Diagnosis Date    Arthritis     rheumatoid    Coronary artery disease     Disease of thyroid gland     Hyperlipidemia     Hypertension        Past Surgical History:   Procedure Laterality Date    BACK SURGERY      BREAST SURGERY      CARPAL TUNNEL RELEASE      CHOLECYSTECTOMY      HYSTERECTOMY      OOPHORECTOMY      ORTHOPEDIC SURGERY         Family History   Problem Relation Age of Onset    No Known Problems Mother     Lung cancer Father     Ovarian cancer Sister     No Known Problems Sister     No Known Problems Sister     No Known Problems Maternal Grandmother     No Known Problems Maternal Grandfather     No Known Problems Paternal Grandmother     No Known Problems Paternal Grandfather     No Known Problems Maternal Aunt     No Known Problems Paternal Aunt     No Known Problems Paternal Aunt     Breast cancer Cousin 45     I have reviewed and agree with the history as documented.    E-Cigarette/Vaping    E-Cigarette Use Never User      E-Cigarette/Vaping Substances    Nicotine No     THC No     CBD No     Flavoring No     Other No     Unknown No      Social History     Tobacco Use    Smoking status: Former      Current packs/day: 0.00     Average packs/day: 0.1 packs/day for 52.0 years (5.2 ttl pk-yrs)     Types: Cigarettes     Start date:      Quit date:      Years since quittin.4     Passive exposure: Never    Smokeless tobacco: Never   Vaping Use    Vaping status: Never Used   Substance Use Topics    Alcohol use: Not Currently    Drug use: Never       Review of Systems   Constitutional:  Negative for chills, diaphoresis, fever and unexpected weight change.   HENT:  Negative for ear pain and sore throat.    Eyes:  Negative for visual disturbance.   Respiratory:  Negative for cough, chest tightness and shortness of breath.    Cardiovascular:  Negative for chest pain and leg swelling.   Gastrointestinal:  Negative for abdominal distention, abdominal pain, constipation, diarrhea, nausea and vomiting.   Endocrine: Negative.    Genitourinary:  Negative for difficulty urinating and dysuria.   Musculoskeletal:  Positive for arthralgias.   Skin: Negative.    Allergic/Immunologic: Negative.    Neurological: Negative.    Hematological: Negative.    Psychiatric/Behavioral: Negative.     All other systems reviewed and are negative.      Physical Exam  Physical Exam  Vitals and nursing note reviewed.   Constitutional:       General: She is not in acute distress.     Appearance: Normal appearance. She is not ill-appearing.   HENT:      Head: Normocephalic and atraumatic.      Right Ear: External ear normal.      Left Ear: External ear normal.      Nose: Nose normal.      Mouth/Throat:      Mouth: Mucous membranes are moist.      Pharynx: Oropharynx is clear.   Eyes:      General: No scleral icterus.        Right eye: No discharge.         Left eye: No discharge.      Extraocular Movements: Extraocular movements intact.      Conjunctiva/sclera: Conjunctivae normal.      Pupils: Pupils are equal, round, and reactive to light.   Cardiovascular:      Rate and Rhythm: Normal rate and regular rhythm.      Pulses: Normal pulses.       Heart sounds: Normal heart sounds.   Pulmonary:      Effort: Pulmonary effort is normal.      Breath sounds: Normal breath sounds.   Abdominal:      General: Abdomen is flat. Bowel sounds are normal. There is no distension.      Palpations: Abdomen is soft.      Tenderness: There is no abdominal tenderness. There is no guarding or rebound.   Musculoskeletal:         General: Tenderness (Right knee) present.      Cervical back: Normal range of motion and neck supple.      Comments: Due to pain patient having difficulty ambulating   Skin:     General: Skin is warm and dry.      Capillary Refill: Capillary refill takes less than 2 seconds.   Neurological:      General: No focal deficit present.      Mental Status: She is alert and oriented to person, place, and time. Mental status is at baseline.   Psychiatric:         Mood and Affect: Mood normal.         Behavior: Behavior normal.         Thought Content: Thought content normal.         Judgment: Judgment normal.         Vital Signs  ED Triage Vitals   Temperature Pulse Respirations Blood Pressure SpO2   06/10/24 1857 06/10/24 1857 06/10/24 1857 06/10/24 1857 06/10/24 1857   98 °F (36.7 °C) 73 18 157/81 91 %      Temp Source Heart Rate Source Patient Position - Orthostatic VS BP Location FiO2 (%)   06/10/24 1857 06/10/24 1857 06/10/24 1857 06/10/24 1857 --   Oral Monitor Lying Left arm       Pain Score       06/10/24 1926       10 - Worst Possible Pain           Vitals:    06/10/24 1857 06/10/24 2150   BP: 157/81 159/81   Pulse: 73 60   Patient Position - Orthostatic VS: Lying Sitting         Visual Acuity      ED Medications  Medications   potassium chloride (Klor-Con M20) CR tablet 20 mEq (has no administration in time range)   acetaminophen (TYLENOL) tablet 975 mg (has no administration in time range)   ondansetron (ZOFRAN) injection 4 mg (has no administration in time range)   enoxaparin (LOVENOX) subcutaneous injection 40 mg (has no administration in time  range)   Diclofenac Sodium (VOLTAREN) 1 % topical gel 2 g (has no administration in time range)   atorvastatin (LIPITOR) tablet 80 mg (has no administration in time range)   Cholecalciferol (VITAMIN D3) tablet 2,000 Units (has no administration in time range)   clopidogrel (PLAVIX) tablet 75 mg (has no administration in time range)   colchicine (COLCRYS) tablet 0.6 mg (has no administration in time range)   hydroxychloroquine (PLAQUENIL) tablet 200 mg (has no administration in time range)   hydroCHLOROthiazide tablet 25 mg (has no administration in time range)   ezetimibe (ZETIA) tablet 10 mg (has no administration in time range)   diltiazem (CARDIZEM CD) 24 hr capsule 240 mg (has no administration in time range)   levothyroxine tablet 100 mcg (has no administration in time range)   losartan (COZAAR) tablet 100 mg (has no administration in time range)   metoprolol succinate (TOPROL-XL) 24 hr tablet 50 mg (has no administration in time range)   pantoprazole (PROTONIX) EC tablet 40 mg (has no administration in time range)   nitroglycerin (NITROSTAT) SL tablet 0.4 mg (has no administration in time range)   folic acid (FOLVITE) tablet 1,000 mcg (has no administration in time range)   predniSONE tablet 5 mg (has no administration in time range)   colchicine (COLCRYS) tablet 1.2 mg (1.2 mg Oral Given 6/10/24 1926)       Diagnostic Studies  Results Reviewed       Procedure Component Value Units Date/Time    Sedimentation rate, automated [[id number]]  (Normal) Collected: 06/10/24 1922    Lab Status: Final result Specimen: Blood from Arm, Right Updated: 06/10/24 2011     Sed Rate 8 mm/hour     Basic metabolic panel [[id number]] Collected: 06/10/24 1922    Lab Status: Final result Specimen: Blood from Arm, Right Updated: 06/10/24 2008     Sodium 140 mmol/L      Potassium 3.5 mmol/L      Chloride 102 mmol/L      CO2 30 mmol/L      ANION GAP 8 mmol/L      BUN 17 mg/dL      Creatinine 1.15 mg/dL      Glucose 132 mg/dL      Calcium  9.9 mg/dL      eGFR 45 ml/min/1.73sq m     Narrative:      [employer] guidelines for Chronic Kidney Disease (CKD):     Stage 1 with normal or high GFR (GFR > 90 mL/min/1.73 square meters)    Stage 2 Mild CKD (GFR = 60-89 mL/min/1.73 square meters)    Stage 3A Moderate CKD (GFR = 45-59 mL/min/1.73 square meters)    Stage 3B Moderate CKD (GFR = 30-44 mL/min/1.73 square meters)    Stage 4 Severe CKD (GFR = 15-29 mL/min/1.73 square meters)    Stage 5 End Stage CKD (GFR <15 mL/min/1.73 square meters)  Note: GFR calculation is accurate only with a steady state creatinine    C-reactive protein [[id number]]  (Normal) Collected: 06/10/24 1922    Lab Status: Final result Specimen: Blood from Arm, Right Updated: 06/10/24 2008     CRP <1.0 mg/L     CBC and differential [[id number]]  (Abnormal) Collected: 06/10/24 1922    Lab Status: Final result Specimen: Blood from Arm, Right Updated: 06/10/24 1931     WBC 9.34 Thousand/uL      RBC 4.70 Million/uL      Hemoglobin 13.0 g/dL      Hematocrit 41.8 %      MCV 89 fL      MCH 27.7 pg      MCHC 31.1 g/dL      RDW 14.0 %      MPV 11.7 fL      Platelets 223 Thousands/uL      nRBC 0 /100 WBCs      Segmented % 75 %      Immature Grans % 0 %      Lymphocytes % 17 %      Monocytes % 7 %      Eosinophils Relative 0 %      Basophils Relative 1 %      Absolute Neutrophils 6.91 Thousands/µL      Absolute Immature Grans 0.03 Thousand/uL      Absolute Lymphocytes 1.62 Thousands/µL      Absolute Monocytes 0.68 Thousand/µL      Eosinophils Absolute 0.04 Thousand/µL      Basophils Absolute 0.06 Thousands/µL                    XR knee 4+ views Right injury   ED Interpretation by Fredrick Floyd MD (06/10 2024)   No osseous abnormality                 Procedures  Procedures         ED Course                               SBIRT 22yo+      Flowsheet Row Most Recent Value   Initial Alcohol Screen: US AUDIT-C     1. How often do you have a drink containing alcohol? 0 Filed at: 06/10/2024  1947   2. How many drinks containing alcohol do you have on a typical day you are drinking?  0 Filed at: 06/10/2024 1947   3b. FEMALE Any Age, or MALE 65+: How often do you have 4 or more drinks on one occassion? 0 Filed at: 06/10/2024 1947   Audit-C Score 0 Filed at: 06/10/2024 1947   CORINE: How many times in the past year have you...    Used an illegal drug or used a prescription medication for non-medical reasons? Never Filed at: 06/10/2024 1947                      Medical Decision Making  Patient is a 79-year-old female presenting with right knee pain  Patient with known osteoarthritis  Will assess for possible worsening osteoarthritis as well as assess for possible septic joint  Exam findings not consistent with septic joint however will use lab findings to assess for potential  Labs were unremarkable  Low likelihood of infectious joint  Treated as possible pseudogout however without any symptom medic relief  Most likely due to significant arthritic changes  Patient due to ambulatory dysfunction is admitted to medicine    Problems Addressed:  Ambulatory dysfunction: acute illness or injury  Right knee pain: acute illness or injury    Amount and/or Complexity of Data Reviewed  Labs: ordered.  Radiology: ordered and independent interpretation performed.    Risk  Prescription drug management.  Decision regarding hospitalization.             Disposition  Final diagnoses:   Right knee pain   Ambulatory dysfunction     Time reflects when diagnosis was documented in both MDM as applicable and the Disposition within this note       Time User Action Codes Description Comment    6/10/2024  8:50 PM Fredrick Floyd Add [M25.561] Right knee pain     6/10/2024  8:50 PM Fredrick Floyd Add [R26.2] Ambulatory dysfunction     6/10/2024  8:50 PM Fredrick Floyd Modify [M25.561] Right knee pain     6/10/2024  8:50 PM Fredrick Floyd Modify [R26.2] Ambulatory dysfunction           ED Disposition       ED Disposition   Admit    Condition   Stable     Date/Time   Mon Stephen 10, 2024 2050    Comment   Case was discussed with JN and the patient's admission status was agreed to be Admission Status: observation status to the service of Dr. Paez .               Follow-up Information    None         Current Discharge Medication List        CONTINUE these medications which have NOT CHANGED    Details   atorvastatin (LIPITOR) 80 mg tablet Take 1 tablet (80 mg total) by mouth daily  Qty: 90 tablet, Refills: 3    Associated Diagnoses: Coronary artery disease of native artery of native heart with stable angina pectoris (HCC)      Cholecalciferol (Vitamin D3) 50 MCG (2000 UT) CAPS Take 1 capsule (2,000 Units total) by mouth daily  Qty: 90 capsule, Refills: 0    Associated Diagnoses: Vitamin D deficiency      clopidogrel (PLAVIX) 75 mg tablet Take 1 tablet (75 mg total) by mouth daily  Qty: 90 tablet, Refills: 0    Associated Diagnoses: Coronary artery disease of native artery of native heart with stable angina pectoris (HCC)      colchicine (COLCRYS) 0.6 mg tablet Take 1 tablet (0.6 mg total) by mouth daily  Qty: 90 tablet, Refills: 0    Associated Diagnoses: Coronary artery disease of native artery of native heart with stable angina pectoris (HCC)      diltiazem (CARDIZEM CD) 240 mg 24 hr capsule Take 1 capsule (240 mg total) by mouth daily  Qty: 90 capsule, Refills: 0    Associated Diagnoses: Essential hypertension      ezetimibe (ZETIA) 10 mg tablet TAKE ONE TABLET BY MOUTH ONCE DAILY  Qty: 90 tablet, Refills: 5    Associated Diagnoses: Hyperlipidemia, unspecified hyperlipidemia type      folic acid (FOLVITE) 1 mg tablet TAKE ONE TABLET BY MOUTH ONCE DAILY  Qty: 90 tablet, Refills: 12    Associated Diagnoses: Routine adult health maintenance      hydroCHLOROthiazide 25 mg tablet TAKE ONE TABLET BY MOUTH ONCE DAILY  Qty: 90 tablet, Refills: 1    Associated Diagnoses: Essential hypertension      hydroxychloroquine (PLAQUENIL) 200 mg tablet Take 1 tablet (200 mg total) by  mouth daily with breakfast  Qty: 90 tablet, Refills: 3    Associated Diagnoses: Elevated C-reactive protein; Bilateral hip pain; Calcium pyrophosphate deposition disease (CPPD); Inflammatory arthritis      levothyroxine 100 mcg tablet TAKE ONE TABLET BY MOUTH ONCE DAILY IN THE MORNING  Qty: 90 tablet, Refills: 2    Associated Diagnoses: Acquired hypothyroidism      losartan (COZAAR) 100 MG tablet TAKE ONE TABLET BY MOUTH ONCE DAILY  Qty: 90 tablet, Refills: 1    Associated Diagnoses: Essential hypertension      metoprolol succinate (TOPROL-XL) 50 mg 24 hr tablet TAKE ONE TABLET BY MOUTH ONCE DAILY  Qty: 90 tablet, Refills: 1    Associated Diagnoses: Essential hypertension      pantoprazole (PROTONIX) 40 mg tablet Take 1 tablet (40 mg total) by mouth daily before breakfast  Qty: 90 tablet, Refills: 0    Associated Diagnoses: Dyspepsia      !! predniSONE 5 mg tablet Take 1 tablet (5 mg total) by mouth daily  Qty: 90 tablet, Refills: 1    Associated Diagnoses: Elevated C-reactive protein; Bilateral hip pain; Calcium pyrophosphate deposition disease (CPPD); Inflammatory arthritis      nitroglycerin (NITROSTAT) 0.4 mg SL tablet Place 1 tablet under the tongue every 5 (five) minutes as needed      !! predniSONE 5 mg tablet Take 1 tablet (5 mg total) by mouth daily  Qty: 30 tablet, Refills: 11    Associated Diagnoses: Inflammatory arthritis       !! - Potential duplicate medications found. Please discuss with provider.          No discharge procedures on file.    PDMP Review       None            ED Provider  Electronically Signed by             Fredrick Floyd MD  06/10/24 2352

## 2024-06-11 PROBLEM — I25.10 CAD (CORONARY ARTERY DISEASE): Status: ACTIVE | Noted: 2018-02-27

## 2024-06-11 LAB
ANION GAP SERPL CALCULATED.3IONS-SCNC: 6 MMOL/L (ref 4–13)
BUN SERPL-MCNC: 16 MG/DL (ref 5–25)
CALCIUM SERPL-MCNC: 9.4 MG/DL (ref 8.4–10.2)
CHLORIDE SERPL-SCNC: 104 MMOL/L (ref 96–108)
CO2 SERPL-SCNC: 31 MMOL/L (ref 21–32)
CREAT SERPL-MCNC: 0.98 MG/DL (ref 0.6–1.3)
ERYTHROCYTE [DISTWIDTH] IN BLOOD BY AUTOMATED COUNT: 14.1 % (ref 11.6–15.1)
GFR SERPL CREATININE-BSD FRML MDRD: 55 ML/MIN/1.73SQ M
GLUCOSE SERPL-MCNC: 81 MG/DL (ref 65–140)
HCT VFR BLD AUTO: 38.5 % (ref 34.8–46.1)
HGB BLD-MCNC: 12.6 G/DL (ref 11.5–15.4)
MCH RBC QN AUTO: 28.1 PG (ref 26.8–34.3)
MCHC RBC AUTO-ENTMCNC: 32.7 G/DL (ref 31.4–37.4)
MCV RBC AUTO: 86 FL (ref 82–98)
PLATELET # BLD AUTO: 209 THOUSANDS/UL (ref 149–390)
PMV BLD AUTO: 12.3 FL (ref 8.9–12.7)
POTASSIUM SERPL-SCNC: 3.3 MMOL/L (ref 3.5–5.3)
RBC # BLD AUTO: 4.48 MILLION/UL (ref 3.81–5.12)
SODIUM SERPL-SCNC: 141 MMOL/L (ref 135–147)
WBC # BLD AUTO: 9.06 THOUSAND/UL (ref 4.31–10.16)

## 2024-06-11 PROCEDURE — 85027 COMPLETE CBC AUTOMATED: CPT | Performed by: PHYSICIAN ASSISTANT

## 2024-06-11 PROCEDURE — 80048 BASIC METABOLIC PNL TOTAL CA: CPT | Performed by: PHYSICIAN ASSISTANT

## 2024-06-11 PROCEDURE — 20610 DRAIN/INJ JOINT/BURSA W/O US: CPT | Performed by: PHYSICIAN ASSISTANT

## 2024-06-11 PROCEDURE — 0S9C3ZZ DRAINAGE OF RIGHT KNEE JOINT, PERCUTANEOUS APPROACH: ICD-10-PCS | Performed by: PHYSICIAN ASSISTANT

## 2024-06-11 PROCEDURE — 97163 PT EVAL HIGH COMPLEX 45 MIN: CPT

## 2024-06-11 PROCEDURE — 99232 SBSQ HOSP IP/OBS MODERATE 35: CPT | Performed by: INTERNAL MEDICINE

## 2024-06-11 PROCEDURE — 99222 1ST HOSP IP/OBS MODERATE 55: CPT | Performed by: PHYSICIAN ASSISTANT

## 2024-06-11 RX ORDER — POTASSIUM CHLORIDE 20 MEQ/1
40 TABLET, EXTENDED RELEASE ORAL ONCE
Status: COMPLETED | OUTPATIENT
Start: 2024-06-11 | End: 2024-06-11

## 2024-06-11 RX ORDER — HYDROMORPHONE HCL IN WATER/PF 6 MG/30 ML
0.2 PATIENT CONTROLLED ANALGESIA SYRINGE INTRAVENOUS
Status: DISCONTINUED | OUTPATIENT
Start: 2024-06-11 | End: 2024-06-12 | Stop reason: HOSPADM

## 2024-06-11 RX ORDER — HYDROMORPHONE HCL/PF 1 MG/ML
0.5 SYRINGE (ML) INJECTION EVERY 6 HOURS PRN
Status: DISCONTINUED | OUTPATIENT
Start: 2024-06-11 | End: 2024-06-11

## 2024-06-11 RX ORDER — OXYCODONE HYDROCHLORIDE 5 MG/1
5 TABLET ORAL EVERY 6 HOURS PRN
Status: DISCONTINUED | OUTPATIENT
Start: 2024-06-11 | End: 2024-06-12 | Stop reason: HOSPADM

## 2024-06-11 RX ORDER — LIDOCAINE 50 MG/G
1 PATCH TOPICAL DAILY
Status: DISCONTINUED | OUTPATIENT
Start: 2024-06-11 | End: 2024-06-12 | Stop reason: HOSPADM

## 2024-06-11 RX ORDER — METHYLPREDNISOLONE ACETATE 40 MG/ML
40 INJECTION, SUSPENSION INTRA-ARTICULAR; INTRALESIONAL; INTRAMUSCULAR; SOFT TISSUE ONCE
Status: COMPLETED | OUTPATIENT
Start: 2024-06-11 | End: 2024-06-11

## 2024-06-11 RX ORDER — LIDOCAINE HYDROCHLORIDE 10 MG/ML
5 INJECTION, SOLUTION EPIDURAL; INFILTRATION; INTRACAUDAL; PERINEURAL ONCE
Status: COMPLETED | OUTPATIENT
Start: 2024-06-11 | End: 2024-06-11

## 2024-06-11 RX ADMIN — CLOPIDOGREL BISULFATE 75 MG: 75 TABLET ORAL at 08:11

## 2024-06-11 RX ADMIN — METHYLPREDNISOLONE ACETATE 40 MG: 40 INJECTION, SUSPENSION INTRA-ARTICULAR; INTRALESIONAL; INTRAMUSCULAR; SOFT TISSUE at 10:23

## 2024-06-11 RX ADMIN — LIDOCAINE 5% 1 PATCH: 700 PATCH TOPICAL at 09:20

## 2024-06-11 RX ADMIN — DICLOFENAC SODIUM 2 G: 10 GEL TOPICAL at 21:10

## 2024-06-11 RX ADMIN — LEVOTHYROXINE SODIUM 100 MCG: 100 TABLET ORAL at 05:11

## 2024-06-11 RX ADMIN — CHOLECALCIFEROL TAB 25 MCG (1000 UNIT) 2000 UNITS: 25 TAB at 08:10

## 2024-06-11 RX ADMIN — LIDOCAINE HYDROCHLORIDE 5 ML: 10 INJECTION, SOLUTION EPIDURAL; INFILTRATION; INTRACAUDAL; PERINEURAL at 10:23

## 2024-06-11 RX ADMIN — HYDROXYCHLOROQUINE SULFATE 200 MG: 200 TABLET, FILM COATED ORAL at 08:11

## 2024-06-11 RX ADMIN — POTASSIUM CHLORIDE 40 MEQ: 1500 TABLET, EXTENDED RELEASE ORAL at 09:20

## 2024-06-11 RX ADMIN — ACETAMINOPHEN 975 MG: 325 TABLET ORAL at 21:10

## 2024-06-11 RX ADMIN — HYDROCHLOROTHIAZIDE 25 MG: 25 TABLET ORAL at 08:11

## 2024-06-11 RX ADMIN — ACETAMINOPHEN 975 MG: 325 TABLET ORAL at 13:54

## 2024-06-11 RX ADMIN — DICLOFENAC SODIUM 2 G: 10 GEL TOPICAL at 08:12

## 2024-06-11 RX ADMIN — PANTOPRAZOLE SODIUM 40 MG: 40 TABLET, DELAYED RELEASE ORAL at 06:01

## 2024-06-11 RX ADMIN — DILTIAZEM HYDROCHLORIDE 240 MG: 120 CAPSULE, COATED, EXTENDED RELEASE ORAL at 08:11

## 2024-06-11 RX ADMIN — DICLOFENAC SODIUM 2 G: 10 GEL TOPICAL at 17:12

## 2024-06-11 RX ADMIN — COLCHICINE 0.6 MG: 0.6 TABLET ORAL at 08:10

## 2024-06-11 RX ADMIN — ENOXAPARIN SODIUM 40 MG: 40 INJECTION SUBCUTANEOUS at 08:15

## 2024-06-11 RX ADMIN — METOPROLOL SUCCINATE 50 MG: 50 TABLET, EXTENDED RELEASE ORAL at 08:11

## 2024-06-11 RX ADMIN — PREDNISONE 5 MG: 5 TABLET ORAL at 08:12

## 2024-06-11 RX ADMIN — HYDROMORPHONE HYDROCHLORIDE 0.5 MG: 1 INJECTION, SOLUTION INTRAMUSCULAR; INTRAVENOUS; SUBCUTANEOUS at 05:11

## 2024-06-11 RX ADMIN — ACETAMINOPHEN 975 MG: 325 TABLET ORAL at 05:11

## 2024-06-11 RX ADMIN — LOSARTAN POTASSIUM 100 MG: 50 TABLET, FILM COATED ORAL at 08:11

## 2024-06-11 RX ADMIN — EZETIMIBE 10 MG: 10 TABLET ORAL at 08:11

## 2024-06-11 RX ADMIN — ATORVASTATIN CALCIUM 80 MG: 80 TABLET, FILM COATED ORAL at 08:11

## 2024-06-11 RX ADMIN — FOLIC ACID 1000 MCG: 1 TABLET ORAL at 08:12

## 2024-06-11 RX ADMIN — DICLOFENAC SODIUM 2 G: 10 GEL TOPICAL at 11:49

## 2024-06-11 NOTE — ASSESSMENT & PLAN NOTE
Patient with right knee pain and difficulty ambulating  ESR 8, CRP <1.0  Pain control  Ortho consult possible steroid injection  F/u final read xray knee

## 2024-06-11 NOTE — PROCEDURES
Procedure- Orthopedics   Ariadna Jernigan 79 y.o. female MRN: [id number]  Unit/Bed#: 7T [facility] 714-01    Procedure: right knee injection    After sterile preparation of the skin overlying the knee an 22 gauge needle was inserted via a anterior lateral portal.  A mixture of 3cc 1% lidocaine and 1cc of 40mg DepoMedrol  was injected into the knee joint without resistance.  The needle was withdrawn and a piece of sterile bandaid was placed over the site.  Pt tolerated the procedure well and was neurovascularly intact both pre and post procedure.    Venus Rivers PA-C

## 2024-06-11 NOTE — ASSESSMENT & PLAN NOTE
Lab Results   Component Value Date    EGFR 45 06/10/2024    EGFR 55 04/05/2024    EGFR 57 01/03/2024    CREATININE 1.15 06/10/2024    CREATININE 0.98 04/05/2024    CREATININE 0.96 01/03/2024   Creat stable, monitor

## 2024-06-11 NOTE — CONSULTS
Consultation - Orthopedics   Ariadna Jernigan 79 y.o. female MRN: [id number]  Unit/Bed#: 7T [facility] 714-01 Encounter: [id number]      Assessment & Plan     Assessment:  79 year old Female Right knee acute mild tricompartmental OA arthritic flare, no concern for septic arthritis at this time     Plan:    Patient presents to the hospital with right knee pain that began about 10 days ago without trauma.  X-ray imaging of the right knee was reviewed and demonstrates mild tricompartmental osteoarthritis without fracture.  On exam patient has a small effusion and tenderness to palpation about the medial joint line and mild swelling.  Knee is without warmth, erythema, or ecchymosis.  There is no pain with passive range of motion of the right knee.  Patient is afebrile and without leukocytosis. Sed 8 and CRP <1.0.  There is no concern for septic arthritis at this time.  Patient was offered a corticosteroid injection.  Patient was counseled on the risk and benefits of undergoing a cortisone injection to the right knee.  Patient agreed to proceed forward with injection.  Please see separate procedure note.  Cortisone injection of the right knee was performed under aseptic technique.  From an orthopedic standpoint the patient would be safe to be discharged home later today.  Instructed that I recommend that she perform outpatient physical therapy in which I will place a prescription to work on overall gait training and right knee strengthening.  WBAT RLE  PT/OT for gait training and safe dc to home  Pain control PRN  Medical management per primary   Start outpatient PT upon discharge  Follow-up with orthopedics as an outpatient as needed   Ortho signing off    History of Present Illness   Physician Requesting Consult: Antonia aPez MD  Reason for Consult / Principal Problem: Right knee pain  HPI: Ariadna Jernigan is a 79 y.o. year old female with a PMH of CAD, HTN, Hypothyroidism, obesity, chronic kidney disease stage III, and  marvin presents to the hospital with right knee pain.  Orthopedics was consulted regarding her right knee pain.  Patient was seen and examined at bedside.  Translation was provided by the patient's daughter, Yessica.  Patient reports that her right knee pain began a few days ago and is progressively gotten worse.  She denies any new trauma or injury.  Patient reports that most of her pain is on the medial aspect of the knee.  Patient states that she walks on her own accord denying using a walker or cane.  Patient reports that yesterday the pain became so bad that she was unable to tolerate walking or bending her knee.  Patient denies any fevers chills or sweats stating that she feels well overall.  She reports that she has received prior injections into her bilateral shoulders and her neck but denies ever receiving cortisone injection into the right knee.  Patient reports that she is on longstanding prednisone and colchicine due to her rheumatology history. She was must recently seen by Rheumatology on 3/26/24 and diagnosed with CPPD and inflammatory arthritis. She additionally reports numbness in the right big toe, which she states has been long standing. She reports that she has a history of low back pain and underwent lower back surgery about 20 years ago.    Inpatient consult to Orthopedic Surgery  Consult performed by: Venus Rivers PA-C  Consult ordered by: Ermelinda Edge PA-C          Review of Systems  10 point review of systems was reviewed with the patient and all were found to be negative except those stated above.    Historical Information   Past Medical History:   Diagnosis Date    Arthritis     rheumatoid    Coronary artery disease     Disease of thyroid gland     Hyperlipidemia     Hypertension      Past Surgical History:   Procedure Laterality Date    BACK SURGERY      BREAST SURGERY      CARPAL TUNNEL RELEASE      CHOLECYSTECTOMY      HYSTERECTOMY      OOPHORECTOMY       "ORTHOPEDIC SURGERY       Social History   Social History     Substance and Sexual Activity   Alcohol Use Not Currently     Social History     Substance and Sexual Activity   Drug Use Never     Social History     Tobacco Use   Smoking Status Former    Current packs/day: 0.00    Average packs/day: 0.1 packs/day for 52.0 years (5.2 ttl pk-yrs)    Types: Cigarettes    Start date:     Quit date:     Years since quittin.4    Passive exposure: Never   Smokeless Tobacco Never     Family History: non-contributory    Meds/Allergies   all current active meds have been reviewed  Allergies   Allergen Reactions    Codeine GI Intolerance    Shingrix [Zoster Vac Recomb Adjuvanted] Rash       Objective   Vitals: Blood pressure 138/69, pulse 68, temperature 98 °F (36.7 °C), temperature source Temporal, resp. rate 18, height 5' 1\" (1.549 m), weight 83.5 kg (184 lb), SpO2 96%, not currently breastfeeding.,Body mass index is 34.77 kg/m².      Intake/Output Summary (Last 24 hours) at 2024 0949  Last data filed at 2024 0440  Gross per 24 hour   Intake 480 ml   Output 380 ml   Net 100 ml     I/O last 24 hours:  In: 480 [P.O.:480]  Out: 380 [Urine:380]    Invasive Devices       Peripheral Intravenous Line  Duration             Peripheral IV 06/10/24 Right Antecubital <1 day                    Physical Exam  Ortho Exam    Lab Results: I have personally reviewed pertinent lab results.  CBC:   Lab Results   Component Value Date    WBC 9.06 2024    HGB 12.6 2024    HCT 38.5 2024    MCV 86 2024     2024    RBC 4.48 2024    MCH 28.1 2024    MCHC 32.7 2024    RDW 14.1 2024    MPV 12.3 2024    NRBC 0 06/10/2024     CMP:   Lab Results   Component Value Date    SODIUM 141 2024     2024    CO2 31 2024    BUN 16 2024    CREATININE 0.98 2024    CALCIUM 9.4 2024    EGFR 55 2024     ESR:   Lab Results   Component Value " Date    ESR 8 06/10/2024     CRP:   Lab Results   Component Value Date    CRP <1.0 06/10/2024     Imaging Studies: I have personally reviewed pertinent films in PACS and my interpretation is as follows x-ray of the right knee was reviewed and demonstrates mild tricompartmental osteoarthritis with joint space narrowing and osteophyte formation.      Venus Rivers PA-C

## 2024-06-11 NOTE — PLAN OF CARE
Problem: Potential for Falls  Goal: Patient will remain free of falls  Description: INTERVENTIONS:  - Educate patient/family on patient safety including physical limitations  - Instruct patient to call for assistance with activity   - Consult OT/PT to assist with strengthening/mobility   - Keep Call bell within reach  - Keep bed low and locked with side rails adjusted as appropriate  - Keep care items and personal belongings within reach  - Initiate and maintain comfort rounds  - Make Fall Risk Sign visible to staff  - Apply yellow socks and bracelet for high fall risk patients  - Consider moving patient to room near nurses station  Outcome: Progressing     Problem: PAIN - ADULT  Goal: Verbalizes/displays adequate comfort level or baseline comfort level  Description: Interventions:  - Encourage patient to monitor pain and request assistance  - Assess pain using appropriate pain scale  - Administer analgesics based on type and severity of pain and evaluate response  - Implement non-pharmacological measures as appropriate and evaluate response  - Consider cultural and social influences on pain and pain management  - Notify physician/advanced practitioner if interventions unsuccessful or patient reports new pain  Outcome: Progressing     Problem: INFECTION - ADULT  Goal: Absence or prevention of progression during hospitalization  Description: INTERVENTIONS:  - Assess and monitor for signs and symptoms of infection  - Monitor lab/diagnostic results  - Monitor all insertion sites, i.e. indwelling lines, tubes, and drains  - Monitor endotracheal if appropriate and nasal secretions for changes in amount and color  - Raymond appropriate cooling/warming therapies per order  - Administer medications as ordered  - Instruct and encourage patient and family to use good hand hygiene technique  - Identify and instruct in appropriate isolation precautions for identified infection/condition  Outcome: Progressing  Goal: Absence  of fever/infection during neutropenic period  Description: INTERVENTIONS:  - Monitor WBC    Outcome: Progressing     Problem: SAFETY ADULT  Goal: Patient will remain free of falls  Description: INTERVENTIONS:  - Educate patient/family on patient safety including physical limitations  - Instruct patient to call for assistance with activity   - Consult OT/PT to assist with strengthening/mobility   - Keep Call bell within reach  - Keep bed low and locked with side rails adjusted as appropriate  - Keep care items and personal belongings within reach  - Initiate and maintain comfort rounds  - Make Fall Risk Sign visible to staff  - Apply yellow socks and bracelet for high fall risk patients  - Consider moving patient to room near nurses station  Outcome: Progressing  Goal: Maintain or return to baseline ADL function  Description: INTERVENTIONS:  -  Assess patient's ability to carry out ADLs; assess patient's baseline for ADL function and identify physical deficits which impact ability to perform ADLs (bathing, care of mouth/teeth, toileting, grooming, dressing, etc.)  - Assess/evaluate cause of self-care deficits   - Assess range of motion  - Assess patient's mobility; develop plan if impaired  - Assess patient's need for assistive devices and provide as appropriate  - Encourage maximum independence but intervene and supervise when necessary  - Involve family in performance of ADLs  - Assess for home care needs following discharge   - Consider OT consult to assist with ADL evaluation and planning for discharge  - Provide patient education as appropriate  Outcome: Progressing  Goal: Maintains/Returns to pre admission functional level  Description: INTERVENTIONS:  - Perform AM-PAC 6 Click Basic Mobility/ Daily Activity assessment daily.  - Set and communicate daily mobility goal to care team and patient/family/caregiver.   - Collaborate with rehabilitation services on mobility goals if consulted  - Out of bed for  toileting  - Record patient progress and toleration of activity level   Outcome: Progressing     Problem: DISCHARGE PLANNING  Goal: Discharge to home or other facility with appropriate resources  Description: INTERVENTIONS:  - Identify barriers to discharge w/patient and caregiver  - Arrange for needed discharge resources and transportation as appropriate  - Identify discharge learning needs (meds, wound care, etc.)  - Arrange for interpretive services to assist at discharge as needed  - Refer to Case Management Department for coordinating discharge planning if the patient needs post-hospital services based on physician/advanced practitioner order or complex needs related to functional status, cognitive ability, or social support system  Outcome: Progressing     Problem: Knowledge Deficit  Goal: Patient/family/caregiver demonstrates understanding of disease process, treatment plan, medications, and discharge instructions  Description: Complete learning assessment and assess knowledge base.  Interventions:  - Provide teaching at level of understanding  - Provide teaching via preferred learning methods  Outcome: Progressing     Problem: MUSCULOSKELETAL - ADULT  Goal: Maintain or return mobility to safest level of function  Description: INTERVENTIONS:  - Assess patient's ability to carry out ADLs; assess patient's baseline for ADL function and identify physical deficits which impact ability to perform ADLs (bathing, care of mouth/teeth, toileting, grooming, dressing, etc.)  - Assess/evaluate cause of self-care deficits   - Assess range of motion  - Assess patient's mobility  - Assess patient's need for assistive devices and provide as appropriate  - Encourage maximum independence but intervene and supervise when necessary  - Involve family in performance of ADLs  - Assess for home care needs following discharge   - Consider OT consult to assist with ADL evaluation and planning for discharge  - Provide patient education  as appropriate  Outcome: Progressing  Goal: Maintain proper alignment of affected body part  Description: INTERVENTIONS:  - Support, maintain and protect limb and body alignment  - Provide patient/ family with appropriate education  Outcome: Progressing

## 2024-06-11 NOTE — H&P
[facility]  H&P  Name: Ariadna Jernigan 79 y.o. female I MRN: [id number]  Unit/Bed#: 7T [facility] 714-01 I Date of Admission: 6/10/2024   Date of Service: 6/10/2024 I Hospital Day: 0      Assessment & Plan   * Right knee pain  Assessment & Plan  Patient with right knee pain and difficulty ambulating  ESR 8, CRP <1.0  Pain control  Ortho consult possible steroid injection  F/u final read xray knee    Ambulatory dysfunction  Assessment & Plan  Patient with right knee pain is not able to ambulate  PT and OT for DC needs    Obesity, Class II, BMI 35-39.9  Assessment & Plan  BMI 36  Healthy diet lifestyle modification recommended    Hypothyroidism  Assessment & Plan  Continue levothyroxine    Stage 3a chronic kidney disease (HCC)  Assessment & Plan  Lab Results   Component Value Date    EGFR 45 06/10/2024    EGFR 55 04/05/2024    EGFR 57 01/03/2024    CREATININE 1.15 06/10/2024    CREATININE 0.98 04/05/2024    CREATININE 0.96 01/03/2024   Creat stable, monitor    Coronary artery disease of native artery of native heart with stable angina pectoris (HCC)  Assessment & Plan  Continue beta-blocker, Plavix, statin    Essential hypertension  Assessment & Plan  Continue home medication with hold parameters        VIRTUAL CARE DOCUMENTATION:     1. This service was provided via Telemedicine using Teams Virtual Rounding      2. Parties in the room with patient during teleconsult Family member: daughter, Vanda Nicolás, RN     3. Confidentiality My office door was closed     4. Participants No one else was in the room    5. Patient acknowledged consent and understanding of privacy and security of the  Telemedicine consult. I informed the patient that I have reviewed their record in Epic and presented the opportunity for them to ask any questions regarding the visit today.  The patient agreed to participate.              VTE Pharmacologic Prophylaxis: VTE Score: 4 Moderate Risk (Score 3-4) - Pharmacological  DVT Prophylaxis Ordered: enoxaparin (Lovenox).  Code Status: Full Code  Discussion with family: Updated  (daughter) at bedside.    Anticipated Length of Stay: Patient will be admitted on an observation basis with an anticipated length of stay of less than 2 midnights secondary to PT/OT and CM for safe d/c plan, Ortho input.    Total Time Spent on Date of Encounter in care of patient: 75 mins. This time was spent on one or more of the following: performing physical exam; counseling and coordination of care; obtaining or reviewing history; documenting in the medical record; reviewing/ordering tests, medications or procedures; communicating with other healthcare professionals and discussing with patient's family/caregivers.    Chief Complaint: right knee pain    History of Present Illness:  Ariadna Jernigan is a 79 y.o. female with a PMH of CAD, hypertension, hypothyroidism, obesity, chronic kidney disease stage III, pseudogout who presents with right knee pain. Patient with history of pseudogout, on chronic prednisone and colchicine. She has chronic pain and has had prior injections in her shoulder and neck. She has not had injection in her knee. She has previously seen Rheumatology and Ortho. She normally is able to function at home and walk, sometimes she has to stay on couch, Today was severe pain and not able to get out of the car. She has burning sensations that shoots up the leg and down foot, has some numbness also. She is not able to bend knee or bear weight to transfer to bed.    Review of Systems:  Review of Systems   Constitutional:  Positive for chills. Negative for fever.   HENT:  Negative for rhinorrhea, sore throat and trouble swallowing.    Eyes:  Negative for discharge and redness.   Respiratory:  Negative for cough and shortness of breath.    Cardiovascular:  Negative for chest pain and leg swelling.   Gastrointestinal:  Negative for abdominal pain, diarrhea, nausea and vomiting.    Genitourinary:  Negative for dysuria and hematuria.   Musculoskeletal:  Positive for arthralgias, back pain and neck pain.   Skin:  Negative for rash and wound.   Neurological:  Positive for weakness and light-headedness. Negative for dizziness and headaches.   Psychiatric/Behavioral:  Negative for agitation and confusion.        Past Medical and Surgical History:   Past Medical History:   Diagnosis Date    Arthritis     rheumatoid    Coronary artery disease     Disease of thyroid gland     Hyperlipidemia     Hypertension        Past Surgical History:   Procedure Laterality Date    BACK SURGERY      BREAST SURGERY      CARPAL TUNNEL RELEASE      CHOLECYSTECTOMY      HYSTERECTOMY      OOPHORECTOMY      ORTHOPEDIC SURGERY         Meds/Allergies:  Prior to Admission medications    Medication Sig Start Date End Date Taking? Authorizing Provider   atorvastatin (LIPITOR) 80 mg tablet Take 1 tablet (80 mg total) by mouth daily 5/1/24  Yes Johnny Hinojosa MD   Cholecalciferol (Vitamin D3) 50 MCG (2000 UT) CAPS Take 1 capsule (2,000 Units total) by mouth daily 4/9/24  Yes Olivier Atkins MD   clopidogrel (PLAVIX) 75 mg tablet Take 1 tablet (75 mg total) by mouth daily 4/9/24  Yes Olivier Atkins MD   colchicine (COLCRYS) 0.6 mg tablet Take 1 tablet (0.6 mg total) by mouth daily 4/9/24  Yes Olivier Atkins MD   diltiazem (CARDIZEM CD) 240 mg 24 hr capsule Take 1 capsule (240 mg total) by mouth daily 4/9/24  Yes Olivier Atkisn MD   ezetimibe (ZETIA) 10 mg tablet TAKE ONE TABLET BY MOUTH ONCE DAILY 2/15/24  Yes Anastasia Vasquez MD   folic acid (FOLVITE) 1 mg tablet TAKE ONE TABLET BY MOUTH ONCE DAILY 8/23/23  Yes Anastasia Vasquez MD   hydroCHLOROthiazide 25 mg tablet TAKE ONE TABLET BY MOUTH ONCE DAILY 3/27/24  Yes Olivier Atkins MD   hydroxychloroquine (PLAQUENIL) 200 mg tablet Take 1 tablet (200 mg total) by mouth daily with breakfast 9/26/23 9/26/24 Yes Serjio Harrison MD   levothyroxine 100 mcg tablet TAKE ONE TABLET BY MOUTH ONCE DAILY  IN THE MORNING 24  Yes Anastasia Vasquez MD   losartan (COZAAR) 100 MG tablet TAKE ONE TABLET BY MOUTH ONCE DAILY 3/27/24  Yes Olivier Atkins MD   metoprolol succinate (TOPROL-XL) 50 mg 24 hr tablet TAKE ONE TABLET BY MOUTH ONCE DAILY 3/27/24  Yes Olivier Aktins MD   pantoprazole (PROTONIX) 40 mg tablet Take 1 tablet (40 mg total) by mouth daily before breakfast 4/9/24 10/6/24 Yes Olivier Atkins MD   predniSONE 5 mg tablet Take 1 tablet (5 mg total) by mouth daily 23  Yes Serjio Harrison MD   hydroxychloroquine (PLAQUENIL) 200 mg tablet Take 1 tablet (200 mg total) by mouth 2 (two) times a day with meals  Patient not taking: Reported on 6/10/2024 3/26/24 3/26/25  Serjio Harrison MD   nitroglycerin (NITROSTAT) 0.4 mg SL tablet Place 1 tablet under the tongue every 5 (five) minutes as needed 11   Rodney Newton MD   predniSONE 5 mg tablet Take 1 tablet (5 mg total) by mouth daily  Patient not taking: Reported on 2024 3/26/24   Serjio Harrison MD   pregabalin (LYRICA) 150 mg capsule Take 1 capsule (150 mg total) by mouth daily  Patient not taking: Reported on 6/10/2024 9/14/23   Priya Kee MD     I have reviewed home medications with patient family member.    Allergies:   Allergies   Allergen Reactions    Codeine GI Intolerance    Shingrix [Zoster Vac Recomb Adjuvanted] Rash       Social History:  Marital Status: /Civil Union   Occupation: retired  Patient Pre-hospital Living Situation: With spouse  Patient Pre-hospital Level of Mobility: walks  Patient Pre-hospital Diet Restrictions: none  Substance Use History:   Social History     Substance and Sexual Activity   Alcohol Use Not Currently     Social History     Tobacco Use   Smoking Status Former    Current packs/day: 0.00    Average packs/day: 0.1 packs/day for 52.0 years (5.2 ttl pk-yrs)    Types: Cigarettes    Start date:     Quit date:     Years since quittin.4    Passive exposure: Never   Smokeless Tobacco Never      Social History     Substance and Sexual Activity   Drug Use Never       Family History:  Family History   Problem Relation Age of Onset    No Known Problems Mother     Lung cancer Father     Ovarian cancer Sister     No Known Problems Sister     No Known Problems Sister     No Known Problems Maternal Grandmother     No Known Problems Maternal Grandfather     No Known Problems Paternal Grandmother     No Known Problems Paternal Grandfather     No Known Problems Maternal Aunt     No Known Problems Paternal Aunt     No Known Problems Paternal Aunt     Breast cancer Cousin 45       Physical Exam:     Vitals:   Blood Pressure: 159/81 (06/10/24 2150)  Pulse: 60 (06/10/24 2150)  Temperature: (!) 96.8 °F (36 °C) (06/10/24 2150)  Temp Source: Temporal (06/10/24 2150)  Respirations: 18 (06/10/24 2150)  Weight - Scale: 85.8 kg (189 lb 2.5 oz) (06/10/24 1857)  SpO2: 94 % (06/10/24 2150)    Physical Exam  Vitals reviewed.   Constitutional:       Appearance: Normal appearance.      Comments: Pleasant elderly female   HENT:      Head: Normocephalic and atraumatic.      Nose: Nose normal.   Eyes:      General:         Right eye: No discharge.         Left eye: No discharge.      Extraocular Movements: Extraocular movements intact.      Conjunctiva/sclera: Conjunctivae normal.   Neck:      Comments: Able to turn head side-to-side  Cardiovascular:      Rate and Rhythm: Normal rate.      Comments: Rate 73  Pulmonary:      Effort: Pulmonary effort is normal.      Comments: Respiratory rate 18, 91% room air  Abdominal:      Comments: No reported abdominal pain   Musculoskeletal:      Cervical back: Normal range of motion.      Comments: Swelling of the right knee   Neurological:      General: No focal deficit present.      Mental Status: She is alert and oriented to person, place, and time. Mental status is at baseline.   Psychiatric:         Mood and Affect: Mood normal.         Behavior: Behavior normal.         Thought Content:  Thought content normal.            Additional Data:     Lab Results:  Results from last 7 days   Lab Units 06/10/24  1922   WBC Thousand/uL 9.34   HEMOGLOBIN g/dL 13.0   HEMATOCRIT % 41.8   PLATELETS Thousands/uL 223   SEGS PCT % 75   LYMPHO PCT % 17   MONO PCT % 7   EOS PCT % 0     Results from last 7 days   Lab Units 06/10/24  1922   SODIUM mmol/L 140   POTASSIUM mmol/L 3.5   CHLORIDE mmol/L 102   CO2 mmol/L 30   BUN mg/dL 17   CREATININE mg/dL 1.15   ANION GAP mmol/L 8   CALCIUM mg/dL 9.9   GLUCOSE RANDOM mg/dL 132       Lines/Drains:  Invasive Devices       Peripheral Intravenous Line  Duration             Peripheral IV 06/10/24 Right Antecubital <1 day                  Imaging: No acute osseous abnormality my read, formal read pending  XR knee 4+ views Right injury   ED Interpretation by Fredrick Floyd MD (06/10 2024)   No osseous abnormality          EKG and Other Studies Reviewed on Admission:   EKG: No EKG obtained.    ** Please Note: This note has been constructed using a voice recognition system. **

## 2024-06-11 NOTE — PLAN OF CARE
Problem: PHYSICAL THERAPY ADULT  Goal: Performs mobility at highest level of function for planned discharge setting.  See evaluation for individualized goals.  Description: Treatment/Interventions: Functional transfer training, LE strengthening/ROM, Elevations, Therapeutic exercise, Endurance training, Patient/family training, Equipment eval/education, Bed mobility, Gait training, Compensatory technique education, Spoke to nursing, Spoke to case management  Equipment Recommended: Walker (+ BSC)       See flowsheet documentation for full assessment, interventions and recommendations.  Note: Prognosis: Excellent  Problem List: Decreased strength, Decreased endurance, Impaired balance, Decreased range of motion, Decreased mobility, Pain  Assessment: Pt is 79 y.o. female seen for a PT evaluation s/p admit to  [place]  on 6/10/2024. Pt presenting w/ R knee pain. Xray revealed OA; pt now s/p cortisone injection 6/11/24. Please see above for other active problem list / PMH. PT now consulted to assess functional mobility and needs for safe d/c planning. Prior to admission, pt was I w/ ambulation w/o AD, lives w/ spouse in a house w/ stairs, FFSU available, 0 PERLA. Currently pt requires S for bed skills; MinAx1 for functional transfers; MinAx1 for ambulation w/ RW. Pt presents functioning below baseline and w/ overall mobility deficits 2* to: pain; decreased LE strength; decreased R knee ROM; decreased endurance; impaired balance; gait deviations. These impairments place pt at risk for falls. Pt will continue to benefit from skilled PT interventions to address stated impairments; to maximize functional potential; for ongoing pt/ family training; and DME needs. PT is currently recommending HHPT pending progress, a RW, + BSC.        Rehab Resource Intensity Level, PT: III (Minimum Resource Intensity)    See flowsheet documentation for full assessment.

## 2024-06-11 NOTE — CASE MANAGEMENT
Case Management Assessment & Discharge Planning Note    Patient name Ariadna Jernigan  Location 7T [facility] 714/7T [facility] 714-01 MRN [id number]  : 1945 Date 2024       Current Admission Date: 6/10/2024  Current Admission Diagnosis:Right knee pain   Patient Active Problem List    Diagnosis Date Noted Date Diagnosed    Right knee pain 06/10/2024     Ambulatory dysfunction 06/10/2024     Voice fatigue 2024     Dyspepsia 2024     Vitamin D deficiency 2024     Seronegative inflammatory arthritis 2024     Obesity, Class II, BMI 35-39.9 2023     Need for pneumococcal 20-valent conjugate vaccination 2023     Cervical spinal stenosis 2023     Thoracic radiculopathy 2023     Stage 3a chronic kidney disease (HCC) 2023     Pseudogout involving multiple joints 2023    Chronic pain of both shoulders 10/19/2022 2023    Polyarthropathy 10/19/2022 2023    Cervical radiculopathy 2022     Lumbar post-laminectomy syndrome 2022     Bilateral carotid artery stenosis 2019     Essential hypertension 2018     Coronary artery disease of native artery of native heart with stable angina pectoris (HCC) 2018     Dyslipidemia 2018     Absence of bladder continence 2013     Osteoarthritis of knee 2012    Hypothyroidism 2012      LOS (days): 0  Geometric Mean LOS (GMLOS) (days):   Days to GMLOS:     OBJECTIVE:           Current admission status: Observation     Preferred Pharmacy:   [facility] - [place] - [address]  [address]  [address]  [place] [address]  Phone: [phone] Fax: [phone]    Primary Care Provider: Olivier Atkins MD    Primary Insurance: [employer]  REP  Secondary Insurance: [facility]    ASSESSMENT:  Active Health Care Proxies    There are no active Health Care Proxies on  file.       Readmission Root Cause  30 Day Readmission: No    Patient Information  Admitted from:: Home  Mental Status: Alert  During Assessment patient was accompanied by: Not accompanied during assessment  Assessment information provided by:: Patient  Primary Caregiver: Self  Support Systems: Children, Self, Spouse/significant other, Daughter, Family members  County of Residence: [place]  What [facility] do you live in?: [place]  Home entry access options. Select all that apply.: No steps to enter home  Type of Current Residence: 2 story home  Upon entering residence, is there a bedroom on the main floor (no further steps)?: No  A bedroom is located on the following floor levels of residence (select all that apply):: 2nd Floor  Upon entering residence, is there a bathroom on the main floor (no further steps)?: Yes  Number of steps to 2nd floor from main floor: One Flight  Living Arrangements: Lives w/ Spouse/significant other  Is patient a ?: No    Activities of Daily Living Prior to Admission  Functional Status: Independent  Completes ADLs independently?: Yes  Ambulates independently?: Yes  Does patient use assisted devices?: No  Does patient currently own DME?: No  Does patient have a history of Outpatient Therapy (PT/OT)?: No  Does the patient have a history of Short-Term Rehab?: No  Does patient have a history of HHC?: No  Does patient currently have HHC?: No      Patient Information Continued  Income Source: Pension/assisted  Does patient have prescription coverage?: Yes  Does patient receive dialysis treatments?: No  Does patient have a history of substance abuse?: No  Does patient have a history of Mental Health Diagnosis?: No    Means of Transportation  Means of Transport to \[facility] [place] [facility]\"":: Family transport      Social Determinants of Health (SDOH)      Flowsheet Row Most Recent Value   Housing Stability    In the last 12 months, was there a time when you were not able to pay the mortgage or rent on time? N    In the past 12 months, how many times have you moved where you were living? 0   At any time in the past 12 months, were you homeless or living in a shelter (including now)? N   Transportation Needs    In the past 12 months, has lack of transportation kept you from medical appointments or from getting medications? no   In the past 12 months, has lack of transportation kept you from meetings, work, or from getting things needed for daily living? No   Food Insecurity    Within the past 12 months, you worried that your food would run out before you got the money to buy more. Never true   Within the past 12 months, the food you bought just didn't last and you didn't have money to get more. Never true   Utilities    In the past 12 months has the electric, gas, oil, or water company threatened to shut off services in your home? No          DISCHARGE DETAILS:    Discharge planning discussed with:: Patient  Freedom of Choice: Yes     CM contacted family/caregiver?: Yes  Were Treatment Team discharge recommendations reviewed with patient/caregiver?: Yes      Contacts  Patient Contacts: Yessica Eaton  [phone]  Relationship to Patient:: Family  Contact Method: Phone  Phone Number: Yessica Reyes  Daughter  [phone]  Reason/Outcome: Emergency Contact       Additional Comments: Used ipdad interperter for Arabella Farmer [id number]. Met with patient at bedside and discussed discharge planning. Waiting on Ortho consult and PT/OT eval for recs.  CM department following hru discharge

## 2024-06-11 NOTE — ASSESSMENT & PLAN NOTE
Patient with right knee pain and difficulty ambulating  ESR 8, CRP <1.0  Pain control  Ortho consult possible steroid injection  F/u final read xray knee    6/11 - evaluated by orthopedics and underwent a corticosteroid injection.  Continue multimodal analgesic regimen including Voltaren gel, around-the-clock Tylenol, as needed Oxycodone, and a transdermal Lidoderm patch.  XR imaging notable for osteoarthritis.  Continue ongoing PT/OT evaluation, with tentative plan for discharge tomorrow.

## 2024-06-11 NOTE — ASSESSMENT & PLAN NOTE
Creatinine approximately 0.8-0.9 - currently stable at 0.98  Monitor renal function and urine output - limit/avoid nephrotoxins and hypotension as possible  Note chronic HCTZ/Cozaar use

## 2024-06-11 NOTE — PLAN OF CARE
Problem: MUSCULOSKELETAL - ADULT  Goal: Maintain or return mobility to safest level of function  Description: INTERVENTIONS:  - Assess patient's ability to carry out ADLs; assess patient's baseline for ADL function and identify physical deficits which impact ability to perform ADLs (bathing, care of mouth/teeth, toileting, grooming, dressing, etc.)  - Assess/evaluate cause of self-care deficits   - Assess range of motion  - Assess patient's mobility  - Assess patient's need for assistive devices and provide as appropriate  - Encourage maximum independence but intervene and supervise when necessary  - Involve family in performance of ADLs  - Assess for home care needs following discharge   - Consider OT consult to assist with ADL evaluation and planning for discharge  - Provide patient education as appropriate  Outcome: Progressing     Problem: MUSCULOSKELETAL - ADULT  Goal: Maintain proper alignment of affected body part  Description: INTERVENTIONS:  - Support, maintain and protect limb and body alignment  - Provide patient/ family with appropriate education  Outcome: Progressing     Problem: SAFETY ADULT  Goal: Maintain or return to baseline ADL function  Description: INTERVENTIONS:  -  Assess patient's ability to carry out ADLs; assess patient's baseline for ADL function and identify physical deficits which impact ability to perform ADLs (bathing, care of mouth/teeth, toileting, grooming, dressing, etc.)  - Assess/evaluate cause of self-care deficits   - Assess range of motion  - Assess patient's mobility; develop plan if impaired  - Assess patient's need for assistive devices and provide as appropriate  - Encourage maximum independence but intervene and supervise when necessary  - Involve family in performance of ADLs  - Assess for home care needs following discharge   - Consider OT consult to assist with ADL evaluation and planning for discharge  - Provide patient education as appropriate  Outcome:  Progressing     Problem: PAIN - ADULT  Goal: Verbalizes/displays adequate comfort level or baseline comfort level  Description: Interventions:  - Encourage patient to monitor pain and request assistance  - Assess pain using appropriate pain scale  - Administer analgesics based on type and severity of pain and evaluate response  - Implement non-pharmacological measures as appropriate and evaluate response  - Consider cultural and social influences on pain and pain management  - Notify physician/advanced practitioner if interventions unsuccessful or patient reports new pain  Outcome: Progressing

## 2024-06-11 NOTE — UTILIZATION REVIEW
Initial Clinical Review  OBSERVATION ADMISSION 6/10/2024 2051  CONVERTED TO   INPATIENT ADMISSION 6/11/2024 1553   DUE TO r knee PAIN, INABILITY TO BEAR WT, GAIT DIFFICULTIES   REQ PAIN MANAGEMENT REGIMEN W IP PT TREATMENTS    Admission: Date/Time/Statement:   Admission Orders (From admission, onward)       Ordered        06/11/24 1553  INPATIENT ADMISSION  Once            06/10/24 2051  Place in Observation  Once                          Orders Placed This Encounter   Procedures    INPATIENT ADMISSION     Standing Status:   Standing     Number of Occurrences:   1     Order Specific Question:   Level of Care     Answer:   Med Surg [16]     Order Specific Question:   Estimated length of stay     Answer:   More than 2 Midnights     Order Specific Question:   Certification     Answer:   I certify that inpatient services are medically necessary for this patient for a duration of greater than two midnights. See H&P and MD Progress Notes for additional information about the patient's course of treatment.     ED Arrival Information       Expected   -    Arrival   6/10/2024 18:48    Acuity   Less Urgent              Means of arrival   Wheelchair    Escorted by   Self    Service   Hospitalist    Admission type   Emergency              Arrival complaint   knee pain             Chief Complaint   Patient presents with    Knee Pain     Reports she has chronic knee pain - Rt knee pain. Reports she took prednisone today. Pain got worse today.        Initial Presentation: 79 y.o. female pmh CAD, hypertension, hypothyroidism, obesity, chronic kidney disease stage III, pseudogout to ED presents self with right knee pain, ambulatory dysfunction  HX pseudogout, on chronic prednisone and colchicine. HX chronic pain w/ prior injections in her shoulder and neck. She has not had injection in her knee. OP management w Rheumatology and Ortho. @ baseline able to function at home and walk, sometimes she has to stay on couch, Today was severe  pain and not able to get out of the car.   Reports burning sensations that shoots up the leg and down foot, has some numbness.     EXAM  Unable to bend knee or bear weight to transfer to bed. Observation admission due to R knee pain, ambulatory dysfunction. Pain management, Ortho consult, PT/OT eval, monitor CR  Anticipated Length of Stay/Certification Statement: Patient will be admitted on an observation basis with an anticipated length of stay of less than 2 midnights secondary to PT/OT and CM for safe d/c plan, Ortho input   Date: 6/11/2024   Day 2: changed to Inpatient  Ortho  Right knee acute mild tricompartmental OA arthritic flare, Given R knee injection anterior lateral portal of knee  WBAT RLE, PT/OT for gait training safe DC dispo; start OP PT upon DC  Attending   evaluated by orthopedics and underwent a corticosteroid injection.    Continue multimodal analgesic regimen including Voltaren gel, around-the-clock Tylenol, as needed Oxycodone, and a transdermal Lidoderm patch.  XR imaging notable for osteoarthritis.  Continue ongoing PT/OT evaluation, with tentative plan for discharge tomorrow.   Date: 6/12/2024  Day 3: Has surpassed a 2nd midnight with active treatments and services.  RLE edema, decreased RLE neurovascular sensation  IP PT Note on treatment   Outcome: Progressing   Reports continued pain in R knee but also acknowledges improvement from yesterday w pain regimen.   Demonstrated functional progress as well w/ decreased assistance required for t/f and ambulation, increased gait distance, + ability to initiate stair training. Continue to recommend HHPT upon d/c to address problem list stated above, as well as a RW + BSC.     ED Triage Vitals   Temperature Pulse Respirations Blood Pressure SpO2   06/10/24 1857 06/10/24 1857 06/10/24 1857 06/10/24 1857 06/10/24 1857   98 °F (36.7 °C) 73 18 157/81 91 %      Temp Source Heart Rate Source Patient Position - Orthostatic VS BP Location FiO2 (%)  "  06/10/24 1857 06/10/24 1857 06/10/24 1857 06/10/24 1857 --   Oral Monitor Lying Left arm       Pain Score       06/10/24 1926       10 - Worst Possible Pain          Wt Readings from Last 1 Encounters:   06/11/24 83.5 kg (184 lb)     Additional Vital Signs:   Date/Time Temp Pulse Resp BP MAP (mmHg) SpO2 O2 Device Patient Position - Orthostatic VS   06/12/24 0714 97.8 °F (36.6 °C) 58 18 130/75 101 96 % None (Room air) Lying   06/12/24 0335 97.9 °F (36.6 °C) 55 18 127/74 92 95 % None (Room air) Lying   06/11/24 2329 98 °F (36.7 °C) 63 18 123/74 102 94 % None (Room air) Lying   06/11/24 1900 97.6 °F (36.4 °C) 63 18 116/71 80 96 % None (Room air) Lying   06/11/24 1458 97.8 °F (36.6 °C) 64 18 142/75 94 96 % None (Room air) Lying     Date/Time Temp Pulse Resp BP MAP (mmHg) SpO2 O2 Device Patient Position - Orthostatic VS   06/11/24 0714 98 °F (36.7 °C) 68 18 138/69 85 96 % None (Room air) Lying   06/10/24 2150 96.8 °F (36 °C) Abnormal  60 18 159/81 115 94 % None (Room air) Sitting   06/10/24 1857 98 °F (36.7 °C) 73 18 157/81 -- 91 % None (Room air) Lying     Weights (last 14 days)    Date/Time Weight Weight Method Height   06/11/24 0444 83.5 kg (184 lb) Standing scale 5' 1\" (1.549 m)   06/10/24 1857 85.8 kg (189 lb 2.5 oz) Bed scale --     Pertinent Labs/Diagnostic Test Results:   No EKG available    XR knee 4+ views Right injury   ED Interpretation by Fredrick Floyd MD (06/10 2024)   No osseous abnormality      Final Result by Carlos Nicole MD (06/11 0826)      No acute osseous abnormality.            Workstation performed: [id number]               Results from last 7 days   Lab Units 06/12/24  0441 06/11/24  0503 06/10/24  1922   WBC Thousand/uL 11.88* 9.06 9.34   HEMOGLOBIN g/dL 13.3 12.6 13.0   HEMATOCRIT % 40.4 38.5 41.8   PLATELETS Thousands/uL 231 209 223   TOTAL NEUT ABS Thousands/µL 10.18*  --  6.91         Results from last 7 days   Lab Units 06/12/24 0441 06/11/24  0503 06/10/24  1922   SODIUM mmol/L 141 " "141 140   POTASSIUM mmol/L 3.7 3.3* 3.5   CHLORIDE mmol/L 103 104 102   CO2 mmol/L 29 31 30   ANION GAP mmol/L 9 6 8   BUN mg/dL 17 16 17   CREATININE mg/dL 0.95 0.98 1.15   EGFR ml/min/1.73sq m 57 55 45   CALCIUM mg/dL 9.8 9.4 9.9   MAGNESIUM mg/dL 2.1  --   --              Results from last 7 days   Lab Units 06/12/24  0441 06/11/24  0503 06/10/24  1922   GLUCOSE RANDOM mg/dL 103 81 132             No results found for: \"BETA-HYDROXYBUTYRATE\"             Results from last 7 days   Lab Units 06/10/24  1922   CRP mg/L <1.0   SED RATE mm/hour 8         ED Treatment:   Medication Administration from 06/10/2024 1848 to 06/10/2024 2132         Date/Time Order Dose Route Action Action by Comments     06/10/2024 1926 EDT colchicine (COLCRYS) tablet 1.2 mg 1.2 mg Oral Given Nadai Beltran RN --          Past Medical History:   Diagnosis Date    Arthritis     rheumatoid    Coronary artery disease     Disease of thyroid gland     Hyperlipidemia     Hypertension      Present on Admission:   Essential hypertension   CAD (coronary artery disease)   Stage 3 chronic kidney disease (HCC)   Hypothyroidism   Right knee pain   Ambulatory dysfunction      Admitting Diagnosis: Knee pain [M25.569]  Right knee pain [M25.561]  Ambulatory dysfunction [R26.2]  Age/Sex: 79 y.o. female  Admission Orders:  Scheduled Medications:  acetaminophen, 975 mg, Oral, Q8H VITOR  atorvastatin, 80 mg, Oral, Daily  Cholecalciferol, 2,000 Units, Oral, Daily  clopidogrel, 75 mg, Oral, Daily  colchicine, 0.6 mg, Oral, Daily  Diclofenac Sodium, 2 g, Topical, 4x Daily  diltiazem, 240 mg, Oral, Daily  enoxaparin, 40 mg, Subcutaneous, Daily  ezetimibe, 10 mg, Oral, Daily  folic acid, 1,000 mcg, Oral, Daily  hydroCHLOROthiazide, 25 mg, Oral, Daily  hydroxychloroquine, 200 mg, Oral, Daily With Breakfast  levothyroxine, 100 mcg, Oral, QAM  lidocaine, 1 patch, Topical, Daily  losartan, 100 mg, Oral, Daily  metoprolol succinate, 50 mg, Oral, Daily  pantoprazole, 40 " mg, Oral, Daily Before Breakfast  predniSONE, 5 mg, Oral, Daily      Continuous IV Infusions:     PRN Meds:  HYDROmorphone, 0.2 mg, Intravenous, Q3H PRN 6/11 x1  nitroglycerin, 0.4 mg, Sublingual, Q5 Min PRN  ondansetron, 4 mg, Intravenous, Q6H PRN  oxyCODONE, 5 mg, Oral, Q6H PRN  oxyCODONE, 2.5 mg, Oral, Q6H PRN        IP CONSULT TO ORTHOPEDIC SURGERY    Network Utilization Review Department  ATTENTION: Please call with any questions or concerns to [phone] and carefully listen to the prompts so that you are directed to the right person. All voicemails are confidential.   For Discharge needs, contact Care Management DC Support Team at [phone] opt. 2  Send all requests for admission clinical reviews, approved or denied determinations and any other requests to dedicated fax number below belonging to the campus where the patient is receiving treatment. List of dedicated fax numbers for the Facilities:  FACILITY NAME UR FAX NUMBER   ADMISSION DENIALS (Administrative/Medical Necessity) [phone]   DISCHARGE SUPPORT TEAM (NETWORK) [phone]   PARENT CHILD HEALTH (Maternity/NICU/Pediatrics) [phone]   [facility] [phone]   [facility] [phone]   [facility] [phone]   [facility] [phone]   [facility] [phone]   [facility] [phone]   [facility] [phone]   [facility] [phone]   [facility] [phone]   [facility] [phone]   [facility] [phone]   [facility] [phone]

## 2024-06-11 NOTE — PHYSICAL THERAPY NOTE
Physical Therapy Evaluation    Patient's Name: Ariadna Jernigan    Admitting Diagnosis  Knee pain [M25.569]  Right knee pain [M25.561]  Ambulatory dysfunction [R26.2]    Problem List  Patient Active Problem List   Diagnosis    Essential hypertension    CAD (coronary artery disease)    Hyperlipidemia    Bilateral carotid artery stenosis    Cervical radiculopathy    Lumbar post-laminectomy syndrome    Stage 3 chronic kidney disease (HCC)    Cervical spinal stenosis    Thoracic radiculopathy    Chronic pain of both shoulders    Hypothyroidism    Osteoarthritis of knee    Polyarthropathy    Pseudogout involving multiple joints    Obesity, Class II, BMI 35-39.9    Need for pneumococcal 20-valent conjugate vaccination    Absence of bladder continence    Dyspepsia    Vitamin D deficiency    Seronegative inflammatory arthritis    Voice fatigue    Right knee pain    Ambulatory dysfunction       Past Medical History  Past Medical History:   Diagnosis Date    Arthritis     rheumatoid    Coronary artery disease     Disease of thyroid gland     Hyperlipidemia     Hypertension        Past Surgical History  Past Surgical History:   Procedure Laterality Date    BACK SURGERY      BREAST SURGERY      CARPAL TUNNEL RELEASE      CHOLECYSTECTOMY      HYSTERECTOMY      OOPHORECTOMY      ORTHOPEDIC SURGERY          06/11/24 1228   PT Last Visit   PT Visit Date 06/11/24   Note Type   Note type Evaluation   Pain Assessment   Pain Assessment Tool 0-10   Pain Score 10 - Worst Possible Pain   Pain Location/Orientation Orientation: Right;Location: Knee   Hospital Pain Intervention(s) Cold applied;Elevated   Restrictions/Precautions   Weight Bearing Precautions Per Order Yes   RLE Weight Bearing Per Order WBAT   Other Precautions Fall Risk;Pain  ([place]ian speaking)   Home Living   Type of Home House   Home Layout Two level  (0 PERLA; 2 high stairs up to 1/2 bathroom; FFOS to bedroom + full bathroom; pt plans to stay on 1st floor)   Prior  Function   Level of [place] Independent with ADLs;Independent with functional mobility  (I + active w/o AD)   Lives With Spouse   Receives Help From Family   Falls in the last 6 months 0  (hx of fall but not within 6 month window)   General   Family/Caregiver Present Yes  (assisted w/ translation)   Cognition   Arousal/Participation Alert   Orientation Level Oriented X4   Subjective   Subjective Agreeable to mobilize.   RLE Assessment   RLE Assessment   (pain limiting, 3-/5)   LLE Assessment   LLE Assessment WFL   Bed Mobility   Supine to Sit 5  Supervision   Additional items HOB elevated;Increased time required;Verbal cues   Additional Comments Pt greeted in supine.   Transfers   Sit to Stand 4  Minimal assistance   Additional items Assist x 1;Increased time required;Verbal cues   Stand to Sit 4  Minimal assistance   Additional items Assist x 1;Increased time required;Verbal cues   Additional Comments RW   Ambulation/Elevation   Gait pattern Excessively slow;Short stride;Antalgic;Decreased foot clearance;Step to   Gait Assistance 4  Minimal assist   Additional items Assist x 1;Verbal cues;Tactile cues   Assistive Device Rolling walker   Distance 10'   Stair Management Assistance Not tested   Balance   Static Sitting Fair +   Dynamic Sitting Fair   Static Standing Fair -   Dynamic Standing Poor +   Ambulatory Poor +  (RW)   Endurance Deficit   Endurance Deficit Yes   Endurance Deficit Description pain, weakness, fatigue   Activity Tolerance   Activity Tolerance Patient limited by pain   Medical Staff Made Aware CM Marley, OT   Nurse Made Aware yes - cleared + updated   Assessment   Prognosis Excellent   Problem List Decreased strength;Decreased endurance;Impaired balance;Decreased range of motion;Decreased mobility;Pain   Assessment Pt is 79 y.o. female seen for a PT evaluation s/p admit to  [place]  on 6/10/2024. Pt presenting w/ R knee pain. Xray revealed OA; pt now s/p cortisone injection 6/11/24. Please  see above for other active problem list / PMH.     PT now consulted to assess functional mobility and needs for safe d/c planning. Prior to admission, pt was I w/ ambulation w/o AD, lives w/ spouse in a house w/ stairs, FFSU available, 0 PERLA.     Currently pt requires S for bed skills; MinAx1 for functional transfers; MinAx1 for ambulation w/ RW. Pt presents functioning below baseline and w/ overall mobility deficits 2* to: pain; decreased LE strength; decreased R knee ROM; decreased endurance; impaired balance; gait deviations. These impairments place pt at risk for falls.     Pt will continue to benefit from skilled PT interventions to address stated impairments; to maximize functional potential; for ongoing pt/ family training; and DME needs. PT is currently recommending HHPT pending progress, a RW, + BSC.   Goals   Patient Goals less pain   STG Expiration Date 06/25/24   Short Term Goal #1 In 14 days pt will complete: 1) Bed mobility skills with [place] to facilitate safe return to previous living environment. 2) Functional transfers with [place]ek to facilitate safe return to previous living environment. 3) Ambulation with least restrictive ' w/ Meek without LOB for safe ambulation in home/community environment. 4) Improve balance scores by 1 grade to decrease fall risk. 5) Improve LE strength grades by 1 to increase independence w/ all functional mobility, transfers and gait. 6) PT for ongoing pt and family education; DME needs and D/C planning to promote highest level of function in least restrictive environment. 7) Stair training up/ down 12 steps with most appropriate technique and Meek for safe access to previous living environment and to increase community access.   PT Treatment Day 0   Plan   Treatment/Interventions Functional transfer training;LE strengthening/ROM;Elevations;Therapeutic exercise;Endurance training;Patient/family training;Equipment eval/education;Bed mobility;Gait training;Compensatory  technique education;Spoke to nursing;Spoke to case management   PT Frequency 3-5x/wk   Discharge Recommendation   Rehab Resource Intensity Level, PT III (Minimum Resource Intensity)   Equipment Recommended Walker  (+ BSC)   Walker Package Recommended Wheeled walker   Change/add to Walker Package? No   AM-PAC Basic Mobility Inpatient   Turning in Flat Bed Without Bedrails 3   Lying on Back to Sitting on Edge of Flat Bed Without Bedrails 3   Moving Bed to Chair 3   Standing Up From Chair Using Arms 3   Walk in Room 3   Climb 3-5 Stairs With Railing 2   Basic Mobility Inpatient Raw Score 17   Basic Mobility Standardized Score 39.67   [place] Highest Level Of Mobility   -HLM Goal 5: Stand one or more mins   -HLM Achieved 6: Walk 10 steps or more   End of Consult   Patient Position at End of Consult Bedside chair;All needs within reach  (on waffle cushion, BLE elevated, CP applied to R knee)     Serenity Nixon, PT, DPT

## 2024-06-11 NOTE — PROGRESS NOTES
[facility]  Progress Note  Name: Ariadna Jernigan I  MRN: [id number]  Unit/Bed#: 7T [facility] 714-01 I Date of Admission: 6/10/2024   Date of Service: 6/11/2024 I Hospital Day: 0      Assessment & Plan:    * Right knee pain  Assessment & Plan  Patient with right knee pain and difficulty ambulating  ESR 8, CRP <1.0  Pain control  Ortho consult possible steroid injection  F/u final read xray knee    6/11 - evaluated by orthopedics and underwent a corticosteroid injection.  Continue multimodal analgesic regimen including Voltaren gel, around-the-clock Tylenol, as needed Oxycodone, and a transdermal Lidoderm patch.  XR imaging notable for osteoarthritis.  Continue ongoing PT/OT evaluation, with tentative plan for discharge tomorrow.    Ambulatory dysfunction  Assessment & Plan  Secondary to right knee pain (see above)  Ongoing PT/OT evaluations    CAD (coronary artery disease)  Assessment & Plan  Continue Plavix/statin/Cozaar/Toprol-XL    Essential hypertension  Assessment & Plan  Continue Cardizem/HCTZ/Cozaar/Toprol-XL  Sodium restriction    Hypothyroidism  Assessment & Plan  Continue Synthroid    Stage 3 chronic kidney disease (HCC)  Assessment & Plan  Creatinine approximately 0.8-0.9 - currently stable at 0.98  Monitor renal function and urine output - limit/avoid nephrotoxins and hypotension as possible  Note chronic HCTZ/Cozaar use    Hyperlipidemia  Assessment & Plan  Continue Zetia/statin      DVT Prophylaxis:  Lovenox    AM-PAC Basic Mobility:  Basic Mobility Inpatient Raw Score: 17  -HLM Achieved: 5: Stand (1 or more minutes)  -HLM Goal: 5: Stand one or more mins    Patient Centered Rounds:  I have performed bedside rounds and discussed plan of care with nursing today.  Discussions with Specialists or Other Care Team Provider:  see above assessments if applicable    Education and Discussions with Family / Patient:  Patient, along with , at bedside this morning    Time Spent for  Care:  35 minutes. More than 50% of total time spent on counseling and coordination of care, on one or more of the following: performing physical exam; counseling and coordination of care, obtaining or reviewing history, documenting in the medical record, reviewing/ordering tests/medications/procedures, and communicating with other healthcare professionals.    Current Length of Stay: 0 day(s)  Current Patient Status: Observation   Certification Statement:  Patient will continue to require additional hospital stay due to assessments as noted above.    Code Status: Level 1 - Full Code        Subjective:     Encountered earlier in the day.  Underwent a corticosteroid injection prior to my encounter.  She states that in general her pain is starting to slowly improve, although at the time my encounter, she is at rest.  Awaiting physical therapy evaluation.        Objective:     Vitals:   Temp (24hrs), Av.6 °F (36.4 °C), Min:96.8 °F (36 °C), Max:98 °F (36.7 °C)    Temp:  [96.8 °F (36 °C)-98 °F (36.7 °C)] 98 °F (36.7 °C)  HR:  [60-73] 68  Resp:  [18] 18  BP: (138-159)/(69-81) 138/69  SpO2:  [91 %-96 %] 96 %  Body mass index is 34.77 kg/m².     Input and Output Summary (last 24 hours):       Intake/Output Summary (Last 24 hours) at 2024 1432  Last data filed at 2024 1355  Gross per 24 hour   Intake 940 ml   Output 1031 ml   Net -91 ml       Physical Exam:     GENERAL Obese - mildly weak/fatigued   HEAD   Normocephalic - atraumatic   EYES Nonicteric   MOUTH   Mucosa moist   NECK   Supple - full range of motion   CARDIAC Rate controlled   PULMONARY Respirations nonlabored at rest   ABDOMEN Nontender/nondistended   MUSCULOSKELETAL   Motor strength/range of motion mildly limited of the RLE secondary to knee tenderness   NEUROLOGIC   Alert/oriented at baseline   SKIN   Chronic wrinkles/blemishes    PSYCHIATRIC   Mood/affect stable         Additional Data:     Labs & Recent Cultures:    Results from last 7 days    Lab Units 06/11/24  0503 06/10/24  1922   WBC Thousand/uL 9.06 9.34   HEMOGLOBIN g/dL 12.6 13.0   HEMATOCRIT % 38.5 41.8   PLATELETS Thousands/uL 209 223   SEGS PCT %  --  75   LYMPHO PCT %  --  17   MONO PCT %  --  7   EOS PCT %  --  0     Results from last 7 days   Lab Units 06/11/24  0503   POTASSIUM mmol/L 3.3*   CHLORIDE mmol/L 104   CO2 mmol/L 31   BUN mg/dL 16   CREATININE mg/dL 0.98   CALCIUM mg/dL 9.4                                 Lines/Drains:  Invasive Devices       Peripheral Intravenous Line  Duration             Peripheral IV 06/10/24 Right Antecubital <1 day                      Last 24 Hours Medication List:   Current Facility-Administered Medications   Medication Dose Route Frequency Provider Last Rate    acetaminophen  975 mg Oral Q8H [facility] Ermelinda Edge PA-C      atorvastatin  80 mg Oral Daily Ermelinda Edge PA-C      Cholecalciferol  2,000 Units Oral Daily Ermelinda Edge PA-C      clopidogrel  75 mg Oral Daily Ermelinda Edge PA-C      colchicine  0.6 mg Oral Daily Ermelinda Edge PA-C      Diclofenac Sodium  2 g Topical 4x Daily Ermelinda Edge PA-C      diltiazem  240 mg Oral Daily Ermelinda Edge PA-C      enoxaparin  40 mg Subcutaneous Daily Ermelinda Edge PA-C      ezetimibe  10 mg Oral Daily Ermelinda Edge PA-C      folic acid  1,000 mcg Oral Daily Ermelinda Edge PA-C      hydroCHLOROthiazide  25 mg Oral Daily Ermelinda Edge PA-C      HYDROmorphone  0.2 mg Intravenous Q3H PRN Antonia Paez MD      hydroxychloroquine  200 mg Oral Daily With Breakfast Ermelinda Edge PA-C      levothyroxine  100 mcg Oral QAM Ermelinda Edge PA-C      lidocaine  1 patch Topical Daily Antonia Paez MD      losartan  100 mg Oral Daily Ermelinda Edge PA-C      metoprolol succinate  50 mg Oral Daily Ermelinda Edge PA-C      nitroglycerin  0.4 mg Sublingual Q5 Min PRN  Ermelinda Edge PA-C      ondansetron  4 mg Intravenous Q6H PRN Ermelinda Edge PA-C      oxyCODONE  5 mg Oral Q6H PRN Antonia Paez MD      oxyCODONE  2.5 mg Oral Q6H PRN Antonia Paez MD      pantoprazole  40 mg Oral Daily Before Breakfast Ermelinda Edge PA-C      predniSONE  5 mg Oral Daily BILLY Leigh MD   Hospitalist - [facility]        ** Please Note: This note is constructed using a voice recognition dictation system.  An occasional wrong word/phrase or “sound-a-like” substitution may have been picked up by dictation device due to the inherent limitations of voice recognition software.  Read the chart carefully and recognize, using reasonable context, where substitutions may have occurred.**

## 2024-06-12 ENCOUNTER — HOME HEALTH ADMISSION (OUTPATIENT)
Dept: HOME HEALTH SERVICES | Facility: HOME HEALTHCARE | Age: 79
End: 2024-06-12
Payer: MEDICARE

## 2024-06-12 VITALS
HEART RATE: 58 BPM | RESPIRATION RATE: 18 BRPM | TEMPERATURE: 97.8 F | HEIGHT: 61 IN | BODY MASS INDEX: 34.74 KG/M2 | SYSTOLIC BLOOD PRESSURE: 130 MMHG | DIASTOLIC BLOOD PRESSURE: 75 MMHG | OXYGEN SATURATION: 96 % | WEIGHT: 184 LBS

## 2024-06-12 LAB
ANION GAP SERPL CALCULATED.3IONS-SCNC: 9 MMOL/L (ref 4–13)
BASOPHILS # BLD AUTO: 0.03 THOUSANDS/ÂΜL (ref 0–0.1)
BASOPHILS NFR BLD AUTO: 0 % (ref 0–1)
BUN SERPL-MCNC: 17 MG/DL (ref 5–25)
CALCIUM SERPL-MCNC: 9.8 MG/DL (ref 8.4–10.2)
CHLORIDE SERPL-SCNC: 103 MMOL/L (ref 96–108)
CO2 SERPL-SCNC: 29 MMOL/L (ref 21–32)
CREAT SERPL-MCNC: 0.95 MG/DL (ref 0.6–1.3)
DME PARACHUTE DELIVERY DATE ACTUAL: NORMAL
DME PARACHUTE DELIVERY DATE REQUESTED: NORMAL
DME PARACHUTE ITEM DESCRIPTION: NORMAL
DME PARACHUTE ITEM DESCRIPTION: NORMAL
DME PARACHUTE ORDER STATUS: NORMAL
DME PARACHUTE SUPPLIER NAME: NORMAL
DME PARACHUTE SUPPLIER PHONE: NORMAL
EOSINOPHIL # BLD AUTO: 0 THOUSAND/ÂΜL (ref 0–0.61)
EOSINOPHIL NFR BLD AUTO: 0 % (ref 0–6)
ERYTHROCYTE [DISTWIDTH] IN BLOOD BY AUTOMATED COUNT: 14 % (ref 11.6–15.1)
GFR SERPL CREATININE-BSD FRML MDRD: 57 ML/MIN/1.73SQ M
GLUCOSE SERPL-MCNC: 103 MG/DL (ref 65–140)
HCT VFR BLD AUTO: 40.4 % (ref 34.8–46.1)
HGB BLD-MCNC: 13.3 G/DL (ref 11.5–15.4)
IMM GRANULOCYTES # BLD AUTO: 0.06 THOUSAND/UL (ref 0–0.2)
IMM GRANULOCYTES NFR BLD AUTO: 1 % (ref 0–2)
LYMPHOCYTES # BLD AUTO: 1.24 THOUSANDS/ÂΜL (ref 0.6–4.47)
LYMPHOCYTES NFR BLD AUTO: 10 % (ref 14–44)
MAGNESIUM SERPL-MCNC: 2.1 MG/DL (ref 1.9–2.7)
MCH RBC QN AUTO: 28.2 PG (ref 26.8–34.3)
MCHC RBC AUTO-ENTMCNC: 32.9 G/DL (ref 31.4–37.4)
MCV RBC AUTO: 86 FL (ref 82–98)
MONOCYTES # BLD AUTO: 0.37 THOUSAND/ÂΜL (ref 0.17–1.22)
MONOCYTES NFR BLD AUTO: 3 % (ref 4–12)
NEUTROPHILS # BLD AUTO: 10.18 THOUSANDS/ÂΜL (ref 1.85–7.62)
NEUTS SEG NFR BLD AUTO: 86 % (ref 43–75)
NRBC BLD AUTO-RTO: 0 /100 WBCS
PLATELET # BLD AUTO: 231 THOUSANDS/UL (ref 149–390)
PMV BLD AUTO: 12.6 FL (ref 8.9–12.7)
POTASSIUM SERPL-SCNC: 3.7 MMOL/L (ref 3.5–5.3)
RBC # BLD AUTO: 4.71 MILLION/UL (ref 3.81–5.12)
SODIUM SERPL-SCNC: 141 MMOL/L (ref 135–147)
WBC # BLD AUTO: 11.88 THOUSAND/UL (ref 4.31–10.16)

## 2024-06-12 PROCEDURE — 97530 THERAPEUTIC ACTIVITIES: CPT

## 2024-06-12 PROCEDURE — 85025 COMPLETE CBC W/AUTO DIFF WBC: CPT | Performed by: INTERNAL MEDICINE

## 2024-06-12 PROCEDURE — 80048 BASIC METABOLIC PNL TOTAL CA: CPT | Performed by: INTERNAL MEDICINE

## 2024-06-12 PROCEDURE — 97116 GAIT TRAINING THERAPY: CPT

## 2024-06-12 PROCEDURE — 99239 HOSP IP/OBS DSCHRG MGMT >30: CPT | Performed by: INTERNAL MEDICINE

## 2024-06-12 PROCEDURE — 83735 ASSAY OF MAGNESIUM: CPT | Performed by: INTERNAL MEDICINE

## 2024-06-12 RX ORDER — LIDOCAINE 50 MG/G
1 PATCH TOPICAL DAILY
Qty: 15 PATCH | Refills: 0 | Status: SHIPPED | OUTPATIENT
Start: 2024-06-13 | End: 2024-06-12

## 2024-06-12 RX ORDER — ACETAMINOPHEN 325 MG/1
975 TABLET ORAL EVERY 8 HOURS SCHEDULED
Start: 2024-06-12 | End: 2024-06-18

## 2024-06-12 RX ORDER — LIDOCAINE 50 MG/G
1 PATCH TOPICAL DAILY
Qty: 15 PATCH | Refills: 0 | Status: SHIPPED | OUTPATIENT
Start: 2024-06-13 | End: 2024-06-18

## 2024-06-12 RX ORDER — ACETAMINOPHEN 325 MG/1
975 TABLET ORAL EVERY 8 HOURS SCHEDULED
Start: 2024-06-12 | End: 2024-06-12

## 2024-06-12 RX ADMIN — LOSARTAN POTASSIUM 100 MG: 50 TABLET, FILM COATED ORAL at 09:00

## 2024-06-12 RX ADMIN — PREDNISONE 5 MG: 5 TABLET ORAL at 09:01

## 2024-06-12 RX ADMIN — METOPROLOL SUCCINATE 50 MG: 50 TABLET, EXTENDED RELEASE ORAL at 09:01

## 2024-06-12 RX ADMIN — ATORVASTATIN CALCIUM 80 MG: 80 TABLET, FILM COATED ORAL at 09:01

## 2024-06-12 RX ADMIN — HYDROXYCHLOROQUINE SULFATE 200 MG: 200 TABLET, FILM COATED ORAL at 09:40

## 2024-06-12 RX ADMIN — HYDROCHLOROTHIAZIDE 25 MG: 25 TABLET ORAL at 09:00

## 2024-06-12 RX ADMIN — ENOXAPARIN SODIUM 40 MG: 40 INJECTION SUBCUTANEOUS at 09:00

## 2024-06-12 RX ADMIN — PANTOPRAZOLE SODIUM 40 MG: 40 TABLET, DELAYED RELEASE ORAL at 06:07

## 2024-06-12 RX ADMIN — EZETIMIBE 10 MG: 10 TABLET ORAL at 09:01

## 2024-06-12 RX ADMIN — LIDOCAINE 5% 1 PATCH: 700 PATCH TOPICAL at 09:01

## 2024-06-12 RX ADMIN — COLCHICINE 0.6 MG: 0.6 TABLET ORAL at 09:00

## 2024-06-12 RX ADMIN — DICLOFENAC SODIUM 2 G: 10 GEL TOPICAL at 09:02

## 2024-06-12 RX ADMIN — FOLIC ACID 1000 MCG: 1 TABLET ORAL at 09:01

## 2024-06-12 RX ADMIN — LEVOTHYROXINE SODIUM 100 MCG: 100 TABLET ORAL at 06:07

## 2024-06-12 RX ADMIN — CHOLECALCIFEROL TAB 25 MCG (1000 UNIT) 2000 UNITS: 25 TAB at 09:00

## 2024-06-12 RX ADMIN — CLOPIDOGREL BISULFATE 75 MG: 75 TABLET ORAL at 09:01

## 2024-06-12 RX ADMIN — DILTIAZEM HYDROCHLORIDE 240 MG: 120 CAPSULE, COATED, EXTENDED RELEASE ORAL at 09:01

## 2024-06-12 RX ADMIN — ACETAMINOPHEN 975 MG: 325 TABLET ORAL at 06:07

## 2024-06-12 NOTE — DISCHARGE SUMMARY
Discharge Summary - [facility]  Patient: Ariadna Jernigan 79 y.o. female   MRN: [id number]  PCP: Olivier Atkins MD  Unit/Bed#: 7T [facility] 714-01 Encounter: [id number]            Discharging Physician / Practitioner: Antonia Paez MD  PCP: Olivier Atkins MD  Admission Date:   Admission Orders (From admission, onward)       Ordered        06/11/24 1553  INPATIENT ADMISSION  Once            06/10/24 2051  Place in Observation  Once                          Discharge Date: 06/12/24      Reason for Admission:  Right knee pain and trouble ambulating      Discharge Diagnoses:     Principal Problem:    Right knee pain    Active Problems:    Ambulatory dysfunction    CAD (coronary artery disease)    Essential hypertension    Hypothyroidism    Stage 3 chronic kidney disease (HCC)    Hyperlipidemia      Consultations During Hospital Stay:  Orthopedic surgery      Hospital Course:     * Right knee pain  Assessment & Plan  Patient with right knee pain and difficulty ambulating  ESR 8, CRP <1.0  Pain control  Ortho consult possible steroid injection  F/u final read xray knee     6/11 - evaluated by orthopedics and underwent a corticosteroid injection.  Continue multimodal analgesic regimen including Voltaren gel, around-the-clock Tylenol, as needed Oxycodone, and a transdermal Lidoderm patch.  XR imaging notable for osteoarthritis.  Continue ongoing PT/OT evaluation, with tentative plan for discharge tomorrow.    6/12 - evaluated again by physical therapy with recommendation of home health services (home PT), with tentative plan for discharge today     Ambulatory dysfunction  Assessment & Plan  Secondary to right knee pain (see above)  As stated above, plan for discharge home with home health services     CAD (coronary artery disease)  Assessment & Plan  Continue Plavix/statin/Cozaar/Toprol-XL     Essential hypertension  Assessment & Plan  Continue Cardizem/HCTZ/Cozaar/Toprol-XL  Sodium restriction encouraged    "  Hypothyroidism  Assessment & Plan  Continue Synthroid     Stage 3 chronic kidney disease (HCC)  Assessment & Plan  Creatinine approximately 0.8-0.9 - currently stable at 0.95  Limit/avoid nephrotoxins and hypotension as possible  Note chronic HCTZ/Cozaar use     Hyperlipidemia  Assessment & Plan  Continue Zetia/statin       Condition at Discharge: fair       Discharge Day Visit / Exam:     Vitals:  Blood Pressure: 130/75 (06/12/24 0714)  Pulse: 58 (06/12/24 0714)  Temperature: 97.8 °F (36.6 °C) (06/12/24 0714)  Temp Source: Temporal (06/12/24 0714)  Respirations: 18 (06/12/24 0714)  Height: 5' 1\" (154.9 cm) (06/11/24 0444)  Weight - Scale: 83.5 kg (184 lb) (06/11/24 0444)  SpO2: 96 % (06/12/24 0714)      Physical exam:  I had a face-to-face encounter with the patient on day of discharge.      Discussion with Patient and/or Family:  The patient has been advised to return to the ER immediately if any symptoms recur or worsen.       Discharge instructions/Information to Patient and/or Family:   See after visit summary for information provided to patient and/or family.        Provisions for Follow-Up Care:  See after visit summary for information related to follow-up care and any pertinent home health orders.        Disposition:   Home with home health services      Discharge Medications:  See after visit summary for reconciled discharge medications provided to patient and/or family.        Discharge Statement:  I spent 38 minutes discharging the patient. This time was spent on the day of discharge. I had direct contact with the patient on the day of discharge. Greater than 50% of the total time was spent examining patient, answering all patient questions, arranging and discussing plan of care with patient as well as directly providing post-discharge instructions.  Additional time then spent on discharge activities.           HINA GUZMAN MD   Hospitalist - [facility]        ** Please Note: This " note is constructed using a voice recognition dictation system.  An occasional wrong word/phrase or “sound-a-like” substitution may have been picked up by dictation device due to the inherent limitations of voice recognition software.  Read the chart carefully and recognize, using reasonable context, where substitutions may have occurred.**

## 2024-06-12 NOTE — UTILIZATION REVIEW
NOTIFICATION OF INPATIENT MEDICAL ADMISSION   AUTHORIZATION REQUEST   SERVICING FACILITY:   [address] [address]  Tax ID: [id number]  NPI: [id number] ATTENDING PROVIDER:  Attending Name and NPI#: Antonia Paez Md [[id number]]  Address: [address] [place] [id number]  Phone: [phone]     ADMISSION INFORMATION:  Place of Service: Inpatient [facility]  Place of Service Code: 21  Inpatient Admission Date/Time: 6/11/24  3:53 PM  Discharge Date/Time: 6/12/2024 11:16 AM  Admitting Diagnosis Code/Description:  Knee pain [M25.569]  Right knee pain [M25.561]  Ambulatory dysfunction [R26.2]     UTILIZATION REVIEW CONTACT:  Nora Krishna Utilization   Network Utilization Review Department  Phone: [phone]  Fax [phone]  Email: [email or web address]  Contact for approvals/pending authorizations, clinical reviews, and discharge.     PHYSICIAN ADVISORY SERVICES:  Medical Necessity Denial & Xyvr-qy-Dbal Review  Phone: [phone]  Fax: [phone]  Email: [email or web address]     DISCHARGE SUPPORT TEAM:  For Patients Discharge Needs & Updates  Phone: [phone] opt. 2 Fax: [phone]  Email: [email or web address]

## 2024-06-12 NOTE — PLAN OF CARE
Problem: PHYSICAL THERAPY ADULT  Goal: Performs mobility at highest level of function for planned discharge setting.  See evaluation for individualized goals.  Description: Treatment/Interventions: Functional transfer training, LE strengthening/ROM, Elevations, Therapeutic exercise, Endurance training, Patient/family training, Equipment eval/education, Bed mobility, Gait training, Compensatory technique education, Spoke to nursing, Spoke to case management  Equipment Recommended: Walker (+ BSC)       See flowsheet documentation for full assessment, interventions and recommendations.  Outcome: Progressing  Note: Prognosis: Excellent  Problem List: Decreased strength, Decreased endurance, Impaired balance, Decreased range of motion, Decreased mobility, Pain  Assessment: Pt seen for PT treatment session w/ interventions consisting of t/f training, gait training, stair training, + LE ther ex instruction. Pt reports continued pain in R knee but also acknowledges improvement from yesterday. Pt demonstrated functional progress as well w/ decreased assistance required for t/f and ambulation, increased gait distance, + ability to initiate stair training. Continue to recommend HHPT upon d/c to address problem list stated above, as well as a RW + BSC.        Rehab Resource Intensity Level, PT: III (Minimum Resource Intensity)    See flowsheet documentation for full assessment.

## 2024-06-12 NOTE — PROGRESS NOTES
Patient:    MRN:  [id number]    [place] Request ID:  [id number]    Level of care reserved:    Partner Reserved:    Clinical needs requested:  physical therapy, occupational therapy    Geography searched:  [id number]    Start of Service:    Request sent:  3:12pm EDT on 6/11/2024 by Marley Johnson    Partner reserved:  by Marley Johnson    Choice list shared:  8:49am EDT on 6/12/2024 by Marley Johnson

## 2024-06-12 NOTE — CASE MANAGEMENT
Case Management Discharge Planning Note    Patient name Ariadna Jernigan  Location 7T [facility] 714/7T [facility] 714-01 MRN [id number]  : 1945 Date 2024       Current Admission Date: 6/10/2024  Current Admission Diagnosis:Right knee pain   Patient Active Problem List    Diagnosis Date Noted Date Diagnosed    Right knee pain 06/10/2024     Ambulatory dysfunction 06/10/2024     Voice fatigue 2024     Dyspepsia 2024     Vitamin D deficiency 2024     Seronegative inflammatory arthritis 2024     Obesity, Class II, BMI 35-39.9 2023     Need for pneumococcal 20-valent conjugate vaccination 2023     Cervical spinal stenosis 2023     Thoracic radiculopathy 2023     Stage 3 chronic kidney disease (HCC) 2023     Pseudogout involving multiple joints 2023    Chronic pain of both shoulders 10/19/2022 2023    Polyarthropathy 10/19/2022 2023    Cervical radiculopathy 2022     Lumbar post-laminectomy syndrome 2022     Bilateral carotid artery stenosis 2019     Essential hypertension 2018     CAD (coronary artery disease) 2018     Hyperlipidemia 2018     Absence of bladder continence 2013     Osteoarthritis of knee 2012    Hypothyroidism 2012      LOS (days): 1  Geometric Mean LOS (GMLOS) (days):   Days to GMLOS:     OBJECTIVE:  Risk of Unplanned Readmission Score: 9.95         Current admission status: Inpatient   Preferred Pharmacy:   [facility] - [facility] - [address]  [address]  [address]  [place] [address]  Phone: [phone] Fax: [phone]    Primary Care Provider: Olivier Atkins MD    Primary Insurance: [employer]  REP  Secondary Insurance: [facility]    DISCHARGE DETAILS:    Discharge planning discussed with:: Patient, spouse and daughter Yessica  Freedom of Choice: Yes  Comments -  Freedom of Choice: Provided with choice list for HHC and choice is [facility] reserved via aidin  CM contacted family/caregiver?: Yes  Were Treatment Team discharge recommendations reviewed with patient/caregiver?: Yes  Did patient/caregiver verbalize understanding of patient care needs?: Yes  Were patient/caregiver advised of the risks associated with not following Treatment Team discharge recommendations?: Yes    Contacts  Patient Contacts: Yessica Reyes (Daughter)  [phone] (Home Phone)  Relationship to Patient:: Family  Phone Number: Yessica Reyes  Daughter  [phone]  Reason/Outcome: Emergency Contact, Discharge Planning    Requested Home Health Care         Is the patient interested in HHC at discharge?: Yes  Home Health Discipline requested:: Occupational Therapy, Physical Therapy  Home Health Agency Name::  [facility]  Home Health Follow-Up Provider:: JENNIFER  Home Health Services Needed:: Evaluate Functional Status and Safety, Gait/ADL Training, Strengthening/Theraputic Exercises to Improve Function  Homebound Criteria Met:: Uses an Assist Device (i.e. cane, walker, etc)  Supporting Clincal Findings:: Fatigues Easliy in Short Distances, Limited Endurance    DME Referral Provided  Referral made for DME?: Yes  DME referral completed for the following items:: Bedside Commode, Walker  DME Supplier Name:: [facility]    Other Referral/Resources/Interventions Provided:  Interventions: HHC, DME      Treatment Team Recommendation: Home with Home Health Care  Discharge Destination Plan:: Home with Home Health Care       Additional Comments: Met with patient, spouse and talked to cameron Ruelas on phone at bedside.  Provided BSC and walker from consignemnt.  [facility] reserved via aidin.  CM departemnt followingthru discharge

## 2024-06-12 NOTE — PROGRESS NOTES
Patient:    MRN:  [id number]    Aidin Request ID:  [id number]    Level of care reserved:  Home Health Agency    Partner Reserved:  [facility], [place] [address] [phone]    Clinical needs requested:  physical therapy, occupational therapy    Geography searched:  [id number]    Start of Service:    Request sent:  3:12pm EDT on 6/11/2024 by Marley Johnson    Partner reserved:  9:19am EDT on 6/12/2024 by Marley Johnson    Choice list shared:  8:49am EDT on 6/12/2024 by Marley Johnson

## 2024-06-12 NOTE — PHYSICAL THERAPY NOTE
Physical Therapy Treatment Note    Patient's Name: Ariadna Jernigan  : 1945     06/12/24 0846   PT Last Visit   PT Visit Date 24   Note Type   Note Type Treatment   Pain Assessment   Pain Assessment Tool 0-10   Pain Score 5   Pain Location/Orientation Orientation: Right;Location: Knee   Hospital Pain Intervention(s) Cold applied;Elevated   Restrictions/Precautions   Weight Bearing Precautions Per Order Yes   RLE Weight Bearing Per Order WBAT   Other Precautions Fall Risk;Pain  (Luxembourgish speaking)   General   Chart Reviewed Yes   Family/Caregiver Present Yes  (spouse)   Subjective   Subjective Agreeable to mobilize.   Bed Mobility   Additional Comments Pt greeted in chair.   Transfers   Sit to Stand 5  Supervision   Additional items Armrests   Stand to Sit 5  Supervision   Additional items Armrests;Verbal cues;Increased time required   Additional Comments RW   Ambulation/Elevation   Gait pattern Excessively slow;Short stride;Antalgic   Gait Assistance 5  Supervision   Additional items Verbal cues;Tactile cues   Assistive Device Rolling walker   Distance 70'x2   Stair Management Assistance 5  Supervision   Additional items Verbal cues   Stair Management Technique Two rails;Nonreciprocal   Ambulation/Elevation Additional Comments Pt reports more comfort w/ descending w/ nonpainful extremity. Pt steady w/ this method so educated pt this method is ok.   Balance   Static Sitting Good   Dynamic Sitting Fair +   Static Standing Fair   Dynamic Standing Fair -   Ambulatory Fair -  (RW)   Endurance Deficit   Endurance Deficit Yes   Endurance Deficit Description pain, fatigue   Activity Tolerance   Activity Tolerance Patient tolerated treatment well;Patient limited by pain   Medical Staff Made Aware AMARI Espinalh   Nurse Made Aware yes - updated   Exercises   Knee AROM Long Arc Quad Sitting;15 reps;AROM;Right   Assessment   Prognosis Excellent   Problem List Decreased strength;Decreased endurance;Impaired  balance;Decreased range of motion;Decreased mobility;Pain   Assessment Pt seen for PT treatment session w/ interventions consisting of t/f training, gait training, stair training, + LE ther ex instruction. Pt reports continued pain in R knee but also acknowledges improvement from yesterday. Pt demonstrated functional progress as well w/ decreased assistance required for t/f and ambulation, increased gait distance, + ability to initiate stair training. Continue to recommend HHPT upon d/c to address problem list stated above, as well as a RW + BSC.   Goals   Patient Goals less pain   PT Treatment Day 1   Plan   Treatment/Interventions Functional transfer training;LE strengthening/ROM;Elevations;Therapeutic exercise;Endurance training;Patient/family training;Equipment eval/education;Bed mobility;Gait training;Compensatory technique education;Spoke to nursing;Spoke to case management;Family   Progress Progressing toward goals   PT Frequency 3-5x/wk   Discharge Recommendation   Rehab Resource Intensity Level, PT III (Minimum Resource Intensity)   Equipment Recommended Walker  (+ BSC)   Walker Package Recommended Wheeled walker   Change/add to Walker Package? No   AM-PAC Basic Mobility Inpatient   Turning in Flat Bed Without Bedrails 3   Lying on Back to Sitting on Edge of Flat Bed Without Bedrails 3   Moving Bed to Chair 3   Standing Up From Chair Using Arms 3   Walk in Room 3   Climb 3-5 Stairs With Railing 3   Basic Mobility Inpatient Raw Score 18   Basic Mobility Standardized Score 41.05   [facility] Highest Level Of Mobility   -HLM Goal 6: Walk 10 steps or more   JH-HLM Achieved 7: Walk 25 feet or more   End of Consult   Patient Position at End of Consult Bedside chair;All needs within reach  (BLE elevated, CP to R knee, on waffle cushion)       Serenity Nixon, PT, DPT

## 2024-06-12 NOTE — PLAN OF CARE
Problem: Potential for Falls  Goal: Patient will remain free of falls  Description: INTERVENTIONS:  - Educate patient/family on patient safety including physical limitations  - Instruct patient to call for assistance with activity   - Consult OT/PT to assist with strengthening/mobility   - Keep Call bell within reach  - Keep bed low and locked with side rails adjusted as appropriate  - Keep care items and personal belongings within reach  - Initiate and maintain comfort rounds  - Make Fall Risk Sign visible to staff  - Offer Toileting every  Hours, in advance of need  - Initiate/Maintain alarm  - Obtain necessary fall risk management equipment:  - Apply yellow socks and bracelet for high fall risk patients  - Consider moving patient to room near nurses station  Outcome: Progressing     Problem: PAIN - ADULT  Goal: Verbalizes/displays adequate comfort level or baseline comfort level  Description: Interventions:  - Encourage patient to monitor pain and request assistance  - Assess pain using appropriate pain scale  - Administer analgesics based on type and severity of pain and evaluate response  - Implement non-pharmacological measures as appropriate and evaluate response  - Consider cultural and social influences on pain and pain management  - Notify physician/advanced practitioner if interventions unsuccessful or patient reports new pain  Outcome: Progressing     Problem: SAFETY ADULT  Goal: Maintain or return to baseline ADL function  Description: INTERVENTIONS:  -  Assess patient's ability to carry out ADLs; assess patient's baseline for ADL function and identify physical deficits which impact ability to perform ADLs (bathing, care of mouth/teeth, toileting, grooming, dressing, etc.)  - Assess/evaluate cause of self-care deficits   - Assess range of motion  - Assess patient's mobility; develop plan if impaired  - Assess patient's need for assistive devices and provide as appropriate  - Encourage maximum  independence but intervene and supervise when necessary  - Involve family in performance of ADLs  - Assess for home care needs following discharge   - Consider OT consult to assist with ADL evaluation and planning for discharge  - Provide patient education as appropriate  Outcome: Progressing     Problem: MUSCULOSKELETAL - ADULT  Goal: Maintain or return mobility to safest level of function  Description: INTERVENTIONS:  - Assess patient's ability to carry out ADLs; assess patient's baseline for ADL function and identify physical deficits which impact ability to perform ADLs (bathing, care of mouth/teeth, toileting, grooming, dressing, etc.)  - Assess/evaluate cause of self-care deficits   - Assess range of motion  - Assess patient's mobility  - Assess patient's need for assistive devices and provide as appropriate  - Encourage maximum independence but intervene and supervise when necessary  - Involve family in performance of ADLs  - Assess for home care needs following discharge   - Consider OT consult to assist with ADL evaluation and planning for discharge  - Provide patient education as appropriate  Outcome: Progressing

## 2024-06-13 ENCOUNTER — HOME CARE VISIT (OUTPATIENT)
Dept: HOME HEALTH SERVICES | Facility: HOME HEALTHCARE | Age: 79
End: 2024-06-13
Payer: MEDICARE

## 2024-06-13 ENCOUNTER — TRANSITIONAL CARE MANAGEMENT (OUTPATIENT)
Dept: FAMILY MEDICINE CLINIC | Facility: CLINIC | Age: 79
End: 2024-06-13

## 2024-06-13 ENCOUNTER — TELEPHONE (OUTPATIENT)
Age: 79
End: 2024-06-13

## 2024-06-13 VITALS — SYSTOLIC BLOOD PRESSURE: 130 MMHG | OXYGEN SATURATION: 98 % | DIASTOLIC BLOOD PRESSURE: 70 MMHG | HEART RATE: 70 BPM

## 2024-06-13 PROCEDURE — G0151 HHCP-SERV OF PT,EA 15 MIN: HCPCS

## 2024-06-13 PROCEDURE — 400013 VN SOC

## 2024-06-13 NOTE — UTILIZATION REVIEW
NOTIFICATION OF ADMISSION DISCHARGE   This is a Notification of Discharge from [facility]. Please be advised that this patient has been discharge from our facility. Below you will find the admission and discharge date and time including the patient’s disposition.   UTILIZATION REVIEW CONTACT:  Nora Krishna MA  Utilization   Network Utilization Review Department  Phone: [phone] x carefully listen to the prompts. All voicemails are confidential.  Email: [email or web address]     ADMISSION INFORMATION  PRESENTATION DATE: 6/10/2024  6:51 PM  OBERVATION ADMISSION DATE:   INPATIENT ADMISSION DATE: 6/11/24  3:53 PM   DISCHARGE DATE: 6/12/2024 11:16 AM   DISPOSITION:Home with Home Health Care    Network Utilization Review Department  ATTENTION: Please call with any questions or concerns to [phone] and carefully listen to the prompts so that you are directed to the right person. All voicemails are confidential.   For Discharge needs, contact Care Management DC Support Team at [phone] opt. 2  Send all requests for admission clinical reviews, approved or denied determinations and any other requests to dedicated fax number below belonging to the campus where the patient is receiving treatment. List of dedicated fax numbers for the Facilities:  FACILITY NAME UR FAX NUMBER   ADMISSION DENIALS (Administrative/Medical Necessity) [phone]   DISCHARGE SUPPORT TEAM ([facility]) [phone]   PARENT CHILD HEALTH (Maternity/NICU/Pediatrics) [phone]   [facility] [phone]   [facility] [phone]   [facility] [phone]   [facility] [phone]   [facility] [phone]   [facility] [phone]   [facility] [phone]   [facility] [phone]   [facility] [phone]   [facility] [phone]   [facility] [phone]   [facility] [phone]

## 2024-06-13 NOTE — TELEPHONE ENCOUNTER
Pt's spouse called to schedule pt for a TCM appointment.  Warm transferred to Savita arredondo/clerical.

## 2024-06-14 ENCOUNTER — HOME CARE VISIT (OUTPATIENT)
Dept: HOME HEALTH SERVICES | Facility: HOME HEALTHCARE | Age: 79
End: 2024-06-14
Payer: MEDICARE

## 2024-06-14 PROCEDURE — G0152 HHCP-SERV OF OT,EA 15 MIN: HCPCS

## 2024-06-15 VITALS — HEART RATE: 61 BPM | OXYGEN SATURATION: 97 % | SYSTOLIC BLOOD PRESSURE: 138 MMHG | DIASTOLIC BLOOD PRESSURE: 68 MMHG

## 2024-06-17 ENCOUNTER — HOME CARE VISIT (OUTPATIENT)
Dept: HOME HEALTH SERVICES | Facility: HOME HEALTHCARE | Age: 79
End: 2024-06-17
Payer: MEDICARE

## 2024-06-17 VITALS — DIASTOLIC BLOOD PRESSURE: 68 MMHG | SYSTOLIC BLOOD PRESSURE: 138 MMHG | OXYGEN SATURATION: 98 % | HEART RATE: 67 BPM

## 2024-06-17 PROCEDURE — G0151 HHCP-SERV OF PT,EA 15 MIN: HCPCS

## 2024-06-18 ENCOUNTER — HOME CARE VISIT (OUTPATIENT)
Dept: HOME HEALTH SERVICES | Facility: HOME HEALTHCARE | Age: 79
End: 2024-06-18
Payer: MEDICARE

## 2024-06-18 ENCOUNTER — OFFICE VISIT (OUTPATIENT)
Dept: FAMILY MEDICINE CLINIC | Facility: CLINIC | Age: 79
End: 2024-06-18
Payer: MEDICARE

## 2024-06-18 VITALS
SYSTOLIC BLOOD PRESSURE: 128 MMHG | OXYGEN SATURATION: 98 % | DIASTOLIC BLOOD PRESSURE: 74 MMHG | WEIGHT: 184.2 LBS | HEIGHT: 61 IN | HEART RATE: 62 BPM | TEMPERATURE: 97.4 F | BODY MASS INDEX: 34.78 KG/M2

## 2024-06-18 VITALS — DIASTOLIC BLOOD PRESSURE: 74 MMHG | HEART RATE: 60 BPM | SYSTOLIC BLOOD PRESSURE: 128 MMHG

## 2024-06-18 DIAGNOSIS — M17.11 PRIMARY OSTEOARTHRITIS OF RIGHT KNEE: Primary | ICD-10-CM

## 2024-06-18 DIAGNOSIS — N18.31 STAGE 3A CHRONIC KIDNEY DISEASE (HCC): Chronic | ICD-10-CM

## 2024-06-18 DIAGNOSIS — M48.04 SPINAL STENOSIS OF THORACIC REGION: ICD-10-CM

## 2024-06-18 DIAGNOSIS — E78.5 HYPERLIPIDEMIA, UNSPECIFIED HYPERLIPIDEMIA TYPE: ICD-10-CM

## 2024-06-18 DIAGNOSIS — R26.2 AMBULATORY DYSFUNCTION: ICD-10-CM

## 2024-06-18 PROCEDURE — 99496 TRANSJ CARE MGMT HIGH F2F 7D: CPT | Performed by: FAMILY MEDICINE

## 2024-06-18 PROCEDURE — G0152 HHCP-SERV OF OT,EA 15 MIN: HCPCS

## 2024-06-18 RX ORDER — PREGABALIN 150 MG/1
150 CAPSULE ORAL DAILY
Qty: 60 CAPSULE | Refills: 0 | Status: SHIPPED | OUTPATIENT
Start: 2024-06-18

## 2024-06-18 RX ORDER — EZETIMIBE 10 MG/1
10 TABLET ORAL DAILY
Qty: 90 TABLET | Refills: 0 | Status: SHIPPED | OUTPATIENT
Start: 2024-06-18

## 2024-06-18 NOTE — PROGRESS NOTES
Transition of Care Visit  Name: Ariadna Jernigan      : 1945      MRN: [id number]  Encounter Provider: Olivier Atkins MD  Encounter Date: 2024   Encounter department: [facility]    Assessment & Plan   1. Primary osteoarthritis of right knee  Assessment & Plan:  New diagnosis osteoarthritis of the right knee status post recent hospitalization evaluated by Ortho she had a steroid injection recommend the patient important lose weight Tylenol for the pain patient will follow-up with Ortho as outpatient recommend to continue PT  Orders:  -     Ambulatory Referral to Orthopedic Surgery; Future  2. Ambulatory dysfunction  Assessment & Plan:  Secondary to knee pain patient doing PT OT and continue to wait as outpatient  3. Spinal stenosis of thoracic region  Assessment & Plan:  When I reviewed the patient's chart she had thoracic radiculopathy with a spinal stenosis patient supposed to be on Lyrica 150 mg she has not been taking it will refill the medication proper use and possible side effect review  Orders:  -     pregabalin (LYRICA) 150 mg capsule; Take 1 capsule (150 mg total) by mouth daily  4. Stage 3 chronic kidney disease (HCC)  Assessment & Plan:  Lab Results   Component Value Date    EGFR 57 2024    EGFR 55 2024    EGFR 45 06/10/2024    CREATININE 0.95 2024    CREATININE 0.98 2024    CREATININE 1.15 06/10/2024   Chronic improving the GFR compared with before discussed the patient to avoid NSAID keep well hydration  5. Hyperlipidemia, unspecified hyperlipidemia type  -     ezetimibe (ZETIA) 10 mg tablet; Take 1 tablet (10 mg total) by mouth daily         History of Present Illness     Transitional Care Management Review:   Ariadna Jernigan is a 79 y.o. female here for TCM follow up.     During the TCM phone call patient stated:  TCM Call       Date and time call was made  2024  8:38 AM    Hospital care reviewed  Records reviewed    Patient  [facility] hospitial[facility] at  [facility]    Date of Admission  06/10/24    Date of discharge  06/12/24    Diagnosis  Rihjt Knee Pain    Disposition  Home    Were the patients medications reviewed and updated  No    Current Symptoms  None          TCM Call       Post hospital issues  None    Should patient be enrolled in anticoag monitoring?  No    Scheduled for follow up?  Yes    Did you obtain your prescribed medications  Yes    Do you need help managing your prescriptions or medications  No    Is transportation to your appointment needed  No    I have advised the patient to call PCP with any new or worsening symptoms  Katheryn IVEY    Living Arrangements  Spouse or Significiant other    Support System  Spouse    The type of support provided  Emotional; Physical    Do you have social support  Yes, as much as I need    Are you recieving any outpatient services  No    Are you recieving home care services  Yes    Types of home care services  Home PT    Are you using any community resources  No    Current waiver services  No    Have you fallen in the last 12 months  No    Interperter language line needed  No    Counseling  Caregiver          Patient here status post hospitalization she went to the hospital with the knee pain in [place] 10 patient could not tolerate the pain and feel the medication has been taking is not helping her during hospitalization evaluated by orthopedic she had injection improvement in her symptoms and she had PT OT was disabled discharged home in June 12 with recommendation to follow-up with Ortho as outpatient Hospital record test result reviewed with the patient      Review of Systems   Constitutional:  Negative for chills and fever.   HENT:  Negative for ear pain and sore throat.    Eyes:  Negative for pain and visual disturbance.   Respiratory:  Negative for cough and shortness of breath.    Cardiovascular:  Negative for chest pain and palpitations.   Gastrointestinal:  Negative for  "abdominal pain, constipation, diarrhea and vomiting.   Genitourinary:  Negative for dysuria and hematuria.   Musculoskeletal:  Positive for arthralgias and back pain.   Skin:  Negative for color change and rash.   Neurological:  Negative for seizures and syncope.   All other systems reviewed and are negative.    Objective     /74 (BP Location: Left arm, Patient Position: Sitting, Cuff Size: Standard)   Pulse 62   Temp (!) 97.4 °F (36.3 °C) (Tympanic)   Ht 5' 1\" (1.549 m)   Wt 83.6 kg (184 lb 3.2 oz)   SpO2 98%   BMI 34.80 kg/m²     Physical Exam  Vitals and nursing note reviewed.   Constitutional:       General: She is not in acute distress.     Appearance: She is well-developed. She is not diaphoretic.   HENT:      Head: Normocephalic.      Right Ear: Tympanic membrane and external ear normal.      Left Ear: Tympanic membrane and external ear normal.      Nose: No rhinorrhea.      Mouth/Throat:      Pharynx: No posterior oropharyngeal erythema.   Eyes:      General:         Right eye: No discharge.         Left eye: No discharge.      Conjunctiva/sclera: Conjunctivae normal.   Neck:      Vascular: No JVD.   Cardiovascular:      Rate and Rhythm: Normal rate and regular rhythm.      Heart sounds: Normal heart sounds.      No gallop.   Pulmonary:      Effort: Pulmonary effort is normal. No respiratory distress.      Breath sounds: Normal breath sounds. No stridor. No wheezing or rales.   Chest:      Chest wall: No tenderness.   Abdominal:      General: There is no distension.      Palpations: Abdomen is soft. There is no mass.      Tenderness: There is no abdominal tenderness. There is no rebound.   Musculoskeletal:         General: Tenderness present.      Cervical back: Normal range of motion and neck supple.   Lymphadenopathy:      Cervical: No cervical adenopathy.   Skin:     General: Skin is warm.   Neurological:      Mental Status: She is alert and oriented to person, place, and time. "       Medications have been reviewed by provider in current encounter    Administrative Statements

## 2024-06-19 ENCOUNTER — TELEPHONE (OUTPATIENT)
Age: 79
End: 2024-06-19

## 2024-06-19 PROCEDURE — G0180 MD CERTIFICATION HHA PATIENT: HCPCS | Performed by: INTERNAL MEDICINE

## 2024-06-19 NOTE — TELEPHONE ENCOUNTER
Reason for call:   [] Prior Auth  [] Other:     Caller:  [] Patient  [x] Pharmacy  Name:   Address:   Callback Number:     Medication: pregabalin (LYRICA) 150 mg capsule     Dose/Frequency: 150 mg    Quantity: 60 capsule    Ordering Provider:   [x] PCP/Provider -   [] Speciality/Provider -     Has the patient tried other medications and failed? If failed, which medications did they fail?    [] No   [] Yes -     Is the patient's insurance updated in EPIC?   [x] Yes   [] No     Is a copy of the patient's insurance scanned in EPIC?   [x] Yes   [] No

## 2024-06-20 LAB
DME PARACHUTE DELIVERY DATE ACTUAL: NORMAL
DME PARACHUTE DELIVERY DATE REQUESTED: NORMAL
DME PARACHUTE ITEM DESCRIPTION: NORMAL
DME PARACHUTE ITEM DESCRIPTION: NORMAL
DME PARACHUTE ORDER STATUS: NORMAL
DME PARACHUTE SUPPLIER NAME: NORMAL
DME PARACHUTE SUPPLIER PHONE: NORMAL

## 2024-06-21 ENCOUNTER — HOME CARE VISIT (OUTPATIENT)
Dept: HOME HEALTH SERVICES | Facility: HOME HEALTHCARE | Age: 79
End: 2024-06-21
Payer: MEDICARE

## 2024-06-21 VITALS — HEART RATE: 63 BPM | DIASTOLIC BLOOD PRESSURE: 60 MMHG | OXYGEN SATURATION: 98 % | SYSTOLIC BLOOD PRESSURE: 118 MMHG

## 2024-06-21 PROBLEM — M48.04 SPINAL STENOSIS OF THORACIC REGION: Status: ACTIVE | Noted: 2024-06-21

## 2024-06-21 PROCEDURE — G0151 HHCP-SERV OF PT,EA 15 MIN: HCPCS

## 2024-06-21 NOTE — ASSESSMENT & PLAN NOTE
When I reviewed the patient's chart she had thoracic radiculopathy with a spinal stenosis patient supposed to be on Lyrica 150 mg she has not been taking it will refill the medication proper use and possible side effect review

## 2024-06-21 NOTE — ASSESSMENT & PLAN NOTE
New diagnosis osteoarthritis of the right knee status post recent hospitalization evaluated by Ortho she had a steroid injection recommend the patient important lose weight Tylenol for the pain patient will follow-up with Ortho as outpatient recommend to continue PT

## 2024-06-21 NOTE — ASSESSMENT & PLAN NOTE
Lab Results   Component Value Date    EGFR 57 06/12/2024    EGFR 55 06/11/2024    EGFR 45 06/10/2024    CREATININE 0.95 06/12/2024    CREATININE 0.98 06/11/2024    CREATININE 1.15 06/10/2024   Chronic improving the GFR compared with before discussed the patient to avoid NSAID keep well hydration

## 2024-06-24 ENCOUNTER — HOME CARE VISIT (OUTPATIENT)
Dept: HOME HEALTH SERVICES | Facility: HOME HEALTHCARE | Age: 79
End: 2024-06-24
Payer: MEDICARE

## 2024-06-24 VITALS — DIASTOLIC BLOOD PRESSURE: 66 MMHG | OXYGEN SATURATION: 98 % | HEART RATE: 64 BPM | SYSTOLIC BLOOD PRESSURE: 124 MMHG

## 2024-06-24 PROCEDURE — G0151 HHCP-SERV OF PT,EA 15 MIN: HCPCS

## 2024-06-25 ENCOUNTER — HOME CARE VISIT (OUTPATIENT)
Dept: HOME HEALTH SERVICES | Facility: HOME HEALTHCARE | Age: 79
End: 2024-06-25
Payer: MEDICARE

## 2024-06-25 NOTE — CASE COMMUNICATION
Progress  plateauing.  Patient  reports  Right  knee  pain  has  decreased  however  does  persist.  Patient  is  independent  with  HEP  and  remains  safe  and  independent  with  transfers  and  ambulation  on  indoor  surfaces  and  stairs.  Patient  with  functional  balance  and  endurance.  Patient  with  follow up  with  orthopedist  on  7/11.    Plan  to  see  patient  1  additional  time  on  6/28  with  DC  from  home  PT  at   that  time.  NOMNC  not  signed

## 2024-06-26 ENCOUNTER — HOSPITAL ENCOUNTER (OUTPATIENT)
Dept: NON INVASIVE DIAGNOSTICS | Facility: CLINIC | Age: 79
Discharge: HOME | End: 2024-06-26
Payer: MEDICARE

## 2024-06-26 DIAGNOSIS — I65.22 STENOSIS OF LEFT CAROTID ARTERY: ICD-10-CM

## 2024-06-26 PROCEDURE — 93880 EXTRACRANIAL BILAT STUDY: CPT | Performed by: SURGERY

## 2024-06-26 PROCEDURE — 93880 EXTRACRANIAL BILAT STUDY: CPT

## 2024-06-27 ENCOUNTER — TRANSCRIBE ORDERS (OUTPATIENT)
Dept: ADMINISTRATIVE | Facility: HOSPITAL | Age: 79
End: 2024-06-27

## 2024-06-27 ENCOUNTER — HOME CARE VISIT (OUTPATIENT)
Dept: HOME HEALTH SERVICES | Facility: HOME HEALTHCARE | Age: 79
End: 2024-06-27
Payer: MEDICARE

## 2024-06-27 DIAGNOSIS — I65.23 BILATERAL CAROTID ARTERY STENOSIS: Primary | ICD-10-CM

## 2024-06-28 ENCOUNTER — HOME CARE VISIT (OUTPATIENT)
Dept: HOME HEALTH SERVICES | Facility: HOME HEALTHCARE | Age: 79
End: 2024-06-28
Payer: MEDICARE

## 2024-06-28 VITALS — HEART RATE: 63 BPM | DIASTOLIC BLOOD PRESSURE: 62 MMHG | SYSTOLIC BLOOD PRESSURE: 118 MMHG | OXYGEN SATURATION: 98 %

## 2024-06-28 PROCEDURE — G0151 HHCP-SERV OF PT,EA 15 MIN: HCPCS

## 2024-06-29 NOTE — CASE COMMUNICATION
Home  PT  goals  achieved  and  patient  discharged  from  home  PT  effective  6/28.  AILYN  signed  and  dated  for  agency  DC  on  7/1/24

## 2024-06-30 NOTE — TELEPHONE ENCOUNTER
PA for (LYRICA) 150 mg     Submitted via    [x]CMM-KEY [id number]  []SurescGrayBug-Case ID #   []Faxed to plan   []Other website   []Phone call Case ID #     Office notes sent, clinical questions answered. Awaiting determination    Turnaround time for your insurance to make a decision on your Prior Authorization can take 7-21 business days.

## 2024-07-01 ENCOUNTER — HOME CARE VISIT (OUTPATIENT)
Dept: HOME HEALTH SERVICES | Facility: HOME HEALTHCARE | Age: 79
End: 2024-07-01
Payer: MEDICARE

## 2024-07-01 VITALS — DIASTOLIC BLOOD PRESSURE: 62 MMHG | HEART RATE: 54 BPM | OXYGEN SATURATION: 97 % | SYSTOLIC BLOOD PRESSURE: 118 MMHG

## 2024-07-01 PROCEDURE — G0152 HHCP-SERV OF OT,EA 15 MIN: HCPCS

## 2024-07-06 DIAGNOSIS — E55.9 VITAMIN D DEFICIENCY: ICD-10-CM

## 2024-07-06 DIAGNOSIS — I10 ESSENTIAL HYPERTENSION: ICD-10-CM

## 2024-07-06 DIAGNOSIS — I25.118 CORONARY ARTERY DISEASE OF NATIVE ARTERY OF NATIVE HEART WITH STABLE ANGINA PECTORIS (HCC): ICD-10-CM

## 2024-07-07 RX ORDER — ACETAMINOPHEN 160 MG
2000 TABLET,DISINTEGRATING ORAL DAILY
Qty: 30 CAPSULE | Refills: 5 | Status: SHIPPED | OUTPATIENT
Start: 2024-07-07

## 2024-07-07 RX ORDER — DILTIAZEM HYDROCHLORIDE 240 MG/1
240 CAPSULE, COATED, EXTENDED RELEASE ORAL DAILY
Qty: 30 CAPSULE | Refills: 5 | Status: SHIPPED | OUTPATIENT
Start: 2024-07-07

## 2024-07-07 RX ORDER — CLOPIDOGREL BISULFATE 75 MG/1
75 TABLET ORAL DAILY
Qty: 30 TABLET | Refills: 5 | Status: SHIPPED | OUTPATIENT
Start: 2024-07-07

## 2024-07-11 ENCOUNTER — OFFICE VISIT (OUTPATIENT)
Dept: OBGYN CLINIC | Facility: MEDICAL CENTER | Age: 79
End: 2024-07-11
Payer: MEDICARE

## 2024-07-11 ENCOUNTER — APPOINTMENT (OUTPATIENT)
Dept: RADIOLOGY | Facility: MEDICAL CENTER | Age: 79
End: 2024-07-11
Payer: MEDICARE

## 2024-07-11 VITALS
DIASTOLIC BLOOD PRESSURE: 77 MMHG | HEIGHT: 61 IN | HEART RATE: 55 BPM | SYSTOLIC BLOOD PRESSURE: 136 MMHG | BODY MASS INDEX: 34.8 KG/M2

## 2024-07-11 DIAGNOSIS — M54.16 LUMBAR RADICULOPATHY: ICD-10-CM

## 2024-07-11 DIAGNOSIS — M17.11 PRIMARY OSTEOARTHRITIS OF RIGHT KNEE: ICD-10-CM

## 2024-07-11 DIAGNOSIS — M25.561 RIGHT KNEE PAIN, UNSPECIFIED CHRONICITY: Primary | ICD-10-CM

## 2024-07-11 DIAGNOSIS — M25.561 RIGHT KNEE PAIN, UNSPECIFIED CHRONICITY: ICD-10-CM

## 2024-07-11 DIAGNOSIS — Z01.89 ENCOUNTER FOR LOWER EXTREMITY COMPARISON IMAGING STUDY: ICD-10-CM

## 2024-07-11 PROCEDURE — 73560 X-RAY EXAM OF KNEE 1 OR 2: CPT

## 2024-07-11 PROCEDURE — 99214 OFFICE O/P EST MOD 30 MIN: CPT | Performed by: ORTHOPAEDIC SURGERY

## 2024-07-11 PROCEDURE — 73564 X-RAY EXAM KNEE 4 OR MORE: CPT

## 2024-07-11 NOTE — PROGRESS NOTES
Assessment & Plan     1. Right knee pain, unspecified chronicity    2. Primary osteoarthritis of right knee    3. Encounter for lower extremity comparison imaging study    4. Lumbar radiculopathy      Orders Placed This Encounter   Procedures    XR knee 4+ vw right injury    XR knee 1 or 2 vw left    Ambulatory Referral to Orthopedic Surgery    Injection Procedure Prior Authorization       Patient has severe right knee osteoarthritis.   Non operative and operative treatment options were reviewed with patient today.   Patient is a surgical candidate at this time.   Injections: Patient underwent a right knee CSI 6/10/2024 while at the hospital. She notes mild relief. Visco supplementation initiated, patient may undergo injection after 8/10/2024.  Medications: Tylenol up to 3000 mg per day  PT: Continue home exercises.   Bracing: Continue OTC knee brace.   Activity: Continue activity as tolerated.   Patient was also provided a referral to Dr Ramirez for a lumbar evaluation as she is symptomatic of lumbar radiculopathy on exam. Patient notes previous history of lumbar surgery, was instructed to obtain medical records before scheduling appointment.   Plan for next appt: Follow up in 4 weeks once Visco approved.       Return in about 4 weeks (around 8/8/2024).    I answered all of the patient's questions during the visit and provided education of the patient's condition during the visit.  The patient verbalized understanding of the information given and agrees with the plan.  This note was dictated using [employer] software.  It may contain errors including improperly dictated words.  Please contact physician directly for any questions.    History of Present Illness   Chief complaint:   Chief Complaint   Patient presents with    Right Knee - Pain       HPI: Ariadna Jernigan is a 79 y.o. female that c/o right knee pain. Pain has been going on for a few years but progressed in June in severity with out injury or trauma.  Patient was seen in ED 6/10/24 due to severity of pain. Pain is a constant ache in the medial joint line, aggravated with activity and alleviated with rest. Patient underwent a CSI 6/10/24 with some benefit, she has tried home physical therapy and they provided her with HEP. She takes tylenol daily for pain.      Mechanism of Injury: None  Pain Description: medial joint line aching   Palliating Factors: rest  Provoking Factors: weight bearing  Associated Symptoms: stiffness  Medications: tylenol   Able to take NSAIDs? If not, why: yes  Physical Therapy or Home Exercises: yes  Injections: yes  Bracing: No  Previous Surgery: None  Miscellaneous:      ROS:    See HPI for musculoskeletal review.   All other systems reviewed are negative     Historical Information   Past Medical History:   Diagnosis Date    Arthritis     rheumatoid    Coronary artery disease     Disease of thyroid gland     Hyperlipidemia     Hypertension      Past Surgical History:   Procedure Laterality Date    BACK SURGERY      BREAST SURGERY      CARPAL TUNNEL RELEASE      CHOLECYSTECTOMY      HYSTERECTOMY      OOPHORECTOMY      ORTHOPEDIC SURGERY       Social History   Social History     Substance and Sexual Activity   Alcohol Use Not Currently     Social History     Substance and Sexual Activity   Drug Use Never     Social History     Tobacco Use   Smoking Status Former    Current packs/day: 0.00    Average packs/day: 0.1 packs/day for 52.0 years (5.2 ttl pk-yrs)    Types: Cigarettes    Start date:     Quit date:     Years since quittin.5    Passive exposure: Never   Smokeless Tobacco Never     Family History:   Family History   Problem Relation Age of Onset    No Known Problems Mother     Lung cancer Father     Ovarian cancer Sister     No Known Problems Sister     No Known Problems Sister     No Known Problems Maternal Grandmother     No Known Problems Maternal Grandfather     No Known Problems Paternal Grandmother     No Known  Problems Paternal Grandfather     No Known Problems Maternal Aunt     No Known Problems Paternal Aunt     No Known Problems Paternal Aunt     Breast cancer Cousin 45       Current Outpatient Medications on File Prior to Visit   Medication Sig Dispense Refill    atorvastatin (LIPITOR) 80 mg tablet Take 1 tablet (80 mg total) by mouth daily 90 tablet 3    Cholecalciferol (Vitamin D3) 50 MCG (2000 UT) capsule TAKE ONE CAPSULE BY MOUTH ONCE DAILY 30 capsule 5    clopidogrel (PLAVIX) 75 mg tablet TAKE ONE TABLET BY MOUTH ONCE DAILY 30 tablet 5    colchicine (COLCRYS) 0.6 mg tablet Take 1 tablet (0.6 mg total) by mouth daily 90 tablet 0    diltiazem (CARDIZEM CD) 240 mg 24 hr capsule TAKE ONE CAPSULE BY MOUTH ONCE DAILY 30 capsule 5    ezetimibe (ZETIA) 10 mg tablet Take 1 tablet (10 mg total) by mouth daily 90 tablet 0    folic acid (FOLVITE) 1 mg tablet TAKE ONE TABLET BY MOUTH ONCE DAILY 90 tablet 12    hydroCHLOROthiazide 25 mg tablet TAKE ONE TABLET BY MOUTH ONCE DAILY 90 tablet 1    hydroxychloroquine (PLAQUENIL) 200 mg tablet Take 1 tablet (200 mg total) by mouth daily with breakfast 90 tablet 3    levothyroxine 100 mcg tablet TAKE ONE TABLET BY MOUTH ONCE DAILY IN THE MORNING 90 tablet 2    losartan (COZAAR) 100 MG tablet TAKE ONE TABLET BY MOUTH ONCE DAILY 90 tablet 1    Menthol-Camphor  MG PTCH Apply 1 patch topically every 12 (twelve) hours. apply Right knee  Indications: .      metoprolol succinate (TOPROL-XL) 50 mg 24 hr tablet TAKE ONE TABLET BY MOUTH ONCE DAILY 90 tablet 1    nitroglycerin (NITROSTAT) 0.4 mg SL tablet Place 1 tablet under the tongue every 5 (five) minutes as needed for chest pain x 3 doses if chest pain persists call 911  not taking        predniSONE 5 mg tablet Take 1 tablet (5 mg total) by mouth daily 90 tablet 1    pantoprazole (PROTONIX) 40 mg tablet Take 1 tablet (40 mg total) by mouth daily before breakfast (Patient not taking: Reported on 6/18/2024) 90 tablet 0    pregabalin  (LYRICA) 150 mg capsule Take 1 capsule (150 mg total) by mouth daily (Patient not taking: Reported on 7/1/2024) 60 capsule 0     No current facility-administered medications on file prior to visit.     Allergies   Allergen Reactions    Codeine GI Intolerance    Shingrix [Zoster Vac Recomb Adjuvanted] Rash       Current Outpatient Medications on File Prior to Visit   Medication Sig Dispense Refill    atorvastatin (LIPITOR) 80 mg tablet Take 1 tablet (80 mg total) by mouth daily 90 tablet 3    Cholecalciferol (Vitamin D3) 50 MCG (2000 UT) capsule TAKE ONE CAPSULE BY MOUTH ONCE DAILY 30 capsule 5    clopidogrel (PLAVIX) 75 mg tablet TAKE ONE TABLET BY MOUTH ONCE DAILY 30 tablet 5    colchicine (COLCRYS) 0.6 mg tablet Take 1 tablet (0.6 mg total) by mouth daily 90 tablet 0    diltiazem (CARDIZEM CD) 240 mg 24 hr capsule TAKE ONE CAPSULE BY MOUTH ONCE DAILY 30 capsule 5    ezetimibe (ZETIA) 10 mg tablet Take 1 tablet (10 mg total) by mouth daily 90 tablet 0    folic acid (FOLVITE) 1 mg tablet TAKE ONE TABLET BY MOUTH ONCE DAILY 90 tablet 12    hydroCHLOROthiazide 25 mg tablet TAKE ONE TABLET BY MOUTH ONCE DAILY 90 tablet 1    hydroxychloroquine (PLAQUENIL) 200 mg tablet Take 1 tablet (200 mg total) by mouth daily with breakfast 90 tablet 3    levothyroxine 100 mcg tablet TAKE ONE TABLET BY MOUTH ONCE DAILY IN THE MORNING 90 tablet 2    losartan (COZAAR) 100 MG tablet TAKE ONE TABLET BY MOUTH ONCE DAILY 90 tablet 1    Menthol-Camphor  MG PTCH Apply 1 patch topically every 12 (twelve) hours. apply Right knee  Indications: .      metoprolol succinate (TOPROL-XL) 50 mg 24 hr tablet TAKE ONE TABLET BY MOUTH ONCE DAILY 90 tablet 1    nitroglycerin (NITROSTAT) 0.4 mg SL tablet Place 1 tablet under the tongue every 5 (five) minutes as needed for chest pain x 3 doses if chest pain persists call 911  not taking        predniSONE 5 mg tablet Take 1 tablet (5 mg total) by mouth daily 90 tablet 1    pantoprazole (PROTONIX) 40  "mg tablet Take 1 tablet (40 mg total) by mouth daily before breakfast (Patient not taking: Reported on 6/18/2024) 90 tablet 0    pregabalin (LYRICA) 150 mg capsule Take 1 capsule (150 mg total) by mouth daily (Patient not taking: Reported on 7/1/2024) 60 capsule 0     No current facility-administered medications on file prior to visit.       Objective   Vitals: Blood pressure 136/77, pulse 55, height 5' 1\" (1.549 m), not currently breastfeeding.,Body mass index is 34.8 kg/m².    PE:  AAOx 3  WDWN  Hearing intact, no drainage from eyes  Regular rate  no audible wheezing  no abdominal distension  LE compartments soft, skin intact    rightknee:    Appearance:  No  swelling   No  ecchymosis  No  obvious joint deformity   No  effusion   Palpation/Tenderness:  No  TTP over medial joint line  No  TTP over lateral joint line   No  TTP over patella  No  TTP over patellar tendon  No  TTP over pes anserine bursa  Active Range of Motion:  AROM: 0-110  Special Tests:  Valgus Stress Test: negative  Varus Stress Test: negative      Imaging Studies:  I have personally reviewed pertinent films in PACS  X-rayright knee: demonstrates moderate to severe degenerative changes with joint space loss, subchondral sclerosis, and osteophytosis.          Scribe Attestation      I,:  Jenna Ohara am acting as a scribe while in the presence of the attending physician.:       I,:  Nayla Heath DO personally performed the services described in this documentation    as scribed in my presence.:                   "

## 2024-07-15 DIAGNOSIS — Z00.00 ROUTINE ADULT HEALTH MAINTENANCE: ICD-10-CM

## 2024-07-15 RX ORDER — FOLIC ACID 1 MG/1
1000 TABLET ORAL DAILY
Qty: 90 TABLET | Refills: 12 | Status: SHIPPED | OUTPATIENT
Start: 2024-07-15

## 2024-07-15 NOTE — TELEPHONE ENCOUNTER
[facility] pharmacy called stating pt is requesting refill for folic acid. This was prescribed by previous provider. Please advise.

## 2024-07-26 DIAGNOSIS — I10 ESSENTIAL HYPERTENSION: ICD-10-CM

## 2024-07-28 RX ORDER — LOSARTAN POTASSIUM 100 MG/1
100 TABLET ORAL DAILY
Qty: 100 TABLET | Refills: 1 | Status: SHIPPED | OUTPATIENT
Start: 2024-07-28

## 2024-07-28 RX ORDER — METOPROLOL SUCCINATE 50 MG/1
50 TABLET, EXTENDED RELEASE ORAL DAILY
Qty: 100 TABLET | Refills: 1 | Status: SHIPPED | OUTPATIENT
Start: 2024-07-28

## 2024-07-28 RX ORDER — HYDROCHLOROTHIAZIDE 25 MG/1
25 TABLET ORAL DAILY
Qty: 100 TABLET | Refills: 1 | Status: SHIPPED | OUTPATIENT
Start: 2024-07-28

## 2024-07-30 ENCOUNTER — OFFICE VISIT (OUTPATIENT)
Dept: OBGYN CLINIC | Facility: HOSPITAL | Age: 79
End: 2024-07-30
Payer: MEDICARE

## 2024-07-30 ENCOUNTER — HOSPITAL ENCOUNTER (OUTPATIENT)
Dept: RADIOLOGY | Facility: HOSPITAL | Age: 79
Discharge: HOME | End: 2024-07-30
Attending: ORTHOPAEDIC SURGERY
Payer: MEDICARE

## 2024-07-30 VITALS
WEIGHT: 184.97 LBS | HEART RATE: 66 BPM | BODY MASS INDEX: 34.92 KG/M2 | DIASTOLIC BLOOD PRESSURE: 68 MMHG | HEIGHT: 61 IN | SYSTOLIC BLOOD PRESSURE: 119 MMHG

## 2024-07-30 DIAGNOSIS — M54.16 LUMBAR RADICULOPATHY: ICD-10-CM

## 2024-07-30 DIAGNOSIS — M48.062 SPINAL STENOSIS OF LUMBAR REGION WITH NEUROGENIC CLAUDICATION: Primary | ICD-10-CM

## 2024-07-30 PROCEDURE — 99213 OFFICE O/P EST LOW 20 MIN: CPT | Performed by: ORTHOPAEDIC SURGERY

## 2024-07-30 PROCEDURE — 72110 X-RAY EXAM L-2 SPINE 4/>VWS: CPT

## 2024-07-30 NOTE — PROGRESS NOTES
Assessment & Plan/Medical Decision Makin y.o. female with Back Pain, Right Radicular Leg Pain, and Ambulatory Dysfunction and imaging findings most notable for lumbar spondylosis .           The clinical, physical and imaging findings were reviewed with the patient.  Ariadna  has a constellation of findings consistent with Lumbar Myofascial Pain, Lumbar Radiculopathy, and Neurogenic Claudication in the setting of lumbar degenerative disease.      Fortunately patient remains neurologically intact and functional. Physical exam showing quadriceps weakness, low back pain, and right lower extremity pain.  We discussed the treatment options including physical therapy, at home exercises, activity modifications, chiropractic medicine, oral medications, interventional spine procedures.  At this time recommend continued conservative treatments.      Referral placed for physical therapy to work on core strengthening, lumbar ROM, strengthening, and stretching exercises.  Referral to pain management for evaluation and treatment. Discussed potential role of steroid injection at or near the source of pain to provide targeted relief.  MRI lumbar spine to further evaluate patient's symptoms. Will review MRI in detail with patient at follow-up visit and discuss further treatment options at that time.    Patient instructed to return to office/ER sooner if symptoms are not improving, getting worse, or new worrisome/neurologic symptoms arise.  Patient will follow up after lumbar spine MRI.     Subjective:      Chief Complaint: Back Pain    HPI:  Ariadna Jernigan is a 79 y.o. female presenting for initial visit with chief complaint of low back pain worsening over the past 5-6 years. She has history of lumbar surgery in  for low back and right lower extremity pain. She was referred to us by Dr. Heath who treats her right knee osteoarthritis. She is experiencing low back and right lower extremity pain. Her pain goes down her  right posterior thigh to the posterolateral knee and down the lateral calf to the ankle. She has associated numbness, tingling, and weakness.     She also experiences neck and bilateral upper extremity pain. She describes her pain as burning. She has numbness or tingling to the bilateral hands, left worse than right. She has difficulty with dexterity activities. She also notes issues with her balance and has had some falls.    Denies any arnulfo trauma. Denies fever or chills, no night sweats. Denies any bladder or bowel changes.      Translation provided by Adrianne #[id number]    Conservative therapy includes the following:   Medications: tylenol  Injections: yes for cervical spine with Dr. Sheikh     Physical Therapy: yes  Chiropractic Medicine: has not attempted  Accupunture/Massage Therapy: has not attempted   These therapeutic modalities were ineffective at providing sustained pain relief/functional improvement.     Nicotine dependent: former  Occupation: retired   Living situation: Lives with family   ADLs: patient is able to perform     Objective:     Family History   Problem Relation Age of Onset    No Known Problems Mother     Lung cancer Father     Ovarian cancer Sister     No Known Problems Sister     No Known Problems Sister     No Known Problems Maternal Grandmother     No Known Problems Maternal Grandfather     No Known Problems Paternal Grandmother     No Known Problems Paternal Grandfather     No Known Problems Maternal Aunt     No Known Problems Paternal Aunt     No Known Problems Paternal Aunt     Breast cancer Cousin 45       Past Medical History:   Diagnosis Date    Arthritis     rheumatoid    Coronary artery disease     Disease of thyroid gland     Hyperlipidemia     Hypertension        Current Outpatient Medications   Medication Sig Dispense Refill    atorvastatin (LIPITOR) 80 mg tablet Take 1 tablet (80 mg total) by mouth daily 90 tablet 3    Cholecalciferol (Vitamin D3) 50 MCG (2000 UT) capsule TAKE  ONE CAPSULE BY MOUTH ONCE DAILY 30 capsule 5    clopidogrel (PLAVIX) 75 mg tablet TAKE ONE TABLET BY MOUTH ONCE DAILY 30 tablet 5    colchicine (COLCRYS) 0.6 mg tablet Take 1 tablet (0.6 mg total) by mouth daily 90 tablet 0    diltiazem (CARDIZEM CD) 240 mg 24 hr capsule TAKE ONE CAPSULE BY MOUTH ONCE DAILY 30 capsule 5    ezetimibe (ZETIA) 10 mg tablet Take 1 tablet (10 mg total) by mouth daily 90 tablet 0    folic acid (FOLVITE) 1 mg tablet Take 1 tablet (1,000 mcg total) by mouth daily 90 tablet 12    hydroCHLOROthiazide 25 mg tablet TAKE ONE TABLET BY MOUTH ONCE DAILY 100 tablet 1    hydroxychloroquine (PLAQUENIL) 200 mg tablet Take 1 tablet (200 mg total) by mouth daily with breakfast 90 tablet 3    levothyroxine 100 mcg tablet TAKE ONE TABLET BY MOUTH ONCE DAILY IN THE MORNING 90 tablet 2    losartan (COZAAR) 100 MG tablet TAKE ONE TABLET BY MOUTH ONCE DAILY 100 tablet 1    Menthol-Camphor  MG PTCH Apply 1 patch topically every 12 (twelve) hours. apply Right knee  Indications: .      metoprolol succinate (TOPROL-XL) 50 mg 24 hr tablet TAKE ONE TABLET BY MOUTH ONCE DAILY 100 tablet 1    nitroglycerin (NITROSTAT) 0.4 mg SL tablet Place 1 tablet under the tongue every 5 (five) minutes as needed for chest pain x 3 doses if chest pain persists call 911  not taking        pantoprazole (PROTONIX) 40 mg tablet Take 1 tablet (40 mg total) by mouth daily before breakfast (Patient not taking: Reported on 6/18/2024) 90 tablet 0    predniSONE 5 mg tablet Take 1 tablet (5 mg total) by mouth daily 90 tablet 1    pregabalin (LYRICA) 150 mg capsule Take 1 capsule (150 mg total) by mouth daily (Patient not taking: Reported on 7/1/2024) 60 capsule 0     No current facility-administered medications for this visit.       Past Surgical History:   Procedure Laterality Date    BACK SURGERY      BREAST SURGERY      CARPAL TUNNEL RELEASE      CHOLECYSTECTOMY      HYSTERECTOMY      OOPHORECTOMY      ORTHOPEDIC SURGERY          Social History     Socioeconomic History    Marital status: /Civil Union     Spouse name: Not on file    Number of children: Not on file    Years of education: Not on file    Highest education level: Not on file   Occupational History    Not on file   Tobacco Use    Smoking status: Former     Current packs/day: 0.00     Average packs/day: 0.1 packs/day for 52.0 years (5.2 ttl pk-yrs)     Types: Cigarettes     Start date:      Quit date:      Years since quittin.5     Passive exposure: Never    Smokeless tobacco: Never   Vaping Use    Vaping status: Never Used   Substance and Sexual Activity    Alcohol use: Not Currently    Drug use: Never    Sexual activity: Not on file   Other Topics Concern    Not on file   Social History Narrative    Not on file     Social Determinants of Health     Financial Resource Strain: Not on file   Food Insecurity: No Food Insecurity (2024)    Hunger Vital Sign     Worried About Running Out of Food in the Last Year: Never true     Ran Out of Food in the Last Year: Never true   Transportation Needs: No Transportation Needs (2024)    PRAPARE - Transportation     Lack of Transportation (Medical): No     Lack of Transportation (Non-Medical): No   Physical Activity: Not on file   Stress: Not on file   Social Connections: Not on file   Intimate Partner Violence: Not on file   Housing Stability: Low Risk  (2024)    Housing Stability Vital Sign     Unable to Pay for Housing in the Last Year: No     Number of Times Moved in the Last Year: 0     Homeless in the Last Year: No       Allergies   Allergen Reactions    Codeine GI Intolerance    Shingrix [Zoster Vac Recomb Adjuvanted] Rash       Review of Systems  General- denies fever/chills  HEENT- denies hearing loss or sore throat  Eyes- denies eye pain or visual disturbances, denies red eyes  Respiratory- denies cough or SOB  Cardio- denies chest pain or palpitations  GI- denies abdominal pain  Endocrine-  "denies urinary frequency  Urinary- denies pain with urination  Musculoskeletal- Negative except noted above  Skin- denies rashes or wounds  Neurological- denies dizziness or headache  Psychiatric- denies anxiety or difficulty concentrating    Physical Exam  Ht 5' 1\" (1.549 m)   Wt 83.9 kg (184 lb 15.5 oz)   BMI 34.95 kg/m²     General/Constitutional: No apparent distress: well-nourished and well developed.  Lymphatic: No appreciable lymphadenopathy  Respiratory: Non-labored breathing  Vascular: No edema, swelling or tenderness, except as noted in detailed exam.  Integumentary: No impressive skin lesions present, except as noted in detailed exam.  Psych: Normal mood and affect, oriented to person, place and time.  MSK: normal other than stated in HPI and exam  Gait & balance: no evidence of myelopathic gait, ambulates Independently     Lumbar spine range of motion:  -Forward flexion to 90  -Extension to neutral  -Lateral bend 25 right, 25 left  -Rotation 25 right, 25 left  There tenderness with palpation along lumbar paraspinal musculature, no midline tenderness     Neurologic:    Lower Extremity Motor Function    Right  Left    Iliopsoas  5/5  5/5    Quadriceps 4+/5 5/5   Tibialis anterior  5/5  5/5    EHL  5/5  5/5    Gastroc. muscle  5/5  5/5    Heel rise  5/5  5/5    Toe rise  5/5  5/5      Sensory: light touch is intact to bilateral upper and lower extremities     Reflexes:    Right Left   Patellar 1+ 1+   Achilles 1+ 1+   Babinski neg neg     Other tests:  Straight Leg Raise: negative  Jesu SI: negative  SAI SI: negative  Greater troch: no tenderness   Internal/external hip ROM: intact, no pain   Flexion/extension knee ROM: intact, no pain   Vascular: WWP extremities, 2+DP bilateral      Reflexes:    Right Left   Biceps 2+ 2+   Triceps 2+ 2+   Brachioradialis 2+ 2+   Patellar 1+ 1+   Achilles 1+ 1+   Babinski neg neg     Other tests:  Spurling's: positive  Cruz's: negative  Clonus: negative  Inverted " "Radial:   Tandem gait: positive  Carpal Tinel/Phalen: negative bilateral   Cubital Tinel: negative bilateral   Shoulder: painless active & passive ROM bilateral     Diagnostic Tests   IMAGING: I have personally reviewed the images and these are my findings:  Lumbar Spine X-rays from 07/30/2024: multi level lumbar spondylosis with loss of disc height, osteophyte formation and facet hypertrophy, no apparent spondylolisthesis, no appreciated lytic/blastic lesions, no obvious instability    Cervical Spine MRI from 04/07/2022: multi level cervical disc degeneration with disc desiccation, loss of disc height, facet and ligamentum hypertrophy, severe central, lateral recess, foraminal stenosis     Electronic Medical Records were reviewed including Dr. Heath's note and family medicine note    Procedures, if performed today     None performed       Portions of the record may have been created with voice recognition software.  Occasional wrong word or \"sound a like\" substitutions may have occurred due to the inherent limitations of voice recognition software.  Read the chart carefully and recognize, using context, where substitutions have occurred.     Scribe Attestation      I,:  Thais Wren am acting as a scribe while in the presence of the attending physician.:       I,:  Cristobal Ramirez MD personally performed the services described in this documentation    as scribed in my presence.:             "

## 2024-08-06 ENCOUNTER — HOSPITAL ENCOUNTER (OUTPATIENT)
Dept: MRI IMAGING | Facility: HOSPITAL | Age: 79
Discharge: HOME | End: 2024-08-06
Attending: ORTHOPAEDIC SURGERY
Payer: MEDICARE

## 2024-08-06 ENCOUNTER — APPOINTMENT (OUTPATIENT)
Dept: LAB | Facility: HOSPITAL | Age: 79
End: 2024-08-06
Payer: MEDICARE

## 2024-08-06 DIAGNOSIS — R10.13 DYSPEPSIA: ICD-10-CM

## 2024-08-06 DIAGNOSIS — Z00.00 MEDICARE ANNUAL WELLNESS VISIT, SUBSEQUENT: ICD-10-CM

## 2024-08-06 DIAGNOSIS — E66.01 OBESITY, MORBID (HCC): ICD-10-CM

## 2024-08-06 DIAGNOSIS — M48.062 SPINAL STENOSIS OF LUMBAR REGION WITH NEUROGENIC CLAUDICATION: ICD-10-CM

## 2024-08-06 DIAGNOSIS — I25.118 CORONARY ARTERY DISEASE OF NATIVE ARTERY OF NATIVE HEART WITH STABLE ANGINA PECTORIS (HCC): ICD-10-CM

## 2024-08-06 DIAGNOSIS — E03.9 ACQUIRED HYPOTHYROIDISM: ICD-10-CM

## 2024-08-06 DIAGNOSIS — Z13.220 SCREENING, LIPID: ICD-10-CM

## 2024-08-06 DIAGNOSIS — R49.8 VOICE FATIGUE: ICD-10-CM

## 2024-08-06 DIAGNOSIS — E55.9 VITAMIN D DEFICIENCY: ICD-10-CM

## 2024-08-06 DIAGNOSIS — I10 ESSENTIAL HYPERTENSION: ICD-10-CM

## 2024-08-06 DIAGNOSIS — M54.16 LUMBAR RADICULOPATHY: ICD-10-CM

## 2024-08-06 LAB
ANION GAP SERPL CALCULATED.3IONS-SCNC: 10 MMOL/L (ref 4–13)
BASOPHILS # BLD AUTO: 0.08 THOUSANDS/ÂΜL (ref 0–0.1)
BASOPHILS NFR BLD AUTO: 1 % (ref 0–1)
BUN SERPL-MCNC: 19 MG/DL (ref 5–25)
CALCIUM SERPL-MCNC: 9.9 MG/DL (ref 8.4–10.2)
CHLORIDE SERPL-SCNC: 102 MMOL/L (ref 96–108)
CHOLEST SERPL-MCNC: 119 MG/DL
CO2 SERPL-SCNC: 31 MMOL/L (ref 21–32)
CREAT SERPL-MCNC: 1.02 MG/DL (ref 0.6–1.3)
EOSINOPHIL # BLD AUTO: 0.11 THOUSAND/ÂΜL (ref 0–0.61)
EOSINOPHIL NFR BLD AUTO: 1 % (ref 0–6)
ERYTHROCYTE [DISTWIDTH] IN BLOOD BY AUTOMATED COUNT: 14.1 % (ref 11.6–15.1)
GFR SERPL CREATININE-BSD FRML MDRD: 52 ML/MIN/1.73SQ M
GLUCOSE P FAST SERPL-MCNC: 78 MG/DL (ref 65–99)
HCT VFR BLD AUTO: 39.9 % (ref 34.8–46.1)
HDLC SERPL-MCNC: 75 MG/DL
HGB BLD-MCNC: 12.3 G/DL (ref 11.5–15.4)
IMM GRANULOCYTES # BLD AUTO: 0.03 THOUSAND/UL (ref 0–0.2)
IMM GRANULOCYTES NFR BLD AUTO: 0 % (ref 0–2)
LDLC SERPL CALC-MCNC: 25 MG/DL (ref 0–100)
LYMPHOCYTES # BLD AUTO: 2.68 THOUSANDS/ÂΜL (ref 0.6–4.47)
LYMPHOCYTES NFR BLD AUTO: 32 % (ref 14–44)
MCH RBC QN AUTO: 28 PG (ref 26.8–34.3)
MCHC RBC AUTO-ENTMCNC: 30.8 G/DL (ref 31.4–37.4)
MCV RBC AUTO: 91 FL (ref 82–98)
MONOCYTES # BLD AUTO: 0.73 THOUSAND/ÂΜL (ref 0.17–1.22)
MONOCYTES NFR BLD AUTO: 9 % (ref 4–12)
NEUTROPHILS # BLD AUTO: 4.75 THOUSANDS/ÂΜL (ref 1.85–7.62)
NEUTS SEG NFR BLD AUTO: 57 % (ref 43–75)
NRBC BLD AUTO-RTO: 0 /100 WBCS
PLATELET # BLD AUTO: 198 THOUSANDS/UL (ref 149–390)
PMV BLD AUTO: 12 FL (ref 8.9–12.7)
POTASSIUM SERPL-SCNC: 3.4 MMOL/L (ref 3.5–5.3)
RBC # BLD AUTO: 4.4 MILLION/UL (ref 3.81–5.12)
SODIUM SERPL-SCNC: 143 MMOL/L (ref 135–147)
TRIGL SERPL-MCNC: 93 MG/DL
WBC # BLD AUTO: 8.38 THOUSAND/UL (ref 4.31–10.16)

## 2024-08-06 PROCEDURE — 80061 LIPID PANEL: CPT

## 2024-08-06 PROCEDURE — 80048 BASIC METABOLIC PNL TOTAL CA: CPT

## 2024-08-06 PROCEDURE — 72148 MRI LUMBAR SPINE W/O DYE: CPT

## 2024-08-06 PROCEDURE — 85025 COMPLETE CBC W/AUTO DIFF WBC: CPT

## 2024-08-06 PROCEDURE — 36415 COLL VENOUS BLD VENIPUNCTURE: CPT

## 2024-08-09 DIAGNOSIS — E78.5 HYPERLIPIDEMIA, UNSPECIFIED HYPERLIPIDEMIA TYPE: ICD-10-CM

## 2024-08-09 DIAGNOSIS — R10.13 DYSPEPSIA: ICD-10-CM

## 2024-08-10 RX ORDER — PANTOPRAZOLE SODIUM 40 MG/1
TABLET, DELAYED RELEASE ORAL
Qty: 90 TABLET | Refills: 1 | Status: SHIPPED | OUTPATIENT
Start: 2024-08-10

## 2024-08-10 RX ORDER — EZETIMIBE 10 MG/1
10 TABLET ORAL DAILY
Qty: 90 TABLET | Refills: 1 | Status: SHIPPED | OUTPATIENT
Start: 2024-08-10

## 2024-08-13 ENCOUNTER — OFFICE VISIT (OUTPATIENT)
Dept: OBGYN CLINIC | Facility: HOSPITAL | Age: 79
End: 2024-08-13
Payer: MEDICARE

## 2024-08-13 VITALS — WEIGHT: 184.97 LBS | BODY MASS INDEX: 34.92 KG/M2 | HEIGHT: 61 IN

## 2024-08-13 DIAGNOSIS — M48.062 SPINAL STENOSIS OF LUMBAR REGION WITH NEUROGENIC CLAUDICATION: ICD-10-CM

## 2024-08-13 DIAGNOSIS — M54.16 LUMBAR RADICULOPATHY: Primary | ICD-10-CM

## 2024-08-13 PROCEDURE — 99213 OFFICE O/P EST LOW 20 MIN: CPT

## 2024-08-13 NOTE — PROGRESS NOTES
Assessment & Plan/Medical Decision Makin y.o. female with Back Pain, Right Radicular Leg Pain, and Ambulatory Dysfunction and imaging findings most notable for lumbar spondylosis , spinal stenosis. Also with neck pain and imaging findings most notable for cervical stenosis         The clinical, physical and imaging findings were reviewed with the patient.  Ariadna  has a constellation of findings consistent with Lumbar Myofascial Pain, Lumbar Radiculopathy, and Neurogenic Claudication in the setting of lumbar degenerative disease.  Also with cervical central stenosis.    Fortunately patient remains neurologically intact and functional. Physical exam showing quadriceps weakness, low back pain, and right lower extremity pain.  We discussed the treatment options including physical therapy, at home exercises, activity modifications, chiropractic medicine, oral medications, interventional spine procedures.  At this time recommend continued conservative treatments.      Discussed both cervical and lumbar MRI in detail with patient. Discussed signs/symptoms of cervical myelopathy in detail with the patient and her . Did briefly discuss role of surgical intervention to address cervical spinal stenosis, however her symptoms are currently mild and have remained relatively unchanged. She reports she is most bothered and limited by her lumbar spine and right knee pain for which she wants to focus treatment modalities.  Patient will follow up in 3 months for re-evaluation of symptoms. Patient should follow up sooner if her symptoms worsen or progress.    Did briefly discuss role of surgical intervention for her lumbar spine, however will plan to exhaust all conservative treatment prior to further discussion. She also reports wanting to have her right knee OA treated/addressed prior to surgical intervention on her lumbar spine.  Referral placed for physical therapy to work on core strengthening, lumbar ROM,  strengthening, and stretching exercises.  Referral to pain management for evaluation and treatment. Recommend lumbar SUMMER. Discussed potential role of steroid injection at or near the source of pain to provide targeted relief.    Patient should continue to follow up with Dr. Heath regarding her right knee OA. She reports starting Visco injections shortly for her right knee.    Patient instructed to return to office/ER sooner if symptoms are not improving, getting worse, or new worrisome/neurologic symptoms arise.  Patient will follow up in 3 months for re-evaluation.     Subjective:      Chief Complaint: Back Pain    HPI:  Ariadna Jernigan is a 79 y.o. female presenting for initial visit with chief complaint of low back pain worsening over the past 5-6 years. She has history of lumbar surgery in 1990 for low back and right lower extremity pain. She was referred to us by Dr. Heath who treats her right knee osteoarthritis. She is experiencing low back and right lower extremity pain. Her pain goes down her right posterior thigh to the posterolateral knee and down the lateral calf to the ankle. She has associated numbness, tingling, and weakness.     She also experiences neck and bilateral upper extremity pain. She describes her pain as burning. She has numbness or tingling to the bilateral hands, left worse than right. She has difficulty with dexterity activities. She also notes issues with her balance and has had some falls. Denies any arnulfo trauma. Denies fever or chills, no night sweats. Denies any bladder or bowel changes.      Translation provided by Adrianne #[id number]    Conservative therapy includes the following:   Medications: tylenol, was taking lyrica however has stopped taking it  Injections: yes for cervical spine with Dr. Sheikh     Physical Therapy: yes  Chiropractic Medicine: has not attempted  Accupunture/Massage Therapy: has not attempted   These therapeutic modalities were ineffective at providing  sustained pain relief/functional improvement.     Nicotine dependent: former  Occupation: retired   Living situation: Lives with family   ADLs: patient is able to perform     Update on 8/13/2024:  Ariadna is here for follow up, lumbar MRI review. Faroese interpretor was used for the entirety of the encounter.    Pain across low back and into gluteal region that is worse with prolonged standing and walking, improved with sitting and rest. She reports being able to walk, however does report increased pain with standing and ambulation. Can walk farther on flat, even surface, difficulty with going up/down hills. Subjective right > left lower extremity weakness. Pain and numbness in right leg is most bothersome to patient currently as well as her isolated right knee pain. She reports limping when ambulating due to right knee pain.    Has gotten right knee CSI with some initial improvement with decreasing efficacy. Has been following with Dr. Heath regarding her right knee OA. Notes her isolated right knee pain has been limiting her significantly on a daily basis. She notes that she is planning to undergo Visco injections.     Also with ongoing pain in her posterior neck with numbness into bilateral arms and hands. Notes neck pain is tolerable, increases with neck extension. Denies  upper extremity weakness, however does report her arms feel tired at times. Drops items occasionally. Some difficulty with fine motor skills/dexterity. Ongoing balance issues at times, reports she has been more careful recently and has not had any falls since last October. She does not use any assistive devices for ambulation. She does report she has known about the stenosis in her cervical spine for years, and symptoms have not significantly worsened or changed. She notes her lower back, right knee and right lower extremity symptoms are most bothersome and limiting to her. She wishes to continue with conservative treatment at this time for  her cervical spine.    Objective:     Family History   Problem Relation Age of Onset   • No Known Problems Mother    • Lung cancer Father    • Ovarian cancer Sister    • No Known Problems Sister    • No Known Problems Sister    • No Known Problems Maternal Grandmother    • No Known Problems Maternal Grandfather    • No Known Problems Paternal Grandmother    • No Known Problems Paternal Grandfather    • No Known Problems Maternal Aunt    • No Known Problems Paternal Aunt    • No Known Problems Paternal Aunt    • Breast cancer Cousin 45       Past Medical History:   Diagnosis Date   • Arthritis     rheumatoid   • Coronary artery disease    • Disease of thyroid gland    • Hyperlipidemia    • Hypertension        Current Outpatient Medications   Medication Sig Dispense Refill   • atorvastatin (LIPITOR) 80 mg tablet Take 1 tablet (80 mg total) by mouth daily 90 tablet 3   • Cholecalciferol (Vitamin D3) 50 MCG (2000 UT) capsule TAKE ONE CAPSULE BY MOUTH ONCE DAILY 30 capsule 5   • clopidogrel (PLAVIX) 75 mg tablet TAKE ONE TABLET BY MOUTH ONCE DAILY 30 tablet 5   • colchicine (COLCRYS) 0.6 mg tablet Take 1 tablet (0.6 mg total) by mouth daily 90 tablet 0   • diltiazem (CARDIZEM CD) 240 mg 24 hr capsule TAKE ONE CAPSULE BY MOUTH ONCE DAILY 30 capsule 5   • ezetimibe (ZETIA) 10 mg tablet TAKE ONE TABLET BY MOUTH ONCE DAILY 90 tablet 1   • folic acid (FOLVITE) 1 mg tablet Take 1 tablet (1,000 mcg total) by mouth daily 90 tablet 12   • hydroCHLOROthiazide 25 mg tablet TAKE ONE TABLET BY MOUTH ONCE DAILY 100 tablet 1   • hydroxychloroquine (PLAQUENIL) 200 mg tablet Take 1 tablet (200 mg total) by mouth daily with breakfast 90 tablet 3   • levothyroxine 100 mcg tablet TAKE ONE TABLET BY MOUTH ONCE DAILY IN THE MORNING 90 tablet 2   • losartan (COZAAR) 100 MG tablet TAKE ONE TABLET BY MOUTH ONCE DAILY 100 tablet 1   • Menthol-Camphor  MG PTCH Apply 1 patch topically every 12 (twelve) hours. apply Right knee  Indications: .      • metoprolol succinate (TOPROL-XL) 50 mg 24 hr tablet TAKE ONE TABLET BY MOUTH ONCE DAILY 100 tablet 1   • nitroglycerin (NITROSTAT) 0.4 mg SL tablet Place 1 tablet under the tongue every 5 (five) minutes as needed for chest pain x 3 doses if chest pain persists call 911  not taking       • pantoprazole (PROTONIX) 40 mg tablet TAKE ONE TABLET BY MOUTH ONCE DAILY BEFORE BREAKFAST 90 tablet 1   • predniSONE 5 mg tablet Take 1 tablet (5 mg total) by mouth daily 90 tablet 1   • pregabalin (LYRICA) 150 mg capsule Take 1 capsule (150 mg total) by mouth daily (Patient not taking: Reported on 2024) 60 capsule 0     No current facility-administered medications for this visit.       Past Surgical History:   Procedure Laterality Date   • BACK SURGERY     • BREAST SURGERY     • CARPAL TUNNEL RELEASE     • CHOLECYSTECTOMY     • HYSTERECTOMY     • OOPHORECTOMY     • ORTHOPEDIC SURGERY         Social History     Socioeconomic History   • Marital status: /Civil Union     Spouse name: Not on file   • Number of children: Not on file   • Years of education: Not on file   • Highest education level: Not on file   Occupational History   • Not on file   Tobacco Use   • Smoking status: Former     Current packs/day: 0.00     Average packs/day: 0.1 packs/day for 52.0 years (5.2 ttl pk-yrs)     Types: Cigarettes     Start date:      Quit date:      Years since quittin.6     Passive exposure: Never   • Smokeless tobacco: Never   Vaping Use   • Vaping status: Never Used   Substance and Sexual Activity   • Alcohol use: Not Currently   • Drug use: Never   • Sexual activity: Not on file   Other Topics Concern   • Not on file   Social History Narrative   • Not on file     Social Determinants of Health     Financial Resource Strain: Not on file   Food Insecurity: No Food Insecurity (2024)    Hunger Vital Sign    • Worried About Running Out of Food in the Last Year: Never true    • Ran Out of Food in the Last Year:  "Never true   Transportation Needs: No Transportation Needs (6/11/2024)    PRAPARE - Transportation    • Lack of Transportation (Medical): No    • Lack of Transportation (Non-Medical): No   Physical Activity: Not on file   Stress: Not on file   Social Connections: Not on file   Intimate Partner Violence: Not on file   Housing Stability: Low Risk  (6/11/2024)    Housing Stability Vital Sign    • Unable to Pay for Housing in the Last Year: No    • Number of Times Moved in the Last Year: 0    • Homeless in the Last Year: No       Allergies   Allergen Reactions   • Codeine GI Intolerance   • Shingrix [Zoster Vac Recomb Adjuvanted] Rash       Review of Systems  General- denies fever/chills  HEENT- denies hearing loss or sore throat  Eyes- denies eye pain or visual disturbances, denies red eyes  Respiratory- denies cough or SOB  Cardio- denies chest pain or palpitations  GI- denies abdominal pain  Endocrine- denies urinary frequency  Urinary- denies pain with urination  Musculoskeletal- Negative except noted above  Skin- denies rashes or wounds  Neurological- denies dizziness or headache  Psychiatric- denies anxiety or difficulty concentrating    Physical Exam  Ht 5' 1\" (1.549 m)   Wt 83.9 kg (184 lb 15.5 oz)   BMI 34.95 kg/m²     General/Constitutional: No apparent distress: well-nourished and well developed.  Lymphatic: No appreciable lymphadenopathy  Respiratory: Non-labored breathing  Vascular: No edema, swelling or tenderness, except as noted in detailed exam.  Integumentary: No impressive skin lesions present, except as noted in detailed exam.  Psych: Normal mood and affect, oriented to person, place and time.  MSK: normal other than stated in HPI and exam  Gait & balance: no evidence of myelopathic gait, ambulates Independently , antalgic gait    Lumbar spine range of motion:  -Forward flexion to 90  -Extension to neutral  -Lateral bend 25 right, 25 left  -Rotation 25 right, 25 left  There tenderness with " palpation along lumbar paraspinal musculature, no midline tenderness     Neurologic:    Lower Extremity Motor Function    Right  Left    Iliopsoas  5/5  5/5    Quadriceps 4+/5 5/5   Tibialis anterior  5/5  5/5    EHL  5/5  5/5    Gastroc. muscle  5/5  5/5    Heel rise  5/5  5/5    Toe rise  5/5  5/5      Sensory: light touch is intact to bilateral upper and lower extremities     Reflexes:    Right Left   Patellar 1+ 1+   Achilles 1+ 1+   Babinski neg neg     Other tests:  Straight Leg Raise: negative  Jesu SI: negative  SAI SI: negative  Greater troch: no tenderness   Internal/external hip ROM: intact, no pain   Flexion/extension knee ROM: intact, no pain   Vascular: WWP extremities, 2+DP bilateral      Reflexes:    Right Left   Biceps 2+ 2+   Triceps 2+ 2+   Brachioradialis 2+ 2+   Patellar 1+ 1+   Achilles 1+ 1+   Babinski neg neg     Other tests:  Spurling's: positive  Cruz's: negative  Clonus: negative  Inverted Radial: negative  Tandem gait: positive  Romberg: positive  Carpal Tinel/Phalen: negative bilateral   Cubital Tinel: negative bilateral   Shoulder: painless active & passive ROM bilateral     Diagnostic Tests   IMAGING: I have personally reviewed the images and these are my findings:  Lumbar Spine X-rays from 07/30/2024: multi level lumbar spondylosis with loss of disc height, osteophyte formation and facet hypertrophy, L4-5 and L5-S1 spondylolisthesis, no appreciated lytic/blastic lesions, no obvious instability    Cervical Spine MRI from 05/13/2023: multi level cervical disc degeneration with disc desiccation, loss of disc height, facet and ligamentum hypertrophy, severe central stenosis noted at C5-6 with cord compression, lateral recess, foraminal stenosis     Lumbar Spine MRI from 8/6/2024: multi level lumbar disc degeneration with disc desiccation, loss of disc height, facet and ligamentum hypertrophy, L2-3 central stenosis with bilateral foraminal, previous surgical changes at L3-4 and L4-5  "appear stable; please note leveling is based on transitional anatomy with sacralized L5     Electronic Medical Records were reviewed including Dr. Heath's note and family medicine note    Procedures, if performed today     None performed       Portions of the record may have been created with voice recognition software.  Occasional wrong word or \"sound a like\" substitutions may have occurred due to the inherent limitations of voice recognition software.  Read the chart carefully and recognize, using context, where substitutions have occurred.   "

## 2024-08-15 ENCOUNTER — PROCEDURE VISIT (OUTPATIENT)
Dept: OBGYN CLINIC | Facility: MEDICAL CENTER | Age: 79
End: 2024-08-15
Payer: MEDICARE

## 2024-08-15 VITALS
DIASTOLIC BLOOD PRESSURE: 75 MMHG | HEIGHT: 61 IN | WEIGHT: 184.4 LBS | HEART RATE: 60 BPM | BODY MASS INDEX: 34.81 KG/M2 | SYSTOLIC BLOOD PRESSURE: 137 MMHG

## 2024-08-15 DIAGNOSIS — M17.11 PRIMARY OSTEOARTHRITIS OF RIGHT KNEE: Primary | ICD-10-CM

## 2024-08-15 PROCEDURE — 20610 DRAIN/INJ JOINT/BURSA W/O US: CPT | Performed by: ORTHOPAEDIC SURGERY

## 2024-08-15 NOTE — PROGRESS NOTES
1. Primary osteoarthritis of right knee            Patient has severe right knee osteoarthritis.  Patient is here for her gel injection of Synvisc-one into the right knee.   Patient rates their pain as 8/10 today  Physical exam of the knee shows no effusion no ecchymosis.  We discussed that patient may get voltaren gel OTC.   Patient tolerated procedure well.   Post injection instructions reviewed.   Follow up 2 months for CSI if needed.       Large joint arthrocentesis: R knee  [place] Protocol:  procedure performed by consultantConsent: Verbal consent obtained.  Risks and benefits: risks, benefits and alternatives were discussed  Consent given by: patient  Patient understanding: patient states understanding of the procedure being performed  Site marked: the operative site was marked  Patient identity confirmed: verbally with patient  Supporting Documentation  Indications: pain   Procedure Details  Location: knee - R knee  Needle size: 22 G  Ultrasound guidance: no  Approach: anterolateral  Medications administered: 48 mg hylan 48 MG/6ML    Patient tolerance: patient tolerated the procedure well with no immediate complications  Dressing:  Sterile dressing applied

## 2024-08-16 ENCOUNTER — OFFICE VISIT (OUTPATIENT)
Dept: FAMILY MEDICINE CLINIC | Facility: CLINIC | Age: 79
End: 2024-08-16
Payer: MEDICARE

## 2024-08-16 VITALS
HEIGHT: 61 IN | SYSTOLIC BLOOD PRESSURE: 124 MMHG | TEMPERATURE: 96.5 F | WEIGHT: 187 LBS | HEART RATE: 61 BPM | BODY MASS INDEX: 35.3 KG/M2 | DIASTOLIC BLOOD PRESSURE: 78 MMHG | OXYGEN SATURATION: 98 %

## 2024-08-16 DIAGNOSIS — M54.16 LUMBAR RADICULOPATHY: ICD-10-CM

## 2024-08-16 DIAGNOSIS — E03.9 ACQUIRED HYPOTHYROIDISM: Chronic | ICD-10-CM

## 2024-08-16 DIAGNOSIS — R30.0 DYSURIA: Primary | ICD-10-CM

## 2024-08-16 DIAGNOSIS — E55.9 VITAMIN D DEFICIENCY: ICD-10-CM

## 2024-08-16 DIAGNOSIS — M48.04 SPINAL STENOSIS OF THORACIC REGION: ICD-10-CM

## 2024-08-16 DIAGNOSIS — E87.6 HYPOKALEMIA: ICD-10-CM

## 2024-08-16 LAB
BACTERIA UR QL AUTO: ABNORMAL /HPF
BILIRUB UR QL STRIP: NEGATIVE
CLARITY UR: CLEAR
COLOR UR: ABNORMAL
GLUCOSE UR STRIP-MCNC: NEGATIVE MG/DL
HGB UR QL STRIP.AUTO: NEGATIVE
KETONES UR STRIP-MCNC: NEGATIVE MG/DL
LEUKOCYTE ESTERASE UR QL STRIP: ABNORMAL
NITRITE UR QL STRIP: NEGATIVE
NON-SQ EPI CELLS URNS QL MICRO: ABNORMAL /HPF
PH UR STRIP.AUTO: 6.5 [PH]
PROT UR STRIP-MCNC: ABNORMAL MG/DL
RBC #/AREA URNS AUTO: ABNORMAL /HPF
SL AMB  POCT GLUCOSE, UA: NEGATIVE
SL AMB LEUKOCYTE ESTERASE,UA: ABNORMAL
SL AMB POCT BILIRUBIN,UA: NEGATIVE
SL AMB POCT BLOOD,UA: ABNORMAL
SL AMB POCT CLARITY,UA: ABNORMAL
SL AMB POCT COLOR,UA: YELLOW
SL AMB POCT KETONES,UA: NEGATIVE
SL AMB POCT NITRITE,UA: NEGATIVE
SL AMB POCT PH,UA: 6
SL AMB POCT SPECIFIC GRAVITY,UA: 1.02
SL AMB POCT URINE PROTEIN: NEGATIVE
SL AMB POCT UROBILINOGEN: 0.2
SP GR UR STRIP.AUTO: 1.02 (ref 1–1.03)
UROBILINOGEN UR STRIP-ACNC: <2 MG/DL
WBC #/AREA URNS AUTO: ABNORMAL /HPF

## 2024-08-16 PROCEDURE — 81002 URINALYSIS NONAUTO W/O SCOPE: CPT | Performed by: FAMILY MEDICINE

## 2024-08-16 PROCEDURE — 81001 URINALYSIS AUTO W/SCOPE: CPT | Performed by: FAMILY MEDICINE

## 2024-08-16 PROCEDURE — 87086 URINE CULTURE/COLONY COUNT: CPT | Performed by: FAMILY MEDICINE

## 2024-08-16 PROCEDURE — G2211 COMPLEX E/M VISIT ADD ON: HCPCS | Performed by: FAMILY MEDICINE

## 2024-08-16 PROCEDURE — 99214 OFFICE O/P EST MOD 30 MIN: CPT | Performed by: FAMILY MEDICINE

## 2024-08-16 RX ORDER — PREGABALIN 150 MG/1
150 CAPSULE ORAL DAILY
Qty: 60 CAPSULE | Refills: 0 | Status: SHIPPED | OUTPATIENT
Start: 2024-08-16

## 2024-08-16 RX ORDER — CEPHALEXIN 500 MG/1
500 CAPSULE ORAL EVERY 12 HOURS SCHEDULED
Qty: 10 CAPSULE | Refills: 0 | Status: SHIPPED | OUTPATIENT
Start: 2024-08-16 | End: 2024-08-21

## 2024-08-16 RX ORDER — HYLAN G-F 20 16MG/2ML
SYRINGE (ML) INTRAARTICULAR
COMMUNITY
Start: 2024-07-12

## 2024-08-16 NOTE — ASSESSMENT & PLAN NOTE
Finding on recent blood work recommend the patient to take food rich with potassium the list has been given to the patient

## 2024-08-16 NOTE — PATIENT INSTRUCTIONS
"Patient Education  ----------------------------------------------------------------------------------------------------  ?????? ??????? ???? ??????????   ?????????  ????? ?? ??? ????? ??????? ??? UpToDate  ????? ????? ??? ???? ????? ???? ??????????? -- ?????????? ?? ???? ???? ?? ?????? ?? ???????. ????? ????? ??? ?????? ???????? ?? ?????????? ????? ???? ???. ??? ?? ??? ???? ?? ???? ?? ?????????? ?? ???? ??? ???? ??????? ??? ??? ???????? ?? ??? ?????? ????? ?????? ?? ?????? ?? ?????. ??????? ????? ??????? ?????????? ?? \"??? ???????? ????\".  ?? ???? ???? ???? ???? ?? ?????? ?? ?????????? ?? ??????? ???????:   ??? ???? ?????? ?? ??????? ??????.   ??? ??? ?????? ????? ????? \"??????? ?????\" - ????? ??? ??????? ????? ????? ???? ????? ?? ???????.  ??? ???? ??? ???????? ???? ????? ??????? ??????????. ???? ?? ????? ???? ?? ???? ????? ??? ????? ?????????? ????. ???? ??? ???? ?????????? ???????? ?? ?????? ???? ???????. ???? ?????? ?????? ?????? ?????? ?? ??? ???? ???? ???? ?????????? ???????? ?? ????? ???????. ?????? ????? ???????? ??? ????? ?????? ?????? ????.  ???? ??????? ????? ??? ???? ?????????? ???? ??? ???????.  ?? ?? ??????? ?????????? ???? ?????? ??????? ?? ?????? ??????? ???? ??????????? -- ???? ????? ??????? ???? ?? ????? ??? ??????????. ???? ??????? ??? ???? ?? ?????????? ???? ?? ????? (?????? 1).   ?????? - ??? ?????? ???????? ????? ?????? ?????????? ????? ?????????.   ??????? - ??????? ?????????? ?????? ??? ?????? ??????? ??????? ???????? ?????????? ????????? ???? ????????? ????????? ??? ?????? ?????? (???????????)? ??????? ??????? (??????? ?????? ?????? ???????? ??????)? ???? ???????? ??????? ??????? ?????? ????? ???? ?????? ????.   ???????? - ????? ????????? ????????? ???????? ?? ???????? ??????? ???????? (???????)? ???? ??????? ??????? (??????)? ????? (?????)? ????? (???????)? ????? (???????? ?????? ??????)? ?????? ???????? ????? (??????? ???????)? ??????? (??????? ???????)? ??????? ??????? ????? ??????? " "????????? ???????? ????? ???????? ???? ???????? ??????? (????????)? ????? (???????? ??????? ??????)? ???????? ???? ???????? ???? ???????? ?????? ???? ????????? ??????? ???????? ??????? .   ?????? ??????? - ?????? ??????? ???????? ???? ?????? ???????.   ?????? ??????? ?? ??????? ????????? ?????????? ???????? ??????????? - ??????? ???????? ????????? ??????? (???? ?????)? ????? ??????? ??? ??????? (????? ??????? ??????)? ????? ?????? ???????? ????? ??? ??????? (????? ?????? ???? ????? ??????)? ????????? ???????? ??????? ??????? ??????? ????????? (?????? ??????? ????? ????????? ???????? ????????? ???????? ????????? ???????)? ???? ??????? ????????? ???? ??????.   ??????? ?????????? ?????? - ??? ????? ?????? ????????? ????? ?????? ????? \"??????\" ??????? ??  ??????????? ??????????? ??? ????? ?????? ?? ?????? ?????? ???????? ???????? (?? ??????? ????????: Ensure? Boost)? ??? ??? ?????.  ?? ?? ??????? ?????????? ???? ??? ????? ?????? ?? ?????? ??????? ???? ??????????? -- ?? ???? ????? ??? ?????? ?? ??? ?????? ???????.  ?? ???? ?????? ???? ?????? ?????????? ?? ?????? -- ???? ??? ??????? ???????? ?? ?????? ??? ?????????? ?? ??????? ???? ????????:   ????? ??????? ????????? ??????? ???? ????.   ??? ?? ????? ????? ?????? ?? ?????. ???? ??? ?? ??????? ???????? ???? ?????????? ?? ??????.   ???? ??????? ???????? ????? ?? ????? ?? ?????. ???? ??????? ?? ????? ???? ???? ??????????.   ???? ??????? ???? ??? ???? ?? ?????. ?????? ????? ???? ?? ??? ?????? ??? ???? ??????? ??????? ???? ????? ??? ?????? ?? ??????????.   ???? ????? ????? ???????? ?????? ????????. ??? ?????? ?? ?????? ?????????? ????? ?? ?????.  ??? ????? ???? ????????? ???? ???? ???? ???? ???????  ????? ????????? ?? ??? ???????  ????? ????????? ?? ??? ???????.  ??? ???? ?????? ?? UpToDate Mar 23, 2024.  ????? [id number] ??????? 1.0.ar.1  Release: 32.2.4 - C32.81  © 2024 ???? ??????? UpToDate ??? ????? ???? ??? ???? All rights reserved.  ??????? ???????? ?????????? ???? "   ?????????  ?????  ??? ?????  ????????? ????? ??? ?????  ????? (?????)  ??????? ???????  ?????  ??????  ???????  ?????????  ???????? ????? ????????  ??????? ?????? (????????) ????? ??????? ??????  ??????   ??????? (?????? ????)  ????????? ????????  ?????? (???????)  ????????  ????? (????) ???????  ??????? (?????)  ????? (?????)  ?????  ???????  ??????? ??????? ???????? ??????  ????????  ???????  ????? ??????  ???????? (?????? ????? ??????)  ??????? ????? ???????   ????????? ???????  ??????  ????? ?????? ???????  ????????? ???????  ??????? (???? ?????)  ????????? ???????  ??????? ?????  ???????  ??? ???????  ???????? (????)  ????? ?????  ????? ??????   ??????????  ?????? ???????  ?????????  ??????  ???? ????? ????????  ????? ?????? ??????? ?? ????? ?? ????? ????????  ???? ??????  ????????? ????????  ???? ???????  ????? ????? ??????? ???????  ??? ?????? ???????  ??????? (?????)     ?????? 1:  ????? ??????? ??????? ?? ??? ??????? ??? ??????? ????? ?? ??????????. ??? ??? ??????? ????? ??????? ??? ????? ???? ????? ????? ?????????? ???? ??? ??????? ?? ???? ?? ???????.  Graphic 27339 Version 3.0   ??????? ??????? ???????? ?????? ?????????  .????? ???????: ???? ??? ????????? ?????? ?????? ??????? ???? ??????? ??????? ?? ?????? ?? ??????? ?? ?? ?? ???. ??? ????? ?? ???? ????? ???? ?? ??????? ????? ??????? ???????? ??? ??? ???????? ????????? ????????? ???????? ?/?? ???????. ??? ???????? ??? ???? ?? ????? ???? ????????? ?????? ????????? ?? ????????? ?? ???????? ?? ?????? ???????? ?? ??????? ???? ?? ????? ??? ???? ????. ???? ?? ??????? ?? ???? ????????? ??????? ??? ?????? ??????? ???? ?? ?????? ?? ????????? ?????? ?? ??????? ?? ?????? ????? ??????? ?????? ???????? ??? ????? ???? ????? ???? ??????? ?????? ?????? ??????? ????? ?????? ??????? ????????. ???? ??? ?????? ?????? ?? ???? ??????? ?????? ???????? ????????? ???????? ??????? ???????? ??????? ????????? ????????? ??? ?? ??? ?? ????? ?? ????? ??? ??? ???????? ???????. ?????  ??? ???? ??? ????????? ??????? ??? ?? ???? ??? ????? ?? ?????? ?? ?? ???????? ?? ???????? ??? ???? ??? ???? ?????? ?????????? ????? ????? ????. ???? ???? UpToDate, Inc ???????? ??????? ??? ????????? ?? ?? ?????? ?? ???????? ?????? ???? ????????? ?? ????? ?????????.???? ??????? ??? ????????? ????? ????????? ??????? ??? ?????? ??????????  [email or web address]  . ©2024 ???? UpToDate[employer] Inc. ???????? ??????? ??? ?/?? ?????? ?????????? ??????? ???. ???? ?????? ??????.   ???? ????? ??????  © 2024 ???? ??????? UpToDate ??? ????? ???? ??? ???? All rights reserved.

## 2024-08-16 NOTE — ASSESSMENT & PLAN NOTE
New diagnosis finding on MRI of the lumbar spine done in August 6, 2024 discussed results with the patient will review proper management recommend to take Lyrica 150 mg twice a day patient was told that other provider she supposed did not take it secondary to interactions with Plavix regarding interaction to medication and it was negative reassured the patient and patient will restart the medication proper use and possible side effect review

## 2024-08-16 NOTE — ASSESSMENT & PLAN NOTE
New diagnosis symptomatic urine dip in the office is positive we will send the urine for UA and CS start her on Keflex 500 mg twice a day proper use review I will follow-up with the result accordingly  Orders:    POCT urine dip    Urinalysis with microscopic; Future    Urine culture; Future    cephalexin (KEFLEX) 500 mg capsule; Take 1 capsule (500 mg total) by mouth every 12 (twelve) hours for 5 days    Urinalysis with microscopic    Urine culture

## 2024-08-16 NOTE — PROGRESS NOTES
Ambulatory Visit  Name: Ariadna Jernigan      : 1945      MRN: [id number]  Encounter Provider: Olivier Atkins MD  Encounter Date: 2024   Encounter department: [facility]      Assessment & Plan  Dysuria  New diagnosis symptomatic urine dip in the office is positive we will send the urine for UA and CS start her on Keflex 500 mg twice a day proper use review I will follow-up with the result accordingly  Orders:    POCT urine dip    Urinalysis with microscopic; Future    Urine culture; Future    cephalexin (KEFLEX) 500 mg capsule; Take 1 capsule (500 mg total) by mouth every 12 (twelve) hours for 5 days    Urinalysis with microscopic    Urine culture    Lumbar radiculopathy  New diagnosis finding on MRI of the lumbar spine done in 2024 discussed results with the patient will review proper management recommend to take Lyrica 150 mg twice a day patient was told that other provider she supposed did not take it secondary to interactions with Plavix regarding interaction to medication and it was negative reassured the patient and patient will restart the medication proper use and possible side effect review       Hypokalemia  Finding on recent blood work recommend the patient to take food rich with potassium the list has been given to the patient       Vitamin D deficiency  Patient history of vitamin D deficiency due for vitamin D level recommend vitamin D rich diet  Orders:    Vitamin D 25 hydroxy; Future    Acquired hypothyroidism         Spinal stenosis of thoracic region    Orders:    pregabalin (LYRICA) 150 mg capsule; Take 1 capsule (150 mg total) by mouth daily         History of Present Illness     Patient here follow-up with a chronic condition that she is concerned about burning sensation on urination this has been going on for couple days did not any blood in the urine no pelvic pain no flank pain no fever no decrease in intake no nausea or vomiting  "patient concerned about low back pain radiated to the lower extremity with numbness and tingling sensation patient recently had MRI of the lumbar spine result reviewed with the patient review her medications supposed to be on Lyrica for discogenic disease and been told by other provider it can affect on her Plavix I did do an drug interaction and no interaction between Plavix and Lyrica recent blood work reviewed with the patient      Review of Systems   Constitutional:  Negative for chills and fever.   HENT:  Negative for ear pain and sore throat.    Eyes:  Negative for pain and visual disturbance.   Respiratory:  Negative for cough and shortness of breath.    Cardiovascular:  Negative for chest pain and palpitations.   Gastrointestinal:  Negative for abdominal pain and vomiting.   Genitourinary:  Positive for dysuria. Negative for hematuria.   Musculoskeletal:  Positive for arthralgias and back pain.   Skin:  Negative for color change and rash.   Neurological:  Negative for seizures and syncope.   All other systems reviewed and are negative.    Objective     /78 (BP Location: Right arm, Patient Position: Sitting)   Pulse 61   Temp (!) 96.5 °F (35.8 °C) (Tympanic)   Ht 5' 1\" (1.549 m)   Wt 84.8 kg (187 lb)   SpO2 98%   BMI 35.33 kg/m²     Physical Exam  Vitals and nursing note reviewed.   Constitutional:       General: She is not in acute distress.     Appearance: She is well-developed. She is not diaphoretic.   HENT:      Head: Normocephalic.      Right Ear: Tympanic membrane and external ear normal.      Left Ear: Tympanic membrane and external ear normal.      Nose: No rhinorrhea.      Mouth/Throat:      Pharynx: No posterior oropharyngeal erythema.   Eyes:      General:         Right eye: No discharge.         Left eye: No discharge.      Conjunctiva/sclera: Conjunctivae normal.   Neck:      Vascular: No JVD.   Cardiovascular:      Rate and Rhythm: Normal rate and regular rhythm.      Heart sounds: " Normal heart sounds.      No gallop.   Pulmonary:      Effort: Pulmonary effort is normal. No respiratory distress.      Breath sounds: Normal breath sounds. No wheezing.   Abdominal:      General: There is no distension.      Palpations: Abdomen is soft. There is no mass.      Tenderness: There is no abdominal tenderness. There is no rebound.   Musculoskeletal:         General: Tenderness present.      Cervical back: Normal range of motion and neck supple.      Lumbar back: Tenderness present. No signs of trauma or bony tenderness. Positive right straight leg raise test and positive left straight leg raise test.   Lymphadenopathy:      Cervical: No cervical adenopathy.   Skin:     General: Skin is warm.      Findings: No rash.   Neurological:      Mental Status: She is alert and oriented to person, place, and time.         Olivier Atkins MD

## 2024-08-16 NOTE — ASSESSMENT & PLAN NOTE
Patient history of vitamin D deficiency due for vitamin D level recommend vitamin D rich diet  Orders:    Vitamin D 25 hydroxy; Future

## 2024-08-18 LAB — BACTERIA UR CULT: NORMAL

## 2024-08-19 ENCOUNTER — TELEPHONE (OUTPATIENT)
Dept: FAMILY MEDICINE CLINIC | Facility: CLINIC | Age: 79
End: 2024-08-19

## 2024-08-26 ENCOUNTER — TELEPHONE (OUTPATIENT)
Age: 79
End: 2024-08-26

## 2024-08-26 NOTE — TELEPHONE ENCOUNTER
Patient's  called back to check the status, I informed him that Dr. Atkins did not respond yet and he stated he just needs to know if his wife can stop taking the medicine. I called the clinical line and spoke to Gini who relayed that is she's experiencing symptoms that she should stop taking and to go to UC if the symptoms worsen. I informed the patient's  and let him know he will receive a call once Dr. Atkins responds.

## 2024-08-26 NOTE — TELEPHONE ENCOUNTER
Patients spouse called stating the patient would like to discontinue taking   pregabalin (LYRICA) 150 mg capsule as she is experiencing side effects: dizziness, blurred vision and difficulty moving.  Please advise and return phone call.

## 2024-08-27 ENCOUNTER — OFFICE VISIT (OUTPATIENT)
Dept: PAIN MEDICINE | Facility: MEDICAL CENTER | Age: 79
End: 2024-08-27
Payer: MEDICARE

## 2024-08-27 ENCOUNTER — TELEPHONE (OUTPATIENT)
Dept: RADIOLOGY | Facility: MEDICAL CENTER | Age: 79
End: 2024-08-27

## 2024-08-27 VITALS
SYSTOLIC BLOOD PRESSURE: 116 MMHG | DIASTOLIC BLOOD PRESSURE: 60 MMHG | HEIGHT: 61 IN | OXYGEN SATURATION: 97 % | WEIGHT: 183 LBS | HEART RATE: 64 BPM | BODY MASS INDEX: 34.55 KG/M2

## 2024-08-27 DIAGNOSIS — M54.42 CHRONIC BILATERAL LOW BACK PAIN WITH BILATERAL SCIATICA: ICD-10-CM

## 2024-08-27 DIAGNOSIS — M54.41 CHRONIC BILATERAL LOW BACK PAIN WITH BILATERAL SCIATICA: ICD-10-CM

## 2024-08-27 DIAGNOSIS — M96.1 LUMBAR POST-LAMINECTOMY SYNDROME: ICD-10-CM

## 2024-08-27 DIAGNOSIS — M54.16 LUMBAR RADICULOPATHY: Primary | ICD-10-CM

## 2024-08-27 DIAGNOSIS — G89.29 CHRONIC BILATERAL LOW BACK PAIN WITH BILATERAL SCIATICA: ICD-10-CM

## 2024-08-27 PROCEDURE — G2211 COMPLEX E/M VISIT ADD ON: HCPCS

## 2024-08-27 PROCEDURE — 99214 OFFICE O/P EST MOD 30 MIN: CPT

## 2024-08-27 NOTE — TELEPHONE ENCOUNTER
Patient is being considered for a neuraxial injection, such as an epidural injection, nerve root block, etc. for the management of their pain.  However, the patient is currently on an anticoagulant per your orders.  The [employer] Society [employer] Guideline of neuraxial procedures and anti-coagulation indicates that medication should be held as follows:        Plavix for 7 days prior to Transforaminal Epidural Steroid Injection    Dr Atkins- Can patient hold her Plavix for 7 days prior to this injection?    ++++  Bilateral L2-L3 TFESI

## 2024-08-27 NOTE — PROGRESS NOTES
Assessment:  1. Lumbar radiculopathy    2. Lumbar post-laminectomy syndrome    3. Chronic bilateral low back pain with bilateral sciatica        Plan:  Schedule for bilateral L2-L3 TFESI.  Complete benefits and risks of this procedure including hyperglycemia, bleeding, infection, local tissue reaction, nerve injury and allergic reaction were discussed at today's visit. The approach was demonstrated using models and literature was provided for home review. The patient was agreeable, verbalized understanding, and wishes to proceed with scheduling the procedure.  Patient aware we will need to obtain approval to hold her anticoagulant prior to scheduling this procedure.  Follow-up after injection, or sooner if needed    My impressions and treatment recommendations were discussed in detail with the patient who verbalized understanding and had no further questions.  Discharge instructions were provided. I personally saw and examined the patient and I agree with the above discussed plan of care.    Orders Placed This Encounter   Procedures    FL spine and pain procedure     Standing Status:   Future     Standing Expiration Date:   8/27/2028     Order Specific Question:   Reason for Exam:     Answer:   Bilateral L2-L3 TFESI     Order Specific Question:   Anticoagulant hold needed?     Answer:   Plavix     No orders of the defined types were placed in this encounter.      History of Present Illness:  Ariadna Jernigan is a 79 y.o. female who presents for a follow up office visit in regards to ongoing low back pain which radiates into the right and left lower extremities diffusely in no specific distribution.  This pain is constant, and she is currently rating it a 9/10 in intensity.  She describes quality of her pain as burning, dull/aching, numbness, and pins-and-needles.  She does admit to some subjective weakness of the bilateral lower extremities at times which is bothersome to her.     The patient was recently evaluated  by orthopedic spine surgery who recommended continued conservative management including injection therapy prior to any surgical intervention.  Patient is interested in lumbar SUMMER as discussed at her consultation with Dr. Ramirez.    Patient was prescribed Lyrica for control of neuropathic pain, however she states she has discontinued this medication due to side effect profile.    Patient is also complaining today of severe right knee pain.  She does have a history of severe right knee osteoarthritis which is being managed by orthopedics.    I have personally reviewed and/or updated the patient's past medical history, past surgical history, family history, social history, current medications, allergies, and vital signs today.     Review of Systems   Respiratory:  Negative for shortness of breath.    Cardiovascular:  Positive for leg swelling. Negative for chest pain.   Gastrointestinal:  Negative for constipation, diarrhea, nausea and vomiting.   Musculoskeletal:  Positive for back pain, gait problem, joint swelling and myalgias. Negative for arthralgias.   Skin:  Negative for rash.   Neurological:  Negative for dizziness, seizures and weakness.   All other systems reviewed and are negative.      Patient Active Problem List   Diagnosis    Essential hypertension    CAD (coronary artery disease)    Hyperlipidemia    Bilateral carotid artery stenosis    Cervical radiculopathy    Lumbar post-laminectomy syndrome    Stage 3 chronic kidney disease (HCC)    Cervical spinal stenosis    Thoracic radiculopathy    Chronic pain of both shoulders    Hypothyroidism    Osteoarthritis of knee    Polyarthropathy    Pseudogout involving multiple joints    Obesity, Class II, BMI 35-39.9    Need for pneumococcal 20-valent conjugate vaccination    Absence of bladder continence    Dyspepsia    Vitamin D deficiency    Seronegative inflammatory arthritis    Voice fatigue    Right knee pain    Ambulatory dysfunction    Spinal stenosis of  thoracic region    Dysuria    Lumbar radiculopathy    Hypokalemia       Past Medical History:   Diagnosis Date    Arthritis     rheumatoid    Coronary artery disease     Disease of thyroid gland     Hyperlipidemia     Hypertension        Past Surgical History:   Procedure Laterality Date    BACK SURGERY      BREAST SURGERY      CARPAL TUNNEL RELEASE      CHOLECYSTECTOMY      HYSTERECTOMY      OOPHORECTOMY      ORTHOPEDIC SURGERY         Family History   Problem Relation Age of Onset    No Known Problems Mother     Lung cancer Father     Ovarian cancer Sister     No Known Problems Sister     No Known Problems Sister     No Known Problems Maternal Grandmother     No Known Problems Maternal Grandfather     No Known Problems Paternal Grandmother     No Known Problems Paternal Grandfather     No Known Problems Maternal Aunt     No Known Problems Paternal Aunt     No Known Problems Paternal Aunt     Breast cancer Cousin 45       Social History     Occupational History    Not on file   Tobacco Use    Smoking status: Former     Current packs/day: 0.00     Average packs/day: 0.1 packs/day for 52.0 years (5.2 ttl pk-yrs)     Types: Cigarettes     Start date:      Quit date:      Years since quittin.6     Passive exposure: Never    Smokeless tobacco: Never   Vaping Use    Vaping status: Never Used   Substance and Sexual Activity    Alcohol use: Not Currently    Drug use: Never    Sexual activity: Not on file       Current Outpatient Medications on File Prior to Visit   Medication Sig    atorvastatin (LIPITOR) 80 mg tablet Take 1 tablet (80 mg total) by mouth daily    Cholecalciferol (Vitamin D3) 50 MCG ( UT) capsule TAKE ONE CAPSULE BY MOUTH ONCE DAILY    clopidogrel (PLAVIX) 75 mg tablet TAKE ONE TABLET BY MOUTH ONCE DAILY    colchicine (COLCRYS) 0.6 mg tablet Take 1 tablet (0.6 mg total) by mouth daily    diltiazem (CARDIZEM CD) 240 mg 24 hr capsule TAKE ONE CAPSULE BY MOUTH ONCE DAILY    ezetimibe (ZETIA) 10  "mg tablet TAKE ONE TABLET BY MOUTH ONCE DAILY    folic acid (FOLVITE) 1 mg tablet Take 1 tablet (1,000 mcg total) by mouth daily    hydroCHLOROthiazide 25 mg tablet TAKE ONE TABLET BY MOUTH ONCE DAILY    hydroxychloroquine (PLAQUENIL) 200 mg tablet Take 1 tablet (200 mg total) by mouth daily with breakfast    levothyroxine 100 mcg tablet TAKE ONE TABLET BY MOUTH ONCE DAILY IN THE MORNING    losartan (COZAAR) 100 MG tablet TAKE ONE TABLET BY MOUTH ONCE DAILY    Menthol-Camphor  MG PTCH Apply 1 patch topically every 12 (twelve) hours. apply Right knee  Indications: .    metoprolol succinate (TOPROL-XL) 50 mg 24 hr tablet TAKE ONE TABLET BY MOUTH ONCE DAILY    pantoprazole (PROTONIX) 40 mg tablet TAKE ONE TABLET BY MOUTH ONCE DAILY BEFORE BREAKFAST    nitroglycerin (NITROSTAT) 0.4 mg SL tablet Place 1 tablet under the tongue every 5 (five) minutes as needed for chest pain x 3 doses if chest pain persists call 911  not taking      predniSONE 5 mg tablet Take 1 tablet (5 mg total) by mouth daily (Patient not taking: Reported on 8/27/2024)    Synvisc One 48 MG/6ML injection      No current facility-administered medications on file prior to visit.       Allergies   Allergen Reactions    Codeine GI Intolerance    Lyrica [Pregabalin] Other (See Comments)     Blurred vision and dizziness    Shingrix [Zoster Vac Recomb Adjuvanted] Rash       Physical Exam:    /60   Pulse 64   Ht 5' 1\" (1.549 m)   Wt 83 kg (183 lb)   SpO2 97%   BMI 34.58 kg/m²     Constitutional:normal, well developed, well nourished, alert, in no distress and non-toxic and no overt pain behavior.  Eyes:anicteric  HEENT:grossly intact  Neck:supple, symmetric, trachea midline and no masses   Pulmonary:even and unlabored  Cardiovascular:No edema or pitting edema present  Skin:Normal without rashes or lesions and well hydrated  Psychiatric:Mood and affect appropriate  Neurologic:Cranial Nerves II-XII grossly intact  Musculoskeletal: Lower " extremity strength is diffusely diminished bilaterally, likely due to severe pain.  Positive straight leg raise from seated position bilaterally.    Imaging  Study Result    Narrative & Impression   MRI LUMBAR SPINE WITHOUT CONTRAST     INDICATION: M54.16: Radiculopathy, lumbar region  M48.062: Spinal stenosis, lumbar region with neurogenic claudication.     COMPARISON: July 25, 2022     TECHNIQUE:  Multiplanar, multisequence imaging of the lumbar spine was performed. .        IMAGE QUALITY:  Diagnostic     FINDINGS:     VERTEBRAL BODIES: There is transitional lumbosacral anatomy as discussed previously with sacralized L5 level and a rudimentary disc at L5-S1. Using this numbering, there are postoperative changes at L3-4 and L4-5. Trace anterolisthesis at L4-5 is   unchanged. No scoliosis.  No compression fracture.    Normal marrow signal is identified within the visualized bony structures.  No discrete marrow lesion.     SACRUM:  Normal signal within the sacrum. No evidence of insufficiency or stress fracture.     DISTAL CORD AND CONUS:  Normal size and signal within the distal cord and conus.     PARASPINAL SOFT TISSUES: There are partially imaged, parapelvic cysts in the left kidney, unchanged.     LOWER THORACIC DISC SPACES:  Normal disc height and signal.  No disc herniation, canal stenosis or foraminal narrowing.     LUMBAR DISC SPACES:     L1-L2: Trace bulge and moderate facet arthropathy are unchanged. There is no stenosis. This level is unchanged.     L2-L3: Severe central stenosis due to disc bulge, moderate facet arthropathy, and ligamentum flavum redundancy. Similar factors along with marginal osteophytes moderately efface the foramen bilaterally. There are Modic type II endplate changes. This   level appears stable.     L3-L4: Stable evidence of laminectomy. There is disc bulge and moderate facet arthropathy without central stenosis. Disc bulge along with facet and marginal osteophytes moderately efface  the foramen bilaterally. There is a left lateral annular fissure.   This level is unchanged.     L4-L5: Laminectomy is again noted. Moderate facet arthropathy contributes to mild central and bilateral subarticular stenosis. Trace anterolisthesis along with disc bulge and facet osteophytes result in severe right and moderate left foraminal stenosis.   There is mass effect on the exiting right L4 nerve root. The level is unchanged.     L5-S1: Transitional level as discussed previously. There is no stenosis.     OTHER FINDINGS:  None.     IMPRESSION:     1. Stable exam as discussed above. There is severe central and moderate bilateral foraminal stenosis at L2-L3 due to disc bulge, moderate facet arthropathy, and ligamentum flavum redundancy.  2. Stable postoperative findings at L3-4 and L4-5.  3. Transitional anatomy with sacralized L5 level as discussed on prior exam.        Workstation performed: [id number]

## 2024-08-27 NOTE — TELEPHONE ENCOUNTER
Left message via Bel[place]ian  # [phone] for patient to call me back DIRECTLY to schedule.  Left my DIRECT phone # 2x on message.

## 2024-08-28 NOTE — TELEPHONE ENCOUNTER
Left message for patient via  #[phone] to call me back DIRECTLY to schedule.  Left my DIRECT phone # 2x on message.

## 2024-08-29 NOTE — TELEPHONE ENCOUNTER
My Chart Message sent to patient with my direct phone number to call to schedule Spine and Pain procedure.

## 2024-09-03 NOTE — TELEPHONE ENCOUNTER
Left message via Faroese  # [phone] for patient to call me back DIRECTLY to schedule.  Left my DIRECT phone # 2x on message.

## 2024-09-03 NOTE — TELEPHONE ENCOUNTER
Left message for patient's daughter Yessica Reyes ([phone]) to call me back DIRECTLY to schedule.  Left my DIRECT phone # 2x on message.

## 2024-09-04 NOTE — TELEPHONE ENCOUNTER
Caller: rodney Juan's daughter    Doctor: Aguilar    Reason for call: returning the 's call    Call back#: call transferred to the

## 2024-09-06 NOTE — TELEPHONE ENCOUNTER
Caller: Yessicalaron gage    Doctor: Aguilar    Reason for call: calling to coordinate date for procedure     Call back#: [phone]

## 2024-09-06 NOTE — TELEPHONE ENCOUNTER
Procedure scheduled 9/25/24.  Please call patient's daughter Yessica at [phone] with medication instructions.  She will add her mother to the call and translate

## 2024-09-25 ENCOUNTER — HOSPITAL ENCOUNTER (OUTPATIENT)
Dept: RADIOLOGY | Facility: MEDICAL CENTER | Age: 79
Discharge: HOME | End: 2024-09-25
Payer: MEDICARE

## 2024-09-25 VITALS
HEART RATE: 63 BPM | DIASTOLIC BLOOD PRESSURE: 74 MMHG | OXYGEN SATURATION: 97 % | RESPIRATION RATE: 18 BRPM | TEMPERATURE: 97.3 F | SYSTOLIC BLOOD PRESSURE: 122 MMHG

## 2024-09-25 DIAGNOSIS — M54.16 LUMBAR RADICULOPATHY: ICD-10-CM

## 2024-09-25 PROCEDURE — 64483 NJX AA&/STRD TFRM EPI L/S 1: CPT | Performed by: PHYSICAL MEDICINE & REHABILITATION

## 2024-09-25 RX ORDER — PAPAVERINE HCL 150 MG
10 CAPSULE, EXTENDED RELEASE ORAL ONCE
Status: COMPLETED | OUTPATIENT
Start: 2024-09-25 | End: 2024-09-25

## 2024-09-25 RX ADMIN — IOHEXOL 2 ML: 300 INJECTION, SOLUTION INTRAVENOUS at 15:27

## 2024-09-25 RX ADMIN — Medication 10 MG: at 15:27

## 2024-09-25 NOTE — DISCHARGE INSTRUCTIONS
Epidural Steroid Injection   WHAT YOU NEED TO KNOW:   An epidural steroid injection (SUMMER) is a procedure to inject steroid medicine into the epidural space. The epidural space is between your spinal cord and vertebrae. Steroids reduce inflammation and fluid buildup in your spine that may be causing pain. You may be given pain medicine along with the steroids.          ACTIVITY  Do not drive or operate machinery today.  No strenuous activity today - bending, lifting, etc.  You may resume normal activites starting tomorrow - start slowly and as tolerated.  You may shower today, but no tub baths or hot tubs.  You may have numbness for several hours from the local anesthetic. Please use caution and common sense, especially with weight-bearing activities.    CARE OF THE INJECTION SITE  If you have soreness or pain, apply ice to the area today (20 minutes on/20 minutes off).  Starting tomorrow, you may use warm, moist heat or ice if needed.  You may have an increase or change in your discomfort for 36-48 hours after your treatment.  Apply ice and continue with any pain medication you have been prescribed.  Notify the [facility] if you have any of the following: redness, drainage, swelling, headache, stiff neck or fever above 100°F.    SPECIAL INSTRUCTIONS  Our office will contact you in approximately 14 days for a progress report.    MEDICATIONS  Continue to take all routine medications.  Our office may have instructed you to hold some medications. You may resume your Plavix in 24 hours, so tomorrow after 4 pm.    As no general anesthesia was used in today's procedure, you should not experience any side effects related to anesthesia.     If you are diabetic, the steroids used in today's injection may temporarily increase your blood sugar levels after the first few days after your injection. Please keep a close eye on your sugars and alert the doctor who manages your diabetes if your sugars are significantly  high from your baseline or you are symptomatic.     If you have a problem specifically related to your procedure, please call our office at [phone].  Problems not related to your procedure should be directed to your primary care physician.

## 2024-09-25 NOTE — H&P
History of Present Illness: The patient is a 79 y.o. female who presents with complaints of bilateral back and leg pain    Past Medical History:   Diagnosis Date    Arthritis     rheumatoid    Coronary artery disease     Disease of thyroid gland     Hyperlipidemia     Hypertension        Past Surgical History:   Procedure Laterality Date    BACK SURGERY      BREAST SURGERY      CARPAL TUNNEL RELEASE      CHOLECYSTECTOMY      HYSTERECTOMY      OOPHORECTOMY      ORTHOPEDIC SURGERY           Current Outpatient Medications:     atorvastatin (LIPITOR) 80 mg tablet, Take 1 tablet (80 mg total) by mouth daily, Disp: 90 tablet, Rfl: 3    Cholecalciferol (Vitamin D3) 50 MCG (2000 UT) capsule, TAKE ONE CAPSULE BY MOUTH ONCE DAILY, Disp: 30 capsule, Rfl: 5    clopidogrel (PLAVIX) 75 mg tablet, TAKE ONE TABLET BY MOUTH ONCE DAILY, Disp: 30 tablet, Rfl: 5    colchicine (COLCRYS) 0.6 mg tablet, Take 1 tablet (0.6 mg total) by mouth daily, Disp: 90 tablet, Rfl: 0    diltiazem (CARDIZEM CD) 240 mg 24 hr capsule, TAKE ONE CAPSULE BY MOUTH ONCE DAILY, Disp: 30 capsule, Rfl: 5    ezetimibe (ZETIA) 10 mg tablet, TAKE ONE TABLET BY MOUTH ONCE DAILY, Disp: 90 tablet, Rfl: 1    folic acid (FOLVITE) 1 mg tablet, Take 1 tablet (1,000 mcg total) by mouth daily, Disp: 90 tablet, Rfl: 12    hydroCHLOROthiazide 25 mg tablet, TAKE ONE TABLET BY MOUTH ONCE DAILY, Disp: 100 tablet, Rfl: 1    hydroxychloroquine (PLAQUENIL) 200 mg tablet, Take 1 tablet (200 mg total) by mouth daily with breakfast, Disp: 90 tablet, Rfl: 3    levothyroxine 100 mcg tablet, TAKE ONE TABLET BY MOUTH ONCE DAILY IN THE MORNING, Disp: 90 tablet, Rfl: 2    losartan (COZAAR) 100 MG tablet, TAKE ONE TABLET BY MOUTH ONCE DAILY, Disp: 100 tablet, Rfl: 1    Menthol-Camphor  MG PTCH, Apply 1 patch topically every 12 (twelve) hours. apply Right knee  Indications: ., Disp: , Rfl:     metoprolol succinate (TOPROL-XL) 50 mg 24 hr tablet, TAKE ONE TABLET BY MOUTH ONCE DAILY,  Disp: 100 tablet, Rfl: 1    nitroglycerin (NITROSTAT) 0.4 mg SL tablet, Place 1 tablet under the tongue every 5 (five) minutes as needed for chest pain x 3 doses if chest pain persists call 911 not taking , Disp: , Rfl:     pantoprazole (PROTONIX) 40 mg tablet, TAKE ONE TABLET BY MOUTH ONCE DAILY BEFORE BREAKFAST, Disp: 90 tablet, Rfl: 1    predniSONE 5 mg tablet, Take 1 tablet (5 mg total) by mouth daily (Patient not taking: Reported on 8/27/2024), Disp: 90 tablet, Rfl: 1    Synvisc One 48 MG/6ML injection, , Disp: , Rfl:     Allergies   Allergen Reactions    Codeine GI Intolerance    Lyrica [Pregabalin] Other (See Comments)     Blurred vision and dizziness    Shingrix [Zoster Vac Recomb Adjuvanted] Rash       Physical Exam:   Vitals:    09/25/24 1504   BP: 129/84   Pulse: 66   Resp: 18   Temp: (!) 97.3 °F (36.3 °C)   SpO2: 96%     General: Awake, Alert, Oriented x 3, Mood and affect appropriate  Respiratory: Respirations even and unlabored  Cardiovascular: Peripheral pulses intact; no edema  Musculoskeletal Exam: bilateral back and leg pain    ASA Score: 3    Patient/Chart Verification  Patient ID Verified: Verbal  ID Band Applied: No  Consents Confirmed: Procedural, To be obtained in the Pre-Procedure area  H&P( within 30 days) Verified: To be obtained in the Procedural area  Interval H&P(within 24 hr) Complete (required for Outpatients and Surgery Admit only): To be obtained in the Procedural area  Allergies Reviewed: Yes  Anticoag/NSAID held?: Yes (LD of Plavix was on 9/16)  Currently on antibiotics?: No  Pregnancy denied?: NA    Assessment:   1. Lumbar radiculopathy        Plan: Bilateral L2-L3 TFESI

## 2024-10-02 ENCOUNTER — TELEPHONE (OUTPATIENT)
Dept: PAIN MEDICINE | Facility: MEDICAL CENTER | Age: 79
End: 2024-10-02

## 2024-10-02 ENCOUNTER — TELEPHONE (OUTPATIENT)
Dept: PAIN MEDICINE | Facility: CLINIC | Age: 79
End: 2024-10-02

## 2024-10-02 NOTE — TELEPHONE ENCOUNTER
Caller: Ariadna     Doctor: Aguilar    Reason for call: calling to make you aware she is having severe pain what do you recommend next f/u 10/29     Call back#: [phone]

## 2024-10-02 NOTE — TELEPHONE ENCOUNTER
RN attempted to reach pt's daughter Yessica, left a detailed vmmom letting her know that RN's have been trying to reach pt with a .

## 2024-10-02 NOTE — TELEPHONE ENCOUNTER
Nurse attempted to reach Ariadna with [facility]  #[id number], nurse stayed on the line for over 15 min, pt's  begins to speak,  was then not on the line.  Nurse asked pt's  if it was okay for nurse to CB and speak to pt's daughter Yessica, okay to speak with Yessica per pt's .

## 2024-10-02 NOTE — TELEPHONE ENCOUNTER
Attempted again with another , tried x2  and they did not answer.    RN then called and the  answered.  RN advised that we would be calling back again with , rodney Lloyd's  stated that he understood same.

## 2024-10-03 ENCOUNTER — OFFICE VISIT (OUTPATIENT)
Dept: RHEUMATOLOGY | Facility: CLINIC | Age: 79
End: 2024-10-03
Payer: MEDICARE

## 2024-10-03 VITALS
HEIGHT: 61 IN | OXYGEN SATURATION: 98 % | BODY MASS INDEX: 34.93 KG/M2 | HEART RATE: 65 BPM | SYSTOLIC BLOOD PRESSURE: 102 MMHG | DIASTOLIC BLOOD PRESSURE: 60 MMHG | WEIGHT: 185 LBS

## 2024-10-03 DIAGNOSIS — M11.20 CALCIUM PYROPHOSPHATE DEPOSITION DISEASE (CPPD): ICD-10-CM

## 2024-10-03 DIAGNOSIS — M19.90 INFLAMMATORY ARTHRITIS: Primary | ICD-10-CM

## 2024-10-03 PROCEDURE — 99214 OFFICE O/P EST MOD 30 MIN: CPT | Performed by: INTERNAL MEDICINE

## 2024-10-03 PROCEDURE — G2211 COMPLEX E/M VISIT ADD ON: HCPCS | Performed by: INTERNAL MEDICINE

## 2024-10-03 RX ORDER — PREGABALIN 150 MG/1
CAPSULE ORAL
COMMUNITY
Start: 2024-09-30

## 2024-10-03 RX ORDER — COLCHICINE 0.6 MG/1
0.6 TABLET ORAL DAILY
Qty: 30 TABLET | Refills: 6 | Status: SHIPPED | OUTPATIENT
Start: 2024-10-03

## 2024-10-03 RX ORDER — PREDNISONE 5 MG/1
5 TABLET ORAL DAILY
Qty: 30 TABLET | Refills: 11 | Status: SHIPPED | OUTPATIENT
Start: 2024-10-03

## 2024-10-03 NOTE — PROGRESS NOTES
HPI: Ariadna Jernigan is a 79 y/o female who presents for further evaluation CPPD, inflammatory arthritis. History obtained via language line.    Active issues related to chronic neck and back pain. Since last visit she has increased low back and muscle spasms. Seeing pain management later this month    She has been tolerating     She has past medical history CAD, HLD, HTN.     She is transferring care from [facility]. She has has chronic pain, swelling and stiffness small joints hands and feet > 1 year duration. Per notes [facility] rheumatology she was diagnosed CPPD and seronegative inflammatory arthritis    Maintained on baseline 5 mg and colchicine 0.6 daily.     She has had episodes pain, swelling stiffness wrists. She is steroid responsive but symptoms have recurred when she has tried to d/c prednisone    Serologies including HAMMAD, RF, CCP ESR and CRP normal (inflammatory markers elevated in the past)        --------------------------------------------------------------------------------------------------------        ROS:      - for: Fevers, Chills or sweats.  No HAs or scalp tenderness.  No jaw claudication.  No acute visual or eye changes.  No dry eyes.  No auditory complaints.  No oral lesions or ulcers.  No dry mouth.  No sore throat or cough.  No chest pains or palpitations.  No shortness of breath, dyspnea on exertion or wheezing.  No hemotpysis.  No abdominal pain, GERD symptoms, diarrhea or constipation.  No urinary complaints.  No numbness, tingling or weakness.  No rashes.    All other ROS was reviewed and negative except as above         --------------------------------------------------------------------------------------------------------    Past Medical History    Past Medical History:   Diagnosis Date    Arthritis     rheumatoid    Coronary artery disease     Disease of thyroid gland     Hyperlipidemia     Hypertension            Past Surgical History    Past Surgical History:   Procedure  Laterality Date    BACK SURGERY      BREAST SURGERY      CARPAL TUNNEL RELEASE      CHOLECYSTECTOMY      HYSTERECTOMY      OOPHORECTOMY      ORTHOPEDIC SURGERY             Family History    Family History   Problem Relation Age of Onset    No Known Problems Mother     Lung cancer Father     Ovarian cancer Sister     No Known Problems Sister     No Known Problems Sister     No Known Problems Maternal Grandmother     No Known Problems Maternal Grandfather     No Known Problems Paternal Grandmother     No Known Problems Paternal Grandfather     No Known Problems Maternal Aunt     No Known Problems Paternal Aunt     No Known Problems Paternal Aunt     Breast cancer Cousin 45            Social History    Social History     Tobacco Use    Smoking status: Former     Current packs/day: 0.00     Average packs/day: 0.1 packs/day for 52.0 years (5.2 ttl pk-yrs)     Types: Cigarettes     Start date:      Quit date:      Years since quittin.7     Passive exposure: Never    Smokeless tobacco: Never   Vaping Use    Vaping status: Never Used   Substance Use Topics    Alcohol use: Not Currently    Drug use: Never         Allergies    Allergies   Allergen Reactions    Codeine GI Intolerance    Lyrica [Pregabalin] Other (See Comments)     Blurred vision and dizziness    Shingrix [Zoster Vac Recomb Adjuvanted] Rash         Medications    Current Outpatient Medications   Medication Instructions    atorvastatin (LIPITOR) 80 mg, Oral, Daily    clopidogrel (PLAVIX) 75 mg, Oral, Daily    colchicine (COLCRYS) 0.6 mg, Oral, Daily    diltiazem (CARDIZEM CD) 240 mg, Oral, Daily    ezetimibe (ZETIA) 10 mg, Oral, Daily    folic acid (FOLVITE) 1,000 mcg, Oral, Daily    hydroCHLOROthiazide 25 mg, Oral, Daily    hydroxychloroquine (PLAQUENIL) 200 mg, Oral, Daily with breakfast    levothyroxine 100 mcg, Oral, Every morning    losartan (COZAAR) 100 mg, Oral, Daily    Menthol-Camphor  MG PTCH 1 patch, Topical, Every 12 hours, apply  "Right knee    metoprolol succinate (TOPROL-XL) 50 mg, Oral, Daily    nitroglycerin (NITROSTAT) 0.4 mg SL tablet 1 tablet, Sublingual, Every 5 minutes PRN, x 3 doses if chest pain persists call 911<BR>not taking<BR>    pantoprazole (PROTONIX) 40 mg tablet TAKE ONE TABLET BY MOUTH ONCE DAILY BEFORE BREAKFAST    predniSONE 5 mg, Oral, Daily    pregabalin (LYRICA) 150 mg capsule     Synvisc One 48 MG/6ML injection     Vitamin D3 2,000 Units, Oral, Daily          Physical Exam    /60   Pulse 65   Ht 5' 1\" (1.549 m)   Wt 83.9 kg (185 lb)   SpO2 98%   BMI 34.96 kg/m²     GEN: AAO, No apparent distress.  Patient is well developed.  HEENT:  Pupils are equal, round and reactive.  Sclera are clear.  Fundoscopic exam is normal.  External ears are without lesions.  Oral pharynx is clear of ulcers or other lesions.  MMM.   NECK:  Supple.  There is no adenopathy appreciable in anterior or posterior cervical chains or supraclavicularly.  JVP is normal.    HEART: Regular rate and rhythm.  There is no appreciable murmur, gallop or rub.  LUNGS: Clear to auscultation.  ABD:  Soft, without tenderness, rebound or guarding.  No appreciable organomegally.  NEURO: Speech and cognition are normal.  Strength is 5/5 throughout.  Tone is normal.  DTRs are 2/4 at the knees, ankles and elbows.  Gait is normal.  SKIN: There are no rashes or lesions    MUSCULOSKELETAL:   + LE edema      ________________________________________________________________________          Results Review    Component      Latest Ref Rng 9/1/2023   ANTI DNA DOUBLE STRANDED      0 - 9 IU/mL <1    CYCLIC CITRULLINATED PEPTIDE ANTIBODY      See comment  0.5    RHEUMATOID FACTOR      Negative  Negative    C-REACTIVE PROTEIN      <3.0 mg/L 1.7    Sed Rate      0 - 29 mm/hour 10    Lyme total antibody      Negative  Negative    HAMMAD      Negative  Negative              Impressions     CPPD  Seronegative inflammatory arthritis  LE edema    Plans:    Ariadna Jernigan is " a 78 y/o female transitioning care from rheumatology at [facility]    Per notes [facility] rheumatology she was diagnosed CPPD and seronegative inflammatory arthritis    She has been on varying doses of prednisone    Low disease activity, no active synovitis noted    Resume 5 mg baseline prednisone     We decided to hold the colchicine for now     Continue  BID    Reviewed labs    Chronic neck pain, spinal stenosis: f/u NS and pain management     RTC 6 months        Thank you for involving me in this patient's care.        Serjio Harrison MD  Department of Rheumatology  [facility]

## 2024-10-03 NOTE — TELEPHONE ENCOUNTER
"S/w pt's daughter Yessica as per medical release of information document regarding previous message. Pt had a TFESI on 9/25. Daughter aware it takes 2 weeks for the full benefit of the procedure. Daughter verbalized understanding. Offered to check if follow up ov could be moved up and inquired what pt was taking for pain. Per daughter pt \" Had a rough couple of days through the weekend but midweek was better and sounded better.\"    Daughter advised pt is taking Tylenol but wasn't sure how much. Pt is not able to take NSAIDS due to taking Plavix. Daughter advised that as long as pt doesn't have any liver concerns she can take up to 1000 mg of Tylenol every 8 hours not to exceed 3000 mg in 24 hours. Pt can alos try OTC options such as Salonpas patches or creams with Lidocaine, ice or heat 20 mins on 20 mins off.     Daughter verbalized understanding and will releay info to pt.     Daughter is on her way to work and asked if ok to call back with pt on the line tomorrow morning if necessary. RN advised daughter to have the phone room contact RN on teams tomorrow after 8 am.       "

## 2024-10-07 NOTE — TELEPHONE ENCOUNTER
Caller: Yessica ( pt daughter)     Doctor: Aguilar     Reason for call: Yessica returning nurses call in regards to pt, she is unsure if she needs to speak with  or nurse.Please Advise     Call back#: [phone]

## 2024-10-07 NOTE — TELEPHONE ENCOUNTER
"--MAURILIOI--  RN s/w pt and pt's daughter Yessica.  Per pt's daughter the pt is much better since having the B/L L2-L3 TFESI om 9/25 than she was last week.  Per Yessica, her mom was experiencing a \"flare\" of pain a week after the procedure where she was having pain in all of her joints.   At this time she is feeling much better and does not want to move up appt for f/u.    Pt stated her pain level is almost non existent when sitting or lying but when standing or walking the pain is a 7-8/10 bilat low back radiating down legs. Hard to give percentage relief because it went from N/t to pain so different since procedure.  Pre procedure she had n/t down legs but that is gone and now she has pain.  Per pt the most concerning thing besides the increased pain flare last week was the sensation that she had to use the BR but then when would attempt to go didn't have to.  This has since resolved and she is not having this issue any longer.  Pt/daughter will call back if any increased issues prior to seeing CG in follow up on 10/29      "

## 2024-10-09 ENCOUNTER — TELEPHONE (OUTPATIENT)
Dept: PAIN MEDICINE | Facility: CLINIC | Age: 79
End: 2024-10-09

## 2024-10-09 NOTE — TELEPHONE ENCOUNTER
Caller: Ariadna  Doctor/office: LEATHA PEREZ#: [phone]    % of improvement: pt is doing better    Pain Scale (1-10): no pain

## 2024-10-29 ENCOUNTER — OFFICE VISIT (OUTPATIENT)
Dept: PAIN MEDICINE | Facility: MEDICAL CENTER | Age: 79
End: 2024-10-29
Payer: MEDICARE

## 2024-10-29 VITALS
HEART RATE: 63 BPM | HEIGHT: 61 IN | WEIGHT: 187 LBS | DIASTOLIC BLOOD PRESSURE: 68 MMHG | SYSTOLIC BLOOD PRESSURE: 129 MMHG | BODY MASS INDEX: 35.3 KG/M2

## 2024-10-29 DIAGNOSIS — M54.42 CHRONIC BILATERAL LOW BACK PAIN WITH BILATERAL SCIATICA: ICD-10-CM

## 2024-10-29 DIAGNOSIS — M54.41 CHRONIC BILATERAL LOW BACK PAIN WITH BILATERAL SCIATICA: ICD-10-CM

## 2024-10-29 DIAGNOSIS — M54.16 LUMBAR RADICULOPATHY: Primary | ICD-10-CM

## 2024-10-29 DIAGNOSIS — G89.29 CHRONIC BILATERAL LOW BACK PAIN WITH BILATERAL SCIATICA: ICD-10-CM

## 2024-10-29 DIAGNOSIS — M96.1 LUMBAR POST-LAMINECTOMY SYNDROME: ICD-10-CM

## 2024-10-29 PROCEDURE — 99214 OFFICE O/P EST MOD 30 MIN: CPT

## 2024-10-29 RX ORDER — PREGABALIN 50 MG/1
50 CAPSULE ORAL 3 TIMES DAILY
Qty: 90 CAPSULE | Refills: 0 | Status: SHIPPED | OUTPATIENT
Start: 2024-10-29

## 2024-10-29 NOTE — PROGRESS NOTES
Assessment:  1. Lumbar radiculopathy    2. Lumbar post-laminectomy syndrome    3. Chronic bilateral low back pain with bilateral sciatica        Plan:  Start Lyrica 50 mg 3 times daily.  Patient was previously taking 150 mg 3 times daily but discontinued this due to side effects of blurred vision and dizziness.  Advised patient that if we start at a lower dose and titrate up slowly we may be able to avoid these adverse effects.  She is agreeable to this.  Consider right L4-L5 TFESI to alleviate radiating right lower extremity pain if Lyrica fails to provide relief.   Most severe pain is localized to the right knee today. Patient has severe osteoarthritis of the right knee and follows with orthopedics for this issue.   Repeat bilateral L2-L3 TFESI as needed.   Follow up in 8 weeks to review response to medication changes.    This patient is being managed for a complex and serious condition that requires ongoing, intensive medical management. The nature of this condition demands constant vigilance and a nuanced approach to treatment. The condition necessitates an in-depth and focused approach to management, including regular monitoring and potential coordination with other healthcare professionals.     Detailed Description of Visit Complexity: This visit involved an intricate evaluation and management of the patient's condition. The complexity of the visit was due to the need for a detailed assessment of the current state, consideration of potential complications, and a careful balancing of treatment options to manage the condition effectively.     Patient's Health Status and History: This patient has a significant pain history which requires regular and detailed management. The condition's impact on their life and health is substantial, necessitating a comprehensive and tailored approach to chronic ongoing care.       My impressions and treatment recommendations were discussed in detail with the patient who  verbalized understanding and had no further questions.  Discharge instructions were provided. I personally saw and examined the patient and I agree with the above discussed plan of care.    No orders of the defined types were placed in this encounter.    New Medications Ordered This Visit   Medications    pregabalin (LYRICA) 50 mg capsule     Sig: Take 1 capsule (50 mg total) by mouth 3 (three) times a day     Dispense:  90 capsule     Refill:  0       History of Present Illness:  Ariadna Jernigan is a 79 y.o. female who presents for a follow up office visit after undergoing bilateral L2-L3 TFESI.  Patient reports near total resolution of bilateral low back and left lower extremity pain at this time.  She does continue to experience persistent pain throughout the right lower extremity, primarily the right knee.  The pain radiates from the low back on the right side into the right leg in an L4 distribution.  This is intermittent, and presently rated a 6-7/10 in intensity.  Patient describes this as a shooting and cramping pain.  Reviewed lumbar spine MRI today which does reveal severe foraminal narrowing at the L4-L5 level on the right which may be contributing to her ongoing right lower extremity pain.  Her most severe pain is described as a throbbing and pressure-like pain and is localized to the right knee.  She does have a known history of severe right knee osteoarthritis which is managed by orthopedics.  She has a follow-up scheduled next month for possible repeat right knee injection according to the patient.    Discussed starting a neuropathic pain medication for symptom relief.  Patient states that Lyrica helped to alleviate her pain in the past but caused very bothersome side effects.  Based on review of the patient's chart, it appears she was started at 150 mg 3 times daily, which I explained to her is a very high dose.  Suggested starting at a lower dose and titrating up slowly to avoid side effects.   Patient is agreeable to this.    Please note that the patient is primarily Albanian speaking and required interpretive services for this office visit. Interpretor [id number] was available throughout the entire office visit and interpreted with the patient in agreement and verbalizing understanding.    I have personally reviewed and/or updated the patient's past medical history, past surgical history, family history, social history, current medications, allergies, and vital signs today.     Review of Systems   Respiratory:  Negative for shortness of breath.    Cardiovascular:  Negative for chest pain.   Gastrointestinal:  Negative for constipation, diarrhea, nausea and vomiting.   Musculoskeletal:  Positive for back pain, gait problem, joint swelling and myalgias. Negative for arthralgias.   Skin:  Negative for rash.   Neurological:  Negative for dizziness, seizures and weakness.   All other systems reviewed and are negative.      Patient Active Problem List   Diagnosis    Essential hypertension    CAD (coronary artery disease)    Hyperlipidemia    Bilateral carotid artery stenosis    Cervical radiculopathy    Lumbar post-laminectomy syndrome    Stage 3 chronic kidney disease (HCC)    Cervical spinal stenosis    Thoracic radiculopathy    Chronic pain of both shoulders    Hypothyroidism    Osteoarthritis of knee    Polyarthropathy    Pseudogout involving multiple joints    Obesity, Class II, BMI 35-39.9    Need for pneumococcal 20-valent conjugate vaccination    Absence of bladder continence    Dyspepsia    Vitamin D deficiency    Seronegative inflammatory arthritis    Voice fatigue    Right knee pain    Ambulatory dysfunction    Spinal stenosis of thoracic region    Dysuria    Lumbar radiculopathy    Hypokalemia       Past Medical History:   Diagnosis Date    Arthritis     rheumatoid    Coronary artery disease     Disease of thyroid gland     Hyperlipidemia     Hypertension        Past Surgical History:   Procedure  Laterality Date    BACK SURGERY      BREAST SURGERY      CARPAL TUNNEL RELEASE      CHOLECYSTECTOMY      HYSTERECTOMY      OOPHORECTOMY      ORTHOPEDIC SURGERY         Family History   Problem Relation Age of Onset    No Known Problems Mother     Lung cancer Father     Ovarian cancer Sister     No Known Problems Sister     No Known Problems Sister     No Known Problems Maternal Grandmother     No Known Problems Maternal Grandfather     No Known Problems Paternal Grandmother     No Known Problems Paternal Grandfather     No Known Problems Maternal Aunt     No Known Problems Paternal Aunt     No Known Problems Paternal Aunt     Breast cancer Cousin 45       Social History     Occupational History    Not on file   Tobacco Use    Smoking status: Former     Current packs/day: 0.00     Average packs/day: 0.1 packs/day for 52.0 years (5.2 ttl pk-yrs)     Types: Cigarettes     Start date:      Quit date:      Years since quittin.8     Passive exposure: Never    Smokeless tobacco: Never   Vaping Use    Vaping status: Never Used   Substance and Sexual Activity    Alcohol use: Not Currently    Drug use: Never    Sexual activity: Not on file       Current Outpatient Medications on File Prior to Visit   Medication Sig    atorvastatin (LIPITOR) 80 mg tablet Take 1 tablet (80 mg total) by mouth daily    Cholecalciferol (Vitamin D3) 50 MCG ( UT) capsule TAKE ONE CAPSULE BY MOUTH ONCE DAILY    clopidogrel (PLAVIX) 75 mg tablet TAKE ONE TABLET BY MOUTH ONCE DAILY    colchicine (COLCRYS) 0.6 mg tablet Take 1 tablet (0.6 mg total) by mouth daily    diltiazem (CARDIZEM CD) 240 mg 24 hr capsule TAKE ONE CAPSULE BY MOUTH ONCE DAILY    ezetimibe (ZETIA) 10 mg tablet TAKE ONE TABLET BY MOUTH ONCE DAILY    folic acid (FOLVITE) 1 mg tablet Take 1 tablet (1,000 mcg total) by mouth daily    hydroCHLOROthiazide 25 mg tablet TAKE ONE TABLET BY MOUTH ONCE DAILY    levothyroxine 100 mcg tablet TAKE ONE TABLET BY MOUTH ONCE DAILY IN  "THE MORNING    losartan (COZAAR) 100 MG tablet TAKE ONE TABLET BY MOUTH ONCE DAILY    Menthol-Camphor  MG PTCH Apply 1 patch topically every 12 (twelve) hours. apply Right knee  Indications: .    metoprolol succinate (TOPROL-XL) 50 mg 24 hr tablet TAKE ONE TABLET BY MOUTH ONCE DAILY    pantoprazole (PROTONIX) 40 mg tablet TAKE ONE TABLET BY MOUTH ONCE DAILY BEFORE BREAKFAST    [DISCONTINUED] pregabalin (LYRICA) 150 mg capsule     colchicine (COLCRYS) 0.6 mg tablet Take 1 tablet (0.6 mg total) by mouth daily (Patient not taking: Reported on 10/3/2024)    hydroxychloroquine (PLAQUENIL) 200 mg tablet Take 1 tablet (200 mg total) by mouth daily with breakfast    nitroglycerin (NITROSTAT) 0.4 mg SL tablet Place 1 tablet under the tongue every 5 (five) minutes as needed for chest pain x 3 doses if chest pain persists call 911  not taking      predniSONE 5 mg tablet Take 1 tablet (5 mg total) by mouth daily (Patient not taking: Reported on 8/27/2024)    predniSONE 5 mg tablet Take 1 tablet (5 mg total) by mouth daily    Synvisc One 48 MG/6ML injection      No current facility-administered medications on file prior to visit.       Allergies   Allergen Reactions    Codeine GI Intolerance    Lyrica [Pregabalin] Other (See Comments)     Blurred vision and dizziness    Shingrix [Zoster Vac Recomb Adjuvanted] Rash       Physical Exam:    /68   Pulse 63   Ht 5' 1\" (1.549 m)   Wt 84.8 kg (187 lb)   BMI 35.33 kg/m²     Constitutional:normal, well developed, well nourished, alert, in no distress and non-toxic and no overt pain behavior.  Eyes:anicteric  HEENT:grossly intact  Neck:supple, symmetric, trachea midline and no masses   Pulmonary:even and unlabored  Cardiovascular:No edema or pitting edema present  Skin:Normal without rashes or lesions and well hydrated  Psychiatric:Mood and affect appropriate  Neurologic:Cranial Nerves II-XII grossly intact  Musculoskeletal:normal except for diminished lower extremity " strength bilaterally and positive straight leg raise on the right

## 2024-11-15 ENCOUNTER — OFFICE VISIT (OUTPATIENT)
Dept: OBGYN CLINIC | Facility: MEDICAL CENTER | Age: 79
End: 2024-11-15
Payer: MEDICARE

## 2024-11-15 VITALS
BODY MASS INDEX: 35.08 KG/M2 | SYSTOLIC BLOOD PRESSURE: 104 MMHG | HEIGHT: 61 IN | DIASTOLIC BLOOD PRESSURE: 63 MMHG | WEIGHT: 185.8 LBS | HEART RATE: 73 BPM

## 2024-11-15 DIAGNOSIS — M25.361 INSTABILITY OF KNEE JOINT, RIGHT: ICD-10-CM

## 2024-11-15 DIAGNOSIS — M17.11 PRIMARY OSTEOARTHRITIS OF RIGHT KNEE: Primary | ICD-10-CM

## 2024-11-15 PROCEDURE — 20610 DRAIN/INJ JOINT/BURSA W/O US: CPT | Performed by: ORTHOPAEDIC SURGERY

## 2024-11-15 PROCEDURE — 99213 OFFICE O/P EST LOW 20 MIN: CPT | Performed by: ORTHOPAEDIC SURGERY

## 2024-11-15 RX ADMIN — TRIAMCINOLONE ACETONIDE 40 MG: 40 INJECTION, SUSPENSION INTRA-ARTICULAR; INTRAMUSCULAR at 09:45

## 2024-11-15 RX ADMIN — BUPIVACAINE HYDROCHLORIDE 2 ML: 2.5 INJECTION, SOLUTION INFILTRATION; PERINEURAL at 09:45

## 2024-11-15 NOTE — PROGRESS NOTES
Assessment/Plan:  1. Primary osteoarthritis of right knee      Orders Placed This Encounter   Procedures    Large joint arthrocentesis     Patient has severe right knee osteoarthritis. We discussed treatment options as well as risks and benefits of treatment options.    After a discussion of risks and benefits the patient elected to proceed with a right knee steroid injection today.  Patient should ice and avoid strenuous activity for 1-2 days if needed.  Patient should avoid vaccines for 2 weeks if possible.  If patient is diabetic should also monitor glucose over the next 7 to 10 days.    Patient has not received symptomatic relief from gel injections in the past.  Continue OTC NSAIDs and Tylenol as needed for pain control.  Will prescribe home PT.  Patient does not drive.  Encouraged to do home exercises.  Short hinged knee brace was given to patient today.  Continue OTC knee sleeve as needed for comfort.    Continue activity as tolerated  We may consider TKA if patient doesn't respond to conservative treatment.      Return in about 3 months (around 2/15/2025).    I answered all of the patient's questions during the visit and provided education of the patient's condition during the visit.  The patient verbalized understanding of the information given and agrees with the plan.  This note was dictated using [employer] software.  It may contain errors including improperly dictated words.  Please contact physician directly for any questions.    Subjective   Chief Complaint:   Chief Complaint   Patient presents with    Right Knee - Follow-up       HPI  Ariadna Jernigan is a 79 y.o. female who presents for follow up for severe right knee osteoarthritis.  Patient was last seen 8/15/2024 patient received Synvisc 1 gel injection to her right knee.  Patient states she received no relief with her Synvisc 1 gel injection.  Patient states she has been taking ibuprofen for pain control as she states Tylenol was not working well  for her.  Patient states she does get partial relief with ibuprofen.  Patient states that she has been applying topical gels and occasionally working on HEP for her knee.  Patient received right knee CSI back in patient states she received couple days of relief.  She is interested in repeat right knee CSI today.    Review of Systems  ROS:    See HPI for musculoskeletal review.   All other systems reviewed are negative     History:  Past Medical History:   Diagnosis Date    Arthritis     rheumatoid    Coronary artery disease     Disease of thyroid gland     Hyperlipidemia     Hypertension      Past Surgical History:   Procedure Laterality Date    BACK SURGERY      BREAST SURGERY      CARPAL TUNNEL RELEASE      CHOLECYSTECTOMY      HYSTERECTOMY      OOPHORECTOMY      ORTHOPEDIC SURGERY       Social History   Social History     Substance and Sexual Activity   Alcohol Use Not Currently     Social History     Substance and Sexual Activity   Drug Use Never     Social History     Tobacco Use   Smoking Status Former    Current packs/day: 0.00    Average packs/day: 0.1 packs/day for 52.0 years (5.2 ttl pk-yrs)    Types: Cigarettes    Start date:     Quit date:     Years since quittin.8    Passive exposure: Never   Smokeless Tobacco Never     Family History:   Family History   Problem Relation Age of Onset    No Known Problems Mother     Lung cancer Father     Ovarian cancer Sister     No Known Problems Sister     No Known Problems Sister     No Known Problems Maternal Grandmother     No Known Problems Maternal Grandfather     No Known Problems Paternal Grandmother     No Known Problems Paternal Grandfather     No Known Problems Maternal Aunt     No Known Problems Paternal Aunt     No Known Problems Paternal Aunt     Breast cancer Cousin 45       Current Outpatient Medications on File Prior to Visit   Medication Sig Dispense Refill    atorvastatin (LIPITOR) 80 mg tablet Take 1 tablet (80 mg total) by mouth daily  90 tablet 3    Cholecalciferol (Vitamin D3) 50 MCG (2000 UT) capsule TAKE ONE CAPSULE BY MOUTH ONCE DAILY 30 capsule 5    clopidogrel (PLAVIX) 75 mg tablet TAKE ONE TABLET BY MOUTH ONCE DAILY 30 tablet 5    colchicine (COLCRYS) 0.6 mg tablet Take 1 tablet (0.6 mg total) by mouth daily (Patient not taking: Reported on 10/3/2024) 90 tablet 0    colchicine (COLCRYS) 0.6 mg tablet Take 1 tablet (0.6 mg total) by mouth daily 30 tablet 6    diltiazem (CARDIZEM CD) 240 mg 24 hr capsule TAKE ONE CAPSULE BY MOUTH ONCE DAILY 30 capsule 5    ezetimibe (ZETIA) 10 mg tablet TAKE ONE TABLET BY MOUTH ONCE DAILY 90 tablet 1    folic acid (FOLVITE) 1 mg tablet Take 1 tablet (1,000 mcg total) by mouth daily 90 tablet 12    hydroCHLOROthiazide 25 mg tablet TAKE ONE TABLET BY MOUTH ONCE DAILY 100 tablet 1    hydroxychloroquine (PLAQUENIL) 200 mg tablet Take 1 tablet (200 mg total) by mouth daily with breakfast 90 tablet 3    levothyroxine 100 mcg tablet TAKE ONE TABLET BY MOUTH ONCE DAILY IN THE MORNING 90 tablet 2    losartan (COZAAR) 100 MG tablet TAKE ONE TABLET BY MOUTH ONCE DAILY 100 tablet 1    Menthol-Camphor  MG PTCH Apply 1 patch topically every 12 (twelve) hours. apply Right knee  Indications: .      metoprolol succinate (TOPROL-XL) 50 mg 24 hr tablet TAKE ONE TABLET BY MOUTH ONCE DAILY 100 tablet 1    nitroglycerin (NITROSTAT) 0.4 mg SL tablet Place 1 tablet under the tongue every 5 (five) minutes as needed for chest pain x 3 doses if chest pain persists call 911  not taking        pantoprazole (PROTONIX) 40 mg tablet TAKE ONE TABLET BY MOUTH ONCE DAILY BEFORE BREAKFAST 90 tablet 1    predniSONE 5 mg tablet Take 1 tablet (5 mg total) by mouth daily (Patient not taking: Reported on 8/27/2024) 90 tablet 1    predniSONE 5 mg tablet Take 1 tablet (5 mg total) by mouth daily 30 tablet 11    pregabalin (LYRICA) 50 mg capsule Take 1 capsule (50 mg total) by mouth 3 (three) times a day 90 capsule 0    Synvisc One 48 MG/6ML  "injection        No current facility-administered medications on file prior to visit.     Allergies   Allergen Reactions    Codeine GI Intolerance    Lyrica [Pregabalin] Other (See Comments)     Blurred vision and dizziness    Shingrix [Zoster Vac Recomb Adjuvanted] Rash        Objective     /63   Pulse 73   Ht 5' 1\" (1.549 m)   Wt 84.3 kg (185 lb 12.8 oz)   BMI 35.11 kg/m²      PE:  AAOx 3  WDWN  Hearing intact, no drainage from eyes  no audible wheezing  no abdominal distension  LE compartments soft, skin intact    Ortho Exam:  Right knee:   No erythema  no swelling  no effusion  no warmth  AROM: 0-95  Stable to varus/valgus stress    Large joint arthrocentesis: R knee  [place] Protocol:  procedure performed by consultantConsent: Verbal consent obtained.  Risks and benefits: risks, benefits and alternatives were discussed  Consent given by: patient  Patient understanding: patient states understanding of the procedure being performed  Site marked: the operative site was marked  Patient identity confirmed: verbally with patient  Supporting Documentation  Indications: pain   Procedure Details  Location: knee - R knee  Needle size: 22 G  Ultrasound guidance: no  Approach: anterolateral    Patient tolerance: patient tolerated the procedure well with no immediate complications  Dressing:  Sterile dressing applied            Scribe Attestation      I,:  Torrey Alcantara am acting as a scribe while in the presence of the attending physician.:       I,:  Nayla Heath DO personally performed the services described in this documentation    as scribed in my presence.:                "

## 2024-11-18 ENCOUNTER — HOME CARE VISIT (OUTPATIENT)
Dept: HOME HEALTH SERVICES | Facility: HOME HEALTHCARE | Age: 79
End: 2024-11-18

## 2024-11-18 ENCOUNTER — HOME HEALTH ADMISSION (OUTPATIENT)
Dept: HOME HEALTH SERVICES | Facility: HOME HEALTHCARE | Age: 79
End: 2024-11-18
Payer: MEDICARE

## 2024-11-20 ENCOUNTER — HOME CARE VISIT (OUTPATIENT)
Dept: HOME HEALTH SERVICES | Facility: HOME HEALTHCARE | Age: 79
End: 2024-11-20
Payer: MEDICARE

## 2024-11-20 VITALS — HEART RATE: 70 BPM | OXYGEN SATURATION: 98 % | SYSTOLIC BLOOD PRESSURE: 132 MMHG | DIASTOLIC BLOOD PRESSURE: 72 MMHG

## 2024-11-20 PROCEDURE — G0151 HHCP-SERV OF PT,EA 15 MIN: HCPCS

## 2024-11-20 PROCEDURE — 400013 VN SOC

## 2024-11-21 RX ORDER — TRIAMCINOLONE ACETONIDE 40 MG/ML
40 INJECTION, SUSPENSION INTRA-ARTICULAR; INTRAMUSCULAR
Status: COMPLETED | OUTPATIENT
Start: 2024-11-15 | End: 2024-11-15

## 2024-11-21 RX ORDER — BUPIVACAINE HYDROCHLORIDE 2.5 MG/ML
2 INJECTION, SOLUTION INFILTRATION; PERINEURAL
Status: COMPLETED | OUTPATIENT
Start: 2024-11-15 | End: 2024-11-15

## 2024-11-22 ENCOUNTER — HOME CARE VISIT (OUTPATIENT)
Dept: HOME HEALTH SERVICES | Facility: HOME HEALTHCARE | Age: 79
End: 2024-11-22
Payer: MEDICARE

## 2024-11-22 NOTE — CASE COMMUNICATION
Medication discrepancies or Major drug interactions  Noted  betweenColchicine  and  Diltiazem  and  between  Pantoprazole  and  Clopidogrel  and  between  Diltiazem  and  Atorvastatin.  Also  noted  is  patient  allergy  to  Pregabalin  with  resulting  dizziness.  However  patient  reports  she  continues  to  take  this  med  Abnormal clinical findings  Moderate/Severe  Right  knee  pain  This report is informational only, no response  is needed   ke's A has Admitted your patient to Home Health service with the following disciplines: PT  Patient stated goals of care  To  decrease  Right  knee  pain  and  to  walk  better  Potential barriers to goal achievement  Moderate/Severe  Right  knee  pain  Primary focus of home health care:Musculoskeletal  Anticipated visit pattern and next visit date  2  x  per  week  x  4  weeks  Thank you for allowing us to participa te in the care of your patient.      Freeman Gonzalez P.T.

## 2024-11-23 DIAGNOSIS — I25.118 CORONARY ARTERY DISEASE OF NATIVE ARTERY OF NATIVE HEART WITH STABLE ANGINA PECTORIS (HCC): ICD-10-CM

## 2024-11-23 DIAGNOSIS — I10 ESSENTIAL HYPERTENSION: ICD-10-CM

## 2024-11-26 ENCOUNTER — HOME CARE VISIT (OUTPATIENT)
Dept: HOME HEALTH SERVICES | Facility: HOME HEALTHCARE | Age: 79
End: 2024-11-26
Payer: MEDICARE

## 2024-11-26 PROCEDURE — G0151 HHCP-SERV OF PT,EA 15 MIN: HCPCS

## 2024-11-26 PROCEDURE — G0180 MD CERTIFICATION HHA PATIENT: HCPCS | Performed by: ORTHOPAEDIC SURGERY

## 2024-11-26 RX ORDER — DILTIAZEM HYDROCHLORIDE 240 MG/1
240 CAPSULE, COATED, EXTENDED RELEASE ORAL DAILY
Qty: 90 CAPSULE | Refills: 1 | Status: SHIPPED | OUTPATIENT
Start: 2024-11-26

## 2024-11-26 RX ORDER — CLOPIDOGREL BISULFATE 75 MG/1
75 TABLET ORAL DAILY
Qty: 90 TABLET | Refills: 1 | Status: SHIPPED | OUTPATIENT
Start: 2024-11-26

## 2024-11-27 VITALS — OXYGEN SATURATION: 98 % | DIASTOLIC BLOOD PRESSURE: 76 MMHG | HEART RATE: 59 BPM | SYSTOLIC BLOOD PRESSURE: 142 MMHG

## 2024-11-29 ENCOUNTER — HOME CARE VISIT (OUTPATIENT)
Dept: HOME HEALTH SERVICES | Facility: HOME HEALTHCARE | Age: 79
End: 2024-11-29
Payer: MEDICARE

## 2024-11-29 VITALS — SYSTOLIC BLOOD PRESSURE: 126 MMHG | HEART RATE: 64 BPM | DIASTOLIC BLOOD PRESSURE: 66 MMHG | OXYGEN SATURATION: 96 %

## 2024-11-29 PROCEDURE — G0151 HHCP-SERV OF PT,EA 15 MIN: HCPCS

## 2024-12-03 ENCOUNTER — HOME CARE VISIT (OUTPATIENT)
Dept: HOME HEALTH SERVICES | Facility: HOME HEALTHCARE | Age: 79
End: 2024-12-03
Payer: MEDICARE

## 2024-12-03 VITALS — DIASTOLIC BLOOD PRESSURE: 66 MMHG | SYSTOLIC BLOOD PRESSURE: 132 MMHG | HEART RATE: 66 BPM | OXYGEN SATURATION: 97 %

## 2024-12-03 PROCEDURE — G0151 HHCP-SERV OF PT,EA 15 MIN: HCPCS

## 2024-12-05 ENCOUNTER — HOME CARE VISIT (OUTPATIENT)
Dept: HOME HEALTH SERVICES | Facility: HOME HEALTHCARE | Age: 79
End: 2024-12-05
Payer: MEDICARE

## 2024-12-05 VITALS — SYSTOLIC BLOOD PRESSURE: 124 MMHG | DIASTOLIC BLOOD PRESSURE: 64 MMHG | OXYGEN SATURATION: 97 % | HEART RATE: 64 BPM

## 2024-12-05 PROCEDURE — G0151 HHCP-SERV OF PT,EA 15 MIN: HCPCS

## 2024-12-10 ENCOUNTER — HOME CARE VISIT (OUTPATIENT)
Dept: HOME HEALTH SERVICES | Facility: HOME HEALTHCARE | Age: 79
End: 2024-12-10
Payer: MEDICARE

## 2024-12-11 NOTE — CASE COMMUNICATION
Arrived  for  scheduled  visit.    reports  patient  is  not  feeling  well  and  cannot  perform  any  PT  activities  today.   Visit  for  today  canceled stated

## 2024-12-13 ENCOUNTER — HOME CARE VISIT (OUTPATIENT)
Dept: HOME HEALTH SERVICES | Facility: HOME HEALTHCARE | Age: 79
End: 2024-12-13
Payer: MEDICARE

## 2024-12-13 ENCOUNTER — APPOINTMENT (OUTPATIENT)
Dept: LAB | Facility: HOSPITAL | Age: 79
End: 2024-12-13
Payer: MEDICARE

## 2024-12-13 DIAGNOSIS — E55.9 VITAMIN D DEFICIENCY: ICD-10-CM

## 2024-12-13 LAB — 25(OH)D3 SERPL-MCNC: 36.9 NG/ML (ref 30–100)

## 2024-12-13 PROCEDURE — 36415 COLL VENOUS BLD VENIPUNCTURE: CPT

## 2024-12-13 PROCEDURE — 82306 VITAMIN D 25 HYDROXY: CPT

## 2024-12-14 DIAGNOSIS — E55.9 VITAMIN D DEFICIENCY: ICD-10-CM

## 2024-12-15 NOTE — CASE COMMUNICATION
Patient  not  seen  by  PT  week  of  12/8  secondary  to  not  feeling  well.  This  resulted  in  a  change  in  ordered  visit  pattern.  CMS  requires  that  referring  clinician  be  notfied  if  said  change  occurs

## 2024-12-15 NOTE — CASE COMMUNICATION
Arrived  for  scheduled  visit.  Upon  arrival  patient  still  upstairs.  Waited  for  patient  to  descend  stairs  to  1st  level  of  home.  Patient  reports  she  is  feeling  better  than  she  did  on  Tuesday  however  still  is  not  feeling  100%.  Reports  she  had  a  stomach  virus.  Patient  indicated  she  has  continued  as  able  with  some  of  her  HEP.  Patient  again  declining  visit  for  today.  Plan  to  re [place]ie [place]  12/17/24

## 2024-12-16 ENCOUNTER — OFFICE VISIT (OUTPATIENT)
Dept: FAMILY MEDICINE CLINIC | Facility: CLINIC | Age: 79
End: 2024-12-16
Payer: MEDICARE

## 2024-12-16 VITALS
DIASTOLIC BLOOD PRESSURE: 60 MMHG | HEIGHT: 61 IN | OXYGEN SATURATION: 98 % | TEMPERATURE: 96.2 F | HEART RATE: 65 BPM | WEIGHT: 180.2 LBS | BODY MASS INDEX: 34.02 KG/M2 | SYSTOLIC BLOOD PRESSURE: 110 MMHG

## 2024-12-16 DIAGNOSIS — N18.31 STAGE 3A CHRONIC KIDNEY DISEASE (HCC): Chronic | ICD-10-CM

## 2024-12-16 DIAGNOSIS — E03.9 ACQUIRED HYPOTHYROIDISM: Chronic | ICD-10-CM

## 2024-12-16 DIAGNOSIS — Z23 ENCOUNTER FOR IMMUNIZATION: ICD-10-CM

## 2024-12-16 DIAGNOSIS — Z23 NEED FOR INFLUENZA VACCINATION: ICD-10-CM

## 2024-12-16 DIAGNOSIS — E78.2 MIXED HYPERLIPIDEMIA: ICD-10-CM

## 2024-12-16 DIAGNOSIS — K52.9 GASTROENTERITIS: Primary | ICD-10-CM

## 2024-12-16 DIAGNOSIS — I10 ESSENTIAL HYPERTENSION: Chronic | ICD-10-CM

## 2024-12-16 PROCEDURE — 90471 IMMUNIZATION ADMIN: CPT

## 2024-12-16 PROCEDURE — 90662 IIV NO PRSV INCREASED AG IM: CPT

## 2024-12-16 PROCEDURE — 99214 OFFICE O/P EST MOD 30 MIN: CPT | Performed by: FAMILY MEDICINE

## 2024-12-16 RX ORDER — ONDANSETRON 4 MG/1
4 TABLET, FILM COATED ORAL EVERY 8 HOURS PRN
Qty: 20 TABLET | Refills: 0 | Status: SHIPPED | OUTPATIENT
Start: 2024-12-16

## 2024-12-16 RX ORDER — ACETAMINOPHEN 160 MG
2000 TABLET,DISINTEGRATING ORAL DAILY
Qty: 90 CAPSULE | Refills: 1 | Status: SHIPPED | OUTPATIENT
Start: 2024-12-16

## 2024-12-16 NOTE — PROGRESS NOTES
Name: Ariadna Jernigan      : 1945      MRN: [id number]  Encounter Provider: Olivier Atkins MD  Encounter Date: 2024   Encounter department: [facility]      Assessment & Plan  Gastroenteritis  Acute symptomatic discussed with the patient brat diet keep well hydration monitor for fever if no improvement to call the office  Orders:  •  ondansetron (ZOFRAN) 4 mg tablet; Take 1 tablet (4 mg total) by mouth every 8 (eight) hours as needed for nausea or vomiting    Mixed hyperlipidemia  Chronic asymptomatic encourage patient to continue current dose of statin atorvastatin 80 mg once a day  Orders:  •  Lipid Panel with Direct LDL reflex; Future    Acquired hypothyroidism  Chronic asymptomatic fair control continue with the levothyroxine 100 mcg once a day proper use review  Orders:  •  TSH, 3rd generation with Free T4 reflex; Future    Essential hypertension  Chronic asymptomatic fair control encourage patient to continue current management she is on losartan 100 mg hydrochlorothiazide 25 mg diltiazem to 40 once a day follow-up with the cardiology periodically  Orders:  •  CBC and differential; Future  •  Comprehensive metabolic panel; Future  •  Lipid Panel with Direct LDL reflex; Future    Stage 3 chronic kidney disease (HCC)  Lab Results   Component Value Date    EGFR 52 2024    EGFR 57 2024    EGFR 55 2024    CREATININE 1.02 2024    CREATININE 0.95 2024    CREATININE 0.98 2024     Orders:  •  CBC and differential; Future  •  Comprehensive metabolic panel; Future  •  Lipid Panel with Direct LDL reflex; Future    Encounter for immunization    Orders:  •  influenza vaccine, high-dose, PF 0.5 mL (Fluzone High Dose)    Need for influenza vaccination    Orders:  •  influenza vaccine, high-dose, PF 0.5 mL (Fluzone High Dose)         History of Present Illness     Patient here follow-up with a chronic condition and she is not complaining about  "GI problems she been having cramps and diarrhea for the last 2 days no blood in the stools no mucus no fever no chills no decrease in oral intake no recent travel no sick contact patient history of hypertension hyperlipidemia compliant with the medication tolerated well without side effect she is also had history of hypothyroidism on levothyroxine tolerated well compliant with the medication recent blood work review      Review of Systems   Constitutional:  Negative for chills and fever.   HENT:  Negative for ear pain and sore throat.    Eyes:  Negative for pain and visual disturbance.   Respiratory:  Negative for cough and shortness of breath.    Cardiovascular:  Negative for chest pain and palpitations.   Gastrointestinal:  Positive for diarrhea. Negative for abdominal pain, constipation, nausea and vomiting.   Genitourinary:  Negative for dysuria and hematuria.   Musculoskeletal:  Positive for gait problem.   Skin:  Negative for color change and rash.   Neurological:  Negative for seizures and syncope.   All other systems reviewed and are negative.    Objective     /60 (BP Location: Left arm, Patient Position: Sitting)   Pulse 65   Temp (!) 96.2 °F (35.7 °C) (Tympanic)   Ht 5' 1\" (1.549 m)   Wt 81.7 kg (180 lb 3.2 oz)   SpO2 98%   BMI 34.05 kg/m²     Physical Exam  Vitals and nursing note reviewed.   Constitutional:       General: She is not in acute distress.     Appearance: She is well-developed. She is not diaphoretic.   HENT:      Head: Normocephalic.      Right Ear: Tympanic membrane and external ear normal.      Left Ear: Tympanic membrane and external ear normal.      Nose: No rhinorrhea.      Mouth/Throat:      Pharynx: No posterior oropharyngeal erythema.   Eyes:      General:         Right eye: No discharge.         Left eye: No discharge.      Conjunctiva/sclera: Conjunctivae normal.   Neck:      Vascular: No JVD.   Cardiovascular:      Rate and Rhythm: Normal rate and regular rhythm.      " Heart sounds: Normal heart sounds.      No gallop.   Pulmonary:      Effort: Pulmonary effort is normal. No respiratory distress.      Breath sounds: Normal breath sounds. No wheezing.   Abdominal:      General: There is no distension.      Palpations: Abdomen is soft.      Tenderness: There is no abdominal tenderness. There is no rebound.   Musculoskeletal:         General: No tenderness.      Cervical back: Normal range of motion and neck supple.   Lymphadenopathy:      Cervical: No cervical adenopathy.   Skin:     General: Skin is warm.      Findings: No rash.   Neurological:      Mental Status: She is alert and oriented to person, place, and time.      Gait: Gait abnormal.         Oliiver Atkins MD

## 2024-12-16 NOTE — ASSESSMENT & PLAN NOTE
Lab Results   Component Value Date    EGFR 52 08/06/2024    EGFR 57 06/12/2024    EGFR 55 06/11/2024    CREATININE 1.02 08/06/2024    CREATININE 0.95 06/12/2024    CREATININE 0.98 06/11/2024     Orders:  •  CBC and differential; Future  •  Comprehensive metabolic panel; Future  •  Lipid Panel with Direct LDL reflex; Future

## 2024-12-16 NOTE — ASSESSMENT & PLAN NOTE
Chronic asymptomatic fair control continue with the levothyroxine 100 mcg once a day proper use review  Orders:  •  TSH, 3rd generation with Free T4 reflex; Future

## 2024-12-16 NOTE — ASSESSMENT & PLAN NOTE
Chronic asymptomatic encourage patient to continue current dose of statin atorvastatin 80 mg once a day  Orders:  •  Lipid Panel with Direct LDL reflex; Future

## 2024-12-16 NOTE — ASSESSMENT & PLAN NOTE
Chronic asymptomatic fair control encourage patient to continue current management she is on losartan 100 mg hydrochlorothiazide 25 mg diltiazem to 40 once a day follow-up with the cardiology periodically  Orders:  •  CBC and differential; Future  •  Comprehensive metabolic panel; Future  •  Lipid Panel with Direct LDL reflex; Future

## 2024-12-17 ENCOUNTER — HOME CARE VISIT (OUTPATIENT)
Dept: HOME HEALTH SERVICES | Facility: HOME HEALTHCARE | Age: 79
End: 2024-12-17
Payer: MEDICARE

## 2024-12-17 VITALS — DIASTOLIC BLOOD PRESSURE: 68 MMHG | HEART RATE: 74 BPM | SYSTOLIC BLOOD PRESSURE: 132 MMHG | OXYGEN SATURATION: 97 %

## 2024-12-17 PROCEDURE — G0151 HHCP-SERV OF PT,EA 15 MIN: HCPCS

## 2024-12-19 ENCOUNTER — HOME CARE VISIT (OUTPATIENT)
Dept: HOME HEALTH SERVICES | Facility: HOME HEALTHCARE | Age: 79
End: 2024-12-19
Payer: MEDICARE

## 2024-12-19 VITALS — OXYGEN SATURATION: 97 % | SYSTOLIC BLOOD PRESSURE: 126 MMHG | HEART RATE: 72 BPM | DIASTOLIC BLOOD PRESSURE: 62 MMHG

## 2024-12-19 PROCEDURE — G0151 HHCP-SERV OF PT,EA 15 MIN: HCPCS

## 2024-12-20 NOTE — CASE COMMUNICATION
Home  PT  goals  achieved.  Patient  continues  with  reports  of  moderate  to  significant  Right  knee  pain.  Patient  remains  independent  with  ambualtion  without  devcie  indoors  and  with  SPC  outdoors.  Patient  instructed  in HEP  and  remains  independent  with  same.    Patient  discharged  from  home  PT  services  effective  12/19/24

## 2024-12-26 DIAGNOSIS — R10.13 DYSPEPSIA: ICD-10-CM

## 2024-12-26 DIAGNOSIS — E78.5 HYPERLIPIDEMIA, UNSPECIFIED HYPERLIPIDEMIA TYPE: ICD-10-CM

## 2024-12-27 RX ORDER — PANTOPRAZOLE SODIUM 40 MG/1
TABLET, DELAYED RELEASE ORAL
Qty: 90 TABLET | Refills: 1 | Status: SHIPPED | OUTPATIENT
Start: 2024-12-27

## 2024-12-27 RX ORDER — EZETIMIBE 10 MG/1
10 TABLET ORAL DAILY
Qty: 90 TABLET | Refills: 1 | Status: SHIPPED | OUTPATIENT
Start: 2024-12-27

## 2025-01-06 DIAGNOSIS — M79.2 NEUROPATHIC PAIN: Primary | ICD-10-CM

## 2025-01-06 NOTE — TELEPHONE ENCOUNTER
Reason for call:   [x] Refill   [] Prior Auth  [] Other:     Office:   [] PCP/Provider -   [x] Specialty/Provider - Cindy Umanzor     Medication: pregabalin (LYRICA) 50 mg     Dose/Frequency: Take 50 mg by mouth 3 (three) times a day    Quantity: 90    Ph[facility]: [facility]    Does the patient have enough for 3 days? unknown  [] Yes   [] No - Send as HP to POD

## 2025-01-06 NOTE — TELEPHONE ENCOUNTER
10/29/2024 10/29/2024 Pregabalin (Capsule) 90.0 30 50 MG NA [facility]  09/30/2024 08/16/2024 Pregabalin (Capsule) 30.0 30 150 MG MD JAROCHO RODRÍGUEZ [facility]

## 2025-01-07 NOTE — TELEPHONE ENCOUNTER
I'm not sure why it was discontinued by her PCP. It may have been a mistake. Is she still taking 50mg TID?

## 2025-01-07 NOTE — TELEPHONE ENCOUNTER
RN attempted to reach pt's daughter as per demographics.  Left a detailed VMMOM for daughter Yessica to call back regarding a refill request for Lyrica.    Need to know if this was actually stopped by the pt's PCP as it seems to have been  If she still taking this?  50 mg three times a day?  Does it help?  Any side effects?  How many are left?

## 2025-01-08 ENCOUNTER — OFFICE VISIT (OUTPATIENT)
Dept: CARDIOLOGY CLINIC | Facility: CLINIC | Age: 80
End: 2025-01-08
Payer: MEDICARE

## 2025-01-08 VITALS
BODY MASS INDEX: 34.17 KG/M2 | DIASTOLIC BLOOD PRESSURE: 80 MMHG | HEART RATE: 59 BPM | SYSTOLIC BLOOD PRESSURE: 142 MMHG | WEIGHT: 181 LBS | HEIGHT: 61 IN

## 2025-01-08 DIAGNOSIS — I25.118 CORONARY ARTERY DISEASE OF NATIVE ARTERY OF NATIVE HEART WITH STABLE ANGINA PECTORIS (HCC): Primary | ICD-10-CM

## 2025-01-08 DIAGNOSIS — I65.23 BILATERAL CAROTID ARTERY STENOSIS: ICD-10-CM

## 2025-01-08 DIAGNOSIS — I10 ESSENTIAL HYPERTENSION: Chronic | ICD-10-CM

## 2025-01-08 DIAGNOSIS — N18.31 STAGE 3A CHRONIC KIDNEY DISEASE (HCC): Chronic | ICD-10-CM

## 2025-01-08 PROCEDURE — 93000 ELECTROCARDIOGRAM COMPLETE: CPT | Performed by: INTERNAL MEDICINE

## 2025-01-08 PROCEDURE — 99214 OFFICE O/P EST MOD 30 MIN: CPT | Performed by: INTERNAL MEDICINE

## 2025-01-08 NOTE — ASSESSMENT & PLAN NOTE
Less than 50% stenosis in carotid arteries bilaterally.  No bruits noted.  Suggest repeating carotid duplex study in June July 2025.

## 2025-01-08 NOTE — PROGRESS NOTES
Name: Ariadna Jernigan      : 1945      MRN: [id number]  Encounter Provider: Johnny Hinojosa MD  Encounter Date: 2025   Encounter department: [facility] CARDIOLOGY [place]    DIAGNOSES:  1. Unspecified coronary artery disease by cardiac catheterization in . Medically managed for small-vessel disease without significant occlusive disease.  2. Primary hypertension  3. Dyslipidemia  4. Carotid artery disease  5. Degenerative joint disease with cervical radiculopathy, chronic pain in both shoulders with bilateral bursitis.  6. Hypothyroidism  7.  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 Normal left ventricle size and systolic function, early, compensated grade 1 diastolic dysfunction.  Left ventricular ejection fraction estimated as around 65%.  2. Normal right ventricle size and systolic function.  3. Normal left and right atrial size.  4. Aortic valve sclerosis with some focal calcification.  No aortic valve stenosis or regurgitation.  5. Mitral annular calcification, mitral valvular sclerosis, trace mitral valve regurgitation.  6. No significant tricuspid valve regurgitation.  7. No obvious pulmonary hypertension.  8. No pericardial effusion.  Compared to report of previous echocardiogram from 6/3/2023 there is overall no significant change.    REGADENOSON NUCLEAR STRESS TEST Chiqui 3, 2022:  1. No ECG changes and no angina noted during stress test.  2. Normal myocardial perfusion scan with normal left ventricular function and normal regional motion.  EF of 67%.  3. Normal resting ECG with no significant changes and no significant arrhythmia noted during stress test.  4. Normal resting blood pressure with appropriate blood pressure response noted during stress test.      ECHOCARDIOGRAM 2022: Normal left ventricular cavity size, mild concentric LVH, normal left ventricular systolic function, EF 65%, grade 1 diastolic dysfunction, normal  right ventricular size and systolic function, normal left and right atrial size, intact interatrial septum, mild mitral annular calcification, trace mitral valve regurgitation, trace tricuspid valve regurgitation, no obvious pulmonary hypertension, no pulmonary hypertension and no pericardial effusion.     REGADENOSON NUCLEAR STRESS TEST in April 2019 the showed normal myocardial perfusion, hyperdynamic LV function with EF of 76%.    ECHOCARDIOGRAM in April 2019 showed normal left ventricular systolic function, EF 65%, grade 1 diastolic dysfunction, normal right ventricular size and function, normal left and right atrial size, mild mitral annular calcification without stenosis or regurgitation, mildly thickened aortic valve with no stenosis or regurgitation, no tricuspid or pulmonic valve regurgitation and no pulmonary hypertension and pericardial effusion.    Assessment & Plan  Coronary artery disease of native artery of native heart with stable angina pectoris (HCC)  Remote history of MI of unspecified vessel.  No history of interventions.  Has had no recent anginal-like symptoms.  Blood pressure is slightly elevated today she mentions that she has not taken her antihypertensives today.  Physical examination significant for increased BMI.  Lipids are very well-controlled.  Is on good medications including losartan and metoprolol succinate Cardizem CD ezetimibe and high intensity statin.    Advising to continue current medications.  Encourage him to try to be physically as much active as possible to try to lose weight and to continue to eat healthy.  Orders:    POCT ECG    Essential hypertension  Blood pressure slightly elevated.  She is on multiple medications.  She says that she has not taken her medications today.  Reinforced compliance with medications and monitoring.  If she is noted to have elevated blood pressures during follow-up visit with primary care physician then should consider adding spironolactone  to her medical regimen.       Bilateral carotid artery stenosis  Less than 50% stenosis in carotid arteries bilaterally.  No bruits noted.  Suggest repeating carotid duplex study in June July 2025.       Stage 3 chronic kidney disease (HCC)  Lab Results   Component Value Date    EGFR 52 08/06/2024    EGFR 57 06/12/2024    EGFR 55 06/11/2024    CREATININE 1.02 08/06/2024    CREATININE 0.95 06/12/2024    CREATININE 0.98 06/11/2024     Stable renal function.  Advise to avoid over-the-counter anti-inflammatory medications.           History of Present Illness   HPI  Ariadna Jernigan is a 79 y.o. female who presents regarding follow-up of her cardiac comorbidities including history of coronary artery disease dyslipidemia hypertension carotid artery disease and other comorbidities.  She was last seen by me in May 2024.  She is here for visit accompanied with her .    History obtained from: patient.  As she probably speaks [place] encounter is performed with help of [employer] telephone translation ( [id number])     She mentions that since last visit she has had no new diagnoses or hospitalizations or significant illnesses.  From a symptom perspective she reports she has been overall all right except for symptoms related to her arthritis.  She has multiple joint pains and this limits her physical activity.  Denies any chest pain or shortness of breath or palpitations or orthopnea or PND or worsening pedal edema.  Denies any recent falls or bleeding or excessive bruising.    Functional capacity status:  Moderate, limited due to knee pain and back pain   (Excellent- >10 METs; Good: (7-10 METs); Moderate (4-7 METs); Poor (<= 4 METs)     Any chronic stressors:  None   (feeling of poor health, financial problems, and social isolation etc).     Tobacco or alcohol dependence:  She does not smoke and she does not drink. She quit smoking in 2023     Current cardiac medications: Cardizem  mg once daily, HCTZ 25 mg  once daily, metoprolol succinate 50 mg daily, losartan 100 mg daily, clopidogrel 75 mg daily, ezetimibe 10 mg daily.     Last recent comprehensive blood work available:   Blood work 4/5/2024: Sodium 143 potassium 3.6 chloride 101 bicarb 32 BUN 20 creatinine 0.98 GFR 55  Normal liver function test  Blood work 8/6/2024 sodium 143 potassium 3.4 chloride 102 bicarb 31 BUN 19 creatinine 1.02 GFR 52  Total cholesterol 119 triglyceride 93 HDL 75 calculated LDL is 25    Vitamin D level 30.8  TSH 1.664  Hemoglobin A1c 5.6 on 4/12/2023  Serum protein electrophoresis October 2022 negative for monoclonal bands.     ECG:   Results for orders placed or performed in visit on 01/08/25   POCT ECG    Narrative    Sinus rhythm with borderline bradycardia HR 59 bpm, normal axis, borderline for degree AV block, normal other intervals, delayed R wave transition.  No significant chamber hypertrophy enlargement.  No changes of ischemia.       REVIEW OF SYSTEMS   Positive for: As noted above in HPI  Negative for: All remaining as reviewed below and in HPI.    SYSTEM SYMPTOMS REVIEWED:  General--weight change, fever, night sweats  Respiratory--cough, wheezing, shortness of breath, sputum production  Cardiovascular--chest pain, syncope, dyspnea on exertion, edema, decline in exercise tolerance, claudication   Gastrointestinal--persistent vomiting, diarrhea, abdominal distention, blood in stool   Muscular or skeletal--joint pain or swelling   Neurologic--headaches, syncope, abnormal movement  Hematologic--history of easy bruising and bleeding   Endocrine--thyroid enlargement, heat or cold intolerance, polyuria   Psychiatric--anxiety, depression     CURRENT MEDICATIONS LIST     Current Outpatient Medications:     atorvastatin (LIPITOR) 80 mg tablet, Take 1 tablet (80 mg total) by mouth daily, Disp: 90 tablet, Rfl: 3    Cholecalciferol (Vitamin D3) 50 MCG (2000 UT) capsule, TAKE ONE CAPSULE BY MOUTH ONCE DAILY, Disp: 90 capsule, Rfl: 1     clopidogrel (PLAVIX) 75 mg tablet, TAKE ONE TABLET BY MOUTH ONCE DAILY, Disp: 90 tablet, Rfl: 1    diltiazem (CARDIZEM CD) 240 mg 24 hr capsule, TAKE ONE CAPSULE BY MOUTH ONCE DAILY, Disp: 90 capsule, Rfl: 1    ezetimibe (ZETIA) 10 mg tablet, TAKE ONE TABLET BY MOUTH ONCE DAILY, Disp: 90 tablet, Rfl: 1    folic acid (FOLVITE) 1 mg tablet, Take 1 tablet (1,000 mcg total) by mouth daily, Disp: 90 tablet, Rfl: 12    hydroCHLOROthiazide 25 mg tablet, TAKE ONE TABLET BY MOUTH ONCE DAILY, Disp: 100 tablet, Rfl: 1    hydroxychloroquine (PLAQUENIL) 200 mg tablet, Take 1 tablet (200 mg total) by mouth daily with breakfast, Disp: 90 tablet, Rfl: 3    levothyroxine 100 mcg tablet, TAKE ONE TABLET BY MOUTH ONCE DAILY IN THE MORNING, Disp: 90 tablet, Rfl: 2    losartan (COZAAR) 100 MG tablet, TAKE ONE TABLET BY MOUTH ONCE DAILY, Disp: 100 tablet, Rfl: 1    Menthol-Camphor  MG PTCH, Apply 1 patch topically every 12 (twelve) hours. apply Right knee  Indications: ., Disp: , Rfl:     metoprolol succinate (TOPROL-XL) 50 mg 24 hr tablet, TAKE ONE TABLET BY MOUTH ONCE DAILY, Disp: 100 tablet, Rfl: 1    nitroglycerin (NITROSTAT) 0.4 mg SL tablet, Place 1 tablet under the tongue every 5 (five) minutes as needed for chest pain x 3 doses if chest pain persists call 911 not taking , Disp: , Rfl:     ondansetron (ZOFRAN) 4 mg tablet, Take 1 tablet (4 mg total) by mouth every 8 (eight) hours as needed for nausea or vomiting, Disp: 20 tablet, Rfl: 0    pantoprazole (PROTONIX) 40 mg tablet, TAKE ONE TABLET BY MOUTH ONCE DAILY BEFORE BREAKFAST, Disp: 90 tablet, Rfl: 1    predniSONE 5 mg tablet, Take 1 tablet (5 mg total) by mouth daily, Disp: 30 tablet, Rfl: 11    pregabalin (LYRICA) 50 mg capsule, Take 50 mg by mouth 3 (three) times a day., Disp: , Rfl:     Synvisc One 48 MG/6ML injection, , Disp: , Rfl:        ALLERGIES     Allergies   Allergen Reactions    Codeine GI Intolerance    Lyrica [Pregabalin] Other (See Comments)     Blurred  "vision and dizziness    Shingrix [Zoster Vac Recomb Adjuvanted] Rash       *-*-*-*-*-*-*-*-*-*-*-*-*-*-*-*-*-*-*-*-*-*-*-*-*-*-*-*-*-*-*-*-*-*-*-*-*-*-*-*-*-*-*-*-*-*-*-*-*-*-*-*-*-*-    Medical History Reviewed by provider this encounter:     .     Objective   /80   Pulse 59   Ht 5' 1\" (1.549 m)   Wt 82.1 kg (181 lb)   BMI 34.20 kg/m²      Physical Exam  Constitutional:       Appearance: She is obese.   HENT:      Mouth/Throat:      Mouth: Mucous membranes are moist.   Neck:      Vascular: No carotid bruit.   Cardiovascular:      Rate and Rhythm: Normal rate and regular rhythm.      Pulses: Normal pulses.      Heart sounds: Normal heart sounds.   Pulmonary:      Effort: Pulmonary effort is normal.      Breath sounds: Normal breath sounds.   Abdominal:      Palpations: Abdomen is soft.   Musculoskeletal:      Cervical back: Neck supple.      Right lower leg: No edema.      Left lower leg: No edema.   Skin:     General: Skin is warm and dry.   Neurological:      Mental Status: She is alert and oriented to person, place, and time. Mental status is at baseline.   Psychiatric:         Mood and Affect: Mood normal.           "

## 2025-01-08 NOTE — PATIENT INSTRUCTIONS
Assessment & Plan  Coronary artery disease of native artery of native heart with stable angina pectoris (HCC)  Remote history of MI of unspecified vessel.  No history of interventions.  Has had no recent anginal-like symptoms.  Blood pressure is slightly elevated today she mentions that she has not taken her antihypertensives today.  Physical examination significant for increased BMI.  Lipids are very well-controlled.  Is on good medications including losartan and metoprolol succinate Cardizem CD ezetimibe and high intensity statin.    Advising to continue current medications.  Encourage him to try to be physically as much active as possible to try to lose weight and to continue to eat healthy.  Orders:    POCT ECG    Essential hypertension  Blood pressure slightly elevated.  She is on multiple medications.  She says that she has not taken her medications today.  Reinforced compliance with medications and monitoring.  If she is noted to have elevated blood pressures during follow-up visit with primary care physician then should consider adding spironolactone to her medical regimen.       Bilateral carotid artery stenosis  Less than 50% stenosis in carotid arteries bilaterally.  No bruits noted.  Suggest repeating carotid duplex study in June July 2025.       Stage 3 chronic kidney disease (HCC)  Lab Results   Component Value Date    EGFR 52 08/06/2024    EGFR 57 06/12/2024    EGFR 55 06/11/2024    CREATININE 1.02 08/06/2024    CREATININE 0.95 06/12/2024    CREATININE 0.98 06/11/2024     Stable renal function.  Advise to avoid over-the-counter anti-inflammatory medications.

## 2025-01-08 NOTE — ASSESSMENT & PLAN NOTE
Blood pressure slightly elevated.  She is on multiple medications.  She says that she has not taken her medications today.  Reinforced compliance with medications and monitoring.  If she is noted to have elevated blood pressures during follow-up visit with primary care physician then should consider adding spironolactone to her medical regimen.

## 2025-01-08 NOTE — ASSESSMENT & PLAN NOTE
Remote history of MI of unspecified vessel.  No history of interventions.  Has had no recent anginal-like symptoms.  Blood pressure is slightly elevated today she mentions that she has not taken her antihypertensives today.  Physical examination significant for increased BMI.  Lipids are very well-controlled.  Is on good medications including losartan and metoprolol succinate Cardizem CD ezetimibe and high intensity statin.    Advising to continue current medications.  Encourage him to try to be physically as much active as possible to try to lose weight and to continue to eat healthy.  Orders:    POCT ECG

## 2025-01-08 NOTE — ASSESSMENT & PLAN NOTE
Lab Results   Component Value Date    EGFR 52 08/06/2024    EGFR 57 06/12/2024    EGFR 55 06/11/2024    CREATININE 1.02 08/06/2024    CREATININE 0.95 06/12/2024    CREATININE 0.98 06/11/2024     Stable renal function.  Advise to avoid over-the-counter anti-inflammatory medications.

## 2025-01-08 NOTE — TELEPHONE ENCOUNTER
Caller: Yessica    Doctor: Cindy Umanzor    Reason for call: PT is still taking 50 mg of Lyrica only once a day in the evening to help get bed rest. States 50 mg makes her droggy. Yes it does help her.     Call back#: [phone]

## 2025-01-08 NOTE — TELEPHONE ENCOUNTER
Caller: Pts daughter    Doctor/Office: [facility]    Call regarding :  Returning call from nurse    Call was transferred to: triage nurse Lizzette

## 2025-01-09 RX ORDER — PREGABALIN 50 MG/1
50 CAPSULE ORAL 3 TIMES DAILY
Qty: 90 CAPSULE | Refills: 0 | Status: SHIPPED | OUTPATIENT
Start: 2025-01-09

## 2025-01-15 PROBLEM — K52.9 GASTROENTERITIS: Status: RESOLVED | Noted: 2024-12-16 | Resolved: 2025-01-15

## 2025-01-16 DIAGNOSIS — I25.118 CORONARY ARTERY DISEASE OF NATIVE ARTERY OF NATIVE HEART WITH STABLE ANGINA PECTORIS (HCC): ICD-10-CM

## 2025-01-16 RX ORDER — ATORVASTATIN CALCIUM 80 MG/1
80 TABLET, FILM COATED ORAL DAILY
Qty: 90 TABLET | Refills: 1 | Status: SHIPPED | OUTPATIENT
Start: 2025-01-16

## 2025-02-21 ENCOUNTER — OFFICE VISIT (OUTPATIENT)
Dept: OBGYN CLINIC | Facility: MEDICAL CENTER | Age: 80
End: 2025-02-21
Payer: MEDICARE

## 2025-02-21 VITALS — WEIGHT: 185.2 LBS | HEIGHT: 61 IN | BODY MASS INDEX: 34.97 KG/M2

## 2025-02-21 DIAGNOSIS — M54.16 LUMBAR RADICULOPATHY: ICD-10-CM

## 2025-02-21 DIAGNOSIS — M25.361 INSTABILITY OF KNEE JOINT, RIGHT: Primary | ICD-10-CM

## 2025-02-21 DIAGNOSIS — M96.1 LUMBAR POST-LAMINECTOMY SYNDROME: ICD-10-CM

## 2025-02-21 DIAGNOSIS — M17.11 PRIMARY OSTEOARTHRITIS OF RIGHT KNEE: ICD-10-CM

## 2025-02-21 DIAGNOSIS — M13.0 POLYARTHROPATHY: ICD-10-CM

## 2025-02-21 DIAGNOSIS — M48.062 SPINAL STENOSIS OF LUMBAR REGION WITH NEUROGENIC CLAUDICATION: ICD-10-CM

## 2025-02-21 PROCEDURE — 99215 OFFICE O/P EST HI 40 MIN: CPT | Performed by: PHYSICIAN ASSISTANT

## 2025-02-21 NOTE — ASSESSMENT & PLAN NOTE
1.  Moderate medial compartment OA.  Pain significantly out of proportion to x-ray and clinical exam findings, cortisone injection no relief  2.  Cortisone injection performed 11- provided the patient with no meaningful relief, not even for several days.  She does complain of low back pain radiating down the leg, she complains of pain down the front of the tibia to her right big toe in which she has numbness  3.  In light of her pain out of proportion and x-ray findings will order MRI of the right knee to assess the weightbearing surfaces, rule out AVN and assess for any meniscal tear.  We will see her back after the MRI is complete.  4.  Patient does have a history of a laminectomy 25+ years ago and is currently treated through pain management for bilateral lower extremity radiculopathy and sciatica.  Patient last had a bilateral transforaminal epidural steroid injection on 9- at L2-3 the patient cannot recall if she had gotten any relief in her knee pain from that injection.  Per their notes, there was consideration for a right L4-5 transforaminal injection if pain down her leg continued but it does not appear she ever had gotten that injection.  It was discussed with her daughter who interpreted for her and the patient that certainly her pain generator could certainly well be her back and this has to be figured out.  We would not recommend any surgical intervention with her knee as on the surface, her back pain could certainly be causing the pain she feels in her knee which is out of proportion.  5.  We will see her back after the MRI.  If there are findings more consistent with the moderate arthritis, I believe the next step would be refer her back to pain management to consider either epidural injection or the transforaminal injection on the right side including L4-5 to see if this gives her a sense of relief and helps the pain radiating down the front of her leg to the big toe with numbness. If  that happens, we should see her shortly back after that injection and she was instructed to keep detailed records the first couple weeks of if it helps her perceived severe knee pain.  Orders:    MRI knee right wo contrast; Future

## 2025-02-21 NOTE — PROGRESS NOTES
Name: Ariadna Jernigan      : 1945      MRN: [id number]  Encounter Provider: Sahil Bartholomew PA-C  Encounter Date: 2025   Encounter department: [facility]  :  Assessment & Plan  Instability of knee joint, right  1.  Moderate medial compartment OA.  Pain significantly out of proportion to x-ray and clinical exam findings, cortisone injection no relief  2.  Cortisone injection performed 11- provided the patient with no meaningful relief, not even for several days.  She does complain of low back pain radiating down the leg, she complains of pain down the front of the tibia to her right big toe in which she has numbness  3.  In light of her pain out of proportion and x-ray findings will order MRI of the right knee to assess the weightbearing surfaces, rule out AVN and assess for any meniscal tear.  We will see her back after the MRI is complete.  4.  Patient does have a history of a laminectomy 25+ years ago and is currently treated through pain management for bilateral lower extremity radiculopathy and sciatica.  Patient last had a bilateral transforaminal epidural steroid injection on 2024 at L2-3 the patient cannot recall if she had gotten any relief in her knee pain from that injection.  Per their notes, there was consideration for a right L4-5 transforaminal injection if pain down her leg continued but it does not appear she ever had gotten that injection.  It was discussed with her daughter who interpreted for her and the patient that certainly her pain generator could certainly well be her back and this has to be figured out.  We would not recommend any surgical intervention with her knee as on the surface, her back pain could certainly be causing the pain she feels in her knee which is out of proportion.  5.  We will see her back after the MRI.  If there are findings more consistent with the moderate arthritis, I believe the next step would be  "refer her back to pain management to consider either epidural injection or the transforaminal injection on the right side including L4-5 to see if this gives her a sense of relief and helps the pain radiating down the front of her leg to the big toe with numbness. If that happens, we should see her shortly back after that injection and she was instructed to keep detailed records the first couple weeks of if it helps her perceived severe knee pain.  Orders:    MRI knee right wo contrast; Future    Primary osteoarthritis of right knee         Lumbar radiculopathy  Bilateral lower extremities       Spinal stenosis of lumbar region with neurogenic claudication         Lumbar post-laminectomy syndrome  History of laminectomy 25+ years ago unsure of level       Polyarthropathy  Seronegative RA per rheumatology notes as well as CPPD disease               No follow-ups on file.    I answered all of the patient's questions during the visit and provided education of the patient's condition during the visit.  The patient verbalized understanding of the information given and agrees with the plan.  This note was dictated using [employer] software.  It may contain errors including improperly dictated words.  Please contact physician directly for any questions.    Subjective   Chief Complaint:   Chief Complaint   Patient presents with    Right Knee - Follow-up         History of Present Illness     HPI    Ariadna Jernigan is a 79 y.o. female who presents for follow up for moderate right knee arthritis.  She is here today with her  and her daughter \"Yessica\" is interpreting for her on the phone as she speaks Telugu.  Patient was last here in November in which she received a right knee cortisone injection which she perceives was of very little to no benefit.  Prior to that, she has had viscosupplementation therapy as well which did not work as well.  At times she states the pain is significant and debilitating.  Last right knee " x-ray was done in 2024 which showed some moderate arthritis.  She also did home physical therapy which was prescribed last visit which did not help a lot with pain, she states it may have improved some of her strength.    **Patient does have a history of back surgery in the past with a laminectomy 25 years ago but it is unclear the level        Review of Systems  ROS:    See HPI for musculoskeletal review.   All other systems reviewed are negative     History:  Past Medical History:   Diagnosis Date    Arthritis     rheumatoid    Coronary artery disease     Disease of thyroid gland     Hyperlipidemia     Hypertension      Past Surgical History:   Procedure Laterality Date    BACK SURGERY      BREAST SURGERY      CARPAL TUNNEL RELEASE      CHOLECYSTECTOMY      HYSTERECTOMY      OOPHORECTOMY      ORTHOPEDIC SURGERY       Social History   Social History     Substance and Sexual Activity   Alcohol Use Not Currently     Social History     Substance and Sexual Activity   Drug Use Never     Social History     Tobacco Use   Smoking Status Former    Current packs/day: 0.00    Average packs/day: 0.1 packs/day for 52.0 years (5.2 ttl pk-yrs)    Types: Cigarettes    Start date:     Quit date:     Years since quittin.1    Passive exposure: Never   Smokeless Tobacco Never     Family History:   Family History   Problem Relation Age of Onset    No Known Problems Mother     Lung cancer Father     Ovarian cancer Sister     No Known Problems Sister     No Known Problems Sister     No Known Problems Maternal Grandmother     No Known Problems Maternal Grandfather     No Known Problems Paternal Grandmother     No Known Problems Paternal Grandfather     No Known Problems Maternal Aunt     No Known Problems Paternal Aunt     No Known Problems Paternal Aunt     Breast cancer Cousin 45       Current Outpatient Medications on File Prior to Visit   Medication Sig Dispense Refill    atorvastatin (LIPITOR) 80 mg tablet TAKE ONE  TABLET BY MOUTH ONCE DAILY 90 tablet 1    Cholecalciferol (Vitamin D3) 50 MCG (2000 UT) capsule TAKE ONE CAPSULE BY MOUTH ONCE DAILY 90 capsule 1    clopidogrel (PLAVIX) 75 mg tablet TAKE ONE TABLET BY MOUTH ONCE DAILY 90 tablet 1    diltiazem (CARDIZEM CD) 240 mg 24 hr capsule TAKE ONE CAPSULE BY MOUTH ONCE DAILY 90 capsule 1    ezetimibe (ZETIA) 10 mg tablet TAKE ONE TABLET BY MOUTH ONCE DAILY 90 tablet 1    folic acid (FOLVITE) 1 mg tablet Take 1 tablet (1,000 mcg total) by mouth daily 90 tablet 12    hydroCHLOROthiazide 25 mg tablet TAKE ONE TABLET BY MOUTH ONCE DAILY 100 tablet 1    hydroxychloroquine (PLAQUENIL) 200 mg tablet Take 1 tablet (200 mg total) by mouth daily with breakfast 90 tablet 3    levothyroxine 100 mcg tablet TAKE ONE TABLET BY MOUTH ONCE DAILY IN THE MORNING 90 tablet 2    losartan (COZAAR) 100 MG tablet TAKE ONE TABLET BY MOUTH ONCE DAILY 100 tablet 1    Menthol-Camphor  MG PTCH Apply 1 patch topically every 12 (twelve) hours. apply Right knee  Indications: .      metoprolol succinate (TOPROL-XL) 50 mg 24 hr tablet TAKE ONE TABLET BY MOUTH ONCE DAILY 100 tablet 1    nitroglycerin (NITROSTAT) 0.4 mg SL tablet Place 1 tablet under the tongue every 5 (five) minutes as needed for chest pain x 3 doses if chest pain persists call 911  not taking        ondansetron (ZOFRAN) 4 mg tablet Take 1 tablet (4 mg total) by mouth every 8 (eight) hours as needed for nausea or vomiting 20 tablet 0    pantoprazole (PROTONIX) 40 mg tablet TAKE ONE TABLET BY MOUTH ONCE DAILY BEFORE BREAKFAST 90 tablet 1    predniSONE 5 mg tablet Take 1 tablet (5 mg total) by mouth daily 30 tablet 11    pregabalin (LYRICA) 50 mg capsule Take 1 capsule (50 mg total) by mouth 3 (three) times a day 90 capsule 0    Synvisc One 48 MG/6ML injection        No current facility-administered medications on file prior to visit.     Allergies   Allergen Reactions    Codeine GI Intolerance    Lyrica [Pregabalin] Other (See Comments)     " Blurred vision and dizziness    Shingrix [Zoster Vac Recomb Adjuvanted] Rash          Objective   Ht 5' 1\" (1.549 m)   Wt 84 kg (185 lb 3.2 oz)   BMI 34.99 kg/m²          PE:  AAOx 3  WDWN  Hearing intact, no drainage from eyes  no audible wheezing  no abdominal distension  LE compartments soft, skin intact    Ortho Exam:  rightknee:    Appearance:  No  swelling   No  ecchymosis  No  obvious joint deformity   Yes  effusion , 1+  Palpation/Tenderness:  Yes  TTP over medial joint line  Yes  TTP over lateral joint line   No  TTP over patella  No  TTP over patellar tendon  No  TTP over pes anserine bursa  Active Range of Motion:  AROM: Right knee 0-1 15, left knee 0-135  Special Tests:  Valgus Stress Test: negative  Varus Stress Test: negative  Medial [facility]: negative  Lateral [place]: negative  Lachman: negative  Anterior/ Posterior Drawer: negative  HIPS:  Bilateral hip range of motion full with no reproduction of groin pain.  Internal rotation 25, external 50 bilateral with no reproduction of groin pain.  Flexion 115 bilaterally    Imaging Studies: Results Review Statement: I personally reviewed the following image studies in PACS and associated radiology reports: xray(s). My interpretation of the radiology images/reports is: 1.  X-ray right knee shows moderate medial compartment varus OA.  Lumbar spine film reviewed does show DDD lumbar spine from L3-S1.  There is an L4 on L5 anterolisthesis of about 15% and a degenerative at this disc as well.  AP projection of the spine does show good preservation of joint space bilaterally without fracture..      Procedures    Scribe Attestation      I,:   am acting as a scribe while in the presence of the attending physician.:       I,:   personally performed the services described in this documentation    as scribed in my presence.:               "

## 2025-03-03 ENCOUNTER — HOSPITAL ENCOUNTER (OUTPATIENT)
Dept: MRI IMAGING | Facility: HOSPITAL | Age: 80
Discharge: HOME | End: 2025-03-03
Payer: MEDICARE

## 2025-03-03 DIAGNOSIS — M25.361 INSTABILITY OF KNEE JOINT, RIGHT: ICD-10-CM

## 2025-03-03 PROCEDURE — 73721 MRI JNT OF LWR EXTRE W/O DYE: CPT

## 2025-03-14 ENCOUNTER — OFFICE VISIT (OUTPATIENT)
Dept: OBGYN CLINIC | Facility: MEDICAL CENTER | Age: 80
End: 2025-03-14
Payer: MEDICARE

## 2025-03-14 VITALS — HEIGHT: 61 IN | BODY MASS INDEX: 35.5 KG/M2 | WEIGHT: 188 LBS

## 2025-03-14 DIAGNOSIS — M17.11 PRIMARY OSTEOARTHRITIS OF RIGHT KNEE: Primary | ICD-10-CM

## 2025-03-14 DIAGNOSIS — M54.16 LUMBAR RADICULOPATHY: ICD-10-CM

## 2025-03-14 PROCEDURE — 99213 OFFICE O/P EST LOW 20 MIN: CPT | Performed by: ORTHOPAEDIC SURGERY

## 2025-03-14 NOTE — ASSESSMENT & PLAN NOTE
Patient has severe right knee osteoarthritis.   MRI was reviewed and discussed with the patient  Treatment options were discussed with patient today.   Patient is a surgical candidate at this time.   Injections: was offered steroid injection but declined. He has not had relief with past injections  We discussed return to spine and pain management for possible lumbar spine injections  Medications: Tylenol up to 3000 mg per day  PT: PT script provided. Patient encouraged to work on their motion.   Bracing: Patient declined knee brace.   Activity: Continue activity as tolerated.     Orders:    Ambulatory Referral to Physical Therapy; Future

## 2025-03-14 NOTE — PROGRESS NOTES
Name: Ariadna Jernigan      : 1945      MRN: [id number]  Encounter Provider: Nayla Heath DO  Encounter Date: 3/14/2025   Encounter department: [facility]  :  Assessment & Plan  Primary osteoarthritis of right knee  Patient has severe right knee osteoarthritis.   MRI was reviewed and discussed with the patient  Treatment options were discussed with patient today.   Patient is a surgical candidate at this time.   Injections: was offered steroid injection but declined. He has not had relief with past injections  We discussed return to spine and pain management for possible lumbar spine injections  Medications: Tylenol up to 3000 mg per day  PT: PT script provided. Patient encouraged to work on their motion.   Bracing: Patient declined knee brace.   Activity: Continue activity as tolerated.     Orders:    Ambulatory Referral to Physical Therapy; Future    Lumbar radiculopathy    Orders:    Ambulatory referral to Spine & Pain Management; Future        Return if symptoms worsen or fail to improve.    I answered all of the patient's questions during the visit and provided education of the patient's condition during the visit.  The patient verbalized understanding of the information given and agrees with the plan.  This note was dictated using [employer] software.  It may contain errors including improperly dictated words.  Please contact physician directly for any questions.    Subjective   Chief Complaint:   Chief Complaint   Patient presents with    Right Knee - Follow-up         History of Present Illness     HPI    Ariadna Jernigan is a 79 y.o. female who presents for follow up for of Right knee pain  Daughter was used as translation as patient does not speak English well  Patient states last visit she has no changes in her knee pain. She has medial knee pain with WB activities only. She states she is not using any medications and only an ace wrap around her knee.    She also reports continued radiating pain down her Right leg to her heel as well  She is here to discuss her recent MRI and treatment options      Review of Systems  ROS:    See HPI for musculoskeletal review.   All other systems reviewed are negative     History:  Past Medical History:   Diagnosis Date    Arthritis     rheumatoid    Coronary artery disease     Disease of thyroid gland     Hyperlipidemia     Hypertension      Past Surgical History:   Procedure Laterality Date    BACK SURGERY      BREAST SURGERY      CARPAL TUNNEL RELEASE      CHOLECYSTECTOMY      HYSTERECTOMY      OOPHORECTOMY      ORTHOPEDIC SURGERY       Social History   Social History     Substance and Sexual Activity   Alcohol Use Not Currently     Social History     Substance and Sexual Activity   Drug Use Never     Social History     Tobacco Use   Smoking Status Former    Current packs/day: 0.00    Average packs/day: 0.1 packs/day for 52.0 years (5.2 ttl pk-yrs)    Types: Cigarettes    Start date:     Quit date:     Years since quittin.2    Passive exposure: Never   Smokeless Tobacco Never     Family History:   Family History   Problem Relation Age of Onset    No Known Problems Mother     Lung cancer Father     Ovarian cancer Sister     No Known Problems Sister     No Known Problems Sister     No Known Problems Maternal Grandmother     No Known Problems Maternal Grandfather     No Known Problems Paternal Grandmother     No Known Problems Paternal Grandfather     No Known Problems Maternal Aunt     No Known Problems Paternal Aunt     No Known Problems Paternal Aunt     Breast cancer Cousin 45       Current Outpatient Medications on File Prior to Visit   Medication Sig Dispense Refill    atorvastatin (LIPITOR) 80 mg tablet TAKE ONE TABLET BY MOUTH ONCE DAILY 90 tablet 1    Cholecalciferol (Vitamin D3) 50 MCG (2000) capsule TAKE ONE CAPSULE BY MOUTH ONCE DAILY 90 capsule 1    clopidogrel (PLAVIX) 75 mg tablet TAKE ONE TABLET BY  "MOUTH ONCE DAILY 90 tablet 1    diltiazem (CARDIZEM CD) 240 mg 24 hr capsule TAKE ONE CAPSULE BY MOUTH ONCE DAILY 90 capsule 1    ezetimibe (ZETIA) 10 mg tablet TAKE ONE TABLET BY MOUTH ONCE DAILY 90 tablet 1    folic acid (FOLVITE) 1 mg tablet Take 1 tablet (1,000 mcg total) by mouth daily 90 tablet 12    hydroCHLOROthiazide 25 mg tablet TAKE ONE TABLET BY MOUTH ONCE DAILY 100 tablet 1    hydroxychloroquine (PLAQUENIL) 200 mg tablet Take 1 tablet (200 mg total) by mouth daily with breakfast 90 tablet 3    levothyroxine 100 mcg tablet TAKE ONE TABLET BY MOUTH ONCE DAILY IN THE MORNING 90 tablet 2    losartan (COZAAR) 100 MG tablet TAKE ONE TABLET BY MOUTH ONCE DAILY 100 tablet 1    Menthol-Camphor  MG PTCH Apply 1 patch topically every 12 (twelve) hours. apply Right knee  Indications: .      metoprolol succinate (TOPROL-XL) 50 mg 24 hr tablet TAKE ONE TABLET BY MOUTH ONCE DAILY 100 tablet 1    nitroglycerin (NITROSTAT) 0.4 mg SL tablet Place 1 tablet under the tongue every 5 (five) minutes as needed for chest pain x 3 doses if chest pain persists call 911  not taking        ondansetron (ZOFRAN) 4 mg tablet Take 1 tablet (4 mg total) by mouth every 8 (eight) hours as needed for nausea or vomiting 20 tablet 0    pantoprazole (PROTONIX) 40 mg tablet TAKE ONE TABLET BY MOUTH ONCE DAILY BEFORE BREAKFAST 90 tablet 1    predniSONE 5 mg tablet Take 1 tablet (5 mg total) by mouth daily 30 tablet 11    pregabalin (LYRICA) 50 mg capsule Take 1 capsule (50 mg total) by mouth 3 (three) times a day 90 capsule 0    Synvisc One 48 MG/6ML injection        No current facility-administered medications on file prior to visit.     Allergies   Allergen Reactions    Codeine GI Intolerance    Lyrica [Pregabalin] Other (See Comments)     Blurred vision and dizziness    Shingrix [Zoster Vac Recomb Adjuvanted] Rash          Objective   Ht 5' 1\" (1.549 m)   Wt 85.3 kg (188 lb)   BMI 35.52 kg/m²          PE:  AAOx 3  WDWN  Hearing " intact, no drainage from eyes  no audible wheezing  no abdominal distension  LE compartments soft, skin intact    Ortho Exam:  rightknee:    Appearance:  No  swelling   No  ecchymosis  No  obvious joint deformity   Yes  effusion , 1+  Palpation/Tenderness:  Yes  TTP over medial joint line  Yes  TTP over lateral joint line   No  TTP over patella  No  TTP over patellar tendon  No  TTP over pes anserine bursa  Active Range of Motion:  AROM: Right knee 5-105  Special Tests:  Valgus Stress Test: negative  Varus Stress Test: negative  Medial [facility]: negative  Lateral [facility]: negative  Lachman: negative  Anterior/ Posterior Drawer: negative  HIPS:  Bilateral hip range of motion full with no reproduction of groin pain.  Internal rotation 25, external 50 bilateral with no reproduction of groin pain.  Flexion 115 bilaterally    Imaging Studies: Results Review Statement: I personally reviewed the following image studies in PACS and associated radiology reports: MRI. My interpretation of the radiology images/reports is:  .   right knee: Medial meniscus tear . Severe medial compartment arthritis. Mild lateral and patellofemoral arthritis    Procedures  No procedures performed    Scribe Attestation      I,:  Giacomo Banuelos am acting as a scribe while in the presence of the attending physician.:       I,:  Nayla Heath DO personally performed the services described in this documentation    as scribed in my presence.:

## 2025-03-24 ENCOUNTER — OFFICE VISIT (OUTPATIENT)
Dept: PAIN MEDICINE | Facility: MEDICAL CENTER | Age: 80
End: 2025-03-24
Payer: MEDICARE

## 2025-03-24 ENCOUNTER — TELEPHONE (OUTPATIENT)
Dept: RADIOLOGY | Facility: MEDICAL CENTER | Age: 80
End: 2025-03-24

## 2025-03-24 VITALS — HEIGHT: 61 IN | WEIGHT: 185 LBS | BODY MASS INDEX: 34.93 KG/M2

## 2025-03-24 DIAGNOSIS — M54.16 LUMBAR RADICULITIS: Primary | ICD-10-CM

## 2025-03-24 PROCEDURE — 99214 OFFICE O/P EST MOD 30 MIN: CPT | Performed by: PHYSICAL MEDICINE & REHABILITATION

## 2025-03-24 PROCEDURE — G2211 COMPLEX E/M VISIT ADD ON: HCPCS | Performed by: PHYSICAL MEDICINE & REHABILITATION

## 2025-03-24 NOTE — TELEPHONE ENCOUNTER
Patient is being considered for a neuraxial injection, such as an epidural injection, nerve root block, etc. for the management of their pain.  However, the patient is currently on an anticoagulant per your orders.  The American Society of Regional Anest[employer] Guideline of neuraxial procedures and anti-coagulation indicates that medication should be held as follows:        Plavix for 7 days prior to Transforaminal Epidural Steroid Injection    Dr Atkins- Can Patient hold her Plavix for 7 days prior to this procedure?    ++++  (R) L5-S1 TFESI

## 2025-03-24 NOTE — TELEPHONE ENCOUNTER
Procedure scheduled 4/8/25.  Please call patient's daughter Yessica at [phone] with medication instructions (patient is Lao speaking).

## 2025-03-24 NOTE — PROGRESS NOTES
Assessment  1. Lumbar radiculitis        Plan  We'll schedule patient for right L5-S1 transforaminal epidural steroid injection. This may need to be repeated.  Complete risks and benefits including bleeding, infection, tissue reaction, allergic reaction were discussed. Verbal consent obtained.  She will need to hold Plavix, we will obtain consent from her prescribing physician.    My impressions and treatment recommendations were discussed in detail with the patient who verbalized understanding and had no further questions.  Discharge instructions were provided. I personally saw and examined the patient and I agree with the above discussed plan of care.    Orders Placed This Encounter   Procedures    FL spine and pain procedure     Standing Status:   Future     Expiration Date:   3/24/2026     Reason for Exam::   (R) L5-S1 TFESI     Anticoagulant hold needed?:   yes, clopidogrel     No orders of the defined types were placed in this encounter.      History of Present Illness    Ariadna Jernigan is a 79 y.o. female seen in follow-up regarding new pain in the right lower extremity radiating from her back.  This appears to be an L5-S1 pattern.  She does have foraminal narrowing on the right at L5-S1 on her MRI.  Currently rates the pain as a 7/10 which is constant described as sharp pain and limiting her ability to walk.    I have personally reviewed and/or updated the patient's past medical history, past surgical history, family history, social history, current medications, allergies, and vital signs today.     Review of Systems   Constitutional:  Negative for chills and fever.   HENT:  Negative for ear pain, hearing loss, sinus pain and sore throat.    Eyes:  Negative for pain and redness.   Respiratory:  Negative for shortness of breath and wheezing.    Cardiovascular:  Negative for palpitations and leg swelling.   Gastrointestinal:  Negative for abdominal pain, constipation, nausea and vomiting.   Endocrine:  Negative for polydipsia and polyuria.   Genitourinary:  Negative for difficulty urinating and hematuria.   Musculoskeletal:  Positive for back pain and gait problem. Negative for joint swelling and myalgias.   Skin:  Negative for rash.   Neurological:  Positive for weakness, numbness and headaches. Negative for dizziness.   Psychiatric/Behavioral:  Negative for confusion and sleep disturbance. The patient is not nervous/anxious.        Patient Active Problem List   Diagnosis    Essential hypertension    CAD (coronary artery disease)    Hyperlipidemia    Bilateral carotid artery stenosis    Cervical radiculopathy    Lumbar post-laminectomy syndrome    Stage 3 chronic kidney disease (HCC)    Cervical spinal stenosis    Thoracic radiculopathy    Chronic pain of both shoulders    Hypothyroidism    Osteoarthritis of knee    Polyarthropathy    Pseudogout involving multiple joints    Obesity, Class II, BMI 35-39.9    Need for pneumococcal 20-valent conjugate vaccination    Absence of bladder continence    Dyspepsia    Vitamin D deficiency    Seronegative inflammatory arthritis    Voice fatigue    Right knee pain    Ambulatory dysfunction    Spinal stenosis of thoracic region    Dysuria    Lumbar radiculopathy    Hypokalemia    Instability of knee joint, right       Past Medical History:   Diagnosis Date    Arthritis     rheumatoid    Coronary artery disease     Disease of thyroid gland     Hyperlipidemia     Hypertension        Past Surgical History:   Procedure Laterality Date    BACK SURGERY      BREAST SURGERY      CARPAL TUNNEL RELEASE      CHOLECYSTECTOMY      HYSTERECTOMY      OOPHORECTOMY      ORTHOPEDIC SURGERY         Family History   Problem Relation Age of Onset    No Known Problems Mother     Lung cancer Father     Ovarian cancer Sister     No Known Problems Sister     No Known Problems Sister     No Known Problems Maternal Grandmother     No Known Problems Maternal Grandfather     No Known Problems Paternal  Grandmother     No Known Problems Paternal Grandfather     No Known Problems Maternal Aunt     No Known Problems Paternal Aunt     No Known Problems Paternal Aunt     Breast cancer Cousin 45       Social History     Occupational History    Not on file   Tobacco Use    Smoking status: Former     Current packs/day: 0.00     Average packs/day: 0.1 packs/day for 52.0 years (5.2 ttl pk-yrs)     Types: Cigarettes     Start date:      Quit date:      Years since quittin.2     Passive exposure: Never    Smokeless tobacco: Never   Vaping Use    Vaping status: Never Used   Substance and Sexual Activity    Alcohol use: Not Currently    Drug use: Never    Sexual activity: Not on file       Current Outpatient Medications on File Prior to Visit   Medication Sig    atorvastatin (LIPITOR) 80 mg tablet TAKE ONE TABLET BY MOUTH ONCE DAILY    Cholecalciferol (Vitamin D3) 50 MCG (2000) capsule TAKE ONE CAPSULE BY MOUTH ONCE DAILY    clopidogrel (PLAVIX) 75 mg tablet TAKE ONE TABLET BY MOUTH ONCE DAILY    diltiazem (CARDIZEM CD) 240 mg 24 hr capsule TAKE ONE CAPSULE BY MOUTH ONCE DAILY    ezetimibe (ZETIA) 10 mg tablet TAKE ONE TABLET BY MOUTH ONCE DAILY    folic acid (FOLVITE) 1 mg tablet Take 1 tablet (1,000 mcg total) by mouth daily    hydroCHLOROthiazide 25 mg tablet TAKE ONE TABLET BY MOUTH ONCE DAILY    levothyroxine 100 mcg tablet TAKE ONE TABLET BY MOUTH ONCE DAILY IN THE MORNING    losartan (COZAAR) 100 MG tablet TAKE ONE TABLET BY MOUTH ONCE DAILY    Menthol-Camphor  MG PTCH Apply 1 patch topically every 12 (twelve) hours. apply Right knee  Indications: .    metoprolol succinate (TOPROL-XL) 50 mg 24 hr tablet TAKE ONE TABLET BY MOUTH ONCE DAILY    nitroglycerin (NITROSTAT) 0.4 mg SL tablet Place 1 tablet under the tongue every 5 (five) minutes as needed for chest pain x 3 doses if chest pain persists call 911  not taking      ondansetron (ZOFRAN) 4 mg tablet Take 1 tablet (4 mg total) by mouth every 8  "(eight) hours as needed for nausea or vomiting    pantoprazole (PROTONIX) 40 mg tablet TAKE ONE TABLET BY MOUTH ONCE DAILY BEFORE BREAKFAST    predniSONE 5 mg tablet Take 1 tablet (5 mg total) by mouth daily    pregabalin (LYRICA) 50 mg capsule Take 1 capsule (50 mg total) by mouth 3 (three) times a day    Synvisc One 48 MG/6ML injection     hydroxychloroquine (PLAQUENIL) 200 mg tablet Take 1 tablet (200 mg total) by mouth daily with breakfast     No current facility-administered medications on file prior to visit.       Allergies   Allergen Reactions    Codeine GI Intolerance    Lyrica [Pregabalin] Other (See Comments)     Blurred vision and dizziness    Shingrix [Zoster Vac Recomb Adjuvanted] Rash       Physical Exam    Ht 5' 1\" (1.549 m)   Wt 83.9 kg (185 lb)   BMI 34.96 kg/m²     Constitutional: normal, well developed, well nourished, alert, in no distress and non-toxic and no overt pain behavior.  Eyes: anicteric  HEENT: grossly intact  Neck: supple, symmetric, trachea midline and no masses   Pulmonary:even and unlabored  Psychiatric:Mood and affect appropriate  Neurologic:Cranial Nerves II-XII grossly intact  Musculoskeletal:normal, except for back and radiating right leg pain in an L5 or S1 distribution    Imaging  "

## 2025-03-24 NOTE — TELEPHONE ENCOUNTER
RN s/w Yessica, pt's daughter as per ALEXI on chart. Yessica will let her mother know that last dose of Plavix will be taken on 3/31 stop 4/1 up to and including 4/8 to restart 24 hours after procedure on 4/9.    Yessica verbalized understanding of same

## 2025-04-08 ENCOUNTER — APPOINTMENT (OUTPATIENT)
Dept: LAB | Facility: MEDICAL CENTER | Age: 80
End: 2025-04-08
Payer: MEDICARE

## 2025-04-08 ENCOUNTER — HOSPITAL ENCOUNTER (OUTPATIENT)
Dept: RADIOLOGY | Facility: MEDICAL CENTER | Age: 80
Discharge: HOME | End: 2025-04-08
Payer: MEDICARE

## 2025-04-08 ENCOUNTER — RESULTS FOLLOW-UP (OUTPATIENT)
Dept: FAMILY MEDICINE CLINIC | Facility: CLINIC | Age: 80
End: 2025-04-08

## 2025-04-08 VITALS
OXYGEN SATURATION: 98 % | RESPIRATION RATE: 20 BRPM | TEMPERATURE: 97.7 F | DIASTOLIC BLOOD PRESSURE: 74 MMHG | SYSTOLIC BLOOD PRESSURE: 114 MMHG | HEART RATE: 59 BPM

## 2025-04-08 DIAGNOSIS — E87.6 LOW POTASSIUM SYNDROME: Primary | ICD-10-CM

## 2025-04-08 DIAGNOSIS — E03.9 ACQUIRED HYPOTHYROIDISM: Chronic | ICD-10-CM

## 2025-04-08 DIAGNOSIS — M54.16 LUMBAR RADICULITIS: ICD-10-CM

## 2025-04-08 DIAGNOSIS — E78.2 MIXED HYPERLIPIDEMIA: ICD-10-CM

## 2025-04-08 DIAGNOSIS — I10 ESSENTIAL HYPERTENSION: Chronic | ICD-10-CM

## 2025-04-08 DIAGNOSIS — N18.31 STAGE 3A CHRONIC KIDNEY DISEASE (HCC): Chronic | ICD-10-CM

## 2025-04-08 LAB
ALBUMIN SERPL BCG-MCNC: 4 G/DL (ref 3.5–5)
ALP SERPL-CCNC: 44 U/L (ref 34–104)
ALT SERPL W P-5'-P-CCNC: 16 U/L (ref 7–52)
ANION GAP SERPL CALCULATED.3IONS-SCNC: 9 MMOL/L (ref 4–13)
AST SERPL W P-5'-P-CCNC: 21 U/L (ref 13–39)
BASOPHILS # BLD AUTO: 0.08 THOUSANDS/ÂΜL (ref 0–0.1)
BASOPHILS NFR BLD AUTO: 1 % (ref 0–1)
BILIRUB SERPL-MCNC: 0.56 MG/DL (ref 0.2–1)
BUN SERPL-MCNC: 16 MG/DL (ref 5–25)
CALCIUM SERPL-MCNC: 9.6 MG/DL (ref 8.4–10.2)
CHLORIDE SERPL-SCNC: 101 MMOL/L (ref 96–108)
CHOLEST SERPL-MCNC: 118 MG/DL (ref ?–200)
CO2 SERPL-SCNC: 33 MMOL/L (ref 21–32)
CREAT SERPL-MCNC: 0.91 MG/DL (ref 0.6–1.3)
EOSINOPHIL # BLD AUTO: 0.14 THOUSAND/ÂΜL (ref 0–0.61)
EOSINOPHIL NFR BLD AUTO: 2 % (ref 0–6)
ERYTHROCYTE [DISTWIDTH] IN BLOOD BY AUTOMATED COUNT: 14 % (ref 11.6–15.1)
GFR SERPL CREATININE-BSD FRML MDRD: 60 ML/MIN/1.73SQ M
GLUCOSE P FAST SERPL-MCNC: 89 MG/DL (ref 65–99)
HCT VFR BLD AUTO: 38.9 % (ref 34.8–46.1)
HDLC SERPL-MCNC: 78 MG/DL
HGB BLD-MCNC: 12 G/DL (ref 11.5–15.4)
IMM GRANULOCYTES # BLD AUTO: 0.04 THOUSAND/UL (ref 0–0.2)
IMM GRANULOCYTES NFR BLD AUTO: 0 % (ref 0–2)
LDLC SERPL CALC-MCNC: 27 MG/DL (ref 0–100)
LYMPHOCYTES # BLD AUTO: 2.47 THOUSANDS/ÂΜL (ref 0.6–4.47)
LYMPHOCYTES NFR BLD AUTO: 27 % (ref 14–44)
MCH RBC QN AUTO: 27.8 PG (ref 26.8–34.3)
MCHC RBC AUTO-ENTMCNC: 30.8 G/DL (ref 31.4–37.4)
MCV RBC AUTO: 90 FL (ref 82–98)
MONOCYTES # BLD AUTO: 0.87 THOUSAND/ÂΜL (ref 0.17–1.22)
MONOCYTES NFR BLD AUTO: 9 % (ref 4–12)
NEUTROPHILS # BLD AUTO: 5.66 THOUSANDS/ÂΜL (ref 1.85–7.62)
NEUTS SEG NFR BLD AUTO: 61 % (ref 43–75)
NRBC BLD AUTO-RTO: 0 /100 WBCS
PLATELET # BLD AUTO: 215 THOUSANDS/UL (ref 149–390)
PMV BLD AUTO: 12.3 FL (ref 8.9–12.7)
POTASSIUM SERPL-SCNC: 3.2 MMOL/L (ref 3.5–5.3)
PROT SERPL-MCNC: 6.3 G/DL (ref 6.4–8.4)
RBC # BLD AUTO: 4.32 MILLION/UL (ref 3.81–5.12)
SODIUM SERPL-SCNC: 143 MMOL/L (ref 135–147)
TRIGL SERPL-MCNC: 67 MG/DL (ref ?–150)
TSH SERPL DL<=0.05 MIU/L-ACNC: 3.35 UIU/ML (ref 0.45–4.5)
WBC # BLD AUTO: 9.26 THOUSAND/UL (ref 4.31–10.16)

## 2025-04-08 PROCEDURE — 85025 COMPLETE CBC W/AUTO DIFF WBC: CPT

## 2025-04-08 PROCEDURE — 80061 LIPID PANEL: CPT

## 2025-04-08 PROCEDURE — 84443 ASSAY THYROID STIM HORMONE: CPT

## 2025-04-08 PROCEDURE — 36415 COLL VENOUS BLD VENIPUNCTURE: CPT

## 2025-04-08 PROCEDURE — 80053 COMPREHEN METABOLIC PANEL: CPT

## 2025-04-08 PROCEDURE — 64483 NJX AA&/STRD TFRM EPI L/S 1: CPT | Performed by: PHYSICAL MEDICINE & REHABILITATION

## 2025-04-08 RX ORDER — PAPAVERINE HCL 150 MG
10 CAPSULE, EXTENDED RELEASE ORAL ONCE
Status: COMPLETED | OUTPATIENT
Start: 2025-04-08 | End: 2025-04-08

## 2025-04-08 RX ADMIN — DEXAMETHASONE SODIUM PHOSPHATE 10 MG: 10 INJECTION INTRAMUSCULAR; INTRAVENOUS at 09:23

## 2025-04-08 RX ADMIN — IOHEXOL 2 ML: 300 INJECTION, SOLUTION INTRAVENOUS at 09:22

## 2025-04-08 NOTE — H&P
History of Present Illness: The patient is a 79 y.o. female who presents with complaints of right back and leg pain    Past Medical History:   Diagnosis Date    Arthritis     rheumatoid    Coronary artery disease     Disease of thyroid gland     Hyperlipidemia     Hypertension        Past Surgical History:   Procedure Laterality Date    BACK SURGERY      BREAST SURGERY      CARPAL TUNNEL RELEASE      CHOLECYSTECTOMY      HYSTERECTOMY      OOPHORECTOMY      ORTHOPEDIC SURGERY           Current Outpatient Medications:     atorvastatin (LIPITOR) 80 mg tablet, TAKE ONE TABLET BY MOUTH ONCE DAILY, Disp: 90 tablet, Rfl: 1    Cholecalciferol (Vitamin D3) 50 MCG (2000 UT) capsule, TAKE ONE CAPSULE BY MOUTH ONCE DAILY, Disp: 90 capsule, Rfl: 1    clopidogrel (PLAVIX) 75 mg tablet, TAKE ONE TABLET BY MOUTH ONCE DAILY, Disp: 90 tablet, Rfl: 1    diltiazem (CARDIZEM CD) 240 mg 24 hr capsule, TAKE ONE CAPSULE BY MOUTH ONCE DAILY, Disp: 90 capsule, Rfl: 1    ezetimibe (ZETIA) 10 mg tablet, TAKE ONE TABLET BY MOUTH ONCE DAILY, Disp: 90 tablet, Rfl: 1    folic acid (FOLVITE) 1 mg tablet, Take 1 tablet (1,000 mcg total) by mouth daily, Disp: 90 tablet, Rfl: 12    hydroCHLOROthiazide 25 mg tablet, TAKE ONE TABLET BY MOUTH ONCE DAILY, Disp: 100 tablet, Rfl: 1    hydroxychloroquine (PLAQUENIL) 200 mg tablet, Take 1 tablet (200 mg total) by mouth daily with breakfast, Disp: 90 tablet, Rfl: 3    levothyroxine 100 mcg tablet, TAKE ONE TABLET BY MOUTH ONCE DAILY IN THE MORNING, Disp: 90 tablet, Rfl: 2    losartan (COZAAR) 100 MG tablet, TAKE ONE TABLET BY MOUTH ONCE DAILY, Disp: 100 tablet, Rfl: 1    Menthol-Camphor  MG PTCH, Apply 1 patch topically every 12 (twelve) hours. apply Right knee  Indications: ., Disp: , Rfl:     metoprolol succinate (TOPROL-XL) 50 mg 24 hr tablet, TAKE ONE TABLET BY MOUTH ONCE DAILY, Disp: 100 tablet, Rfl: 1    nitroglycerin (NITROSTAT) 0.4 mg SL tablet, Place 1 tablet under the tongue every 5 (five)  minutes as needed for chest pain x 3 doses if chest pain persists call 911 not taking , Disp: , Rfl:     ondansetron (ZOFRAN) 4 mg tablet, Take 1 tablet (4 mg total) by mouth every 8 (eight) hours as needed for nausea or vomiting, Disp: 20 tablet, Rfl: 0    pantoprazole (PROTONIX) 40 mg tablet, TAKE ONE TABLET BY MOUTH ONCE DAILY BEFORE BREAKFAST, Disp: 90 tablet, Rfl: 1    predniSONE 5 mg tablet, Take 1 tablet (5 mg total) by mouth daily, Disp: 30 tablet, Rfl: 11    pregabalin (LYRICA) 50 mg capsule, Take 1 capsule (50 mg total) by mouth 3 (three) times a day, Disp: 90 capsule, Rfl: 0    Synvisc One 48 MG/6ML injection, , Disp: , Rfl:     Allergies   Allergen Reactions    Codeine GI Intolerance    Lyrica [Pregabalin] Other (See Comments)     Blurred vision and dizziness    Shingrix [Zoster Vac Recomb Adjuvanted] Rash       Physical Exam:   Vitals:    04/08/25 0908   BP: 124/79   Pulse: 62   Resp: 20   Temp: 97.7 °F (36.5 °C)   SpO2: 98%     General: Awake, Alert, Oriented x 3, Mood and affect appropriate  Respiratory: Respirations even and unlabored  Cardiovascular: Peripheral pulses intact; no edema  Musculoskeletal Exam: right back and leg pain    ASA Score: 3    Patient/Chart Verification  Patient ID Verified: Verbal (VIA # [id number])  ID Band Applied: No  Consents Confirmed: To be obtained in the Procedural area  Interval H&P(within 24 hr) Complete (required for Outpatients and Surgery Admit only): To be obtained in the Procedural area  Allergies Reviewed: Yes  Anticoag/NSAID held?: Yes (Plavix LD 03/31, JE aware.)  Currently on antibiotics?: No    Assessment:   1. Lumbar radiculitis        Plan: (R) L5-S1 TFESI

## 2025-04-08 NOTE — DISCHARGE INSTR - LAB
Epidural Steroid Injection   WHAT YOU NEED TO KNOW:   An epidural steroid injection (SUMMER) is a procedure to inject steroid medicine into the epidural space. The epidural space is between your spinal cord and vertebrae. Steroids reduce inflammation and fluid buildup in your spine that may be causing pain. You may be given pain medicine along with the steroids.          ACTIVITY  Do not drive or operate machinery today.  No strenuous activity today - bending, lifting, etc.  You may resume normal activites starting tomorrow - start slowly and as tolerated.  You may shower today, but no tub baths or hot tubs.  You may have numbness for several hours from the local anesthetic. Please use caution and common sense, especially with weight-bearing activities.    CARE OF THE INJECTION SITE  If you have soreness or pain, apply ice to the area today (20 minutes on/20 minutes off).  Starting tomorrow, you may use warm, moist heat or ice if needed.  You may have an increase or change in your discomfort for 36-48 hours after your treatment.  Apply ice and continue with any pain medication you have been prescribed.  Notify the [facility] if you have any of the following: redness, drainage, swelling, headache, stiff neck or fever above 100°F.    SPECIAL INSTRUCTIONS  Our office will contact you in approximately 14 days for a progress report.    MEDICATIONS  Continue to take all routine medications. Resume Plavix tomorrow.   Our office may have instructed you to hold some medications.    As no general anesthesia was used in today's procedure, you should not experience any side effects related to anesthesia.     If you are diabetic, the steroids used in today's injection may temporarily increase your blood sugar levels after the first few days after your injection. Please keep a close eye on your sugars and alert the doctor who manages your diabetes if your sugars are significantly high from your baseline or you are  symptomatic.     If you have a problem specifically related to your procedure, please call our office at [phone].  Problems not related to your procedure should be directed to your primary care physician.

## 2025-04-09 ENCOUNTER — HOSPITAL ENCOUNTER (OUTPATIENT)
Dept: MAMMOGRAPHY | Facility: CLINIC | Age: 80
Discharge: HOME | End: 2025-04-09
Payer: MEDICARE

## 2025-04-09 VITALS — WEIGHT: 185 LBS | BODY MASS INDEX: 34.93 KG/M2 | HEIGHT: 61 IN

## 2025-04-09 DIAGNOSIS — Z12.31 ENCOUNTER FOR SCREENING MAMMOGRAM FOR MALIGNANT NEOPLASM OF BREAST: ICD-10-CM

## 2025-04-09 PROCEDURE — 77063 BREAST TOMOSYNTHESIS BI: CPT

## 2025-04-09 PROCEDURE — 77067 SCR MAMMO BI INCL CAD: CPT

## 2025-04-11 ENCOUNTER — OFFICE VISIT (OUTPATIENT)
Dept: FAMILY MEDICINE CLINIC | Facility: CLINIC | Age: 80
End: 2025-04-11
Payer: MEDICARE

## 2025-04-11 VITALS
HEART RATE: 58 BPM | TEMPERATURE: 96.3 F | SYSTOLIC BLOOD PRESSURE: 130 MMHG | HEIGHT: 61 IN | BODY MASS INDEX: 35.12 KG/M2 | DIASTOLIC BLOOD PRESSURE: 70 MMHG | OXYGEN SATURATION: 98 % | WEIGHT: 186 LBS

## 2025-04-11 DIAGNOSIS — E66.812 CLASS 2 SEVERE OBESITY DUE TO EXCESS CALORIES WITH SERIOUS COMORBIDITY AND BODY MASS INDEX (BMI) OF 35.0 TO 35.9 IN ADULT (HCC): ICD-10-CM

## 2025-04-11 DIAGNOSIS — E87.6 HYPOKALEMIA: ICD-10-CM

## 2025-04-11 DIAGNOSIS — I25.118 CORONARY ARTERY DISEASE OF NATIVE ARTERY OF NATIVE HEART WITH STABLE ANGINA PECTORIS (HCC): ICD-10-CM

## 2025-04-11 DIAGNOSIS — I10 ESSENTIAL HYPERTENSION: ICD-10-CM

## 2025-04-11 DIAGNOSIS — R10.13 DYSPEPSIA: ICD-10-CM

## 2025-04-11 DIAGNOSIS — E66.01 CLASS 2 SEVERE OBESITY DUE TO EXCESS CALORIES WITH SERIOUS COMORBIDITY AND BODY MASS INDEX (BMI) OF 35.0 TO 35.9 IN ADULT (HCC): ICD-10-CM

## 2025-04-11 DIAGNOSIS — Z00.00 MEDICARE ANNUAL WELLNESS VISIT, SUBSEQUENT: Primary | ICD-10-CM

## 2025-04-11 DIAGNOSIS — R07.89 OTHER CHEST PAIN: ICD-10-CM

## 2025-04-11 PROCEDURE — 93000 ELECTROCARDIOGRAM COMPLETE: CPT | Performed by: FAMILY MEDICINE

## 2025-04-11 PROCEDURE — G2211 COMPLEX E/M VISIT ADD ON: HCPCS | Performed by: FAMILY MEDICINE

## 2025-04-11 PROCEDURE — G0439 PPPS, SUBSEQ VISIT: HCPCS | Performed by: FAMILY MEDICINE

## 2025-04-11 PROCEDURE — 99214 OFFICE O/P EST MOD 30 MIN: CPT | Performed by: FAMILY MEDICINE

## 2025-04-11 RX ORDER — PANTOPRAZOLE SODIUM 40 MG/1
40 TABLET, DELAYED RELEASE ORAL 2 TIMES DAILY
Qty: 180 TABLET | Refills: 1 | Status: SHIPPED | OUTPATIENT
Start: 2025-04-11

## 2025-04-11 RX ORDER — CLOPIDOGREL BISULFATE 75 MG/1
75 TABLET ORAL DAILY
Qty: 90 TABLET | Refills: 1 | Status: SHIPPED | OUTPATIENT
Start: 2025-04-11

## 2025-04-11 RX ORDER — DILTIAZEM HYDROCHLORIDE 240 MG/1
240 CAPSULE, COATED, EXTENDED RELEASE ORAL DAILY
Qty: 90 CAPSULE | Refills: 1 | Status: SHIPPED | OUTPATIENT
Start: 2025-04-11

## 2025-04-11 RX ORDER — POTASSIUM CHLORIDE 750 MG/1
10 CAPSULE, EXTENDED RELEASE ORAL DAILY
Qty: 90 CAPSULE | Refills: 0 | Status: SHIPPED | OUTPATIENT
Start: 2025-04-11

## 2025-04-11 NOTE — ASSESSMENT & PLAN NOTE
Acute symptomatic EKG in the office no significant finding patient had history of coronary artery disease previous workup not significant patient have history of dyspepsia and have history of muscle and joint pain discussed with patient chest pain can be cardiac versus noncardiac recommend to increase her pantoprazole to twice a day and will reevaluate in 3-week but also inform the patient and symptoms get worse to go to the emergency room I will contact the cardiology and update him patient aware  Orders:  •  POCT ECG

## 2025-04-11 NOTE — PROGRESS NOTES
Name: Ariadna Jernigan      : 1945      MRN: [id number]  Encounter Provider: Olivier Atkins MD  Encounter Date: 2025   Encounter department: [facility]  :  Assessment & Plan  Medicare annual wellness visit, subsequent  Advice and education were given regarding nutrition, aerobic exercises, weight-bearing exercises, cardiovascular risk reduction, fall risk reduction, and age-appropriate supplements.     The patient was counseled regarding instructions for management, risk factor reductions, prognosis, risks and benefits of treatment options, patient and family education, and importance of compliance with treatment.       Other chest pain  Acute symptomatic EKG in the office no significant finding patient had history of coronary artery disease previous workup not significant patient have history of dyspepsia and have history of muscle and joint pain discussed with patient chest pain can be cardiac versus noncardiac recommend to increase her pantoprazole to twice a day and will reevaluate in 3-week but also inform the patient and symptoms get worse to go to the emergency room I will contact the cardiology and update him patient aware  Orders:  •  POCT ECG    Class 2 severe obesity due to excess calories with serious comorbidity and body mass index (BMI) of 35.0 to 35.9 in adult (HCC)  BMI Counseling: Body mass index is 35.14 kg/m². The BMI is above normal. Nutrition recommendations include reducing portion sizes, 3-5 servings of fruits/vegetables daily, and consuming healthier snacks. Exercise recommendations include moderate aerobic physical activity for 150 minutes/week.        Hypokalemia  Asymptomatic persistent possible secondary to diuretic recommend to start potassium 10 mEq once a day plan to repeat potassium level in 2-week  Orders:  •  potassium chloride (MICRO-K) 10 MEQ CR capsule; Take 1 capsule (10 mEq total) by mouth daily  •  Basic metabolic panel;  Future    Dyspepsia  Chronic symptomatic recommend to increase the pantoprazole to 40 mg twice a day 2 to 3 weeks and will reevaluate proper use and possible side effect review  Orders:  •  pantoprazole (PROTONIX) 40 mg tablet; Take 1 tablet (40 mg total) by mouth 2 (two) times a day    BMI Counseling: Body mass index is 35.14 kg/m². The BMI is above normal. Exercise recommendations include exercising 3-5 times per week.       Preventive health issues were discussed with patient, and age appropriate screening tests were ordered as noted in patient's After Visit Summary. Personalized health advice and appropriate referrals for health education or preventive services given if needed, as noted in patient's After Visit Summary.    History of Present Illness     Patient here for Medicare exam and patient not have history of coronary artery disease she complaining from pain in her epigastric area and radiated to her chest and feel chest pain no cough no wheezing no hematosis no dyspnea on exertion denies any change in her diet patient follow-up with cardiology and saw her recently she is compliant with her medication recent blood work review       Patient Care Team:  Olivier Atkins MD as PCP - General (Family Medicine)  Josef Maurice MD    Review of Systems   Constitutional:  Negative for chills and fever.   HENT:  Negative for ear pain and sore throat.    Eyes:  Negative for pain and visual disturbance.   Respiratory:  Negative for cough and shortness of breath.    Cardiovascular:  Positive for chest pain. Negative for palpitations.   Gastrointestinal:  Negative for abdominal pain, constipation, diarrhea and vomiting.   Genitourinary:  Negative for dysuria and hematuria.   Skin:  Negative for color change and rash.   Neurological:  Negative for seizures and syncope.   All other systems reviewed and are negative.    Medical History Reviewed by provider this encounter:  Tobacco  Allergies  Meds  Problems  Med Hx  Surg  Hx  Fam Hx       Annual Wellness Visit Questionnaire       Health Risk Assessment:   Patient rates overall health as poor. Patient feels that their physical health rating is slightly worse. Patient is satisfied with their life. Eyesight was rated as same. Hearing was rated as same. Patient feels that their emotional and mental health rating is same. Patients states they are never, rarely angry. Patient states they are never, rarely unusually tired/fatigued. Pain experienced in the last 7 days has been none. Patient states that she has experienced no weight loss or gain in last 6 months.     Fall Risk Screening:   In the past year, patient has experienced: no history of falling in past year      Urinary Incontinence Screening:   Patient has not leaked urine accidently in the last six months.     Home Safety:  Patient has trouble with stairs inside or outside of their home. Patient has working smoke alarms and has working carbon monoxide detector. Home safety hazards include: none.     Nutrition:   Current diet is Regular.     Medications:   Patient is not currently taking any over-the-counter supplements. Patient is not able to manage medications.     Activities of Daily Living (ADLs)/Instrumental Activities of Daily Living (IADLs):     Dress and groom yourself?: Yes    Bathe or shower yourself?: Yes    Feed yourself? Yes  Do your laundry/housekeeping?: Yes  Manage your money, pay your bills and track your expenses?: Yes  Make your own meals?: Yes    Do your own shopping?: Yes    Previous Hospitalizations:   Any hospitalizations or ED visits within the last 12 months?: Yes    How many hospitalizations have you had in the last year?: 1-2    Advance Care Planning:     Durable POA for healthcare: Yes      Cognitive Screening:   Provider or family/friend/caregiver concerned regarding cognition?: No    Preventive Screenings      Cardiovascular Screening:    General: Screening Not Indicated and History Lipid Disorder       Diabetes Screening:     General: Screening Current      Colorectal Cancer Screening:     General: Patient Declines      Breast Cancer Screening:     General: Screening Current      Cervical Cancer Screening:    General: Screening Not Indicated      Abdominal Aortic Aneurysm (AAA) Screening:        General: Screening Not Indicated      Lung Cancer Screening:     General: Screening Not Indicated      Hepatitis C Screening:    General: Screening Current    Immunizations:  - Immunizations due: Zoster (Shingrix)  - Risks/benefits immunizations discussed    - The patient declines recommended vaccines currently despite my recommendations      Screening, Brief Intervention, and Referral to Treatment (SBIRT)     Screening      AUDIT-C Screenin) How often did you have a drink containing alcohol in the past year? never  2) How many drinks did you have on a typical day when you were drinking in the past year? 0  3) How often did you have 6 or more drinks on one occasion in the past year? never    AUDIT-C Score: 0  Interpretation: Score 0-2 (female): Negative screen for alcohol misuse    Single Item Drug Screening:  How often have you used an illegal drug (including marijuana) or a prescription medication for non-medical reasons in the past year? never    Single Item Drug Screen Score: 0  Interpretation: Negative screen for possible drug use disorder    Brief Intervention  Alcohol & drug use screenings were reviewed. No concerns regarding substance use disorder identified.     Review of Current Opioid Use    Opioid Risk Tool (ORT) Interpretation: Complete Opioid Risk Tool (ORT)    Social Drivers of Health     Food Insecurity: No Food Insecurity (2025)    Hunger Vital Sign    • Worried About Running Out of Food in the Last Year: Never true    • Ran Out of Food in the Last Year: Never true   Transportation Needs: No Transportation Needs (2025)    PRAPARE - Transportation    • Lack of Transportation (Medical): No     "• Lack of Transportation (Non-Medical): No   Housing Stability: Low Risk  (4/11/2025)    Housing Stability Vital Sign    • Unable to Pay for Housing in the Last Year: No    • Number of Times Moved in the Last Year: 0    • Homeless in the Last Year: No   Utilities: Not At Risk (4/11/2025)    [facility] Utilities    • Threatened with loss of utilities: No     No results found.    Objective   /70   Pulse 58   Temp (!) 96.3 °F (35.7 °C)   Ht 5' 1\" (1.549 m)   Wt 84.4 kg (186 lb)   SpO2 98%   BMI 35.14 kg/m²     Physical Exam  Vitals and nursing note reviewed.   Constitutional:       General: She is not in acute distress.     Appearance: She is well-developed. She is not diaphoretic.   HENT:      Head: Normocephalic.      Right Ear: Tympanic membrane and external ear normal.      Left Ear: Tympanic membrane and external ear normal.      Nose: No rhinorrhea.      Mouth/Throat:      Pharynx: No posterior oropharyngeal erythema.   Eyes:      General:         Right eye: No discharge.         Left eye: No discharge.      Conjunctiva/sclera: Conjunctivae normal.   Neck:      Vascular: No JVD.   Cardiovascular:      Rate and Rhythm: Normal rate and regular rhythm.      Heart sounds: Normal heart sounds.      No gallop.   Pulmonary:      Effort: Pulmonary effort is normal. No respiratory distress.      Breath sounds: Normal breath sounds. No wheezing.   Abdominal:      General: There is no distension.      Palpations: Abdomen is soft.      Tenderness: There is no abdominal tenderness. There is no rebound.   Musculoskeletal:         General: No tenderness.      Cervical back: Normal range of motion and neck supple.   Lymphadenopathy:      Cervical: No cervical adenopathy.   Skin:     General: Skin is warm.      Findings: No rash.   Neurological:      Mental Status: She is alert and oriented to person, place, and time.         "

## 2025-04-11 NOTE — PATIENT INSTRUCTIONS
Medicare Preventive Visit Patient Instructions  Thank you for completing your Welcome to Medicare Visit or Medicare Annual Wellness Visit today. Your next wellness visit will be due in one year (4/12/2026).  The screening/preventive services that you may require over the next 5-10 years are detailed below. Some tests may not apply to you based off risk factors and/or age. Screening tests ordered at today's visit but not completed yet may show as past due. Also, please note that scanned in results may not display below.  Preventive Screenings:  Service Recommendations Previous Testing/Comments   Colorectal Cancer Screening  * Colonoscopy    * Fecal Occult Blood Test (FOBT)/Fecal Immunochemical Test (FIT)  * Fecal DNA/Cologuard Test  * Flexible Sigmoidoscopy Age: 45-75 years old   Colonoscopy: every 10 years (may be performed more frequently if at higher risk)  OR  FOBT/FIT: every 1 year  OR  Cologuard: every 3 years  OR  Sigmoidoscopy: every 5 years  Screening may be recommended earlier than age 45 if at higher risk for colorectal cancer. Also, an individualized decision between you and your healthcare provider will decide whether screening between the ages of 76-85 would be appropriate. Colonoscopy: Not on file  FOBT/FIT: Not on file  Cologuard: Not on file  Sigmoidoscopy: Not on file          Breast Cancer Screening Age: 40+ years old  Frequency: every 1-2 years  Not required if history of left and right mastectomy Mammogram: 04/09/2025    Screening Current   Cervical Cancer Screening Between the ages of 21-29, pap smear recommended once every 3 years.   Between the ages of 30-65, can perform pap smear with HPV co-testing every 5 years.   Recommendations may differ for women with a history of total hysterectomy, cervical cancer, or abnormal pap smears in past. Pap Smear: Not on file    Screening Not Indicated   Hepatitis C Screening Once for adults born between 1945 and 1965  More frequently in patients at high  risk for Hepatitis C Hep C Antibody: 10/20/2022    Screening Current   Diabetes Screening 1-2 times per year if you're at risk for diabetes or have pre-diabetes Fasting glucose: 89 mg/dL (4/8/2025)  A1C: 5.6 % (4/12/2023)  Screening Current   Cholesterol Screening Once every 5 years if you don't have a lipid disorder. May order more often based on risk factors. Lipid panel: 04/08/2025    Screening Not Indicated  History Lipid Disorder     Other Preventive Screenings Covered by Medicare:  Abdominal Aortic Aneurysm (AAA) Screening: covered once if your at risk. You're considered to be at risk if you have a family history of AAA.  Lung Cancer Screening: covers low dose CT scan once per year if you meet all of the following conditions: (1) Age 55-77; (2) No signs or symptoms of lung cancer; (3) Current smoker or have quit smoking within the last 15 years; (4) You have a tobacco smoking history of at least 20 pack years (packs per day multiplied by number of years you smoked); (5) You get a written order from a healthcare provider.  Glaucoma Screening: covered annually if you're considered high risk: (1) You have diabetes OR (2) Family history of glaucoma OR (3)  aged 50 and older OR (4)  American aged 65 and older  Osteoporosis Screening: covered every 2 years if you meet one of the following conditions: (1) You're estrogen deficient and at risk for osteoporosis based off medical history and other findings; (2) Have a vertebral abnormality; (3) On glucocorticoid therapy for more than 3 months; (4) Have primary hyperparathyroidism; (5) On osteoporosis medications and need to assess response to drug therapy.   Last bone density test (DXA Scan): 12/13/2023.  HIV Screening: covered annually if you're between the age of 15-65. Also covered annually if you are younger than 15 and older than 65 with risk factors for HIV infection. For pregnant patients, it is covered up to 3 times per  pregnancy.    Immunizations:  Immunization Recommendations   Influenza Vaccine Annual influenza vaccination during flu season is recommended for all persons aged >= 6 months who do not have contraindications   Pneumococcal Vaccine   * Pneumococcal conjugate vaccine = PCV13 (Prevnar 13), PCV15 (Vaxneuvance), PCV20 (Prevnar 20)  * Pneumococcal polysaccharide vaccine = PPSV23 (Pneumovax) Adults 19-65 yo with certain risk factors or if 65+ yo  If never received any pneumonia vaccine: recommend Prevnar 20 (PCV20)  Give PCV20 if previously received 1 dose of PCV13 or PPSV23   Hepatitis B Vaccine 3 dose series if at intermediate or high risk (ex: diabetes, end stage renal disease, liver disease)   Respiratory syncytial virus (RSV) Vaccine - COVERED BY MEDICARE PART D  * RSVPreF3 (Arexvy) CDC recommends that adults 60 years of age and older may receive a single dose of RSV vaccine using shared clinical decision-making (SCDM)   Tetanus (Td) Vaccine - COST NOT COVERED BY MEDICARE PART B Following completion of primary series, a booster dose should be given every 10 years to maintain immunity against tetanus. Td may also be given as tetanus wound prophylaxis.   Tdap Vaccine - COST NOT COVERED BY MEDICARE PART B Recommended at least once for all adults. For pregnant patients, recommended with each pregnancy.   Shingles Vaccine (Shingrix) - COST NOT COVERED BY MEDICARE PART B  2 shot series recommended in those 19 years and older who have or will have weakened immune systems or those 50 years and older     Health Maintenance Due:      Topic Date Due   • Breast Cancer Screening: Mammogram  04/03/2025   • Hepatitis C Screening  Completed     Immunizations Due:      Topic Date Due   • COVID-19 Vaccine (4 - 2024-25 season) 09/01/2024     Advance Directives   What are advance directives?  Advance directives are legal documents that state your wishes and plans for medical care. These plans are made ahead of time in case you lose your  ability to make decisions for yourself. Advance directives can apply to any medical decision, such as the treatments you want, and if you want to donate organs.   What are the types of advance directives?  There are many types of advance directives, and each state has rules about how to use them. You may choose a combination of any of the following:  Living will:  This is a written record of the treatment you want. You can also choose which treatments you do not want, which to limit, and which to stop at a certain time. This includes surgery, medicine, IV fluid, and tube feedings.   Durable power of  for healthcare (DPAHC):  This is a written record that states who you want to make healthcare choices for you when you are unable to make them for yourself. This person, called a proxy, is usually a family member or a friend. You may choose more than 1 proxy.  Do not resuscitate (DNR) order:  A DNR order is used in case your heart stops beating or you stop breathing. It is a request not to have certain forms of treatment, such as CPR. A DNR order may be included in other types of advance directives.  Medical directive:  This covers the care that you want if you are in a coma, near death, or unable to make decisions for yourself. You can list the treatments you want for each condition. Treatment may include pain medicine, surgery, blood transfusions, dialysis, IV or tube feedings, and a ventilator (breathing machine).  Values history:  This document has questions about your views, beliefs, and how you feel and think about life. This information can help others choose the care that you would choose.  Why are advance directives important?  An advance directive helps you control your care. Although spoken wishes may be used, it is better to have your wishes written down. Spoken wishes can be misunderstood, or not followed. Treatments may be given even if you do not want them. An advance directive may make it easier  for your family to make difficult choices about your care.   Weight Management   Why it is important to manage your weight:  Being overweight increases your risk of health conditions such as heart disease, high blood pressure, type 2 diabetes, and certain types of cancer. It can also increase your risk for osteoarthritis, sleep apnea, and other respiratory problems. Aim for a slow, steady weight loss. Even a small amount of weight loss can lower your risk of health problems.  How to lose weight safely:  A safe and healthy way to lose weight is to eat fewer calories and get regular exercise. You can lose up about 1 pound a week by decreasing the number of calories you eat by 500 calories each day.   Healthy meal plan for weight management:  A healthy meal plan includes a variety of foods, contains fewer calories, and helps you stay healthy. A healthy meal plan includes the following:  Eat whole-grain foods more often.  A healthy meal plan should contain fiber. Fiber is the part of grains, fruits, and vegetables that is not broken down by your body. Whole-grain foods are healthy and provide extra fiber in your diet. Some examples of whole-grain foods are whole-wheat breads and pastas, oatmeal, brown rice, and bulgur.  Eat a variety of vegetables every day.  Include dark, leafy greens such as spinach, kale, ankur greens, and mustard greens. Eat yellow and orange vegetables such as carrots, sweet potatoes, and winter squash.   Eat a variety of fruits every day.  Choose fresh or canned fruit (canned in its own juice or light syrup) instead of juice. Fruit juice has very little or no fiber.  Eat low-fat dairy foods.  Drink fat-free (skim) milk or 1% milk. Eat fat-free yogurt and low-fat cottage cheese. Try low-fat cheeses such as mozzarella and other reduced-fat cheeses.  Choose meat and other protein foods that are low in fat.  Choose beans or other legumes such as split peas or lentils. Choose fish, skinless poultry  (chicken or turkey), or lean cuts of red meat (beef or pork). Before you cook meat or poultry, cut off any visible fat.   Use less fat and oil.  Try baking foods instead of frying them. Add less fat, such as margarine, sour cream, regular salad dressing and mayonnaise to foods. Eat fewer high-fat foods. Some examples of high-fat foods include french fries, doughnuts, ice cream, and cakes.  Eat fewer sweets.  Limit foods and drinks that are high in sugar. This includes candy, cookies, regular soda, and sweetened drinks.  Exercise:  Exercise at least 30 minutes per day on most days of the week. Some examples of exercise include walking, biking, dancing, and swimming. You can also fit in more physical activity by taking the stairs instead of the elevator or parking farther away from stores. Ask your healthcare provider about the best exercise plan for you.   Narcotic (Opioid) Safety    Use narcotics safely:  Take prescribed narcotics exactly as directed  Do not give narcotics to others or take narcotics that belong to someone else  Do not mix narcotics without medicines or alcohol  Do not drive or operate heavy machinery after you take the narcotic  Monitor for side effects and notify your healthcare provider if you experienced side effects such as nausea, sleepiness, itching, or trouble thinking clearly.    Manage constipation:    Constipation is the most common side effect of narcotic medicine. Constipation is when you have hard, dry bowel movements, or you go longer than usual between bowel movements. Tell your healthcare provider about all changes in your bowel movements while you are taking narcotics. He or she may recommend laxative medicine to help you have a bowel movement. He or she may also change the kind of narcotic you are taking, or change when you take it. The following are more ways you can prevent or relieve constipation:    Drink liquids as directed.  You may need to drink extra liquids to help soften  and move your bowels. Ask how much liquid to drink each day and which liquids are best for you.  Eat high-fiber foods.  This may help decrease constipation by adding bulk to your bowel movements. High-fiber foods include fruits, vegetables, whole-grain breads and cereals, and beans. Your healthcare provider or dietitian can help you create a high-fiber meal plan. Your provider may also recommend a fiber supplement if you cannot get enough fiber from food.  Exercise regularly.  Regular physical activity can help stimulate your intestines. Walking is a good exercise to prevent or relieve constipation. Ask which exercises are best for you.  Schedule a time each day to have a bowel movement.  This may help train your body to have regular bowel movements. Bend forward while you are on the toilet to help move the bowel movement out. Sit on the toilet for at least 10 minutes, even if you do not have a bowel movement.    Store narcotics safely:   Store narcotics where others cannot easily get them.  Keep them in a locked cabinet or secure area. Do not  keep them in a purse or other bag you carry with you. A person may be looking for something else and find the narcotics.  Make sure narcotics are stored out of the reach of children.  A child can easily overdose on narcotics. Narcotics may look like candy to a small child.    The best way to dispose of narcotics:      The laws vary by country and area. In the [place], the best way is to return the narcotics through a take-back program. This program is offered by the [place] Drug Enforcement Agency (JOLIE). The following are options for using the program:  Take the narcotics to a JOLIE collection site.  The site is often a law enforcement center. Call your local law enforcement center for scheduled take-back days in your area. You will be given information on where to go if the collection site is in a different location.  Take the narcotics to an approved pharmacy or hospital.  A  pharmacy or hospital may be set up as a collection site. You will need to ask if it is a JOLIE collection site if you were not directed there. A pharmacy or doctor's office may not be able to take back narcotics unless it is a JOLIE site.  Use a mail-back system.  This means you are given containers to put the narcotics into. You will then mail them in the containers.  Use a take-back drop box.  This is a place to leave the narcotics at any time. People and animals will not be able to get into the box. Your local law enforcement agency can tell you where to find a drop box in your area.    Other ways to manage pain:   Ask your healthcare provider about non-narcotic medicines to control pain.  Nonprescription medicines include NSAIDs (such as ibuprofen) and acetaminophen. Prescription medicines include muscle relaxers, antidepressants, and steroids.  Pain may be managed without any medicines.  Some ways to relieve pain include massage, aromatherapy, or meditation. Physical or occupational therapy may also help.    For more information:   Drug Enforcement Administration  [address] [place] [id number]  Phone: 5- 462 - 540-3451  Web Address: https://[email or web address].[facility].gov/drug_disposal/    [place] Food and Drug Administration  [address] [place] [address]  Phone: 1- 410 - 573-7753  Web Address: http://www.fda.gov     © Copyright [employer] 2018 Information is for End User's use only and may not be sold, redistributed or otherwise used for commercial purposes. All illustrations and images included in CareNotes® are the copyrighted property of [employer] Inc[employer] or [employer]

## 2025-04-11 NOTE — ASSESSMENT & PLAN NOTE
BMI Counseling: Body mass index is 35.14 kg/m². The BMI is above normal. Nutrition recommendations include reducing portion sizes, 3-5 servings of fruits/vegetables daily, and consuming healthier snacks. Exercise recommendations include moderate aerobic physical activity for 150 minutes/week.

## 2025-04-11 NOTE — ASSESSMENT & PLAN NOTE
Chronic symptomatic recommend to increase the pantoprazole to 40 mg twice a day 2 to 3 weeks and will reevaluate proper use and possible side effect review  Orders:  •  pantoprazole (PROTONIX) 40 mg tablet; Take 1 tablet (40 mg total) by mouth 2 (two) times a day

## 2025-04-11 NOTE — ASSESSMENT & PLAN NOTE
Asymptomatic persistent possible secondary to diuretic recommend to start potassium 10 mEq once a day plan to repeat potassium level in 2-week  Orders:  •  potassium chloride (MICRO-K) 10 MEQ CR capsule; Take 1 capsule (10 mEq total) by mouth daily  •  Basic metabolic panel; Future

## 2025-04-15 ENCOUNTER — RESULTS FOLLOW-UP (OUTPATIENT)
Dept: FAMILY MEDICINE CLINIC | Facility: CLINIC | Age: 80
End: 2025-04-15

## 2025-04-15 DIAGNOSIS — Z12.31 ENCOUNTER FOR SCREENING MAMMOGRAM FOR BREAST CANCER: Primary | ICD-10-CM

## 2025-04-15 NOTE — TELEPHONE ENCOUNTER
----- Message from Olivier Atkins MD sent at 4/15/2025  1:53 PM EDT -----  Normal mammogram repeat in one year

## 2025-04-22 ENCOUNTER — TELEPHONE (OUTPATIENT)
Dept: PAIN MEDICINE | Facility: CLINIC | Age: 80
End: 2025-04-22

## 2025-04-22 NOTE — TELEPHONE ENCOUNTER
Patient reports % improvement post inj for back, but leg and toes no improvement.  Pain level 7-8/10 in right leg.     Still experiencing numbness in legs/toes. Also has burning sensation. Is having trouble walking and putting weight on leg. Please give patient call back and advise.    #: [phone]

## 2025-04-24 ENCOUNTER — OFFICE VISIT (OUTPATIENT)
Dept: PAIN MEDICINE | Facility: MEDICAL CENTER | Age: 80
End: 2025-04-24
Payer: MEDICARE

## 2025-04-24 VITALS — HEIGHT: 61 IN | WEIGHT: 186 LBS | BODY MASS INDEX: 35.12 KG/M2

## 2025-04-24 DIAGNOSIS — M54.16 LUMBAR RADICULITIS: Primary | ICD-10-CM

## 2025-04-24 DIAGNOSIS — M54.41 CHRONIC BILATERAL LOW BACK PAIN WITH BILATERAL SCIATICA: ICD-10-CM

## 2025-04-24 DIAGNOSIS — M17.11 PRIMARY OSTEOARTHRITIS OF RIGHT KNEE: ICD-10-CM

## 2025-04-24 DIAGNOSIS — G89.29 CHRONIC BILATERAL LOW BACK PAIN WITH BILATERAL SCIATICA: ICD-10-CM

## 2025-04-24 DIAGNOSIS — M79.2 NEUROPATHIC PAIN: ICD-10-CM

## 2025-04-24 DIAGNOSIS — M96.1 LUMBAR POST-LAMINECTOMY SYNDROME: ICD-10-CM

## 2025-04-24 DIAGNOSIS — M54.42 CHRONIC BILATERAL LOW BACK PAIN WITH BILATERAL SCIATICA: ICD-10-CM

## 2025-04-24 PROCEDURE — G2211 COMPLEX E/M VISIT ADD ON: HCPCS

## 2025-04-24 PROCEDURE — 99214 OFFICE O/P EST MOD 30 MIN: CPT

## 2025-04-24 RX ORDER — PREGABALIN 50 MG/1
50 CAPSULE ORAL
Qty: 90 CAPSULE | Refills: 0 | Status: SHIPPED | OUTPATIENT
Start: 2025-04-24

## 2025-04-24 NOTE — PROGRESS NOTES
Assessment:  1. Lumbar radiculitis    2. Lumbar post-laminectomy syndrome    3. Chronic bilateral low back pain with bilateral sciatica    4. Neuropathic pain    5. Primary osteoarthritis of right knee        Plan:  I discussed with patient that I believe she would benefit from attending formal physical therapy sessions for lumbar strengthening and stretching.  Patient notes she is not able to attend in person physical therapy sessions due to lack of transportation.  Referral to home PT ordered.    Continue the use of Lyrica as prescribed.  Patient notes that she has not taken the daytime dosage of this medication in weeks/months due to side effect of sleepiness throughout the day.  Discussed with patient that she can take Lyrica 50 mg at bedtime.  Patient agreeable and verbalized understanding.  Refills of this medication were sent to patient's pharmacy today.    Continue to follow-up with orthopedics, Dr. Heath, for ongoing evaluation and treatment for severe osteoarthritis of right knee and chronic right knee pain.  Patient has a scheduled follow-up appointment with Dr. Heath on 5/7/2025.    Follow-up in 3 months, or sooner if needed.    Spanish , [place] # [id number], utilized via [employer] throughout patient encounter    My impressions and treatment recommendations were discussed in detail with the patient who verbalized understanding and had no further questions.  Discharge instructions were provided. I personally saw and examined the patient and I agree with the above discussed plan of care.    Orders Placed This Encounter   Procedures    Ambulatory referral to Physical Therapy     Standing Status:   Future     Expiration Date:   4/24/2026     Referral Priority:   Routine     Referral Type:   Physical Therapy     Referral Reason:   Specialty Services Required     Requested Specialty:   Physical Therapy     Number of Visits Requested:   1     Expiration Date:   4/24/2026     New Medications Ordered This  Visit   Medications    pregabalin (LYRICA) 50 mg capsule     Sig: Take 1 capsule (50 mg total) by mouth daily at bedtime     Dispense:  90 capsule     Refill:  0       History of Present Illness:  Ariadna Jernigan is a 79 y.o. female who presents for a follow up office visit after undergoing right L5-S1 TFESI on 4/8/2025.  Patient reports experiencing approximately 90% low back pain relief following this recent injection.      Patient does continue to note ongoing right knee pain with radiation into the right lower leg.  She describes this pain as a constant pain which she rates a 10/10 on the numeric rating scale.  Admits numbness and tingling of bilateral feet.  Admits the use of Lyrica for symptom management.  Patient is followed by orthopedics, Dr. Heath, for chronic right knee pain and primary osteoarthritis of right knee.  Dr. Heath recommended surgery, physical therapy, and knee bracing which patient declined.  Patient has upcoming follow-up appointment with Dr. Heath on 5/7/2025.    I have personally reviewed and/or updated the patient's past medical history, past surgical history, family history, social history, current medications, allergies, and vital signs today.     Review of Systems   Respiratory:  Negative for shortness of breath.    Cardiovascular:  Negative for chest pain.   Gastrointestinal:  Negative for constipation, diarrhea, nausea and vomiting.   Musculoskeletal:  Positive for back pain, gait problem, joint swelling and myalgias. Negative for arthralgias.   Skin:  Negative for rash.   Neurological:  Negative for dizziness, seizures and weakness.   All other systems reviewed and are negative.      Patient Active Problem List   Diagnosis    Essential hypertension    CAD (coronary artery disease)    Hyperlipidemia    Bilateral carotid artery stenosis    Cervical radiculopathy    Lumbar post-laminectomy syndrome    Stage 3 chronic kidney disease (HCC)    Cervical spinal stenosis    Thoracic  radiculopathy    Chronic pain of both shoulders    Hypothyroidism    Osteoarthritis of knee    Polyarthropathy    Pseudogout involving multiple joints    Class 2 severe obesity due to excess calories with serious comorbidity and body mass index (BMI) of 35.0 to 35.9 in adult (HCC)    Need for pneumococcal 20-valent conjugate vaccination    Absence of bladder continence    Dyspepsia    Vitamin D deficiency    Seronegative inflammatory arthritis    Voice fatigue    Right knee pain    Ambulatory dysfunction    Spinal stenosis of thoracic region    Dysuria    Lumbar radiculitis    Hypokalemia    Instability of knee joint, right    Other chest pain       Past Medical History:   Diagnosis Date    Arthritis     rheumatoid    Coronary artery disease     Disease of thyroid gland     Hyperlipidemia     Hypertension        Past Surgical History:   Procedure Laterality Date    BACK SURGERY      BREAST SURGERY      CARPAL TUNNEL RELEASE      CHOLECYSTECTOMY      HYSTERECTOMY      OOPHORECTOMY      ORTHOPEDIC SURGERY         Family History   Problem Relation Age of Onset    No Known Problems Mother     Lung cancer Father     Ovarian cancer Sister     Colon cancer Sister     Cancer Sister         bladder    No Known Problems Sister     No Known Problems Maternal Grandmother     No Known Problems Maternal Grandfather     No Known Problems Paternal Grandmother     No Known Problems Paternal Grandfather     No Known Problems Maternal Aunt     No Known Problems Paternal Aunt     No Known Problems Paternal Aunt     Breast cancer Cousin 45       Social History     Occupational History    Not on file   Tobacco Use    Smoking status: Former     Current packs/day: 0.00     Average packs/day: 0.1 packs/day for 52.0 years (5.2 ttl pk-yrs)     Types: Cigarettes     Start date:      Quit date: 2020     Years since quittin.3     Passive exposure: Never    Smokeless tobacco: Never   Vaping Use    Vaping status: Never Used   Substance and  Sexual Activity    Alcohol use: Not Currently    Drug use: Never    Sexual activity: Not on file       Current Outpatient Medications on File Prior to Visit   Medication Sig    atorvastatin (LIPITOR) 80 mg tablet TAKE ONE TABLET BY MOUTH ONCE DAILY    Cholecalciferol (Vitamin D3) 50 MCG (2000 UT) capsule TAKE ONE CAPSULE BY MOUTH ONCE DAILY    clopidogrel (PLAVIX) 75 mg tablet TAKE ONE TABLET BY MOUTH ONCE DAILY    diltiazem (CARDIZEM CD) 240 mg 24 hr capsule TAKE 1 CAPSULE BY MOUTH ONCE DAILY    ezetimibe (ZETIA) 10 mg tablet TAKE ONE TABLET BY MOUTH ONCE DAILY    folic acid (FOLVITE) 1 mg tablet Take 1 tablet (1,000 mcg total) by mouth daily    hydroCHLOROthiazide 25 mg tablet TAKE ONE TABLET BY MOUTH ONCE DAILY    levothyroxine 100 mcg tablet TAKE ONE TABLET BY MOUTH ONCE DAILY IN THE MORNING    losartan (COZAAR) 100 MG tablet TAKE ONE TABLET BY MOUTH ONCE DAILY    Menthol-Camphor  MG PTCH Apply 1 patch topically every 12 (twelve) hours. apply Right knee  Indications: .    metoprolol succinate (TOPROL-XL) 50 mg 24 hr tablet TAKE ONE TABLET BY MOUTH ONCE DAILY    nitroglycerin (NITROSTAT) 0.4 mg SL tablet Place 1 tablet under the tongue every 5 (five) minutes as needed for chest pain x 3 doses if chest pain persists call 911  not taking      ondansetron (ZOFRAN) 4 mg tablet Take 1 tablet (4 mg total) by mouth every 8 (eight) hours as needed for nausea or vomiting    pantoprazole (PROTONIX) 40 mg tablet Take 1 tablet (40 mg total) by mouth 2 (two) times a day    potassium chloride (MICRO-K) 10 MEQ CR capsule Take 1 capsule (10 mEq total) by mouth daily    [DISCONTINUED] pregabalin (LYRICA) 50 mg capsule Take 1 capsule (50 mg total) by mouth 3 (three) times a day    hydroxychloroquine (PLAQUENIL) 200 mg tablet Take 1 tablet (200 mg total) by mouth daily with breakfast    predniSONE 5 mg tablet Take 1 tablet (5 mg total) by mouth daily    Synvisc One 48 MG/6ML injection      No current facility-administered  "medications on file prior to visit.       Allergies   Allergen Reactions    Codeine GI Intolerance    Lyrica [Pregabalin] Other (See Comments)     Blurred vision and dizziness    Shingrix [Zoster Vac Recomb Adjuvanted] Rash       Physical Exam:    Ht 5' 1\" (1.549 m)   Wt 84.4 kg (186 lb)   BMI 35.14 kg/m²     Constitutional:normal, well developed, well nourished, alert, in no distress and non-toxic and no overt pain behavior.  Eyes:anicteric  HEENT:grossly intact  Neck:supple, symmetric, trachea midline and no masses   Pulmonary:even and unlabored  Cardiovascular:No edema or pitting edema present  Skin:Normal without rashes or lesions and well hydrated  Psychiatric:Mood and affect appropriate  Neurologic:Cranial Nerves II-XII grossly intact, diminished lower extremity strength bilaterally  Musculoskeletal:normal, except for right knee pain and low back pain    Imaging    "

## 2025-04-28 DIAGNOSIS — I10 ESSENTIAL HYPERTENSION: ICD-10-CM

## 2025-04-28 DIAGNOSIS — E03.9 ACQUIRED HYPOTHYROIDISM: ICD-10-CM

## 2025-04-29 RX ORDER — METOPROLOL SUCCINATE 50 MG/1
50 TABLET, EXTENDED RELEASE ORAL DAILY
Qty: 100 TABLET | Refills: 1 | Status: SHIPPED | OUTPATIENT
Start: 2025-04-29 | End: 2025-04-30 | Stop reason: SDUPTHER

## 2025-04-29 RX ORDER — LEVOTHYROXINE SODIUM 100 UG/1
100 TABLET ORAL DAILY
Qty: 90 TABLET | Refills: 1 | Status: SHIPPED | OUTPATIENT
Start: 2025-04-29 | End: 2025-04-30 | Stop reason: SDUPTHER

## 2025-04-30 ENCOUNTER — OFFICE VISIT (OUTPATIENT)
Dept: FAMILY MEDICINE CLINIC | Facility: CLINIC | Age: 80
End: 2025-04-30
Payer: MEDICARE

## 2025-04-30 VITALS
OXYGEN SATURATION: 98 % | HEART RATE: 65 BPM | HEIGHT: 61 IN | BODY MASS INDEX: 34.93 KG/M2 | TEMPERATURE: 96.5 F | DIASTOLIC BLOOD PRESSURE: 80 MMHG | SYSTOLIC BLOOD PRESSURE: 120 MMHG | WEIGHT: 185 LBS

## 2025-04-30 DIAGNOSIS — J01.00 ACUTE NON-RECURRENT MAXILLARY SINUSITIS: Primary | ICD-10-CM

## 2025-04-30 DIAGNOSIS — E03.9 ACQUIRED HYPOTHYROIDISM: ICD-10-CM

## 2025-04-30 DIAGNOSIS — I10 ESSENTIAL HYPERTENSION: ICD-10-CM

## 2025-04-30 PROCEDURE — 99214 OFFICE O/P EST MOD 30 MIN: CPT | Performed by: FAMILY MEDICINE

## 2025-04-30 PROCEDURE — G2211 COMPLEX E/M VISIT ADD ON: HCPCS | Performed by: FAMILY MEDICINE

## 2025-04-30 RX ORDER — LEVOTHYROXINE SODIUM 100 UG/1
100 TABLET ORAL DAILY
Qty: 90 TABLET | Refills: 2 | Status: SHIPPED | OUTPATIENT
Start: 2025-04-30

## 2025-04-30 RX ORDER — FLUTICASONE PROPIONATE 50 MCG
2 SPRAY, SUSPENSION (ML) NASAL DAILY
Qty: 16 G | Refills: 5 | Status: SHIPPED | OUTPATIENT
Start: 2025-04-30

## 2025-04-30 RX ORDER — METOPROLOL SUCCINATE 50 MG/1
50 TABLET, EXTENDED RELEASE ORAL DAILY
Qty: 100 TABLET | Refills: 2 | Status: SHIPPED | OUTPATIENT
Start: 2025-04-30

## 2025-04-30 NOTE — ASSESSMENT & PLAN NOTE
Chronic asymptomatic fair control continue current management low-salt diet important lose weight review patient also follow-up with cardiology periodically  Orders:  •  metoprolol succinate (TOPROL-XL) 50 mg 24 hr tablet; Take 1 tablet (50 mg total) by mouth daily

## 2025-04-30 NOTE — PROGRESS NOTES
Name: Ariadna Jernigan      : 1945      MRN: [id number]  Encounter Provider: Olivier Atkins MD  Encounter Date: 2025   Encounter department: [facility]  :  Assessment & Plan  Acute non-recurrent maxillary sinusitis  Acute we discussed supportive treatment with the patient and than 90% of sinus infection is viral patient symptom has been going on for more than 2 weeks recommend antibiotic proper use review  if patient symptom persistent to call the office  Rinse nose and sinuses with salt few times a day  Take over-the-counter med pain relief for  Use the congestion noted spray  Orders:  •  amoxicillin-clavulanate (AUGMENTIN) 875-125 mg per tablet; Take 1 tablet by mouth every 12 (twelve) hours for 7 days  •  fluticasone (FLONASE) 50 mcg/act nasal spray; 2 sprays into each nostril daily    Acquired hypothyroidism  Chronic asymptomatic fair control continue with the levothyroxine 100 mcg once a day  Orders:  •  levothyroxine 100 mcg tablet; Take 1 tablet (100 mcg total) by mouth daily    Essential hypertension  Chronic asymptomatic fair control continue current management low-salt diet important lose weight review patient also follow-up with cardiology periodically  Orders:  •  metoprolol succinate (TOPROL-XL) 50 mg 24 hr tablet; Take 1 tablet (50 mg total) by mouth daily      Assessment & Plan           History of Present Illness   Patient here follow-up with a chronic condition concerned about sinus congestion sinus pressure facial pain postnasal drip it has been going on for more than 2 weeks she been taking over-the-counter medication for NT congestion and is not helping recent blood work reviewed with the patient      Review of Systems   Constitutional:  Negative for chills and fever.   HENT:  Positive for congestion, sinus pressure and sinus pain. Negative for ear pain and sore throat.    Eyes:  Negative for pain and visual disturbance.   Respiratory:  Negative for cough and  "shortness of breath.    Cardiovascular:  Negative for chest pain and palpitations.   Gastrointestinal:  Negative for abdominal pain, constipation, diarrhea and vomiting.   Genitourinary:  Negative for dysuria and hematuria.   Skin:  Negative for color change and rash.   Neurological:  Negative for seizures and syncope.   All other systems reviewed and are negative.      Objective   /80 (BP Location: Left arm, Patient Position: Sitting, Cuff Size: Standard)   Pulse 65   Temp (!) 96.5 °F (35.8 °C) (Tympanic)   Ht 5' 1\" (1.549 m)   Wt 83.9 kg (185 lb)   SpO2 98%   BMI 34.96 kg/m²      Physical Exam  Vitals and nursing note reviewed.   Constitutional:       General: She is not in acute distress.     Appearance: She is well-developed. She is not diaphoretic.   HENT:      Head: Normocephalic.      Right Ear: Tympanic membrane and external ear normal.      Left Ear: Tympanic membrane and external ear normal.      Nose: No rhinorrhea.      Mouth/Throat:      Pharynx: No posterior oropharyngeal erythema.   Eyes:      General:         Right eye: No discharge.         Left eye: No discharge.      Conjunctiva/sclera: Conjunctivae normal.   Neck:      Vascular: No JVD.   Cardiovascular:      Rate and Rhythm: Normal rate and regular rhythm.      Heart sounds: Murmur heard.      No gallop.   Pulmonary:      Effort: Pulmonary effort is normal. No respiratory distress.      Breath sounds: Normal breath sounds. No wheezing.   Abdominal:      General: There is no distension.      Palpations: Abdomen is soft.      Tenderness: There is no abdominal tenderness. There is no rebound.   Musculoskeletal:      Cervical back: Normal range of motion and neck supple.   Lymphadenopathy:      Cervical: No cervical adenopathy.   Skin:     General: Skin is warm.      Findings: No rash.   Neurological:      Mental Status: She is alert and oriented to person, place, and time.         "

## 2025-04-30 NOTE — ASSESSMENT & PLAN NOTE
Chronic asymptomatic fair control continue with the levothyroxine 100 mcg once a day  Orders:  •  levothyroxine 100 mcg tablet; Take 1 tablet (100 mcg total) by mouth daily

## 2025-05-01 PROBLEM — J01.00 ACUTE NON-RECURRENT MAXILLARY SINUSITIS: Status: ACTIVE | Noted: 2025-05-01

## 2025-05-01 NOTE — ASSESSMENT & PLAN NOTE
Acute we discussed supportive treatment with the patient and than 90% of sinus infection is viral patient symptom has been going on for more than 2 weeks recommend antibiotic proper use review  if patient symptom persistent to call the office  Rinse nose and sinuses with salt few times a day  Take over-the-counter med pain relief for  Use the congestion noted spray  Orders:  •  amoxicillin-clavulanate (AUGMENTIN) 875-125 mg per tablet; Take 1 tablet by mouth every 12 (twelve) hours for 7 days  •  fluticasone (FLONASE) 50 mcg/act nasal spray; 2 sprays into each nostril daily

## 2025-05-07 ENCOUNTER — OFFICE VISIT (OUTPATIENT)
Dept: OBGYN CLINIC | Facility: MEDICAL CENTER | Age: 80
End: 2025-05-07
Payer: MEDICARE

## 2025-05-07 ENCOUNTER — APPOINTMENT (OUTPATIENT)
Dept: LAB | Facility: MEDICAL CENTER | Age: 80
End: 2025-05-07
Payer: MEDICARE

## 2025-05-07 VITALS — BODY MASS INDEX: 34.78 KG/M2 | WEIGHT: 184.2 LBS | HEIGHT: 61 IN

## 2025-05-07 DIAGNOSIS — E87.6 LOW POTASSIUM SYNDROME: ICD-10-CM

## 2025-05-07 DIAGNOSIS — M17.11 PRIMARY OSTEOARTHRITIS OF RIGHT KNEE: Primary | ICD-10-CM

## 2025-05-07 LAB — POTASSIUM SERPL-SCNC: 3.7 MMOL/L (ref 3.5–5.3)

## 2025-05-07 PROCEDURE — 84132 ASSAY OF SERUM POTASSIUM: CPT

## 2025-05-07 PROCEDURE — 36415 COLL VENOUS BLD VENIPUNCTURE: CPT

## 2025-05-07 PROCEDURE — 99213 OFFICE O/P EST LOW 20 MIN: CPT | Performed by: ORTHOPAEDIC SURGERY

## 2025-05-07 NOTE — PROGRESS NOTES
Name: Ariadna Jernigan      : 1945      MRN: [id number]  Encounter Provider: Nalya Heath DO  Encounter Date: 2025   Encounter department: [facility]  :  Assessment & Plan  Primary osteoarthritis of right knee    Orders:    Injection Procedure Prior Authorization; Future      Patient has severe right knee osteoarthritis.   Treatment options were discussed with patient today.  Patient is a surgical candidate at this time.   Injections: Prior authorization for orthovisc 3 series placed.   Medications: Tylenol up to 3000 mg per day and Lyrica 50mg  PT: Continue home exercises.   Activity: Continue activity as tolerated.   Discussed being evaluated by Podiatry for right foot pain  Plan for next appt:  Visco injection    Return for visco once available.    I answered all of the patient's questions during the visit and provided education of the patient's condition during the visit.  The patient verbalized understanding of the information given and agrees with the plan.  This note was dictated using [employer] software.  It may contain errors including improperly dictated words.  Please contact physician directly for any questions.    Subjective   Chief Complaint: No chief complaint on file.        History of Present Illness     HPI    Ariadna Jernigan is a 79 y.o. female who presents for follow up for right knee. She was previously seen on 3/14/25. She is currently being seen by spine in which she had 3 lumbar spine injections with the most recent being about 4 weeks ago. She got relief in her back but not in her leg. She is currently performing a HEP daily with no complications. She is interested in visco supplementation before considering a total knee      Length of time knee pain has been present: 1 year  Any falls or trauma associated with onset of pain: No  Location of pain: Medial joint line  Intermittent or constant: Only while active   Description of pain:  Numbing and burning  Aggravating factors: All movements, knee flexion  Instability or locking: No  Pain medication that has been tried: Tylenol, Lyrica from pain management   Topical mediation that has been tried: No  Has heat/ice/elevation been tried: Ice  Can NSAIDs be taken?  If not why?: No   Has PT or home exercises been tried?: HEP. Home PT denied  Has bracing been tried? OTC or rx?  No  Have injections been tried?  Steroid/visco?: 11/15/24 with no relief   Any history of surgery on that knee?:  No      Review of Systems  ROS:    See HPI for musculoskeletal review.   All other systems reviewed are negative     History:  Past Medical History:   Diagnosis Date    Arthritis     rheumatoid    Coronary artery disease     Disease of thyroid gland     Hyperlipidemia     Hypertension      Past Surgical History:   Procedure Laterality Date    BACK SURGERY      BREAST SURGERY      CARPAL TUNNEL RELEASE      CHOLECYSTECTOMY      HYSTERECTOMY      OOPHORECTOMY      ORTHOPEDIC SURGERY       Social History   Social History     Substance and Sexual Activity   Alcohol Use Not Currently     Social History     Substance and Sexual Activity   Drug Use Never     Social History     Tobacco Use   Smoking Status Former    Current packs/day: 0.00    Average packs/day: 0.1 packs/day for 52.0 years (5.2 ttl pk-yrs)    Types: Cigarettes    Start date:     Quit date:     Years since quittin.3    Passive exposure: Never   Smokeless Tobacco Never     Family History:   Family History   Problem Relation Age of Onset    No Known Problems Mother     Lung cancer Father     Ovarian cancer Sister     Colon cancer Sister     Cancer Sister         bladder    No Known Problems Sister     No Known Problems Maternal Grandmother     No Known Problems Maternal Grandfather     No Known Problems Paternal Grandmother     No Known Problems Paternal Grandfather     No Known Problems Maternal Aunt     No Known Problems Paternal Aunt     No Known  Problems Paternal Aunt     Breast cancer Cousin 45       Current Outpatient Medications on File Prior to Visit   Medication Sig Dispense Refill    amoxicillin-clavulanate (AUGMENTIN) 875-125 mg per tablet Take 1 tablet by mouth every 12 (twelve) hours for 7 days 14 tablet 0    atorvastatin (LIPITOR) 80 mg tablet TAKE ONE TABLET BY MOUTH ONCE DAILY 90 tablet 1    Cholecalciferol (Vitamin D3) 50 MCG (2000 UT) capsule TAKE ONE CAPSULE BY MOUTH ONCE DAILY 90 capsule 1    clopidogrel (PLAVIX) 75 mg tablet TAKE ONE TABLET BY MOUTH ONCE DAILY 90 tablet 1    diltiazem (CARDIZEM CD) 240 mg 24 hr capsule TAKE 1 CAPSULE BY MOUTH ONCE DAILY 90 capsule 1    ezetimibe (ZETIA) 10 mg tablet TAKE ONE TABLET BY MOUTH ONCE DAILY 90 tablet 1    fluticasone (FLONASE) 50 mcg/act nasal spray 2 sprays into each nostril daily 16 g 5    folic acid (FOLVITE) 1 mg tablet Take 1 tablet (1,000 mcg total) by mouth daily 90 tablet 12    hydroCHLOROthiazide 25 mg tablet TAKE ONE TABLET BY MOUTH ONCE DAILY 100 tablet 1    hydroxychloroquine (PLAQUENIL) 200 mg tablet Take 1 tablet (200 mg total) by mouth daily with breakfast 90 tablet 3    levothyroxine 100 mcg tablet Take 1 tablet (100 mcg total) by mouth daily 90 tablet 2    losartan (COZAAR) 100 MG tablet TAKE ONE TABLET BY MOUTH ONCE DAILY 100 tablet 1    Menthol-Camphor  MG PTCH Apply 1 patch topically every 12 (twelve) hours. apply Right knee  Indications: .      metoprolol succinate (TOPROL-XL) 50 mg 24 hr tablet Take 1 tablet (50 mg total) by mouth daily 100 tablet 2    nitroglycerin (NITROSTAT) 0.4 mg SL tablet Place 1 tablet under the tongue every 5 (five) minutes as needed for chest pain x 3 doses if chest pain persists call 911  not taking        ondansetron (ZOFRAN) 4 mg tablet Take 1 tablet (4 mg total) by mouth every 8 (eight) hours as needed for nausea or vomiting 20 tablet 0    pantoprazole (PROTONIX) 40 mg tablet Take 1 tablet (40 mg total) by mouth 2 (two) times a day 180  "tablet 1    potassium chloride (MICRO-K) 10 MEQ CR capsule Take 1 capsule (10 mEq total) by mouth daily 90 capsule 0    predniSONE 5 mg tablet Take 1 tablet (5 mg total) by mouth daily 30 tablet 11    pregabalin (LYRICA) 50 mg capsule Take 1 capsule (50 mg total) by mouth daily at bedtime 90 capsule 0    Synvisc One 48 MG/6ML injection        No current facility-administered medications on file prior to visit.     Allergies   Allergen Reactions    Codeine GI Intolerance    Lyrica [Pregabalin] Other (See Comments)     Blurred vision and dizziness    Shingrix [Zoster Vac Recomb Adjuvanted] Rash          Objective   Ht 5' 1\" (1.549 m)   Wt 83.6 kg (184 lb 3.2 oz)   BMI 34.80 kg/m²          PE:  AAOx 3  WDWN  Hearing intact, no drainage from eyes  no audible wheezing  no abdominal distension  LE compartments soft, skin intact    Ortho Exam:  rightknee:    Appearance:  no swelling   No bruising  no obvious joint deformity   No effusion  Palpation/Tenderness:  +TTP over medial joint line, no TTP over lateral joint line or over patella/patellar tendon  Active Range of Motion:  AROM: 0-125  Special Tests:  Medial Estephanie's Test:  Positive  Lateral Estephanie's Test:  Negative  Apley's compression test:  Negative  Lachman's Test:  negative  Anterior Drawer Test:  negative  Valgus Stress Test:  negative  Varus Stress Test:  negative      Imaging Studies: Results Review Statement: I personally reviewed the following image studies in PACS and associated radiology reports: xray(s). My interpretation of the radiology images/reports is: severe osteoarthritis of the right knee .      Procedures    Scribe Attestation      I,:  Facundo Rogers am acting as a scribe while in the presence of the attending physician.:       I,:  Nayla Heath DO personally performed the services described in this documentation    as scribed in my presence.:               "

## 2025-05-09 ENCOUNTER — RESULTS FOLLOW-UP (OUTPATIENT)
Dept: FAMILY MEDICINE CLINIC | Facility: CLINIC | Age: 80
End: 2025-05-09

## 2025-05-09 NOTE — TELEPHONE ENCOUNTER
----- Message from Olivier Atkins MD sent at 5/9/2025  9:36 AM EDT -----  Improving potassium level

## 2025-05-12 DIAGNOSIS — M11.20 CALCIUM PYROPHOSPHATE DEPOSITION DISEASE (CPPD): ICD-10-CM

## 2025-05-12 DIAGNOSIS — I10 ESSENTIAL HYPERTENSION: ICD-10-CM

## 2025-05-12 DIAGNOSIS — M25.551 BILATERAL HIP PAIN: ICD-10-CM

## 2025-05-12 DIAGNOSIS — M25.552 BILATERAL HIP PAIN: ICD-10-CM

## 2025-05-12 DIAGNOSIS — R79.82 ELEVATED C-REACTIVE PROTEIN: ICD-10-CM

## 2025-05-12 DIAGNOSIS — M19.90 INFLAMMATORY ARTHRITIS: ICD-10-CM

## 2025-05-12 RX ORDER — HYDROXYCHLOROQUINE SULFATE 200 MG/1
200 TABLET, FILM COATED ORAL 2 TIMES DAILY WITH MEALS
Qty: 60 TABLET | Refills: 5 | Status: SHIPPED | OUTPATIENT
Start: 2025-05-12 | End: 2025-11-08

## 2025-05-12 RX ORDER — HYDROCHLOROTHIAZIDE 25 MG/1
25 TABLET ORAL DAILY
Qty: 100 TABLET | Refills: 1 | Status: SHIPPED | OUTPATIENT
Start: 2025-05-12

## 2025-05-22 ENCOUNTER — PROCEDURE VISIT (OUTPATIENT)
Dept: OBGYN CLINIC | Facility: MEDICAL CENTER | Age: 80
End: 2025-05-22

## 2025-05-22 VITALS — HEIGHT: 61 IN | BODY MASS INDEX: 35.19 KG/M2 | WEIGHT: 186.4 LBS

## 2025-05-22 DIAGNOSIS — E78.5 HYPERLIPIDEMIA, UNSPECIFIED HYPERLIPIDEMIA TYPE: ICD-10-CM

## 2025-05-22 DIAGNOSIS — M17.11 PRIMARY OSTEOARTHRITIS OF RIGHT KNEE: Primary | ICD-10-CM

## 2025-05-22 NOTE — PROGRESS NOTES
1. Primary osteoarthritis of right knee            Patient has R knee osteoarthritis.  Patient is here for her first injection of Euflexxa into the right knee.   Physical exam of the knee shows no effusion no ecchymosis.  Patient tolerated procedure follow up 1 week inj #2 Right knee    Large joint arthrocentesis: R knee    Performed by: Sahil Bartholomew PA-C  Authorized by: Sahil Bartholomew PA-C    [place] Protocol:  procedure performed by consultantConsent: Verbal consent obtained. Written consent not obtained  Risks and benefits: risks, benefits and alternatives were discussed  Consent given by: patient  Patient understanding: patient states understanding of the procedure being performed  Patient consent: the patient's understanding of the procedure matches consent given  Patient identity confirmed: verbally with patient  Supporting Documentation  Indications: pain and joint swelling     Is this a Visco injection? Yes  Non-Pharmacologic Treatments Attempted: Home Exercise  Pharmacologic Treatments Attempted: Tylenol, NSAIDs, OTC knee sleeve, cortisone injection  Pain Score: 5Procedure Details  Location: knee - R knee  Needle size: 22 G  Ultrasound guidance: no  Approach: anterolateral  Medications administered: 20 mg Sodium Hyaluronate (Viscosup) 20 MG/2ML  Specialty Pharmacy Supplied: received medications from pharmacy  Patient tolerance: patient tolerated the procedure well with no immediate complications  Dressing:  Sterile dressing applied

## 2025-05-23 RX ORDER — EZETIMIBE 10 MG/1
10 TABLET ORAL DAILY
Qty: 90 TABLET | Refills: 1 | Status: SHIPPED | OUTPATIENT
Start: 2025-05-23

## 2025-05-24 DIAGNOSIS — E55.9 VITAMIN D DEFICIENCY: ICD-10-CM

## 2025-05-25 RX ORDER — ACETAMINOPHEN 160 MG
2000 TABLET,DISINTEGRATING ORAL DAILY
Qty: 90 CAPSULE | Refills: 0 | Status: SHIPPED | OUTPATIENT
Start: 2025-05-25

## 2025-05-29 ENCOUNTER — PROCEDURE VISIT (OUTPATIENT)
Dept: OBGYN CLINIC | Facility: MEDICAL CENTER | Age: 80
End: 2025-05-29
Payer: MEDICARE

## 2025-05-29 VITALS — BODY MASS INDEX: 35.23 KG/M2 | HEIGHT: 61 IN | WEIGHT: 186.6 LBS

## 2025-05-29 DIAGNOSIS — M17.11 PRIMARY OSTEOARTHRITIS OF RIGHT KNEE: Primary | ICD-10-CM

## 2025-05-29 PROCEDURE — 20610 DRAIN/INJ JOINT/BURSA W/O US: CPT | Performed by: PHYSICIAN ASSISTANT

## 2025-05-29 RX ORDER — HYALURONATE SODIUM 20 MG/2 ML
SYRINGE (ML) INTRAARTICULAR
COMMUNITY
Start: 2025-05-07

## 2025-05-29 NOTE — PROGRESS NOTES
1. Primary osteoarthritis of right knee              Patient has R knee osteoarthritis.  Patient is here for her 2nd injection of Euflexxa into the right knee.   Physical exam of the knee shows no effusion no ecchymosis.  Patient tolerated procedure follow up 1 week inj #3 Right knee    Large joint arthrocentesis: R knee    Performed by: Sahil Bartholomew PA-C  Authorized by: Sahil Bartholomew PA-C    [place] Protocol:  procedure performed by consultantConsent: Verbal consent obtained. Written consent not obtained  Risks and benefits: risks, benefits and alternatives were discussed  Consent given by: patient  Patient understanding: patient states understanding of the procedure being performed  Patient consent: the patient's understanding of the procedure matches consent given  Patient identity confirmed: verbally with patient  Supporting Documentation  Indications: pain and joint swelling     Is this a Visco injection? Yes  Non-Pharmacologic Treatments Attempted: Home Exercise  Pharmacologic Treatments Attempted: Tylenol, NSAIDs, OTC knee sleeve, cortisone injection  Pain Score: 4Procedure Details  Location: knee - R knee  Needle size: 22 G  Ultrasound guidance: no  Approach: anterolateral  Medications administered: 20 mg Sodium Hyaluronate (Viscosup) 20 MG/2ML  Specialty Pharmacy Supplied: received medications from pharmacy  Patient tolerance: patient tolerated the procedure well with no immediate complications  Dressing:  Sterile dressing applied

## 2025-05-31 PROBLEM — J01.00 ACUTE NON-RECURRENT MAXILLARY SINUSITIS: Status: RESOLVED | Noted: 2025-05-01 | Resolved: 2025-05-31

## 2025-06-05 ENCOUNTER — PROCEDURE VISIT (OUTPATIENT)
Dept: OBGYN CLINIC | Facility: MEDICAL CENTER | Age: 80
End: 2025-06-05
Payer: MEDICARE

## 2025-06-05 VITALS — BODY MASS INDEX: 35.04 KG/M2 | HEIGHT: 61 IN | WEIGHT: 185.6 LBS

## 2025-06-05 DIAGNOSIS — M17.11 PRIMARY OSTEOARTHRITIS OF RIGHT KNEE: Primary | ICD-10-CM

## 2025-06-05 PROCEDURE — 20610 DRAIN/INJ JOINT/BURSA W/O US: CPT | Performed by: PHYSICIAN ASSISTANT

## 2025-06-05 NOTE — PROGRESS NOTES
1. Primary osteoarthritis of right knee                Patient has R knee osteoarthritis.  Patient is here for her 3rd injection of Euflexxa into the right knee.   Physical exam of the knee shows no effusion no ecchymosis.  Patient tolerated procedure follow up 2 months , discussed response to viscosupplementation therapy as she is has had minimal to no relief with previous cortisone    Large joint arthrocentesis: R knee    Performed by: Sahil Bartholomew PA-C  Authorized by: Sahil Bartholomew PA-C    [place] Protocol:  procedure performed by consultantConsent: Verbal consent obtained. Written consent not obtained  Risks and benefits: risks, benefits and alternatives were discussed  Consent given by: patient  Patient understanding: patient states understanding of the procedure being performed  Patient consent: the patient's understanding of the procedure matches consent given  Patient identity confirmed: verbally with patient  Supporting Documentation  Indications: pain and joint swelling     Is this a Visco injection? Yes  Non-Pharmacologic Treatments Attempted: Home Exercise  Pharmacologic Treatments Attempted: Tylenol, NSAIDs, OTC knee sleeve, cortisone injection  Pain Score: 4Procedure Details  Location: knee - R knee  Needle size: 22 G  Ultrasound guidance: no  Approach: anterolateral  Medications administered: 20 mg Sodium Hyaluronate (Viscosup) 20 MG/2ML  Specialty Pharmacy Supplied: received medications from pharmacy  Patient tolerance: patient tolerated the procedure well with no immediate complications  Dressing:  Sterile dressing applied

## 2025-06-09 DIAGNOSIS — E87.6 HYPOKALEMIA: ICD-10-CM

## 2025-06-09 RX ORDER — POTASSIUM CHLORIDE 750 MG/1
10 CAPSULE, EXTENDED RELEASE ORAL DAILY
Qty: 90 CAPSULE | Refills: 1 | Status: SHIPPED | OUTPATIENT
Start: 2025-06-09

## 2025-06-12 NOTE — ASSESSMENT & PLAN NOTE
Chronic asymptomatic fair control continue current management [facility]   Pt is A/O x4, can make his needs know. Low fat diet, daily weight. Heavy A1 with GB and walker. Baseline numbness and tingling in BUE and BLE, continue with POC.

## 2025-06-16 DIAGNOSIS — I25.118 CORONARY ARTERY DISEASE OF NATIVE ARTERY OF NATIVE HEART WITH STABLE ANGINA PECTORIS (HCC): ICD-10-CM

## 2025-06-16 RX ORDER — ATORVASTATIN CALCIUM 80 MG/1
80 TABLET, FILM COATED ORAL DAILY
Qty: 90 TABLET | Refills: 1 | Status: SHIPPED | OUTPATIENT
Start: 2025-06-16

## 2025-06-24 ENCOUNTER — OFFICE VISIT (OUTPATIENT)
Dept: RHEUMATOLOGY | Facility: CLINIC | Age: 80
End: 2025-06-24
Payer: MEDICARE

## 2025-06-24 VITALS
OXYGEN SATURATION: 98 % | DIASTOLIC BLOOD PRESSURE: 68 MMHG | BODY MASS INDEX: 33.99 KG/M2 | HEIGHT: 61 IN | WEIGHT: 180 LBS | HEART RATE: 60 BPM | SYSTOLIC BLOOD PRESSURE: 120 MMHG

## 2025-06-24 DIAGNOSIS — M25.552 BILATERAL HIP PAIN: ICD-10-CM

## 2025-06-24 DIAGNOSIS — R79.82 ELEVATED C-REACTIVE PROTEIN: ICD-10-CM

## 2025-06-24 DIAGNOSIS — M11.20 CALCIUM PYROPHOSPHATE DEPOSITION DISEASE (CPPD): ICD-10-CM

## 2025-06-24 DIAGNOSIS — M19.90 INFLAMMATORY ARTHRITIS: Primary | ICD-10-CM

## 2025-06-24 DIAGNOSIS — M25.551 BILATERAL HIP PAIN: ICD-10-CM

## 2025-06-24 PROCEDURE — G2211 COMPLEX E/M VISIT ADD ON: HCPCS | Performed by: INTERNAL MEDICINE

## 2025-06-24 PROCEDURE — 99215 OFFICE O/P EST HI 40 MIN: CPT | Performed by: INTERNAL MEDICINE

## 2025-06-24 RX ORDER — HYDROXYCHLOROQUINE SULFATE 200 MG/1
200 TABLET, FILM COATED ORAL 2 TIMES DAILY WITH MEALS
Qty: 60 TABLET | Refills: 11 | Status: SHIPPED | OUTPATIENT
Start: 2025-06-24 | End: 2025-12-21

## 2025-06-24 NOTE — PROGRESS NOTES
Name: Ariadna Jernigan      : 1945      MRN: [id number]  Encounter Provider: Serjio Harrison MD  Encounter Date: 2025   Encounter department: [facility] RHEUMATOLOGY [facility]  :  Assessment & Plan  Inflammatory arthritis  CPPD  Seronegative inflammatory arthritis  LE edema     Plans:     Ariadna Jernigan is a 81 y/o female transitioning care from rheumatology at [facility]     Per notes [facility] rheumatology she was diagnosed CPPD and seronegative inflammatory arthritis     She has been on varying doses of prednisone    She would like to d/c baseline 5 mg prednisone     Low disease activity, no active synovitis noted     Off colchicine     Continue  BID     Reviewed labs     Chronic neck pain, spinal stenosis: f/u NS and pain management     OA knees: had euflexxa per Orthopedics    RTC 6 months  Orders:    hydroxychloroquine (PLAQUENIL) 200 mg tablet; Take 1 tablet (200 mg total) by mouth 2 (two) times a day with meals    Elevated C-reactive protein    Orders:    hydroxychloroquine (PLAQUENIL) 200 mg tablet; Take 1 tablet (200 mg total) by mouth 2 (two) times a day with meals    Bilateral hip pain    Orders:    hydroxychloroquine (PLAQUENIL) 200 mg tablet; Take 1 tablet (200 mg total) by mouth 2 (two) times a day with meals    Calcium pyrophosphate deposition disease (CPPD)    Orders:    hydroxychloroquine (PLAQUENIL) 200 mg tablet; Take 1 tablet (200 mg total) by mouth 2 (two) times a day with meals        History of Present Illness   HPI  Ariadna Jernigan is a 80 y.o. female who presents for further f/u inflammatory arthritis, osteoarthritis  History obtained from: patient       Ariadna Jernigan is a 77 y/o female who presents for further evaluation CPPD, inflammatory arthritis. History obtained via language line.    She has past medical history CAD, HLD, HTN.      Active issues related to chronic neck and back pain. Follows with pain management and gets injections and maintained on  Lyrica     She has been tolerating  BID and feel this has been helpful. Also on baseline 5 mg prednisone     Completed Euflexxa per Orthopedics     She is transferring care from [facility]. She has has chronic pain, swelling and stiffness small joints hands and feet > 1 year duration. Per notes [facility] rheumatology she was diagnosed CPPD and seronegative inflammatory arthritis     Off baseline colchicine 0.6 daily.      She has had episodes pain, swelling stiffness wrists. She is steroid responsive but symptoms have recurred when she has tried to d/c prednisone     Serologies including HAMMAD, RF, CCP ESR and CRP normal (inflammatory markers elevated in the past)  Review of Systems  Pertinent Medical History         --------------------------------------------------------------------------------------------------------        ROS:        - for: Fevers, Chills or sweats.  No HAs or scalp tenderness.  No jaw claudication.  No acute visual or eye changes.  No dry eyes.  No auditory complaints.  No oral lesions or ulcers.  No dry mouth.  No sore throat or cough.  No chest pains or palpitations.  No shortness of breath, dyspnea on exertion or wheezing.  No hemotpysis.  No abdominal pain, GERD symptoms, diarrhea or constipation.  No urinary complaints.  No numbness, tingling or weakness.  No rashes.    All other ROS was reviewed and negative except as above         --------------------------------------------------------------------------------------------------------    Past Medical History    Past Medical History:  Diagnosis Date    Arthritis     rheumatoid    Coronary artery disease     Disease of thyroid gland     Hyperlipidemia     Hypertension            Past Surgical History    Past Surgical History:  Procedure Laterality Date    BACK SURGERY      BREAST SURGERY      CARPAL TUNNEL RELEASE      CHOLECYSTECTOMY      HYSTERECTOMY      OOPHORECTOMY      ORTHOPEDIC SURGERY             Family History    Family  History  Problem Relation Name Age of Onset    No Known Problems Mother      Lung cancer Father      Ovarian cancer Sister      Colon cancer Sister      Cancer Sister          bladder    No Known Problems Sister      No Known Problems Maternal Grandmother      No Known Problems Maternal Grandfather      No Known Problems Paternal Grandmother      No Known Problems Paternal Grandfather      No Known Problems Maternal Aunt      No Known Problems Paternal Aunt      No Known Problems Paternal Aunt      Breast cancer Cousin paternal 45            Social History    Social History  Tobacco Use    Smoking status: Former     Current packs/day: 0.00     Average packs/day: 0.1 packs/day for 52.0 years (5.2 ttl pk-yrs)     Types: Cigarettes     Start date:      Quit date:      Years since quittin.4     Passive exposure: Never    Smokeless tobacco: Never   Vaping Use    Vaping status: Never Used   Substance Use Topics    Alcohol use: Not Currently    Drug use: Never         Allergies    Allergies  Allergen Reactions    Codeine GI Intolerance    Lyrica [Pregabalin] Other (See Comments)     Blurred vision and dizziness    Shingrix [Zoster Vac Recomb Adjuvanted] Rash         Medications    Current Outpatient Medications   Medication Instructions    atorvastatin (LIPITOR) 80 mg, Oral, Daily    clopidogrel (PLAVIX) 75 mg, Oral, Daily    diltiazem (CARDIZEM CD) 240 mg, Oral, Daily    Euflexxa 20 MG/2ML SOSY     ezetimibe (ZETIA) 10 mg, Oral, Daily    fluticasone (FLONASE) 50 mcg/act nasal spray 2 sprays, Nasal, Daily    folic acid (FOLVITE) 1,000 mcg, Oral, Daily    hydroCHLOROthiazide 25 mg, Oral, Daily    hydroxychloroquine (PLAQUENIL) 200 mg, Oral, 2 times daily with meals    levothyroxine 100 mcg, Oral, Daily    losartan (COZAAR) 100 mg, Oral, Daily    Menthol-Camphor  MG PTCH 1 patch, Every 12 hours    metoprolol succinate (TOPROL-XL) 50 mg, Oral, Daily    nitroglycerin (NITROSTAT) 0.4 mg SL tablet 1 tablet,  Every 5 minutes PRN    ondansetron (ZOFRAN) 4 mg, Oral, Every 8 hours PRN    pantoprazole (PROTONIX) 40 mg, Oral, 2 times daily    potassium chloride (MICRO-K) 10 MEQ CR capsule 10 mEq, Oral, Daily    predniSONE 5 mg, Oral, Daily    pregabalin (LYRICA) 50 mg, Oral, Daily at bedtime    Synvisc One 48 MG/6ML injection     Vitamin D3 2,000 Units, Oral, Daily          ________________________________________________________________________      Results Review    Component      Latest Ref [facility] 4/8/2025   WBC      4.31 - 10.16 Thousand/uL 9.26    RBC      3.81 - 5.12 Million/uL 4.32    Hemoglobin      11.5 - 15.4 g/dL 12.0    Hematocrit      34.8 - 46.1 % 38.9    MCV      82 - 98 fL 90    MCH      26.8 - 34.3 pg 27.8    MCHC      31.4 - 37.4 g/dL 30.8 (L)    RDW      11.6 - 15.1 % 14.0    MPV      8.9 - 12.7 fL 12.3    Platelet Count      149 - 390 Thousands/uL 215    nRBC      /100 WBCs 0    Segmented %      43 - 75 % 61    Immature Grans %      0 - 2 % 0    Lymphocytes %      14 - 44 % 27    Monocytes %      4 - 12 % 9    Eosinophils %      0 - 6 % 2    Basophils %      0 - 1 % 1    Absolute Neutrophils      1.85 - 7.62 Thousands/µL 5.66    Absolute Immature Grans      0.00 - 0.20 Thousand/uL 0.04    Absolute Lymphocytes      0.60 - 4.47 Thousands/µL 2.47    Absolute Monocytes      0.17 - 1.22 Thousand/µL 0.87    Absolute Eosinophils      0.00 - 0.61 Thousand/µL 0.14    Absolute Basophils      0.00 - 0.10 Thousands/µL 0.08    Sodium      135 - 147 mmol/L 143    Potassium      3.5 - 5.3 mmol/L 3.2 (L)    Chloride      96 - 108 mmol/L 101    Carbon Dioxide      21 - 32 mmol/L 33 (H)    ANION GAP      4 - 13 mmol/L 9    BUN      5 - 25 mg/dL 16    Creatinine      0.60 - 1.30 mg/dL 0.91    GLUCOSE, FASTING      65 - 99 mg/dL 89    Calcium      8.4 - 10.2 mg/dL 9.6    AST      13 - 39 U/L 21    ALT      7 - 52 U/L 16    ALK PHOS      34 - 104 U/L 44    Total Protein      6.4 - 8.4 g/dL 6.3 (L)    Albumin      3.5 - 5.0 g/dL  "4.0    Total Bilirubin      0.20 - 1.00 mg/dL 0.56    GFR, Calculated      ml/min/1.73sq m 60    TSH 3RD GENERATON      0.450 - 4.500 uIU/mL 3.351       Legend:  (L) Low  (H) High               Objective   /68 (BP Location: Right arm, Patient Position: Sitting)   Pulse 60   Ht 5' 1\" (1.549 m)   Wt 81.6 kg (180 lb)   SpO2 98%   BMI 34.01 kg/m²      Physical Exam      GEN: AAO, No apparent distress.  Patient is well developed.  HEENT:  Pupils are equal, round and reactive.  Sclera are clear.  Fundoscopic exam is normal.  External ears are without lesions.  Oral pharynx is clear of ulcers or other lesions.  MMM.   NECK:  Supple.  There is no adenopathy appreciable in anterior or posterior cervical chains or supraclavicularly.  JVP is normal.    HEART: Regular rate and rhythm.  There is no appreciable murmur, gallop or rub.  LUNGS: Clear to auscultation.  ABD:  Soft, without tenderness, rebound or guarding.  No appreciable organomegally.  NEURO: Speech and cognition are normal.  Strength is 5/5 throughout.  Tone is normal.  DTRs are 2/4 at the knees, ankles and elbows.  Gait is normal.  SKIN: There are no rashes or lesions     MUSCULOSKELETAL:   + LE edema         I have spent a total time of 45 minutes in caring for this patient on the day of the visit/encounter including Diagnostic results, Prognosis, Risks and benefits of tx options, Risk factor reductions, Impressions, Counseling / Coordination of care, Documenting in the medical record, Reviewing/placing orders in the medical record (including tests, medications, and/or procedures), and Obtaining or reviewing history  .    "

## 2025-07-01 ENCOUNTER — HOSPITAL ENCOUNTER (OUTPATIENT)
Dept: NON INVASIVE DIAGNOSTICS | Facility: HOSPITAL | Age: 80
Discharge: HOME | End: 2025-07-01
Payer: MEDICARE

## 2025-07-01 DIAGNOSIS — I65.23 BILATERAL CAROTID ARTERY STENOSIS: ICD-10-CM

## 2025-07-01 PROCEDURE — 93880 EXTRACRANIAL BILAT STUDY: CPT | Performed by: SURGERY

## 2025-07-01 PROCEDURE — 93880 EXTRACRANIAL BILAT STUDY: CPT

## 2025-07-03 ENCOUNTER — TELEPHONE (OUTPATIENT)
Dept: VASCULAR SURGERY | Facility: CLINIC | Age: 80
End: 2025-07-03

## 2025-07-03 NOTE — TELEPHONE ENCOUNTER
This is a reminder; patient is due for OV . Please call patient and schedule the following by the dates provided.    Patient's appointment(s) are due now.    Dopplers  [] Abdominal Aorta Iliac (AOIL)  [x] Carotid (CV)   [] Celiac and/or Mesenteric  [] Endovascular Aortic Repair (EVAR)   [] Hemodialysis Access (HD)   [] Lower Limb Arterial (SHYLA)  [] Lower Limb Venous (LEV)  [] Lower Limb Venous Duplex with Reflux (LEVDR)  [] Renal Artery  [] Upper Limb Arterial (UEA)    [] Upper Limb Venous (UEV)              [] DANIELLA and Waveform analysis     Advanced Imaging   [] CTA head/neck    [] CTA abdomen    [] CTA abdomen & pelvis    [] CT abdomen with/ without contrast  [] CT abdomen with contrast  [] CT abdomen without contrast    [] CT abdomen & pelvis with/ without contrast  [] CT abdomen & pelvis with contrast  [] CT abdomen & pelvis without contrast    Office Visit   [] New patient, patient last seen over 3 years ago  [] Risk factor modification (RFM)   [x] Follow up   [] Lost to follow up (LTFU)   This is a reminder to call patient when appts become available arnulfo/Danyelle in [place] /[facility] CV 7/1/25/1 YR RFM OV DUE

## 2025-07-07 DIAGNOSIS — I10 ESSENTIAL HYPERTENSION: ICD-10-CM

## 2025-07-08 RX ORDER — LOSARTAN POTASSIUM 100 MG/1
100 TABLET ORAL DAILY
Qty: 90 TABLET | Refills: 1 | Status: SHIPPED | OUTPATIENT
Start: 2025-07-08

## 2025-07-08 NOTE — TELEPHONE ENCOUNTER
Patient called to request a refill for their      losartan (COZAAR) 100 MG tablet   advised a refill was requested on 07/07/2025 and is pending approval. Patient verbalized understanding and is in agreement.     Does the patient have enough for 3 days?   [] Yes   [x] No - Send as HP to POD

## 2025-07-10 ENCOUNTER — TELEPHONE (OUTPATIENT)
Age: 80
End: 2025-07-10

## 2025-07-10 NOTE — TELEPHONE ENCOUNTER
Patients spouse called requesting an appointment with provider tomorrow as patient has a sore throat. Patients spouse was offered an appointment with different provider, patients spouse refused stated they will only see PCP.

## 2025-07-11 ENCOUNTER — OFFICE VISIT (OUTPATIENT)
Dept: FAMILY MEDICINE CLINIC | Facility: CLINIC | Age: 80
End: 2025-07-11
Payer: MEDICARE

## 2025-07-11 VITALS
WEIGHT: 179 LBS | HEART RATE: 80 BPM | TEMPERATURE: 97.4 F | HEIGHT: 60 IN | OXYGEN SATURATION: 97 % | SYSTOLIC BLOOD PRESSURE: 130 MMHG | BODY MASS INDEX: 35.14 KG/M2 | DIASTOLIC BLOOD PRESSURE: 80 MMHG

## 2025-07-11 DIAGNOSIS — R11.2 NAUSEA AND VOMITING, UNSPECIFIED VOMITING TYPE: ICD-10-CM

## 2025-07-11 DIAGNOSIS — U07.1 COVID-19 VIRUS INFECTION: Primary | ICD-10-CM

## 2025-07-11 DIAGNOSIS — J02.9 SORE THROAT: ICD-10-CM

## 2025-07-11 DIAGNOSIS — R68.83 CHILLS: ICD-10-CM

## 2025-07-11 LAB
S PYO AG THROAT QL: NEGATIVE
SARS-COV-2 AG UPPER RESP QL IA: POSITIVE
VALID CONTROL: ABNORMAL

## 2025-07-11 PROCEDURE — 99214 OFFICE O/P EST MOD 30 MIN: CPT | Performed by: FAMILY MEDICINE

## 2025-07-11 PROCEDURE — 87880 STREP A ASSAY W/OPTIC: CPT | Performed by: FAMILY MEDICINE

## 2025-07-11 PROCEDURE — G2211 COMPLEX E/M VISIT ADD ON: HCPCS | Performed by: FAMILY MEDICINE

## 2025-07-11 PROCEDURE — 87811 SARS-COV-2 COVID19 W/OPTIC: CPT | Performed by: FAMILY MEDICINE

## 2025-07-11 RX ORDER — ONDANSETRON 4 MG/1
4 TABLET, FILM COATED ORAL EVERY 8 HOURS PRN
Qty: 20 TABLET | Refills: 0 | Status: SHIPPED | OUTPATIENT
Start: 2025-07-11 | End: 2025-07-18

## 2025-07-11 RX ORDER — NIRMATRELVIR AND RITONAVIR 300-100 MG
3 KIT ORAL 2 TIMES DAILY
Qty: 30 TABLET | Refills: 0 | Status: ON HOLD | OUTPATIENT
Start: 2025-07-11 | End: 2025-07-16

## 2025-07-11 NOTE — PROGRESS NOTES
COVID-19 Outpatient Progress Note  Name: Ariadna Jernigan      : 1945      MRN: [id number]  Encounter Provider: Olivier Atkins MD  Encounter Date: 2025   Encounter department: [facility]  :  Assessment & Plan  COVID-19 virus infection  Acute symptomatic patient candidate for retreatment COVID test positive in the office recommend to start the Paxlovid proper use and possible side effect discussed recommend to hold on her atorvastatin for 10 days recommend to take vitamin D and vitamin C benefit versus side effect review  Orders:  •  nirmatrelvir & ritonavir (Paxlovid, 300/100,) tablet therapy pack; Take 3 tablets by mouth 2 (two) times a day for 5 days Take 2 nirmatrelvir tablets + 1 ritonavir tablet together per dose    Sore throat  Acute symptomatic rapid strep in the office is negative recommend supportive treatment  Orders:  •  POCT Rapid Covid Ag  •  POCT rapid ANTIGEN strepA    Chills  Symptomatic secondary to COVID recommend supportive treatment keep well hydration  Orders:  •  POCT Rapid Covid Ag  •  POCT rapid ANTIGEN strepA    Nausea and vomiting, unspecified vomiting type  Symptomatic secondary to COVID recommend Zofran 4 mg as needed basis  Orders:  •  POCT Rapid Covid Ag  •  POCT rapid ANTIGEN strepA  •  ondansetron (ZOFRAN) 4 mg tablet; Take 1 tablet (4 mg total) by mouth every 8 (eight) hours as needed for nausea or vomiting      Assessment & Plan        Disposition:     Discussed symptom directed medication options with patient. Discussed vitamin D, vitamin C, and/or zinc supplementation with patient.     Patient meets criteria for Paxlovid and they have been counseled appropriately regarding risks, benefits, side effects, and alternative treatment options. After discussion, patient agrees to treatment.    Possible side effects of Paxlovid?    Possible side effects of Paxlovid are:  - Liver Problems. Notify us right away if you start to experience loss of  appetite, yellowing of your skin and the whites of eyes (jaundice), dark-colored urine, pale colored stools and itchy skin, stomach area (abdominal) pain.  - Resistance to HIV Medicines. If you have untreated HIV infection, Paxlovid may lead to some HIV medicines not working as well in the future.  - Other possible side effects include: altered sense of taste, diarrhea, high blood pressure, or muscle aches.         Recent Visits  Date Type Provider Dept   07/11/25 Office Visit Olivier Atkins MD [facility]   Showing recent visits within past 7 days and meeting all other requirements  Future Appointments  No visits were found meeting these conditions.  Showing future appointments within next 150 days and meeting all other requirements    History of Present Illness     Subjective:   Ariadna Jernigan is a 80 y.o. female who is concerned about COVID-19. Patient's symptoms include chills, fatigue, sore throat, nausea and vomiting. Patient denies fever, malaise, congestion, rhinorrhea, anosmia, loss of taste, cough, shortness of breath, chest tightness, abdominal pain, diarrhea, myalgias and headaches.     - Date of symptom onset: 7/7/2025      COVID-19 vaccination status: Fully vaccinated with booster    Exposure:   Contact with a person who is under investigation (PUI) for or who is positive for COVID-19 within the last 14 days?: No    Hospitalized recently for fever and/or lower respiratory symptoms?: No      Currently a healthcare worker that is involved in direct patient care?: No      Works in a special setting where the risk of COVID-19 transmission may be high? (this may include long-term care, correctional and custodial facilities; homeless shelters; assisted-living facilities and group homes.): No      Resident in a special setting where the risk of COVID-19 transmission may be high? (this may include long-term care, correctional and custodial facilities; homeless shelters; assisted-living  facilities and group homes.): No      History of Present Illness      Lab Results   Component Value Date    SARSCOV2 Negative 12/01/2021    SARSCOVAG Positive (A) 07/11/2025       Review of Systems   Constitutional:  Positive for chills and fatigue. Negative for fever.   HENT:  Positive for sore throat. Negative for congestion and rhinorrhea.    Respiratory:  Negative for cough, chest tightness and shortness of breath.    Gastrointestinal:  Positive for nausea and vomiting. Negative for abdominal pain and diarrhea.   Musculoskeletal:  Negative for myalgias.   Neurological:  Negative for headaches.     Objective   /80 (BP Location: Left arm, Patient Position: Sitting)   Pulse 80   Temp (!) 97.4 °F (36.3 °C) (Tympanic)   Ht 5' (1.524 m)   Wt 81.2 kg (179 lb)   SpO2 97%   Breastfeeding No   BMI 34.96 kg/m²     Physical Exam    Physical Exam  Vitals and nursing note reviewed.   Constitutional:       General: She is not in acute distress.     Appearance: She is well-developed. She is not diaphoretic.   HENT:      Head: Normocephalic.      Right Ear: Tympanic membrane and external ear normal.      Left Ear: Tympanic membrane and external ear normal.      Nose: No rhinorrhea.      Mouth/Throat:      Pharynx: No posterior oropharyngeal erythema.     Eyes:      General:         Right eye: No discharge.         Left eye: No discharge.      Conjunctiva/sclera: Conjunctivae normal.     Neck:      Vascular: No JVD.     Cardiovascular:      Rate and Rhythm: Normal rate and regular rhythm.      Heart sounds: Normal heart sounds. No murmur heard.     No gallop.   Pulmonary:      Effort: Pulmonary effort is normal. No respiratory distress.      Breath sounds: Normal breath sounds. No wheezing.   Abdominal:      General: There is no distension.      Palpations: Abdomen is soft.      Tenderness: There is no abdominal tenderness. There is no rebound.     Musculoskeletal:         General: No tenderness.      Cervical back:  Normal range of motion and neck supple.   Lymphadenopathy:      Cervical: No cervical adenopathy.     Skin:     General: Skin is warm.      Findings: No rash.     Neurological:      Mental Status: She is alert and oriented to person, place, and time.         Administrative Statements   Encounter provider Olivier Atkins MD

## 2025-07-12 PROBLEM — U07.1 COVID-19 VIRUS INFECTION: Status: ACTIVE | Noted: 2025-07-12

## 2025-07-12 NOTE — ASSESSMENT & PLAN NOTE
Acute symptomatic patient candidate for retreatment COVID test positive in the office recommend to start the Paxlovid proper use and possible side effect discussed recommend to hold on her atorvastatin for 10 days recommend to take vitamin D and vitamin C benefit versus side effect review  Orders:  •  nirmatrelvir & ritonavir (Paxlovid, 300/100,) tablet therapy pack; Take 3 tablets by mouth 2 (two) times a day for 5 days Take 2 nirmatrelvir tablets + 1 ritonavir tablet together per dose

## 2025-07-16 ENCOUNTER — HOSPITAL ENCOUNTER (INPATIENT)
Facility: HOSPITAL | Age: 80
LOS: 1 days | Discharge: HOME | End: 2025-07-18
Attending: EMERGENCY MEDICINE | Admitting: INTERNAL MEDICINE
Payer: MEDICARE

## 2025-07-16 DIAGNOSIS — R10.13 DYSPEPSIA: ICD-10-CM

## 2025-07-16 DIAGNOSIS — R53.1 GENERALIZED WEAKNESS: ICD-10-CM

## 2025-07-16 DIAGNOSIS — E87.1 HYPONATREMIA: Primary | ICD-10-CM

## 2025-07-16 DIAGNOSIS — R20.0 NUMBNESS AND TINGLING OF BOTH FEET: ICD-10-CM

## 2025-07-16 DIAGNOSIS — R20.2 NUMBNESS AND TINGLING OF BOTH FEET: ICD-10-CM

## 2025-07-16 LAB
2HR DELTA HS TROPONIN: 1 NG/L
ALBUMIN SERPL BCG-MCNC: 4.2 G/DL (ref 3.5–5)
ALP SERPL-CCNC: 28 U/L (ref 34–104)
ALT SERPL W P-5'-P-CCNC: 20 U/L (ref 7–52)
ANION GAP SERPL CALCULATED.3IONS-SCNC: 10 MMOL/L (ref 4–13)
AST SERPL W P-5'-P-CCNC: 28 U/L (ref 13–39)
BASOPHILS # BLD AUTO: 0.03 THOUSANDS/ÂΜL (ref 0–0.1)
BASOPHILS NFR BLD AUTO: 1 % (ref 0–1)
BILIRUB SERPL-MCNC: 0.45 MG/DL (ref 0.2–1)
BNP SERPL-MCNC: 56 PG/ML (ref 0–100)
BUN SERPL-MCNC: 12 MG/DL (ref 5–25)
CALCIUM SERPL-MCNC: 9.4 MG/DL (ref 8.4–10.2)
CARDIAC TROPONIN I PNL SERPL HS: 5 NG/L (ref ?–50)
CARDIAC TROPONIN I PNL SERPL HS: 6 NG/L (ref ?–50)
CHLORIDE SERPL-SCNC: 90 MMOL/L (ref 96–108)
CO2 SERPL-SCNC: 26 MMOL/L (ref 21–32)
CREAT SERPL-MCNC: 0.96 MG/DL (ref 0.6–1.3)
EOSINOPHIL # BLD AUTO: 0.06 THOUSAND/ÂΜL (ref 0–0.61)
EOSINOPHIL NFR BLD AUTO: 1 % (ref 0–6)
ERYTHROCYTE [DISTWIDTH] IN BLOOD BY AUTOMATED COUNT: 14.5 % (ref 11.6–15.1)
GFR SERPL CREATININE-BSD FRML MDRD: 56 ML/MIN/1.73SQ M
GLUCOSE SERPL-MCNC: 89 MG/DL (ref 65–140)
HCT VFR BLD AUTO: 36.8 % (ref 34.8–46.1)
HGB BLD-MCNC: 12.1 G/DL (ref 11.5–15.4)
IMM GRANULOCYTES # BLD AUTO: 0.03 THOUSAND/UL (ref 0–0.2)
IMM GRANULOCYTES NFR BLD AUTO: 1 % (ref 0–2)
LYMPHOCYTES # BLD AUTO: 1.52 THOUSANDS/ÂΜL (ref 0.6–4.47)
LYMPHOCYTES NFR BLD AUTO: 25 % (ref 14–44)
MAGNESIUM SERPL-MCNC: 1.9 MG/DL (ref 1.9–2.7)
MCH RBC QN AUTO: 27.5 PG (ref 26.8–34.3)
MCHC RBC AUTO-ENTMCNC: 32.9 G/DL (ref 31.4–37.4)
MCV RBC AUTO: 84 FL (ref 82–98)
MONOCYTES # BLD AUTO: 0.71 THOUSAND/ÂΜL (ref 0.17–1.22)
MONOCYTES NFR BLD AUTO: 12 % (ref 4–12)
NEUTROPHILS # BLD AUTO: 3.76 THOUSANDS/ÂΜL (ref 1.85–7.62)
NEUTS SEG NFR BLD AUTO: 60 % (ref 43–75)
NRBC BLD AUTO-RTO: 0 /100 WBCS
PHOSPHATE SERPL-MCNC: 3.1 MG/DL (ref 2.3–4.1)
PLATELET # BLD AUTO: 167 THOUSANDS/UL (ref 149–390)
PMV BLD AUTO: 10.9 FL (ref 8.9–12.7)
POTASSIUM SERPL-SCNC: 3.6 MMOL/L (ref 3.5–5.3)
PROT SERPL-MCNC: 6.4 G/DL (ref 6.4–8.4)
RBC # BLD AUTO: 4.4 MILLION/UL (ref 3.81–5.12)
SODIUM SERPL-SCNC: 126 MMOL/L (ref 135–147)
SODIUM UR-SCNC: 33 MMOL/L
TSH SERPL DL<=0.05 MIU/L-ACNC: 7.09 UIU/ML (ref 0.45–4.5)
WBC # BLD AUTO: 6.11 THOUSAND/UL (ref 4.31–10.16)

## 2025-07-16 PROCEDURE — 84443 ASSAY THYROID STIM HORMONE: CPT | Performed by: EMERGENCY MEDICINE

## 2025-07-16 PROCEDURE — 84484 ASSAY OF TROPONIN QUANT: CPT | Performed by: EMERGENCY MEDICINE

## 2025-07-16 PROCEDURE — 99285 EMERGENCY DEPT VISIT HI MDM: CPT

## 2025-07-16 PROCEDURE — 36415 COLL VENOUS BLD VENIPUNCTURE: CPT | Performed by: EMERGENCY MEDICINE

## 2025-07-16 PROCEDURE — 85025 COMPLETE CBC W/AUTO DIFF WBC: CPT | Performed by: EMERGENCY MEDICINE

## 2025-07-16 PROCEDURE — 84300 ASSAY OF URINE SODIUM: CPT | Performed by: EMERGENCY MEDICINE

## 2025-07-16 PROCEDURE — 80053 COMPREHEN METABOLIC PANEL: CPT | Performed by: EMERGENCY MEDICINE

## 2025-07-16 PROCEDURE — 99223 1ST HOSP IP/OBS HIGH 75: CPT | Performed by: INTERNAL MEDICINE

## 2025-07-16 PROCEDURE — 93005 ELECTROCARDIOGRAM TRACING: CPT

## 2025-07-16 PROCEDURE — 84100 ASSAY OF PHOSPHORUS: CPT | Performed by: EMERGENCY MEDICINE

## 2025-07-16 PROCEDURE — 99285 EMERGENCY DEPT VISIT HI MDM: CPT | Performed by: EMERGENCY MEDICINE

## 2025-07-16 PROCEDURE — 83880 ASSAY OF NATRIURETIC PEPTIDE: CPT | Performed by: EMERGENCY MEDICINE

## 2025-07-16 PROCEDURE — 83735 ASSAY OF MAGNESIUM: CPT | Performed by: EMERGENCY MEDICINE

## 2025-07-16 RX ORDER — LEVOTHYROXINE SODIUM 100 UG/1
100 TABLET ORAL
Status: DISCONTINUED | OUTPATIENT
Start: 2025-07-17 | End: 2025-07-18 | Stop reason: HOSPADM

## 2025-07-16 RX ORDER — DILTIAZEM HYDROCHLORIDE 240 MG/1
240 CAPSULE, COATED, EXTENDED RELEASE ORAL DAILY
Status: DISCONTINUED | OUTPATIENT
Start: 2025-07-17 | End: 2025-07-18 | Stop reason: HOSPADM

## 2025-07-16 RX ORDER — HYDROXYCHLOROQUINE SULFATE 200 MG/1
200 TABLET, FILM COATED ORAL 2 TIMES DAILY WITH MEALS
Status: DISCONTINUED | OUTPATIENT
Start: 2025-07-17 | End: 2025-07-18 | Stop reason: HOSPADM

## 2025-07-16 RX ORDER — ENOXAPARIN SODIUM 100 MG/ML
40 INJECTION SUBCUTANEOUS DAILY
Status: DISCONTINUED | OUTPATIENT
Start: 2025-07-17 | End: 2025-07-18 | Stop reason: HOSPADM

## 2025-07-16 RX ORDER — POTASSIUM CHLORIDE 750 MG/1
10 TABLET, EXTENDED RELEASE ORAL DAILY
Status: DISCONTINUED | OUTPATIENT
Start: 2025-07-17 | End: 2025-07-17

## 2025-07-16 RX ORDER — LOSARTAN POTASSIUM 50 MG/1
100 TABLET ORAL DAILY
Status: DISCONTINUED | OUTPATIENT
Start: 2025-07-17 | End: 2025-07-18 | Stop reason: HOSPADM

## 2025-07-16 RX ORDER — PREGABALIN 50 MG/1
50 CAPSULE ORAL
Status: DISCONTINUED | OUTPATIENT
Start: 2025-07-16 | End: 2025-07-18 | Stop reason: HOSPADM

## 2025-07-16 RX ORDER — EZETIMIBE 10 MG/1
10 TABLET ORAL DAILY
Status: DISCONTINUED | OUTPATIENT
Start: 2025-07-17 | End: 2025-07-18 | Stop reason: HOSPADM

## 2025-07-16 RX ORDER — FLUTICASONE PROPIONATE 50 MCG
2 SPRAY, SUSPENSION (ML) NASAL DAILY
Status: DISCONTINUED | OUTPATIENT
Start: 2025-07-17 | End: 2025-07-18 | Stop reason: HOSPADM

## 2025-07-16 RX ORDER — ONDANSETRON 4 MG/1
4 TABLET, ORALLY DISINTEGRATING ORAL EVERY 6 HOURS PRN
Status: DISCONTINUED | OUTPATIENT
Start: 2025-07-16 | End: 2025-07-18 | Stop reason: HOSPADM

## 2025-07-16 RX ORDER — ACETAMINOPHEN 325 MG/1
975 TABLET ORAL EVERY 8 HOURS PRN
Status: DISCONTINUED | OUTPATIENT
Start: 2025-07-16 | End: 2025-07-18 | Stop reason: HOSPADM

## 2025-07-16 RX ORDER — PANTOPRAZOLE SODIUM 40 MG/1
40 TABLET, DELAYED RELEASE ORAL
Status: DISCONTINUED | OUTPATIENT
Start: 2025-07-16 | End: 2025-07-18 | Stop reason: HOSPADM

## 2025-07-16 RX ORDER — CLOPIDOGREL BISULFATE 75 MG/1
75 TABLET ORAL DAILY
Status: DISCONTINUED | OUTPATIENT
Start: 2025-07-17 | End: 2025-07-18 | Stop reason: HOSPADM

## 2025-07-16 RX ORDER — METOPROLOL SUCCINATE 50 MG/1
50 TABLET, EXTENDED RELEASE ORAL DAILY
Status: DISCONTINUED | OUTPATIENT
Start: 2025-07-17 | End: 2025-07-18 | Stop reason: HOSPADM

## 2025-07-16 RX ORDER — FOLIC ACID 1 MG/1
1000 TABLET ORAL DAILY
Status: DISCONTINUED | OUTPATIENT
Start: 2025-07-17 | End: 2025-07-18 | Stop reason: HOSPADM

## 2025-07-16 RX ORDER — NITROGLYCERIN 0.4 MG/1
0.4 TABLET SUBLINGUAL
Status: DISCONTINUED | OUTPATIENT
Start: 2025-07-16 | End: 2025-07-18 | Stop reason: HOSPADM

## 2025-07-16 RX ADMIN — SODIUM CHLORIDE 1000 ML: 0.9 INJECTION, SOLUTION INTRAVENOUS at 17:32

## 2025-07-16 RX ADMIN — ACETAMINOPHEN 975 MG: 325 TABLET ORAL at 23:44

## 2025-07-16 RX ADMIN — PANTOPRAZOLE SODIUM 40 MG: 40 TABLET, DELAYED RELEASE ORAL at 20:35

## 2025-07-16 NOTE — ED PROVIDER NOTES
Time reflects when diagnosis was documented in both MDM as applicable and the Disposition within this note       Time User Action Codes Description Comment    7/16/2025  5:28 PM Ted Cassidy Add [E87.1] Hyponatremia     7/16/2025  5:28 PM Edi Cassidyat Add [R53.1] Generalized weakness     7/16/2025  5:28 PM Ted Cassidy Add [R20.0,  R20.2] Numbness and tingling of both feet           ED Disposition       ED Disposition   Admit    Condition   Stable    Date/Time   Wed Jul 16, 2025  5:28 PM    Comment   Case was discussed with Dr. Campo and the patient's admission status was agreed to be Admission Status: observation status to the service of Dr. Campo .               Assessment & Plan       Medical Decision Making  80-year-old female with generalized weakness and arthralgias.  Differential diagnosis includes electrolyte abnormality, dehydration, ongoing COVID, Paxlovid side effect.  Laboratory evaluation shows significant hyponatremia, likely cause of symptoms.  IV fluid bolus given as well as admitted to internal medicine service for further management.    Amount and/or Complexity of Data Reviewed  Labs: ordered.    Risk  Decision regarding hospitalization.             Medications   sodium chloride 0.9 % bolus 1,000 mL (1,000 mL Intravenous New Bag 7/16/25 1732)       ED Risk Strat Scores                    No data recorded        SBIRT 20yo+      Flowsheet Row Most Recent Value   Initial Alcohol Screen: US AUDIT-C     1. How often do you have a drink containing alcohol? 0 Filed at: 07/16/2025 1644   2. How many drinks containing alcohol do you have on a typical day you are drinking?  0 Filed at: 07/16/2025 1644   3a. Male UNDER 65: How often do you have five or more drinks on one occasion? 0 Filed at: 07/16/2025 1644   3b. FEMALE Any Age, or MALE 65+: How often do you have 4 or more drinks on one occassion? 0 Filed at: 07/16/2025 1644   Audit-C Score 0 Filed at: 07/16/2025 1644   CORINE: How many times in the past  year have you...    Used an illegal drug or used a prescription medication for non-medical reasons? Never Filed at: 07/16/2025 6600                            History of Present Illness       Chief Complaint   Patient presents with    Weakness - Generalized     Daughter states 7/4/25 (+)COVID.  C/o weakness/fatigue and joint pain.  No fevers/coughing/sob       Past Medical History[1]   Past Surgical History[2]   Family History[3]   Social History[4]   E-Cigarette/Vaping    E-Cigarette Use Never User       E-Cigarette/Vaping Substances    Nicotine No     THC No     CBD No     Flavoring No     Other No     Unknown No       I have reviewed and agree with the history as documented.     80-year-old female presenting to the emergency department with generalized weakness.  Patient was diagnosed with COVID about 5 days ago, symptoms started on July 7, 2025.  Just finished her course of Paxlovid today.  Patient has been having joint aches as well as generalized weakness.  Due to Paxlovid, having nausea and poor p.o. intake, drinking plenty of fluids.        Review of Systems   Constitutional:  Negative for chills and fever.   HENT:  Negative for ear pain and sore throat.    Eyes:  Negative for pain and visual disturbance.   Respiratory:  Negative for cough and shortness of breath.    Cardiovascular:  Negative for chest pain and palpitations.   Gastrointestinal:  Negative for abdominal pain and vomiting.   Genitourinary:  Negative for dysuria and hematuria.   Musculoskeletal:  Positive for arthralgias. Negative for back pain.   Skin:  Negative for color change and rash.   Neurological:  Positive for weakness. Negative for seizures and syncope.   All other systems reviewed and are negative.          Objective       ED Triage Vitals   Temperature Pulse Blood Pressure Respirations SpO2 Patient Position - Orthostatic VS   07/16/25 1613 07/16/25 1613 07/16/25 1613 07/16/25 1613 07/16/25 1613 07/16/25 1613   97.8 °F (36.6 °C) 57  113/63 20 96 % Lying      Temp src Heart Rate Source BP Location FiO2 (%) Pain Score    -- 07/16/25 1613 07/16/25 1613 -- 07/16/25 1927     Monitor Left arm  8      Vitals      Date and Time Temp Pulse SpO2 Resp BP Pain Score FACES Pain Rating User   07/16/25 1927 -- -- -- -- -- 8 -- TS   07/16/25 1823 97 °F (36.1 °C) 57 95 % 18 149/79 -- -- LB   07/16/25 1613 97.8 °F (36.6 °C) 57 96 % 20 113/63 -- -- TB            Physical Exam  Vitals and nursing note reviewed.   Constitutional:       General: She is not in acute distress.     Appearance: She is well-developed.   HENT:      Head: Normocephalic and atraumatic.     Eyes:      Conjunctiva/sclera: Conjunctivae normal.       Cardiovascular:      Rate and Rhythm: Normal rate and regular rhythm.      Heart sounds: No murmur heard.  Pulmonary:      Effort: Pulmonary effort is normal. No respiratory distress.      Breath sounds: Normal breath sounds.   Abdominal:      Palpations: Abdomen is soft.      Tenderness: There is no abdominal tenderness.     Musculoskeletal:         General: No swelling.      Cervical back: Neck supple.     Skin:     General: Skin is warm and dry.      Capillary Refill: Capillary refill takes less than 2 seconds.     Neurological:      Mental Status: She is alert.     Psychiatric:         Mood and Affect: Mood normal.         Results Reviewed       Procedure Component Value Units Date/Time    HS Troponin I 2hr [[id number]]  (Normal) Collected: 07/16/25 1854    Lab Status: Final result Specimen: Blood from Arm, Left Updated: 07/16/25 1926     hs TnI 2hr 6 ng/L      Delta 2hr hsTnI 1 ng/L     Sodium, urine, random [[id number]] Collected: 07/16/25 1854    Lab Status: Final result Specimen: Urine, Catheter Updated: 07/16/25 1914     Sodium, Ur 33.0 mmol/L     TSH [[id number]]  (Abnormal) Collected: 07/16/25 1643    Lab Status: Final result Specimen: Blood from Arm, Left Updated: 07/16/25 1821     TSH 3RD GENERATION 7.088 uIU/mL     Magnesium [[id number]]   (Normal) Collected: 07/16/25 1643    Lab Status: Final result Specimen: Blood from Arm, Left Updated: 07/16/25 1733     Magnesium 1.9 mg/dL     Phosphorus [[id number]]  (Normal) Collected: 07/16/25 1643    Lab Status: Final result Specimen: Blood from Arm, Left Updated: 07/16/25 1733     Phosphorus 3.1 mg/dL     HS Troponin 0hr (reflex protocol) [[id number]]  (Normal) Collected: 07/16/25 1643    Lab Status: Final result Specimen: Blood from Arm, Left Updated: 07/16/25 1713     hs TnI 0hr 5 ng/L     B-Type Natriuretic Peptide(BNP) [[id number]]  (Normal) Collected: 07/16/25 1643    Lab Status: Final result Specimen: Blood from Arm, Left Updated: 07/16/25 1712     BNP 56 pg/mL     Comprehensive metabolic panel [[id number]]  (Abnormal) Collected: 07/16/25 1643    Lab Status: Final result Specimen: Blood from Arm, Left Updated: 07/16/25 1707     Sodium 126 mmol/L      Potassium 3.6 mmol/L      Chloride 90 mmol/L      CO2 26 mmol/L      ANION GAP 10 mmol/L      BUN 12 mg/dL      Creatinine 0.96 mg/dL      Glucose 89 mg/dL      Calcium 9.4 mg/dL      AST 28 U/L      ALT 20 U/L      Alkaline Phosphatase 28 U/L      Total Protein 6.4 g/dL      Albumin 4.2 g/dL      Total Bilirubin 0.45 mg/dL      eGFR 56 ml/min/1.73sq m     Narrative:      [employer] guidelines for Chronic Kidney Disease (CKD):     Stage 1 with normal or high GFR (GFR > 90 mL/min/1.73 square meters)    Stage 2 Mild CKD (GFR = 60-89 mL/min/1.73 square meters)    Stage 3A Moderate CKD (GFR = 45-59 mL/min/1.73 square meters)    Stage 3B Moderate CKD (GFR = 30-44 mL/min/1.73 square meters)    Stage 4 Severe CKD (GFR = 15-29 mL/min/1.73 square meters)    Stage 5 End Stage CKD (GFR <15 mL/min/1.73 square meters)  Note: GFR calculation is accurate only with a steady state creatinine    CBC and differential [[id number]] Collected: 07/16/25 1643    Lab Status: Final result Specimen: Blood from Arm, Left Updated: 07/16/25 1650     WBC 6.11  Thousand/uL      RBC 4.40 Million/uL      Hemoglobin 12.1 g/dL      Hematocrit 36.8 %      MCV 84 fL      MCH 27.5 pg      MCHC 32.9 g/dL      RDW 14.5 %      MPV 10.9 fL      Platelets 167 Thousands/uL      nRBC 0 /100 WBCs      Segmented % 60 %      Immature Grans % 1 %      Lymphocytes % 25 %      Monocytes % 12 %      Eosinophils Relative 1 %      Basophils Relative 1 %      Absolute Neutrophils 3.76 Thousands/µL      Absolute Immature Grans 0.03 Thousand/uL      Absolute Lymphocytes 1.52 Thousands/µL      Absolute Monocytes 0.71 Thousand/µL      Eosinophils Absolute 0.06 Thousand/µL      Basophils Absolute 0.03 Thousands/µL             No orders to display       Procedures    ED Medication and Procedure Management   Prior to Admission Medications   Prescriptions Last Dose Informant Patient Reported? Taking?   Cholecalciferol (Vitamin D3) 50 MCG ( UT) capsule 2025 Self No Yes   Sig: TAKE 1 CAPSULE BY MOUTH ONCE DAILY   Euflexxa 20 MG/2ML SOSY Not Taking Self Yes No   Patient not taking: Reported on 2025   Menthol-Camphor  MG PTCH Unknown Self Yes No   Sig: Apply 1 patch topically every 12 (twelve) hours apply Right knee   Synvisc One 48 MG/6ML injection  Self Yes No   atorvastatin (LIPITOR) 80 mg tablet 7/15/2025 Self No Yes   Sig: TAKE ONE TABLET BY MOUTH ONCE DAILY   clopidogrel (PLAVIX) 75 mg tablet 2025 Self No Yes   Sig: TAKE ONE TABLET BY MOUTH ONCE DAILY   diltiazem (CARDIZEM CD) 240 mg 24 hr capsule 2025 Self No Yes   Sig: TAKE 1 CAPSULE BY MOUTH ONCE DAILY   ezetimibe (ZETIA) 10 mg tablet 2025 Self No Yes   Sig: TAKE ONE TABLET BY MOUTH ONCE DAILY   fluticasone (FLONASE) 50 mcg/act nasal spray 7/15/2025 Self No Yes   Si sprays into each nostril daily   folic acid (FOLVITE) 1 mg tablet Unknown Self No No   Sig: Take 1 tablet (1,000 mcg total) by mouth daily   hydroCHLOROthiazide 25 mg tablet 7/15/2025 Self No Yes   Sig: TAKE ONE TABLET BY MOUTH ONCE DAILY    hydroxychloroquine (PLAQUENIL) 200 mg tablet 7/15/2025 Self No Yes   Sig: Take 1 tablet (200 mg total) by mouth 2 (two) times a day with meals   levothyroxine 100 mcg tablet 7/16/2025 Self No Yes   Sig: Take 1 tablet (100 mcg total) by mouth daily   losartan (COZAAR) 100 MG tablet Unknown Self No No   Sig: TAKE ONE TABLET BY MOUTH ONCE DAILY   metoprolol succinate (TOPROL-XL) 50 mg 24 hr tablet Unknown Self No No   Sig: Take 1 tablet (50 mg total) by mouth daily   nitroglycerin (NITROSTAT) 0.4 mg SL tablet Unknown Self Yes No   Sig: Place 1 tablet under the tongue every 5 (five) minutes as needed for chest pain x 3 doses if chest pain persists call 911  not taking   ondansetron (ZOFRAN) 4 mg tablet Unknown  No No   Sig: Take 1 tablet (4 mg total) by mouth every 8 (eight) hours as needed for nausea or vomiting   pantoprazole (PROTONIX) 40 mg tablet Unknown Self No No   Sig: Take 1 tablet (40 mg total) by mouth 2 (two) times a day   potassium chloride (MICRO-K) 10 MEQ CR capsule Unknown Self No No   Sig: TAKE 1 CAPSULE BY MOUTH ONCE DAILY   predniSONE 5 mg tablet Not Taking Self No No   Sig: Take 1 tablet (5 mg total) by mouth daily   Patient not taking: Reported on 7/16/2025   pregabalin (LYRICA) 50 mg capsule Unknown Self No No   Sig: Take 1 capsule (50 mg total) by mouth daily at bedtime      Facility-Administered Medications: None     Current Discharge Medication List        CONTINUE these medications which have NOT CHANGED    Details   atorvastatin (LIPITOR) 80 mg tablet TAKE ONE TABLET BY MOUTH ONCE DAILY  Qty: 90 tablet, Refills: 1    Associated Diagnoses: Coronary artery disease of native artery of native heart with stable angina pectoris (HCC)      Cholecalciferol (Vitamin D3) 50 MCG (2000 UT) capsule TAKE 1 CAPSULE BY MOUTH ONCE DAILY  Qty: 90 capsule, Refills: 0    Associated Diagnoses: Vitamin D deficiency      clopidogrel (PLAVIX) 75 mg tablet TAKE ONE TABLET BY MOUTH ONCE DAILY  Qty: 90 tablet,  Refills: 1    Associated Diagnoses: Coronary artery disease of native artery of native heart with stable angina pectoris (HCC)      diltiazem (CARDIZEM CD) 240 mg 24 hr capsule TAKE 1 CAPSULE BY MOUTH ONCE DAILY  Qty: 90 capsule, Refills: 1    Associated Diagnoses: Essential hypertension      ezetimibe (ZETIA) 10 mg tablet TAKE ONE TABLET BY MOUTH ONCE DAILY  Qty: 90 tablet, Refills: 1    Associated Diagnoses: Hyperlipidemia, unspecified hyperlipidemia type      fluticasone (FLONASE) 50 mcg/act nasal spray 2 sprays into each nostril daily  Qty: 16 g, Refills: 5    Associated Diagnoses: Acute non-recurrent maxillary sinusitis      hydroCHLOROthiazide 25 mg tablet TAKE ONE TABLET BY MOUTH ONCE DAILY  Qty: 100 tablet, Refills: 1    Associated Diagnoses: Essential hypertension      hydroxychloroquine (PLAQUENIL) 200 mg tablet Take 1 tablet (200 mg total) by mouth 2 (two) times a day with meals  Qty: 60 tablet, Refills: 11    Associated Diagnoses: Inflammatory arthritis; Elevated C-reactive protein; Bilateral hip pain; Calcium pyrophosphate deposition disease (CPPD)      levothyroxine 100 mcg tablet Take 1 tablet (100 mcg total) by mouth daily  Qty: 90 tablet, Refills: 2    Associated Diagnoses: Acquired hypothyroidism      Euflexxa 20 MG/2ML SOSY       folic acid (FOLVITE) 1 mg tablet Take 1 tablet (1,000 mcg total) by mouth daily  Qty: 90 tablet, Refills: 12    Associated Diagnoses: Routine adult health maintenance      losartan (COZAAR) 100 MG tablet TAKE ONE TABLET BY MOUTH ONCE DAILY  Qty: 90 tablet, Refills: 1    Associated Diagnoses: Essential hypertension      Menthol-Camphor  MG PTCH Apply 1 patch topically every 12 (twelve) hours apply Right knee      metoprolol succinate (TOPROL-XL) 50 mg 24 hr tablet Take 1 tablet (50 mg total) by mouth daily  Qty: 100 tablet, Refills: 2    Associated Diagnoses: Essential hypertension      nitroglycerin (NITROSTAT) 0.4 mg SL tablet Place 1 tablet under the tongue every  5 (five) minutes as needed for chest pain x 3 doses if chest pain persists call 911  not taking      ondansetron (ZOFRAN) 4 mg tablet Take 1 tablet (4 mg total) by mouth every 8 (eight) hours as needed for nausea or vomiting  Qty: 20 tablet, Refills: 0    Associated Diagnoses: Nausea and vomiting, unspecified vomiting type      pantoprazole (PROTONIX) 40 mg tablet Take 1 tablet (40 mg total) by mouth 2 (two) times a day  Qty: 180 tablet, Refills: 1    Associated Diagnoses: Dyspepsia      potassium chloride (MICRO-K) 10 MEQ CR capsule TAKE 1 CAPSULE BY MOUTH ONCE DAILY  Qty: 90 capsule, Refills: 1    Associated Diagnoses: Hypokalemia      predniSONE 5 mg tablet Take 1 tablet (5 mg total) by mouth daily  Qty: 30 tablet, Refills: 11    Associated Diagnoses: Inflammatory arthritis; Calcium pyrophosphate deposition disease (CPPD)      pregabalin (LYRICA) 50 mg capsule Take 1 capsule (50 mg total) by mouth daily at bedtime  Qty: 90 capsule, Refills: 0    Associated Diagnoses: Neuropathic pain      Synvisc One 48 MG/6ML injection            No discharge procedures on file.  ED SEPSIS DOCUMENTATION   Time reflects when diagnosis was documented in both MDM as applicable and the Disposition within this note       Time User Action Codes Description Comment    7/16/2025  5:28 PM Ted Cassidy Add [E87.1] Hyponatremia     7/16/2025  5:28 PM Ted Cassidy Add [R53.1] Generalized weakness     7/16/2025  5:28 PM Ted Cassidy Add [R20.0,  R20.2] Numbness and tingling of both feet                      [1]   Past Medical History:  Diagnosis Date    Arthritis     rheumatoid    Disease of thyroid gland     Hyperlipidemia    [2]   Past Surgical History:  Procedure Laterality Date    BACK SURGERY      BREAST SURGERY      CARPAL TUNNEL RELEASE      CHOLECYSTECTOMY      HYSTERECTOMY      OOPHORECTOMY      ORTHOPEDIC SURGERY     [3]   Family History  Problem Relation Name Age of Onset    No Known Problems Mother      Lung cancer Father       Ovarian cancer Sister      Colon cancer Sister      Cancer Sister          bladder    No Known Problems Sister      No Known Problems Maternal Grandmother      No Known Problems Maternal Grandfather      No Known Problems Paternal Grandmother      No Known Problems Paternal Grandfather      No Known Problems Maternal Aunt      No Known Problems Paternal Aunt      No Known Problems Paternal Aunt      Breast cancer Cousin paternal 45   [4]   Social History  Tobacco Use    Smoking status: Former     Current packs/day: 0.00     Average packs/day: 0.1 packs/day for 52.0 years (5.2 ttl pk-yrs)     Types: Cigarettes     Start date:      Quit date: 2020     Years since quittin.5     Passive exposure: Never    Smokeless tobacco: Never   Vaping Use    Vaping status: Never Used   Substance Use Topics    Alcohol use: Not Currently    Drug use: Never        Ted Cassidy MD  [phone]

## 2025-07-16 NOTE — PLAN OF CARE
Problem: Potential for Falls  Goal: Patient will remain free of falls  Description: INTERVENTIONS:  - Educate patient/family on patient safety including physical limitations  - Instruct patient to call for assistance with activity   - Consider consulting OT/PT to assist with strengthening/mobility based on AM PAC & JH-HLM score  - Consult OT/PT to assist with strengthening/mobility   - Keep Call bell within reach  - Keep bed low and locked with side rails adjusted as appropriate  - Keep care items and personal belongings within reach  - Initiate and maintain comfort rounds  7/16/2025 1926 by Speedy Ornelas RN  Outcome: Progressing  7/16/2025 1925 by Speedy Ornelas, RN  Outcome: Progressing

## 2025-07-17 LAB
ANION GAP SERPL CALCULATED.3IONS-SCNC: 8 MMOL/L (ref 4–13)
ATRIAL RATE: 56 BPM
BASOPHILS # BLD AUTO: 0.02 THOUSANDS/ÂΜL (ref 0–0.1)
BASOPHILS NFR BLD AUTO: 0 % (ref 0–1)
BUN SERPL-MCNC: 9 MG/DL (ref 5–25)
CALCIUM SERPL-MCNC: 9.4 MG/DL (ref 8.4–10.2)
CHLORIDE SERPL-SCNC: 95 MMOL/L (ref 96–108)
CO2 SERPL-SCNC: 28 MMOL/L (ref 21–32)
CREAT SERPL-MCNC: 0.84 MG/DL (ref 0.6–1.3)
EOSINOPHIL # BLD AUTO: 0.06 THOUSAND/ÂΜL (ref 0–0.61)
EOSINOPHIL NFR BLD AUTO: 1 % (ref 0–6)
ERYTHROCYTE [DISTWIDTH] IN BLOOD BY AUTOMATED COUNT: 14.5 % (ref 11.6–15.1)
GFR SERPL CREATININE-BSD FRML MDRD: 66 ML/MIN/1.73SQ M
GLUCOSE SERPL-MCNC: 76 MG/DL (ref 65–140)
GLUCOSE SERPL-MCNC: 82 MG/DL (ref 65–140)
HCT VFR BLD AUTO: 36.6 % (ref 34.8–46.1)
HGB BLD-MCNC: 11.8 G/DL (ref 11.5–15.4)
IMM GRANULOCYTES # BLD AUTO: 0.03 THOUSAND/UL (ref 0–0.2)
IMM GRANULOCYTES NFR BLD AUTO: 1 % (ref 0–2)
LYMPHOCYTES # BLD AUTO: 1.41 THOUSANDS/ÂΜL (ref 0.6–4.47)
LYMPHOCYTES NFR BLD AUTO: 31 % (ref 14–44)
MCH RBC QN AUTO: 26.8 PG (ref 26.8–34.3)
MCHC RBC AUTO-ENTMCNC: 32.2 G/DL (ref 31.4–37.4)
MCV RBC AUTO: 83 FL (ref 82–98)
MONOCYTES # BLD AUTO: 0.57 THOUSAND/ÂΜL (ref 0.17–1.22)
MONOCYTES NFR BLD AUTO: 12 % (ref 4–12)
NEUTROPHILS # BLD AUTO: 2.51 THOUSANDS/ÂΜL (ref 1.85–7.62)
NEUTS SEG NFR BLD AUTO: 55 % (ref 43–75)
NRBC BLD AUTO-RTO: 0 /100 WBCS
P AXIS: 56 DEGREES
PLATELET # BLD AUTO: 168 THOUSANDS/UL (ref 149–390)
PMV BLD AUTO: 11.4 FL (ref 8.9–12.7)
POTASSIUM SERPL-SCNC: 3.1 MMOL/L (ref 3.5–5.3)
PR INTERVAL: 280 MS
QRS AXIS: -31 DEGREES
QRSD INTERVAL: 102 MS
QT INTERVAL: 548 MS
QTC INTERVAL: 529 MS
RBC # BLD AUTO: 4.4 MILLION/UL (ref 3.81–5.12)
SODIUM SERPL-SCNC: 131 MMOL/L (ref 135–147)
T WAVE AXIS: 38 DEGREES
VENTRICULAR RATE: 56 BPM
WBC # BLD AUTO: 4.6 THOUSAND/UL (ref 4.31–10.16)

## 2025-07-17 PROCEDURE — 85025 COMPLETE CBC W/AUTO DIFF WBC: CPT | Performed by: INTERNAL MEDICINE

## 2025-07-17 PROCEDURE — 80048 BASIC METABOLIC PNL TOTAL CA: CPT | Performed by: INTERNAL MEDICINE

## 2025-07-17 PROCEDURE — 93010 ELECTROCARDIOGRAM REPORT: CPT | Performed by: INTERNAL MEDICINE

## 2025-07-17 PROCEDURE — 99232 SBSQ HOSP IP/OBS MODERATE 35: CPT | Performed by: INTERNAL MEDICINE

## 2025-07-17 PROCEDURE — 82948 REAGENT STRIP/BLOOD GLUCOSE: CPT

## 2025-07-17 RX ORDER — SODIUM CHLORIDE 9 MG/ML
50 INJECTION, SOLUTION INTRAVENOUS CONTINUOUS
Status: DISCONTINUED | OUTPATIENT
Start: 2025-07-17 | End: 2025-07-18 | Stop reason: HOSPADM

## 2025-07-17 RX ORDER — POTASSIUM CHLORIDE 1500 MG/1
20 TABLET, EXTENDED RELEASE ORAL 2 TIMES DAILY
Status: DISCONTINUED | OUTPATIENT
Start: 2025-07-17 | End: 2025-07-18 | Stop reason: HOSPADM

## 2025-07-17 RX ORDER — DOCUSATE SODIUM 100 MG/1
100 CAPSULE, LIQUID FILLED ORAL 2 TIMES DAILY
Status: DISCONTINUED | OUTPATIENT
Start: 2025-07-17 | End: 2025-07-18 | Stop reason: HOSPADM

## 2025-07-17 RX ADMIN — DILTIAZEM HYDROCHLORIDE 240 MG: 240 CAPSULE, COATED, EXTENDED RELEASE ORAL at 08:13

## 2025-07-17 RX ADMIN — FLUTICASONE PROPIONATE 2 SPRAY: 50 SPRAY, METERED NASAL at 08:13

## 2025-07-17 RX ADMIN — HYDROXYCHLOROQUINE SULFATE 200 MG: 200 TABLET, FILM COATED ORAL at 16:48

## 2025-07-17 RX ADMIN — FOLIC ACID 1000 MCG: 1 TABLET ORAL at 08:11

## 2025-07-17 RX ADMIN — PANTOPRAZOLE SODIUM 40 MG: 40 TABLET, DELAYED RELEASE ORAL at 06:00

## 2025-07-17 RX ADMIN — LOSARTAN POTASSIUM 100 MG: 50 TABLET, FILM COATED ORAL at 08:11

## 2025-07-17 RX ADMIN — POTASSIUM CHLORIDE 10 MEQ: 750 TABLET, EXTENDED RELEASE ORAL at 08:11

## 2025-07-17 RX ADMIN — DOCUSATE SODIUM 100 MG: 100 CAPSULE, LIQUID FILLED ORAL at 18:09

## 2025-07-17 RX ADMIN — LEVOTHYROXINE SODIUM 100 MCG: 0.1 TABLET ORAL at 05:19

## 2025-07-17 RX ADMIN — ONDANSETRON 4 MG: 4 TABLET, ORALLY DISINTEGRATING ORAL at 17:33

## 2025-07-17 RX ADMIN — HYDROXYCHLOROQUINE SULFATE 200 MG: 200 TABLET, FILM COATED ORAL at 08:15

## 2025-07-17 RX ADMIN — ENOXAPARIN SODIUM 40 MG: 40 INJECTION SUBCUTANEOUS at 08:12

## 2025-07-17 RX ADMIN — PANTOPRAZOLE SODIUM 40 MG: 40 TABLET, DELAYED RELEASE ORAL at 16:48

## 2025-07-17 RX ADMIN — EZETIMIBE 10 MG: 10 TABLET ORAL at 08:11

## 2025-07-17 RX ADMIN — CHOLECALCIFEROL TAB 25 MCG (1000 UNIT) 2000 UNITS: 25 TAB at 08:11

## 2025-07-17 RX ADMIN — SODIUM CHLORIDE 50 ML/HR: 0.9 INJECTION, SOLUTION INTRAVENOUS at 11:07

## 2025-07-17 RX ADMIN — CLOPIDOGREL BISULFATE 75 MG: 75 TABLET, FILM COATED ORAL at 08:11

## 2025-07-17 RX ADMIN — METOPROLOL SUCCINATE 50 MG: 50 TABLET, EXTENDED RELEASE ORAL at 08:11

## 2025-07-17 RX ADMIN — POTASSIUM CHLORIDE 20 MEQ: 1500 TABLET, EXTENDED RELEASE ORAL at 18:09

## 2025-07-17 RX ADMIN — ONDANSETRON 4 MG: 4 TABLET, ORALLY DISINTEGRATING ORAL at 12:58

## 2025-07-17 RX ADMIN — ACETAMINOPHEN 975 MG: 325 TABLET ORAL at 21:20

## 2025-07-17 NOTE — NURSING NOTE
Pt complaining of new onset LUE numbness. Neuro assessment complete. Provider notified at this time. Awaiting further orders.

## 2025-07-17 NOTE — PROGRESS NOTES
Progress Note - Hospitalist   Name: Ariadna Jernigan 80 y.o. female I MRN: [id number]  Unit/Bed#: 5T -01 I Date of Admission: 7/16/2025   Date of Service: 7/17/2025 I Hospital Day: 0    Assessment & Plan  Hyponatremia  This is likely related to volume depletion plus hydrochlorothiazide.  It is improving with withdrawal of hydrochlorothiazide and IV fluid.  COVID-19 virus infection  The patient has completed Paxlovid therapy.  She is generally weak which is at least partly in the aftermath of COVID-19.  Overall, she seems to be recovering from this adequately.  CAD (coronary artery disease)  Currently asymptomatic.  Continue clopidogrel and appropriate risk factor modification.  Hypothyroidism  Continue thyroid hormone replacement.  Stage 3 chronic kidney disease (HCC)  Kidney function is stable.  Essential hypertension  Blood pressure is adequately controlled off of hydrochlorothiazide.  Hyperlipidemia  Continue current therapy.      Although the patient has improved, she is still dizzy and nauseous.  I do not think she is suitable for discharge today.  She will be converted to inpatient status.  Additional saline will be administered and we will monitor her sodium.        Subjective:  The patient feels weak and lightheaded.  She is nauseous.  Apparently she had some numbness in her arm overnight.  This has resolved.  Her strength has been good.  She has had no vertigo, diplopia, etc.  Denies chest pain or shortness of breath.    Physical Exam:   Temp:  [97 °F (36.1 °C)-98.7 °F (37.1 °C)] 98.7 °F (37.1 °C)  HR:  [56-57] 56  BP: (111-149)/(52-79) 130/52  Resp:  [16-20] 16  SpO2:  [95 %-96 %] 96 %  O2 Device: None (Room air)    Gen: Well-developed, in no distress.  Neck: Supple.  No lymphadenopathy, goiter, or bruit.  Heart: Regular rhythm.  I heard no murmur, gallop, or rub.  Lungs: Clear to auscultation and percussion.  No wheezing, rales, or rhonchi.  Abd: Soft with active bowel sounds.  No mass or  tenderness.  Extremities: No clubbing, cyanosis, or edema.  No calf tenderness.  Neuro: Alert and oriented.  No focal sign.  Skin: Warm and dry.      LABS:   CBC:   Lab Results   Component Value Date    WBC 4.60 07/17/2025    HGB 11.8 07/17/2025    HCT 36.6 07/17/2025    MCV 83 07/17/2025     07/17/2025    RBC 4.40 07/17/2025    MCH 26.8 07/17/2025    MCHC 32.2 07/17/2025    RDW 14.5 07/17/2025    MPV 11.4 07/17/2025    NRBC 0 07/17/2025   , CMP:   Lab Results   Component Value Date    SODIUM 131 (L) 07/17/2025    K 3.1 (L) 07/17/2025    CL 95 (L) 07/17/2025    CO2 28 07/17/2025    BUN 9 07/17/2025    CREATININE 0.84 07/17/2025    CALCIUM 9.4 07/17/2025    AST 28 07/16/2025    ALT 20 07/16/2025    ALKPHOS 28 (L) 07/16/2025    EGFR 66 07/17/2025       VTE Pharmacologic Prophylaxis: Enoxaparin (Lovenox)  VTE Mechanical Prophylaxis: sequential compression device

## 2025-07-17 NOTE — NURSING NOTE
Patient ambulated with walker and supervision by staff with a steady gait to and from the bathroom.

## 2025-07-17 NOTE — ASSESSMENT & PLAN NOTE
This is likely related to volume depletion plus hydrochlorothiazide.  It is improving with withdrawal of hydrochlorothiazide and IV fluid.

## 2025-07-17 NOTE — UTILIZATION REVIEW
Initial Clinical Review    Pt initially admitted as Observation on 7/16. Changed to Inpatient on 7/17. Pt requiring continued stay d/t hyponatremia.    Admission: Date/Time/Statement:   Admission Orders (From admission, onward)       Ordered        07/17/25 1100  INPATIENT ADMISSION  Once            07/16/25 1729  Place in Observation  Once                          Orders Placed This Encounter   Procedures    INPATIENT ADMISSION     Standing Status:   Standing     Number of Occurrences:   1     Level of Care:   Med Surg [16]     Estimated length of stay:   More than 2 Midnights     Certification:   I certify that inpatient services are medically necessary for this patient for a duration of greater than two midnights. See H&P and MD Progress Notes for additional information about the patient's course of treatment.     ED Arrival Information       Expected   -    Arrival   7/16/2025 16:02    Acuity   Urgent              Means of arrival   Walk-In    Escorted by   Family Member  (daughter)    Service   Hospitalist    Admission type   Emergency              Arrival complaint   weakness             Chief Complaint   Patient presents with    Weakness - Generalized     Daughter states 7/4/25 (+)COVID.  C/o weakness/fatigue and joint pain.  No fevers/coughing/sob       Initial Presentation: 80 y.o. female who presented self from home to [facility] ED.  Pertinent PMHx: arthritis, HLD, HTN, CAD, CKD S3, hypothyroidism. Presented COVID-19 positive since 7/11. Notes progressive weakness, tingling in feet. Na 126. EXAM: dry mucous membranes. Admitted as Observation for evaluation and treatment of hyponatremia. PLAN: hold HCTZ, IVF, trend Na, continue other current meds including Paxlovid course.     Date: 07/17/25   Day 2: Overnight, pt w/ new onset LUE weakness, numbness, tingling. Symptoms quickly resolved without intervention. NIHSS 0. This morning, pt with dizziness, nausea, lightheadedness, weakness. Na 131.  Plan: hold HCTZ, IVF, Trend labs, replete electrolytes as needed. Continue current meds.     Date: 07/18/25  Day 3: Has surpassed a 2nd midnight with active treatments and services. Endorses nausea. Exam: b/l LE edema. Plan: IVF, continue current meds, Trend labs, replete electrolytes as needed. I&O.       ED Treatment-Medication Administration from 07/16/2025 1602 to 07/16/2025 1754         Date/Time Order Dose Route Action     07/16/2025 1732 sodium chloride 0.9 % bolus 1,000 mL 1,000 mL Intravenous New Bag            Scheduled Medications:  Cholecalciferol, 2,000 Units, Oral, Daily  clopidogrel, 75 mg, Oral, Daily  diltiazem, 240 mg, Oral, Daily  docusate sodium, 100 mg, Oral, BID  enoxaparin, 40 mg, Subcutaneous, Daily  ezetimibe, 10 mg, Oral, Daily  fluticasone, 2 spray, Nasal, Daily  folic acid, 1,000 mcg, Oral, Daily  hydroxychloroquine, 200 mg, Oral, BID With Meals  levothyroxine, 100 mcg, Oral, Early Morning  losartan, 100 mg, Oral, Daily  metoprolol succinate, 50 mg, Oral, Daily  pantoprazole, 40 mg, Oral, BID AC  potassium chloride, 20 mEq, Oral, BID  pregabalin, 50 mg, Oral, HS    Continuous IV Infusions:  sodium chloride, 50 mL/hr, Intravenous, Continuous    PRN Meds:  acetaminophen, 975 mg, Oral, Q8H PRN; 7/16 x1, 7/17 x1  nitroglycerin, 0.4 mg, Sublingual, Q5 Min PRN  ondansetron, 4 mg, Oral, Q6H PRN; 7/17 x2    potassium chloride (Klor-Con M10) CR tablet 10 mEq  Dose: 10 mEq  Freq: Daily Route: PO  Start: 07/17/25 0900 End: 07/17/25 1036    ED Triage Vitals   Temperature Pulse Respirations Blood Pressure SpO2 Pain Score   07/16/25 1613 07/16/25 1613 07/16/25 1613 07/16/25 1613 07/16/25 1613 07/16/25 1927   97.8 °F (36.6 °C) 57 20 113/63 96 % 8     Weight (last 2 days)       Date/Time Weight    07/16/25 1823 80 (176.37)    07/16/25 1613 80 (176.37)            Vital Signs (last 3 days)       Date/Time Temp Pulse Resp BP MAP (mmHg) SpO2 O2 Device Patient Position - Orthostatic VS Jitendra Coma Scale  Score Pain    07/18/25 1118 97.1 °F (36.2 °C) 63 -- 121/64 83 97 % None (Room air) Lying -- --    07/18/25 0839 -- 70 -- 106/58 -- -- -- Standing - Orthostatic VS -- --    07/18/25 0838 -- 74 -- 121/63 -- -- -- Sitting - Orthostatic VS -- --    07/18/25 0837 -- 63 -- 111/54 -- -- -- Lying - Orthostatic VS -- --    07/18/25 0816 -- -- -- -- -- -- -- -- -- No Pain    07/18/25 0815 -- -- -- -- -- -- -- -- -- No Pain    07/18/25 0755 97.6 °F (36.4 °C) 66 -- 122/66 84 96 % -- Sitting -- --    07/18/25 0425 98.5 °F (36.9 °C) 61 18 114/54 -- 96 % None (Room air) Lying -- --    07/17/25 2220 -- -- -- -- -- -- -- -- 15 --    07/17/25 2120 -- -- -- -- -- -- -- -- -- 6    07/17/25 2112 97.8 °F (36.6 °C) 62 16 100/49 -- 98 % None (Room air) -- -- --    07/17/25 1609 97.5 °F (36.4 °C) 55 16 100/49 -- 98 % None (Room air) -- -- --    07/17/25 0900 -- -- -- -- -- -- -- -- 15 4    07/17/25 0713 98.7 °F (37.1 °C) 56 16 130/52 78 96 % None (Room air) Lying -- --    07/17/25 0351 97.5 °F (36.4 °C) 56 16 111/67 -- 95 % None (Room air) Lying -- --    07/17/25 0331 -- -- -- -- -- -- -- -- 15 --    07/16/25 2344 -- -- -- -- -- -- -- -- -- 8    07/16/25 1927 -- -- -- -- -- -- -- -- 15 8    07/16/25 1823 97 °F (36.1 °C) 57 18 149/79 -- 95 % None (Room air) Lying -- --    07/16/25 1644 -- -- -- -- -- -- None (Room air) -- 15 --    07/16/25 1613 97.8 °F (36.6 °C) 57 20 113/63 -- 96 % None (Room air) Lying -- --              Pertinent Labs/Diagnostic Test Results:   Cardiology:  ECG 12 lead   Final Result by Darrius Valdes DO (07/17 [id number])   Sinus bradycardia with 1st degree A-V block   Left axis deviation   Moderate voltage criteria for LVH, may be normal variant   Nonspecific T wave abnormality   Abnormal ECG   When compared with ECG of 29-Sep-2022 12:14,   QRS duration has increased   QT has lengthened   Confirmed by Darrius Valdes ([id number]) on 7/17/2025 7:59:12 AM            Results from last 7 days   Lab Units 07/17/25  0532  07/16/25  1643   WBC Thousand/uL 4.60 6.11   HEMOGLOBIN g/dL 11.8 12.1   HEMATOCRIT % 36.6 36.8   PLATELETS Thousands/uL 168 167   TOTAL NEUT ABS Thousands/µL 2.51 3.76         Results from last 7 days   Lab Units 07/18/25  0434 07/17/25  0532 07/16/25  1643   SODIUM mmol/L 136 131* 126*   POTASSIUM mmol/L 3.9 3.1* 3.6   CHLORIDE mmol/L 101 95* 90*   CO2 mmol/L 25 28 26   ANION GAP mmol/L 10 8 10   BUN mg/dL 8 9 12   CREATININE mg/dL 0.82 0.84 0.96   EGFR ml/min/1.73sq m 68 66 56   CALCIUM mg/dL 9.1 9.4 9.4   MAGNESIUM mg/dL  --   --  1.9   PHOSPHORUS mg/dL  --   --  3.1     Results from last 7 days   Lab Units 07/16/25  1643   AST U/L 28   ALT U/L 20   ALK PHOS U/L 28*   TOTAL PROTEIN g/dL 6.4   ALBUMIN g/dL 4.2   TOTAL BILIRUBIN mg/dL 0.45     Results from last 7 days   Lab Units 07/17/25  0346   POC GLUCOSE mg/dl 82     Results from last 7 days   Lab Units 07/18/25  0434 07/17/25  0532 07/16/25  1643   GLUCOSE RANDOM mg/dL 78 76 89     Results from last 7 days   Lab Units 07/16/25  1854 07/16/25  1643   HS TNI 0HR ng/L  --  5   HS TNI 2HR ng/L 6  --    HSTNI D2 ng/L 1  --      Results from last 7 days   Lab Units 07/16/25  1643   TSH 3RD GENERATON uIU/mL 7.088*     Results from last 7 days   Lab Units 07/16/25  1643   BNP pg/mL 56     Results from last 7 days   Lab Units 07/16/25  1854   SODIUM UR mmol/L 33.0               Past Medical History[1]  Present on Admission:   CAD (coronary artery disease)   Essential hypertension   Hypothyroidism   Stage 3 chronic kidney disease (HCC)   COVID-19 virus infection   Hyponatremia   Hyperlipidemia      Admitting Diagnosis: Hyponatremia [E87.1]  Weakness generalized [R53.1]  Generalized weakness [R53.1]  Numbness and tingling of both feet [R20.0, R20.2]  Age/Sex: 80 y.o. female    Network Utilization Review Department  ATTENTION: Please call with any questions or concerns to [phone] and carefully listen to the prompts so that you are directed to the right person. All  voicemails are confidential.   For Discharge needs, contact Care Management DC Support Team at [phone] opt. [phone]  Send all requests for admission clinical reviews, approved or denied determinations and any other requests to dedicated fax number below belonging to the campus where the patient is receiving treatment. List of dedicated fax numbers for the Facilities:  FACILITY NAME UR FAX NUMBER   ADMISSION DENIALS (Administrative/Medical Necessity) [phone]   DISCHARGE SUPPORT TEAM (NETWORK) [phone]   PARENT CHILD HEALTH (Maternity/NICU/Pediatrics) [phone]   [facility] [phone]   [facility] [phone]   [facility] [phone]   [facility] [phone]   [facility] [phone]   [facility] [phone]   [facility] [phone]   [facility] [phone]   [facility] [phone]   [facility] [phone]   [facility] [phone]   [facility] [phone]              [1]   Past Medical History:  Diagnosis Date    Arthritis     rheumatoid    Disease of thyroid gland     Hyperlipidemia

## 2025-07-17 NOTE — QUICK NOTE
Alerted by nursing that patient had new onset left upper extremity weakness with numbness and tingling.  Family medicine evaluated patient due to new findings.  Neuroexam nonfocal, GCS 15, symptoms currently resolved.  Patient does have history of cervical radiculopathy reports symptoms are similar to her symptoms of cervical radiculopathy and believes it is more positional.  Per family medicine evaluation NIH of 0.  Will continue to monitor neuroexam.  Will hold off on neuroimaging at this time.

## 2025-07-17 NOTE — ASSESSMENT & PLAN NOTE
The patient has completed Paxlovid therapy.  She is generally weak which is at least partly in the aftermath of COVID-19.  Overall, she seems to be recovering from this adequately.

## 2025-07-17 NOTE — CASE MANAGEMENT
Case Management Assessment & Discharge Planning Note    Patient name Ariadna Jernigan  Location 5T /5T -01 MRN [id number]  : 1945 Date 2025       Current Admission Date: 2025  Current Admission Diagnosis:Hyponatremia   Patient Active Problem List    Diagnosis Date Noted    Hyponatremia 2025    COVID-19 virus infection 2025    Other chest pain 2025    Instability of knee joint, right 11/15/2024    Dysuria 2024    Lumbar radiculitis 2024    Hypokalemia 2024    Spinal stenosis of thoracic region 2024    Right knee pain 06/10/2024    Ambulatory dysfunction 06/10/2024    Voice fatigue 2024    Dyspepsia 2024    Vitamin D deficiency 2024    Seronegative inflammatory arthritis 2024    Class 2 severe obesity due to excess calories with serious comorbidity and body mass index (BMI) of 35.0 to 35.9 in adult (HCC) 2023    Need for pneumococcal 20-valent conjugate vaccination 2023    Cervical spinal stenosis 2023    Thoracic radiculopathy 2023    Stage 3 chronic kidney disease (HCC) 2023    Pseudogout involving multiple joints 2023    Chronic pain of both shoulders 10/19/2022    Polyarthropathy 10/19/2022    Cervical radiculopathy 2022    Lumbar post-laminectomy syndrome 2022    Bilateral carotid artery stenosis 2019    Essential hypertension 2018    CAD (coronary artery disease) 2018    Hyperlipidemia 2018    Absence of bladder continence 2013    Osteoarthritis of knee 2012    Hypothyroidism 2012      LOS (days): 0  Geometric Mean LOS (GMLOS) (days):   Days to GMLOS:     OBJECTIVE:    Risk of Unplanned Readmission Score: 13.5       Current admission status: Inpatient     Preferred Pharmacy:   [facility] - [facility] - [address]  [address]  [place] [address]  Phone: [phone] Fax:  [phone]    Primary Care Provider: Olivier Atkins MD    Primary Insurance: [employer]  REP  Secondary Insurance: [employer] [facility]    ASSESSMENT:  Active Health Care Proxies    There are no active Health Care Proxies on file.       Readmission Root Cause  30 Day Readmission: No    Patient Information  Admitted from:: Home  Mental Status: Alert  During Assessment patient was accompanied by: Not accompanied during assessment  Assessment information provided by:: Daughter  Primary Caregiver: Self  Support Systems: Children, Self, Spouse/significant other, Daughter, Family members  County of Residence: [place]  What city do you live in?: [place]  Home entry access options. Select all that apply.: No steps to enter home  Type of Current Residence: 2 story home  Upon entering residence, is there a bedroom on the main floor (no further steps)?: No  A bedroom is located on the following floor levels of residence (select all that apply):: 2nd Floor  Upon entering residence, is there a bathroom on the main floor (no further steps)?: Yes  Number of steps to 2nd floor from main floor: One Flight  Living Arrangements: Lives w/ Spouse/significant other  Is patient a ?: No    Activities of Daily Living Prior to Admission  Functional Status: Independent  Completes ADLs independently?: Yes  Ambulates independently?: Yes  Does patient use assisted devices?: Yes  Assisted Devices (DME) used: Straight Cane, Walker  Does patient currently own DME?: Yes  What DME does the patient currently own?: Straight Cane  Does patient have a history of Outpatient Therapy (PT/OT)?: No  Does the patient have a history of Short-Term Rehab?: No  Does patient have a history of HHC?: No  Does patient currently have HHC?: No      Patient Information Continued  Income Source: Unknown  Does patient have prescription coverage?: Yes  Can the patient afford their medications and any related supplies (such  as glucometers or test strips)?: N/A  Does patient receive dialysis treatments?: No  Does patient have a history of substance abuse?: No  Does patient have a history of Mental Health Diagnosis?: No         Means of Transportation  Means of Transport to Appts:: Family transport          DISCHARGE DETAILS:    Discharge planning discussed with:: Patient  Freedom of Choice: Yes     CM contacted family/caregiver?: Yes  Were Treatment Team discharge recommendations reviewed with patient/caregiver?: Yes  Did patient/caregiver verbalize understanding of patient care needs?: Yes  Were patient/caregiver advised of the risks associated with not following Treatment Team discharge recommendations?: Yes    Contacts  Patient Contacts: Yessica Reyes (Daughter)  [phone] (Home Phone)  Relationship to Patient:: Family  Contact Method: Phone  Phone Number: Yessica Reyes  Daughter  [phone]  Reason/Outcome: Emergency Contact, Discharge Planning    Treatment Team Recommendation: Home  Expected Discharge Disposition: Home or Self Care  Additional Discharge Dispositions: Home or Self Care  Transport at Discharge : Family      Additional Comments: Patient in COVID isolation.  Call to daughter and CM assessment completed.  Provided drtr with email and information an Waiver Services.  CM department following thru discharge

## 2025-07-17 NOTE — H&P
Unit/Bed#: 5T -01 Encounter: [id number]    Complaint: Generalized weakness and tingling feet.    History of Present Illness     HPI:  Ariadna Jernigan is a 80 y.o. female who was recently diagnosed with COVID-19.  She was started on Paxlovid.  Over the past day or so she has been feeling progressively weak.  She has developed some tingling in her feet.  Because of this she came to the emergency room for further evaluation.  She was discovered to have a sodium of 126.  She was admitted for further evaluation and care.    The patient's past medical history is positive for hypertension.  She has a history of coronary artery disease and carotid stenosis.  She has a history of peripheral vascular disease.  She has hypothyroidism.  She has chronic kidney disease stage III.  She has a history of hyperlipidemia.  The patient has no history of congestive heart failure, cardiac arrhythmias, COPD, peptic ulcer disease, or liver disease.    The patient's medications at the time of admission include:  Atorvastatin 80 mg daily which has been on hold since Paxlovid was started  Vitamin D 2000 units daily  Clopidogrel 75 mg daily  Diltiazem 240 mg daily  Zetia 10 mg daily  Fluticasone 2 puffs each nostril daily  Folic acid 1 mg daily  Hydrochlorothiazide 25 mg daily  Hydroxychloroquine 200 milligrams twice daily  Levothyroxine 100 mcg daily  Losartan 100 mg daily  Metoprolol succinate 50 mg daily  Pantoprazole 40 mg twice daily  Potassium chloride 10 mill equivalents daily  Pregabalin 50 mg at bedtime    The patient is intolerant to codeine and pregabalin.  Apparently her pregabalin intolerance is dose related.  She developed rash from Shingrix.    Family history is noncontributory.    Social history reveals that the patient either smokes, drinks, or uses illicit drugs.    Review of systems was taken in detail and was negative except as mentioned above.      Objective   Vitals: Blood pressure 149/79, pulse 57, temperature  (!) 97 °F (36.1 °C), resp. rate 18, height 5' (1.524 m), weight 80 kg (176 lb 5.9 oz), SpO2 95%, not currently breastfeeding.    Physical Exam   HEENT: PERRLA, EOMI, sclera anicteric, dry mucous membranes, tongue mucosa dry without lesions.  Neck: supple, no JVD, lymphadenopathy, thyromegaly.  Heart: Regular rate and rhythm, S1S2 present.  No murmur, rub or gallop.    Lungs; Clear to auscultation bilaterally.  No wheezing, crackles or rhonchi.  No accessory muscle use or respiratory distress.  Abdomen: soft, non-tender, non-distended, NABS.  No guarding or rebound. No peritoneal sound or mass.  Extremities: no clubbing, cyanosis, or edema.  2+ pedal pulses bilaterally.  Full range of motion.  Neurologic:  Cranial nerves II-XII intact.  Strength and sensation globally intact. Speech fluent and goal directed.  Awake, alert and oriented x 3.  Skin: warm and dry. No petechiae, purpura or rash.    Lab Results:   Results for orders placed or performed during the hospital encounter of 07/16/25   CBC and differential   Result Value Ref Range    WBC 6.11 4.31 - 10.16 Thousand/uL    RBC 4.40 3.81 - 5.12 Million/uL    Hemoglobin 12.1 11.5 - 15.4 g/dL    Hematocrit 36.8 34.8 - 46.1 %    MCV 84 82 - 98 fL    MCH 27.5 26.8 - 34.3 pg    MCHC 32.9 31.4 - 37.4 g/dL    RDW 14.5 11.6 - 15.1 %    MPV 10.9 8.9 - 12.7 fL    Platelets 167 149 - 390 Thousands/uL    nRBC 0 /100 WBCs    Segmented % 60 43 - 75 %    Immature Grans % 1 0 - 2 %    Lymphocytes % 25 14 - 44 %    Monocytes % 12 4 - 12 %    Eosinophils Relative 1 0 - 6 %    Basophils Relative 1 0 - 1 %    Absolute Neutrophils 3.76 1.85 - 7.62 Thousands/µL    Absolute Immature Grans 0.03 0.00 - 0.20 Thousand/uL    Absolute Lymphocytes 1.52 0.60 - 4.47 Thousands/µL    Absolute Monocytes 0.71 0.17 - 1.22 Thousand/µL    Eosinophils Absolute 0.06 0.00 - 0.61 Thousand/µL    Basophils Absolute 0.03 0.00 - 0.10 Thousands/µL   Comprehensive metabolic panel   Result Value Ref Range    Sodium  "126 (L) 135 - 147 mmol/L    Potassium 3.6 3.5 - 5.3 mmol/L    Chloride 90 (L) 96 - 108 mmol/L    CO2 26 21 - 32 mmol/L    ANION GAP 10 4 - 13 mmol/L    BUN 12 5 - 25 mg/dL    Creatinine 0.96 0.60 - 1.30 mg/dL    Glucose 89 65 - 140 mg/dL    Calcium 9.4 8.4 - 10.2 mg/dL    AST 28 13 - 39 U/L    ALT 20 7 - 52 U/L    Alkaline Phosphatase 28 (L) 34 - 104 U/L    Total Protein 6.4 6.4 - 8.4 g/dL    Albumin 4.2 3.5 - 5.0 g/dL    Total Bilirubin 0.45 0.20 - 1.00 mg/dL    eGFR 56 ml/min/1.73sq m   HS Troponin 0hr (reflex protocol)   Result Value Ref Range    hs TnI 0hr 5 \"Refer to ACS Flowchart\"- see link ng/L   B-Type Natriuretic Peptide(BNP)   Result Value Ref Range    BNP 56 0 - 100 pg/mL   HS Troponin I 2hr   Result Value Ref Range    hs TnI 2hr 6 \"Refer to ACS Flowchart\"- see link ng/L    Delta 2hr hsTnI 1 <20 ng/L   Magnesium   Result Value Ref Range    Magnesium 1.9 1.9 - 2.7 mg/dL   Phosphorus   Result Value Ref Range    Phosphorus 3.1 2.3 - 4.1 mg/dL   TSH   Result Value Ref Range    TSH 3RD GENERATION 7.088 (H) 0.450 - 4.500 uIU/mL   Sodium, urine, random   Result Value Ref Range    Sodium, Ur 33.0 mmol/L     *Note: Due to a large number of results and/or encounters for the requested time period, some results have not been displayed. A complete set of results can be found in Results Review.     Assessment:  1.  Hyponatremia  2.  Hypertension  3.  Hyperlipidemia  4.  Coronary artery disease  5.  Carotid artery disease  6.  Peripheral vascular disease  7.  Hypothyroidism with mildly elevated TSH  8.  Acute COVID-19  9.  Paresthesias, likely related to Paxlovid    Plan:  I suspect that the patient's hyponatremia is related to diuretic therapy coupled with her acute illness.  Hydrochlorothiazide will be stopped.  She will be gently hydrated with intravenous fluid.  Her sodium will be monitored carefully.  Paxlovid will be curtailed.  Assuming that her sodium response satisfactorily, I do not believe that any immediate " change in her thyroid hormone replacement is needed.  Repeat TSH measurement in several weeks would be advisable.    The patient has been admitted to observation status.  Obviously, the length of her hospitalization will depend on the response of her sodium to the above-mentioned interventions.        Code Status: Level 1 - Full Code

## 2025-07-18 VITALS
HEIGHT: 60 IN | DIASTOLIC BLOOD PRESSURE: 64 MMHG | TEMPERATURE: 97.1 F | RESPIRATION RATE: 18 BRPM | SYSTOLIC BLOOD PRESSURE: 121 MMHG | HEART RATE: 63 BPM | WEIGHT: 176.37 LBS | OXYGEN SATURATION: 97 % | BODY MASS INDEX: 34.63 KG/M2

## 2025-07-18 LAB
ANION GAP SERPL CALCULATED.3IONS-SCNC: 10 MMOL/L (ref 4–13)
BUN SERPL-MCNC: 8 MG/DL (ref 5–25)
CALCIUM SERPL-MCNC: 9.1 MG/DL (ref 8.4–10.2)
CHLORIDE SERPL-SCNC: 101 MMOL/L (ref 96–108)
CO2 SERPL-SCNC: 25 MMOL/L (ref 21–32)
CREAT SERPL-MCNC: 0.82 MG/DL (ref 0.6–1.3)
GFR SERPL CREATININE-BSD FRML MDRD: 68 ML/MIN/1.73SQ M
GLUCOSE SERPL-MCNC: 78 MG/DL (ref 65–140)
POTASSIUM SERPL-SCNC: 3.9 MMOL/L (ref 3.5–5.3)
SODIUM SERPL-SCNC: 136 MMOL/L (ref 135–147)

## 2025-07-18 PROCEDURE — 80048 BASIC METABOLIC PNL TOTAL CA: CPT | Performed by: INTERNAL MEDICINE

## 2025-07-18 PROCEDURE — 99239 HOSP IP/OBS DSCHRG MGMT >30: CPT | Performed by: INTERNAL MEDICINE

## 2025-07-18 RX ORDER — ONDANSETRON 4 MG/1
4 TABLET, ORALLY DISINTEGRATING ORAL EVERY 6 HOURS PRN
Qty: 20 TABLET | Refills: 0 | Status: SHIPPED | OUTPATIENT
Start: 2025-07-18

## 2025-07-18 RX ADMIN — CLOPIDOGREL BISULFATE 75 MG: 75 TABLET, FILM COATED ORAL at 08:13

## 2025-07-18 RX ADMIN — CHOLECALCIFEROL TAB 25 MCG (1000 UNIT) 2000 UNITS: 25 TAB at 08:12

## 2025-07-18 RX ADMIN — FLUTICASONE PROPIONATE 2 SPRAY: 50 SPRAY, METERED NASAL at 08:19

## 2025-07-18 RX ADMIN — FOLIC ACID 1000 MCG: 1 TABLET ORAL at 08:13

## 2025-07-18 RX ADMIN — METOPROLOL SUCCINATE 50 MG: 50 TABLET, EXTENDED RELEASE ORAL at 08:13

## 2025-07-18 RX ADMIN — PANTOPRAZOLE SODIUM 40 MG: 40 TABLET, DELAYED RELEASE ORAL at 06:00

## 2025-07-18 RX ADMIN — ONDANSETRON 4 MG: 4 TABLET, ORALLY DISINTEGRATING ORAL at 13:04

## 2025-07-18 RX ADMIN — LOSARTAN POTASSIUM 100 MG: 50 TABLET, FILM COATED ORAL at 08:13

## 2025-07-18 RX ADMIN — EZETIMIBE 10 MG: 10 TABLET ORAL at 08:13

## 2025-07-18 RX ADMIN — LEVOTHYROXINE SODIUM 100 MCG: 0.1 TABLET ORAL at 06:00

## 2025-07-18 RX ADMIN — DILTIAZEM HYDROCHLORIDE 240 MG: 240 CAPSULE, COATED, EXTENDED RELEASE ORAL at 08:14

## 2025-07-18 RX ADMIN — HYDROXYCHLOROQUINE SULFATE 200 MG: 200 TABLET, FILM COATED ORAL at 08:13

## 2025-07-18 RX ADMIN — POTASSIUM CHLORIDE 20 MEQ: 1500 TABLET, EXTENDED RELEASE ORAL at 08:13

## 2025-07-18 RX ADMIN — DOCUSATE SODIUM 100 MG: 100 CAPSULE, LIQUID FILLED ORAL at 08:13

## 2025-07-18 NOTE — DISCHARGE SUMMARY
Discharge Summary - Ariadna Jernigan, 1945, [id number]        Admission Date: 7/16/2025  Discharge Date: 7/18/2025    Discharge Diagnosis:   1.  Hyponatremia  2.  Mild dehydration  3.  Recent COVID-19  4.  Paresthesias secondary to Paxlovid  5.  Coronary artery disease  6.  Hypothyroidism  7.  Chronic kidney disease stage III  8.  Hypertension  9.  Hyperlipidemia    Consulting Physicians:  None    Procedures Performed:   None    HPI: The patient is a 80-year-old woman who was recently diagnosed with COVID-19 and started on Paxlovid.  Over the past few days she has become progressively weak.  She has had some tingling in her feet.  She has not been eating and drinking well.  Because of this she came to the emergency room and was found to have a sodium of 126.  She has been on hydrochlorothiazide.    Hospital Course: The patient was admitted to the hospital and monitored carefully.  She was gently hydrated with intravenous fluid.  Hydrochlorothiazide was withdrawn.  The patient's sodium went up to 131 and then to 136.  She did feel better.    As mentioned, the patient recently had COVID-19.  She finished Paxlovid before her admission.  I suspect that some of her generalized weakness was related to COVID-19.  Her sodium was accentuating this undoubtedly.  I suspect that some of her ill feeling was also related to Paxlovid.  In particular, she described paresthesias in her feet which resolved during her hospitalization.    The patient's other medical illnesses remained stable during her stay.    At the time of discharge the patient was feeling significantly better.  She was stronger.  She still had occasional nausea.  Vital signs were stable.  Lungs were clear.  Cardiac exam revealed a regular rhythm.  The abdomen was soft and nontender.  There was no edema.    Disposition: The patient was discharged home on July 18.  Diet and activity will be as tolerated.  She was asked to contact the office of Dr. Atkins to  arrange primary care follow-up within 1 week.    Discharge instructions/Information to patient and family:   See after visit summary for information provided to patient and family.      Provisions for Follow-Up Care:  See after visit summary for information related to follow-up care and any pertinent home health orders.      Planned Readmission: No    Discharge Statement   I spent 40 minutes discharging the patient. This time was spent on the day of discharge. I had direct contact with the patient on the day of discharge.     Discharge Medications:  See after visit summary for reconciled discharge medications provided to patient and family.

## 2025-07-18 NOTE — UTILIZATION REVIEW
NOTIFICATION OF INPATIENT MEDICAL ADMISSION   AUTHORIZATION REQUEST   SERVICING FACILITY:   [address] [address]  Tax ID: [id number]  NPI: [id number] ATTENDING PROVIDER:  Attending Name and NPI#: Alex Campo Md [[id number]]  Address: [address] [place] [id number]  Phone: [phone]     ADMISSION INFORMATION:  Place of Service: Inpatient [facility]  Place of Service Code: 21  Inpatient Admission Date/Time: 7/17/25 11:00 AM  Discharge Date/Time: 7/18/2025  1:25 PM  Admitting Diagnosis Code/Description:  Hyponatremia [E87.1]  Weakness generalized [R53.1]  Generalized weakness [R53.1]  Numbness and tingling of both feet [R20.0, R20.2]     UTILIZATION REVIEW CONTACT:  Yuly Esparza, Utilization   Network Utilization Review Department  Phone: [phone] Fax: [phone]  Email: [email or web address]  Contact for approvals/pending authorizations, clinical reviews, and discharge.     PHYSICIAN ADVISORY SERVICES:  Medical Necessity Denial & Rqcb-ae-Ettm Review  Phone: [phone]  Fax: [phone]  Email: [email or web address]     DISCHARGE SUPPORT TEAM:  For Patients Discharge Needs & Updates  Phone: [phone] opt. 2 Fax: [phone]  Email: [email or web address]

## 2025-07-21 ENCOUNTER — OFFICE VISIT (OUTPATIENT)
Dept: PAIN MEDICINE | Facility: MEDICAL CENTER | Age: 80
End: 2025-07-21
Payer: MEDICARE

## 2025-07-21 VITALS — WEIGHT: 176 LBS | BODY MASS INDEX: 33.23 KG/M2 | HEIGHT: 61 IN

## 2025-07-21 DIAGNOSIS — G89.29 CHRONIC BILATERAL LOW BACK PAIN WITH BILATERAL SCIATICA: ICD-10-CM

## 2025-07-21 DIAGNOSIS — M79.2 NEUROPATHIC PAIN: ICD-10-CM

## 2025-07-21 DIAGNOSIS — M54.41 CHRONIC BILATERAL LOW BACK PAIN WITH BILATERAL SCIATICA: ICD-10-CM

## 2025-07-21 DIAGNOSIS — M96.1 LUMBAR POST-LAMINECTOMY SYNDROME: ICD-10-CM

## 2025-07-21 DIAGNOSIS — M54.16 LUMBAR RADICULITIS: Primary | ICD-10-CM

## 2025-07-21 DIAGNOSIS — M54.42 CHRONIC BILATERAL LOW BACK PAIN WITH BILATERAL SCIATICA: ICD-10-CM

## 2025-07-21 PROCEDURE — 99214 OFFICE O/P EST MOD 30 MIN: CPT

## 2025-07-21 PROCEDURE — G2211 COMPLEX E/M VISIT ADD ON: HCPCS

## 2025-07-21 RX ORDER — PREGABALIN 50 MG/1
50 CAPSULE ORAL
Qty: 90 CAPSULE | Refills: 0 | Status: SHIPPED | OUTPATIENT
Start: 2025-07-21

## 2025-07-21 NOTE — UTILIZATION REVIEW
NOTIFICATION OF ADMISSION DISCHARGE   This is a Notification of Discharge from [facility]. Please be advised that this patient has been discharge from our facility. Below you will find the admission and discharge date and time including the patient’s disposition.   UTILIZATION REVIEW CONTACT:  Utilization Review Assistants  Network Utilization Review Department  Phone: [phone] x carefully listen to the prompts. All voicemails are confidential.  Email: [email or web address]     ADMISSION INFORMATION  PRESENTATION DATE: 7/16/2025  4:08 PM  OBERVATION ADMISSION DATE: 07/16/2025 1729  INPATIENT ADMISSION DATE: 7/17/25 11:00 AM   DISCHARGE DATE: 7/18/2025  1:25 PM   DISPOSITION:Home/Self Care    Network Utilization Review Department  ATTENTION: Please call with any questions or concerns to [phone] and carefully listen to the prompts so that you are directed to the right person. All voicemails are confidential.   For Discharge needs, contact Care Management DC Support Team at [phone] opt. 2  Send all requests for admission clinical reviews, approved or denied determinations and any other requests to dedicated fax number below belonging to the campus where the patient is receiving treatment. List of dedicated fax numbers for the Facilities:  FACILITY NAME UR FAX NUMBER   ADMISSION DENIALS (Administrative/Medical Necessity) [phone]   DISCHARGE SUPPORT TEAM ([facility]) [phone]   PARENT CHILD HEALTH (Maternity/NICU/Pediatrics) [phone]   [facility] [phone]   [facility] [phone]   [facility] [phone]   [facility] [phone]   On license of [facility] [phone]   [facility] [phone]   [facility] [phone]   [facility] [phone]    [facility] [phone]   [facility] [phone]   [facility] [phone]   [facility] [phone]

## 2025-07-21 NOTE — PROGRESS NOTES
Name: Ariadna Jernigan      : 1945      MRN: [id number]  Encounter Provider: Mahi Escobar PA-C  Encounter Date: 2025   Encounter department: [facility] [place]  :  Assessment & Plan  Lumbar radiculitis    Orders:    FL spine and pain procedure; Future    Neuropathic pain    Orders:    pregabalin (LYRICA) 50 mg capsule; Take 1 capsule (50 mg total) by mouth daily at bedtime    Lumbar post-laminectomy syndrome         Chronic bilateral low back pain with bilateral sciatica           Patient is a candidate for bilateral L5-S1 TFESI.  Complete risks and benefits including hyperglycemia, bleeding, infection, local tissue reaction, nerve injury, and allergic reaction were discussed. The approach was demonstrated using models and literature was provided. The patient was agreeable, verbalized an understanding, and wishes to proceed with scheduling the procedure.  Will need to obtain anticoagulant hold prior to scheduling as patient takes Plavix daily.    Continue Lyrica 50 mg nightly as prescribed.  Patient states she tolerates this medication well without side effects.  Refills of this medication were sent to patient's pharmacy today.    Follow-up after injection, or sooner if needed.    [place] , [employer] #[id number], utilized via [employer] throughout patient encounter.    My impressions and treatment recommendations were discussed in detail with the patient who verbalized understanding and had no further questions.  Discharge instructions were provided. I personally saw and examined the patient and I agree with the above discussed plan of care.    History of Present Illness     Ariadna Jernigan is a 80 y.o. female with significant PMH of lumbar laminectomy who presents to [facility] for interval re-evaluation in regards to pain in the Low back. Patient presents today with pain along the bilateral aspect of the low back that radiates to bilateral lower extremities.  "Pain is described as Burning. Symptoms are worse sleeping and walking. Alleviating factors identifiable by the patient are laying down on a pillow. On the numeric pain scale of 1-10, the pain typically increases to max of 2 out of 10, which is currently impacting their quality of life and interferes with their activities of daily living.  Admits numbness, tingling, and radiating pain along bilateral lower extremities in an L5/S1 distribution.  Admits to the use of Lyrica 50 mg nightly and acetaminophen for symptom management.    Patient previously underwent right L5-S1 TFESI on 4/8/2025.  Patient reports experiencing approximately 90% symptom relief lasting approximately 3 months prior to her pain beginning to gradually return to baseline.    Review of Systems    Medical History Reviewed by provider this encounter:     .  Medications Ordered Prior to Encounter[1]      Objective   Ht 5' 1\" (1.549 m)   Wt 79.8 kg (176 lb)   BMI 33.25 kg/m²      Pain Score:   2  Physical Exam  Constitutional: normal, well developed, well nourished, alert, in no distress and non-toxic and no overt pain behavior.  Eyes: anicteric  HEENT: grossly intact  Neck: supple, symmetric, trachea midline and no masses   Pulmonary: even and unlabored  Cardiovascular: No edema or pitting edema present  Skin: Normal without rashes or lesions and well hydrated  Psychiatric: Mood and affect appropriate  Neurologic: Cranial Nerves II-XII grossly intact, diminished lower extremity strength bilaterally, negative seated straight leg raise bilaterally  Musculoskeletal: Ambulates with a cane, back and radiating bilateral leg pain in an L5 or S1 distribution    Radiology Results Review: I have reviewed radiology reports from 8/6/2024 including: MRI spine.       [1]   Current Outpatient Medications on File Prior to Visit   Medication Sig Dispense Refill    atorvastatin (LIPITOR) 80 mg tablet TAKE ONE TABLET BY MOUTH ONCE DAILY 90 tablet 1    Cholecalciferol " (Vitamin D3) 50 MCG (2000 UT) capsule TAKE 1 CAPSULE BY MOUTH ONCE DAILY 90 capsule 0    clopidogrel (PLAVIX) 75 mg tablet TAKE ONE TABLET BY MOUTH ONCE DAILY 90 tablet 1    diltiazem (CARDIZEM CD) 240 mg 24 hr capsule TAKE 1 CAPSULE BY MOUTH ONCE DAILY 90 capsule 1    ezetimibe (ZETIA) 10 mg tablet TAKE ONE TABLET BY MOUTH ONCE DAILY 90 tablet 1    fluticasone (FLONASE) 50 mcg/act nasal spray 2 sprays into each nostril daily 16 g 5    folic acid (FOLVITE) 1 mg tablet Take 1 tablet (1,000 mcg total) by mouth daily 90 tablet 12    hydroxychloroquine (PLAQUENIL) 200 mg tablet Take 1 tablet (200 mg total) by mouth 2 (two) times a day with meals 60 tablet 11    levothyroxine 100 mcg tablet Take 1 tablet (100 mcg total) by mouth daily 90 tablet 2    losartan (COZAAR) 100 MG tablet TAKE ONE TABLET BY MOUTH ONCE DAILY 90 tablet 1    Menthol-Camphor  MG PTCH Apply 1 patch topically every 12 (twelve) hours apply Right knee      metoprolol succinate (TOPROL-XL) 50 mg 24 hr tablet Take 1 tablet (50 mg total) by mouth daily 100 tablet 2    nitroglycerin (NITROSTAT) 0.4 mg SL tablet Place 1 tablet under the tongue every 5 (five) minutes as needed for chest pain x 3 doses if chest pain persists call 911  not taking      ondansetron (ZOFRAN-ODT) 4 mg disintegrating tablet Take 1 tablet (4 mg total) by mouth every 6 (six) hours as needed for nausea or vomiting 20 tablet 0    pantoprazole (PROTONIX) 40 mg tablet Take 1 tablet (40 mg total) by mouth 2 (two) times a day 180 tablet 1    potassium chloride (MICRO-K) 10 MEQ CR capsule TAKE 1 CAPSULE BY MOUTH ONCE DAILY 90 capsule 1    [DISCONTINUED] pregabalin (LYRICA) 50 mg capsule Take 1 capsule (50 mg total) by mouth daily at bedtime 90 capsule 0     No current facility-administered medications on file prior to visit.

## 2025-07-22 ENCOUNTER — TELEPHONE (OUTPATIENT)
Dept: RADIOLOGY | Facility: MEDICAL CENTER | Age: 80
End: 2025-07-22

## 2025-07-22 ENCOUNTER — OFFICE VISIT (OUTPATIENT)
Dept: FAMILY MEDICINE CLINIC | Facility: CLINIC | Age: 80
End: 2025-07-22
Payer: MEDICARE

## 2025-07-22 VITALS
WEIGHT: 177 LBS | DIASTOLIC BLOOD PRESSURE: 84 MMHG | HEIGHT: 61 IN | BODY MASS INDEX: 33.42 KG/M2 | HEART RATE: 62 BPM | SYSTOLIC BLOOD PRESSURE: 106 MMHG | TEMPERATURE: 98.3 F | OXYGEN SATURATION: 97 %

## 2025-07-22 DIAGNOSIS — K59.09 OTHER CONSTIPATION: ICD-10-CM

## 2025-07-22 DIAGNOSIS — U07.1 COVID-19 VIRUS INFECTION: ICD-10-CM

## 2025-07-22 DIAGNOSIS — E87.1 HYPONATREMIA: Primary | ICD-10-CM

## 2025-07-22 DIAGNOSIS — R10.13 DYSPEPSIA: ICD-10-CM

## 2025-07-22 PROCEDURE — 99496 TRANSJ CARE MGMT HIGH F2F 7D: CPT | Performed by: FAMILY MEDICINE

## 2025-07-22 RX ORDER — DOCUSATE SODIUM 100 MG/1
100 CAPSULE, LIQUID FILLED ORAL DAILY
Qty: 100 CAPSULE | Refills: 0 | Status: SHIPPED | OUTPATIENT
Start: 2025-07-22

## 2025-07-22 RX ORDER — FAMOTIDINE 20 MG/1
20 TABLET, FILM COATED ORAL 2 TIMES DAILY
Qty: 180 TABLET | Refills: 3 | Status: SHIPPED | OUTPATIENT
Start: 2025-07-22 | End: 2026-07-17

## 2025-07-22 NOTE — PROGRESS NOTES
Transition of Care Visit:  Name: Ariadna Jernigan      : 1945      MRN: [id number]  Encounter Provider: Olivier Atkins MD  Encounter Date: 2025   Encounter department: [facility]    Assessment & Plan  Hyponatremia  Status post recent hospitalization at the time of the discharge sodium back to normal 136 most probably secondary to COVID infection and dehydration       COVID-19 virus infection  Status post Paxlovid treatment improved symptomatically       Dyspepsia  Chronic continue famotidine proper use review  Orders:  •  famotidine (PEPCID) 20 mg tablet; Take 1 tablet (20 mg total) by mouth 2 (two) times a day    Other constipation    Orders:  •  docusate sodium (COLACE) 100 mg capsule; Take 1 capsule (100 mg total) by mouth in the morning         History of Present Illness     Transitional Care Management Review:   Ariadna Jernigan is a 80 y.o. female here for TCM follow up.     During the TCM phone call patient stated:  TCM Call (since 2025)     Date and time call was made  2025  2:46 PM    Patient was hospitialized at  [facility]    Date of Admission  25    Date of discharge  25    Diagnosis  Hyponatremia    Disposition  Home    Were the patients medications reviewed and updated  Yes    Current Symptoms  Fatigue      TCM Call (since 2025)     Post hospital issues  None    Scheduled for follow up?  Yes    Did you obtain your prescribed medications  Yes    Do you need help managing your prescriptions or medications  No    Is transportation to your appointment needed  No    I have advised the patient to call PCP with any new or worsening symptoms  Billy FITZGERALD        [facility]  Patient here status post hospitalization  patient who diagnosed with well COVID and she had decreased oral intake and not hydrated while she was feeling weak and fatigued in the hospital found to have low sodium level with well hydration improved symptomatically and  "was stable to discharge home on July 18 hospital record test result reviewed with the patient medication review  Review of Systems   Constitutional:  Negative for activity change, appetite change, fatigue and fever.   HENT:  Negative for congestion, ear pain, sinus pressure, sinus pain and sore throat.    Eyes:  Negative for discharge and redness.   Respiratory:  Negative for cough, chest tightness and shortness of breath.    Cardiovascular:  Negative for chest pain, palpitations and leg swelling.   Gastrointestinal:  Negative for abdominal pain, constipation and diarrhea.   Genitourinary:  Negative for dysuria, flank pain, frequency and hematuria.   Skin:  Negative for pallor and rash.   Neurological:  Negative for dizziness, tremors, weakness, numbness and headaches.   Hematological:  Does not bruise/bleed easily.     Objective   /84   Pulse 62   Temp 98.3 °F (36.8 °C) (Tympanic)   Ht 5' 0.98\" (1.549 m)   Wt 80.3 kg (177 lb)   SpO2 97%   Breastfeeding No   BMI 33.46 kg/m²     Physical Exam  Vitals and nursing note reviewed.   Constitutional:       General: She is not in acute distress.     Appearance: She is well-developed. She is not diaphoretic.   HENT:      Head: Normocephalic.      Right Ear: Tympanic membrane and external ear normal.      Left Ear: Tympanic membrane and external ear normal.      Nose: No rhinorrhea.      Mouth/Throat:      Pharynx: No posterior oropharyngeal erythema.     Eyes:      General:         Right eye: No discharge.         Left eye: No discharge.      Conjunctiva/sclera: Conjunctivae normal.     Neck:      Vascular: No JVD.     Cardiovascular:      Rate and Rhythm: Normal rate and regular rhythm.      Heart sounds: Murmur heard.      No gallop.   Pulmonary:      Effort: Pulmonary effort is normal. No respiratory distress.      Breath sounds: Normal breath sounds. No wheezing.   Abdominal:      General: There is no distension.      Palpations: Abdomen is soft.      " Tenderness: There is no abdominal tenderness. There is no rebound.     Musculoskeletal:         General: No tenderness.      Cervical back: Normal range of motion and neck supple.   Lymphadenopathy:      Cervical: No cervical adenopathy.     Skin:     General: Skin is warm.      Findings: No rash.     Neurological:      Mental Status: She is alert and oriented to person, place, and time.       Medications have been reviewed by provider in current encounter

## 2025-07-22 NOTE — ASSESSMENT & PLAN NOTE
Status post recent hospitalization at the time of the discharge sodium back to normal 136 most probably secondary to COVID infection and dehydration

## 2025-07-22 NOTE — TELEPHONE ENCOUNTER
Patient is being considered for a neuraxial injection, such as an epidural injection, nerve root block, etc. for the management of their pain.  However, the patient is currently on an anticoagulant per your orders.  The [employer] Guideline of neuraxial procedures and anti-coagulation indicates that medication should be held as follows:        Plavix for 7 days prior to Transforaminal Epidural Steroid Injection    Dr Atkins- Can Patient hold her Plavix for 7 days prior to this procedure?    +++  B/L L5-S1 TFESI

## 2025-07-22 NOTE — ASSESSMENT & PLAN NOTE
Chronic continue famotidine proper use review  Orders:  •  famotidine (PEPCID) 20 mg tablet; Take 1 tablet (20 mg total) by mouth 2 (two) times a day

## 2025-07-23 NOTE — TELEPHONE ENCOUNTER
RN to contact pt w/ Plavix hold instruc below (7 day hold):      LD 8/4/25  HS 8/5/25  PROC DATE 8/12/25

## 2025-07-25 NOTE — TELEPHONE ENCOUNTER
RN s/w pt with assist of  # [id number]  Pt aware no plavix starting 8/5 up to and including 8/12 to restart plavix 24 hours after procedure.  Pt appreciative of information and will call back if any questions or concerns

## 2025-07-30 ENCOUNTER — OFFICE VISIT (OUTPATIENT)
Dept: FAMILY MEDICINE CLINIC | Facility: CLINIC | Age: 80
End: 2025-07-30
Payer: MEDICARE

## 2025-07-30 VITALS
HEART RATE: 72 BPM | SYSTOLIC BLOOD PRESSURE: 100 MMHG | HEIGHT: 60 IN | DIASTOLIC BLOOD PRESSURE: 60 MMHG | BODY MASS INDEX: 34.95 KG/M2 | TEMPERATURE: 97 F | OXYGEN SATURATION: 98 % | WEIGHT: 178 LBS

## 2025-07-30 DIAGNOSIS — R42 DIZZINESS: ICD-10-CM

## 2025-07-30 DIAGNOSIS — R42 VERTIGO: ICD-10-CM

## 2025-07-30 DIAGNOSIS — I10 ESSENTIAL HYPERTENSION: Primary | Chronic | ICD-10-CM

## 2025-07-30 PROCEDURE — 99214 OFFICE O/P EST MOD 30 MIN: CPT | Performed by: FAMILY MEDICINE

## 2025-07-30 PROCEDURE — G2211 COMPLEX E/M VISIT ADD ON: HCPCS | Performed by: FAMILY MEDICINE

## 2025-07-30 RX ORDER — HYDROCHLOROTHIAZIDE 25 MG/1
TABLET ORAL
COMMUNITY
Start: 2025-07-21 | End: 2025-07-30

## 2025-07-30 RX ORDER — MECLIZINE HCL 12.5 MG 12.5 MG/1
12.5 TABLET ORAL
Qty: 30 TABLET | Refills: 0 | Status: SHIPPED | OUTPATIENT
Start: 2025-07-30

## 2025-08-01 DIAGNOSIS — R42 VERTIGO: ICD-10-CM

## 2025-08-01 PROCEDURE — 93000 ELECTROCARDIOGRAM COMPLETE: CPT | Performed by: FAMILY MEDICINE

## 2025-08-03 PROBLEM — R42 DIZZINESS: Status: ACTIVE | Noted: 2025-08-03

## 2025-08-04 RX ORDER — MECLIZINE HCL 12.5 MG 12.5 MG/1
12.5 TABLET ORAL
Qty: 30 TABLET | Refills: 0 | OUTPATIENT
Start: 2025-08-04

## 2025-08-11 ENCOUNTER — TELEPHONE (OUTPATIENT)
Dept: RADIOLOGY | Facility: MEDICAL CENTER | Age: 80
End: 2025-08-11

## 2025-08-14 ENCOUNTER — OFFICE VISIT (OUTPATIENT)
Dept: OBGYN CLINIC | Facility: MEDICAL CENTER | Age: 80
End: 2025-08-14
Payer: MEDICARE

## 2025-08-15 ENCOUNTER — TELEPHONE (OUTPATIENT)
Age: 80
End: 2025-08-15

## 2025-08-22 DIAGNOSIS — M79.671 RIGHT FOOT PAIN: Primary | ICD-10-CM

## 2025-08-24 PROBLEM — M79.671 RIGHT FOOT PAIN: Status: ACTIVE | Noted: 2025-08-24

## 2025-08-24 PROBLEM — M25.474 SWELLING OF FOOT JOINT, RIGHT: Status: ACTIVE | Noted: 2025-08-24
